# Patient Record
Sex: FEMALE | Race: BLACK OR AFRICAN AMERICAN | NOT HISPANIC OR LATINO | Employment: UNEMPLOYED | URBAN - METROPOLITAN AREA
[De-identification: names, ages, dates, MRNs, and addresses within clinical notes are randomized per-mention and may not be internally consistent; named-entity substitution may affect disease eponyms.]

---

## 2021-09-21 ENCOUNTER — APPOINTMENT (OUTPATIENT)
Dept: CT IMAGING | Facility: HOSPITAL | Age: 31
End: 2021-09-21

## 2021-09-21 ENCOUNTER — HOSPITAL ENCOUNTER (INPATIENT)
Facility: HOSPITAL | Age: 31
LOS: 5 days | Discharge: HOME | End: 2021-09-27
Attending: EMERGENCY MEDICINE | Admitting: INTERNAL MEDICINE

## 2021-09-21 DIAGNOSIS — K85.20 ALCOHOL-INDUCED ACUTE PANCREATITIS WITHOUT INFECTION OR NECROSIS: Primary | ICD-10-CM

## 2021-09-21 DIAGNOSIS — R74.8 ELEVATED LIVER ENZYMES: ICD-10-CM

## 2021-09-21 DIAGNOSIS — R11.2 NON-INTRACTABLE VOMITING WITH NAUSEA, UNSPECIFIED VOMITING TYPE: ICD-10-CM

## 2021-09-21 LAB
ALBUMIN SERPL-MCNC: 4.6 G/DL (ref 3.5–5.2)
ALBUMIN/GLOB SERPL: 1.6 G/DL
ALP SERPL-CCNC: 82 U/L (ref 39–117)
ALT SERPL W P-5'-P-CCNC: 84 U/L (ref 1–33)
ANION GAP SERPL CALCULATED.3IONS-SCNC: 15.2 MMOL/L (ref 5–15)
AST SERPL-CCNC: 217 U/L (ref 1–32)
BACTERIA UR QL AUTO: ABNORMAL /HPF
BASOPHILS # BLD AUTO: 0.05 10*3/MM3 (ref 0–0.2)
BASOPHILS NFR BLD AUTO: 1.2 % (ref 0–1.5)
BILIRUB SERPL-MCNC: 0.4 MG/DL (ref 0–1.2)
BILIRUB UR QL STRIP: NEGATIVE
BUN SERPL-MCNC: 5 MG/DL (ref 6–20)
BUN/CREAT SERPL: 12.2 (ref 7–25)
CALCIUM SPEC-SCNC: 10 MG/DL (ref 8.6–10.5)
CHLORIDE SERPL-SCNC: 98 MMOL/L (ref 98–107)
CLARITY UR: ABNORMAL
CLUE CELLS SPEC QL WET PREP: NORMAL
CO2 SERPL-SCNC: 24.8 MMOL/L (ref 22–29)
COLOR UR: YELLOW
CREAT SERPL-MCNC: 0.41 MG/DL (ref 0.57–1)
DEPRECATED RDW RBC AUTO: 48.3 FL (ref 37–54)
EOSINOPHIL # BLD AUTO: 0.03 10*3/MM3 (ref 0–0.4)
EOSINOPHIL NFR BLD AUTO: 0.7 % (ref 0.3–6.2)
ERYTHROCYTE [DISTWIDTH] IN BLOOD BY AUTOMATED COUNT: 14.5 % (ref 12.3–15.4)
GFR SERPL CREATININE-BSD FRML MDRD: >150 ML/MIN/1.73
GLOBULIN UR ELPH-MCNC: 2.9 GM/DL
GLUCOSE SERPL-MCNC: 83 MG/DL (ref 65–99)
GLUCOSE UR STRIP-MCNC: NEGATIVE MG/DL
HCG SERPL QL: NEGATIVE
HCT VFR BLD AUTO: 35.9 % (ref 34–46.6)
HGB BLD-MCNC: 12.3 G/DL (ref 12–15.9)
HGB UR QL STRIP.AUTO: NEGATIVE
HOLD SPECIMEN: NORMAL
HYALINE CASTS UR QL AUTO: ABNORMAL /LPF
HYDATID CYST SPEC WET PREP: NORMAL
IMM GRANULOCYTES # BLD AUTO: 0.02 10*3/MM3 (ref 0–0.05)
IMM GRANULOCYTES NFR BLD AUTO: 0.5 % (ref 0–0.5)
KETONES UR QL STRIP: ABNORMAL
KOH PREP NAIL: NORMAL
LEUKOCYTE ESTERASE UR QL STRIP.AUTO: NEGATIVE
LIPASE SERPL-CCNC: 834 U/L (ref 13–60)
LYMPHOCYTES # BLD AUTO: 1.03 10*3/MM3 (ref 0.7–3.1)
LYMPHOCYTES NFR BLD AUTO: 23.7 % (ref 19.6–45.3)
MCH RBC QN AUTO: 31.5 PG (ref 26.6–33)
MCHC RBC AUTO-ENTMCNC: 34.3 G/DL (ref 31.5–35.7)
MCV RBC AUTO: 92.1 FL (ref 79–97)
MONOCYTES # BLD AUTO: 0.55 10*3/MM3 (ref 0.1–0.9)
MONOCYTES NFR BLD AUTO: 12.7 % (ref 5–12)
NEUTROPHILS NFR BLD AUTO: 2.66 10*3/MM3 (ref 1.7–7)
NEUTROPHILS NFR BLD AUTO: 61.2 % (ref 42.7–76)
NITRITE UR QL STRIP: NEGATIVE
NRBC BLD AUTO-RTO: 0.5 /100 WBC (ref 0–0.2)
PH UR STRIP.AUTO: 7.5 [PH] (ref 5–8)
PLATELET # BLD AUTO: 259 10*3/MM3 (ref 140–450)
PMV BLD AUTO: 9.5 FL (ref 6–12)
POTASSIUM SERPL-SCNC: 3.9 MMOL/L (ref 3.5–5.2)
PROT SERPL-MCNC: 7.5 G/DL (ref 6–8.5)
PROT UR QL STRIP: ABNORMAL
RBC # BLD AUTO: 3.9 10*6/MM3 (ref 3.77–5.28)
RBC # UR: ABNORMAL /HPF
REF LAB TEST METHOD: ABNORMAL
SODIUM SERPL-SCNC: 138 MMOL/L (ref 136–145)
SP GR UR STRIP: 1.02 (ref 1–1.03)
SQUAMOUS #/AREA URNS HPF: ABNORMAL /HPF
T VAGINALIS SPEC QL WET PREP: NORMAL
UROBILINOGEN UR QL STRIP: ABNORMAL
WBC # BLD AUTO: 4.34 10*3/MM3 (ref 3.4–10.8)
WBC SPEC QL WET PREP: NORMAL
WBC UR QL AUTO: ABNORMAL /HPF
WHOLE BLOOD HOLD SPECIMEN: NORMAL
YEAST GENITAL QL WET PREP: NORMAL

## 2021-09-21 PROCEDURE — 74177 CT ABD & PELVIS W/CONTRAST: CPT

## 2021-09-21 PROCEDURE — 25010000002 DIPHENHYDRAMINE PER 50 MG: Performed by: NURSE PRACTITIONER

## 2021-09-21 PROCEDURE — 25010000002 IOPAMIDOL 61 % SOLUTION: Performed by: EMERGENCY MEDICINE

## 2021-09-21 PROCEDURE — 85025 COMPLETE CBC W/AUTO DIFF WBC: CPT | Performed by: EMERGENCY MEDICINE

## 2021-09-21 PROCEDURE — 87591 N.GONORRHOEAE DNA AMP PROB: CPT | Performed by: NURSE PRACTITIONER

## 2021-09-21 PROCEDURE — 87491 CHLMYD TRACH DNA AMP PROBE: CPT | Performed by: NURSE PRACTITIONER

## 2021-09-21 PROCEDURE — 25010000002 HYDROMORPHONE PER 4 MG: Performed by: NURSE PRACTITIONER

## 2021-09-21 PROCEDURE — 25010000002 HYDROMORPHONE 1 MG/ML SOLUTION: Performed by: NURSE PRACTITIONER

## 2021-09-21 PROCEDURE — G0378 HOSPITAL OBSERVATION PER HR: HCPCS

## 2021-09-21 PROCEDURE — 99285 EMERGENCY DEPT VISIT HI MDM: CPT

## 2021-09-21 PROCEDURE — 83690 ASSAY OF LIPASE: CPT | Performed by: EMERGENCY MEDICINE

## 2021-09-21 PROCEDURE — U0004 COV-19 TEST NON-CDC HGH THRU: HCPCS | Performed by: NURSE PRACTITIONER

## 2021-09-21 PROCEDURE — 80053 COMPREHEN METABOLIC PANEL: CPT | Performed by: EMERGENCY MEDICINE

## 2021-09-21 PROCEDURE — 25010000002 ONDANSETRON PER 1 MG: Performed by: NURSE PRACTITIONER

## 2021-09-21 PROCEDURE — 82077 ASSAY SPEC XCP UR&BREATH IA: CPT | Performed by: NURSE PRACTITIONER

## 2021-09-21 PROCEDURE — 81001 URINALYSIS AUTO W/SCOPE: CPT | Performed by: EMERGENCY MEDICINE

## 2021-09-21 PROCEDURE — 25010000002 DROPERIDOL PER 5 MG: Performed by: NURSE PRACTITIONER

## 2021-09-21 PROCEDURE — 87220 TISSUE EXAM FOR FUNGI: CPT | Performed by: NURSE PRACTITIONER

## 2021-09-21 PROCEDURE — 87210 SMEAR WET MOUNT SALINE/INK: CPT | Performed by: NURSE PRACTITIONER

## 2021-09-21 PROCEDURE — 84703 CHORIONIC GONADOTROPIN ASSAY: CPT | Performed by: EMERGENCY MEDICINE

## 2021-09-21 RX ORDER — DROPERIDOL 2.5 MG/ML
2.5 INJECTION, SOLUTION INTRAMUSCULAR; INTRAVENOUS ONCE
Status: COMPLETED | OUTPATIENT
Start: 2021-09-21 | End: 2021-09-21

## 2021-09-21 RX ORDER — ONDANSETRON 2 MG/ML
4 INJECTION INTRAMUSCULAR; INTRAVENOUS ONCE
Status: COMPLETED | OUTPATIENT
Start: 2021-09-21 | End: 2021-09-21

## 2021-09-21 RX ORDER — SODIUM CHLORIDE 0.9 % (FLUSH) 0.9 %
10 SYRINGE (ML) INJECTION AS NEEDED
Status: DISCONTINUED | OUTPATIENT
Start: 2021-09-21 | End: 2021-09-27 | Stop reason: HOSPADM

## 2021-09-21 RX ORDER — ACETAMINOPHEN 325 MG/1
650 TABLET ORAL EVERY 4 HOURS PRN
Status: DISCONTINUED | OUTPATIENT
Start: 2021-09-21 | End: 2021-09-27 | Stop reason: HOSPADM

## 2021-09-21 RX ORDER — ACETAMINOPHEN 650 MG/1
650 SUPPOSITORY RECTAL EVERY 4 HOURS PRN
Status: DISCONTINUED | OUTPATIENT
Start: 2021-09-21 | End: 2021-09-27 | Stop reason: HOSPADM

## 2021-09-21 RX ORDER — SODIUM CHLORIDE 9 MG/ML
200 INJECTION, SOLUTION INTRAVENOUS CONTINUOUS
Status: DISCONTINUED | OUTPATIENT
Start: 2021-09-21 | End: 2021-09-27

## 2021-09-21 RX ORDER — HYDROMORPHONE HYDROCHLORIDE 1 MG/ML
0.5 INJECTION, SOLUTION INTRAMUSCULAR; INTRAVENOUS; SUBCUTANEOUS ONCE
Status: COMPLETED | OUTPATIENT
Start: 2021-09-21 | End: 2021-09-21

## 2021-09-21 RX ORDER — SODIUM CHLORIDE 0.9 % (FLUSH) 0.9 %
10 SYRINGE (ML) INJECTION EVERY 12 HOURS SCHEDULED
Status: DISCONTINUED | OUTPATIENT
Start: 2021-09-21 | End: 2021-09-27 | Stop reason: HOSPADM

## 2021-09-21 RX ORDER — ONDANSETRON 2 MG/ML
4 INJECTION INTRAMUSCULAR; INTRAVENOUS EVERY 6 HOURS PRN
Status: DISCONTINUED | OUTPATIENT
Start: 2021-09-21 | End: 2021-09-22

## 2021-09-21 RX ORDER — HYDROMORPHONE HYDROCHLORIDE 1 MG/ML
0.5 INJECTION, SOLUTION INTRAMUSCULAR; INTRAVENOUS; SUBCUTANEOUS EVERY 4 HOURS PRN
Status: DISCONTINUED | OUTPATIENT
Start: 2021-09-21 | End: 2021-09-22

## 2021-09-21 RX ORDER — DIPHENHYDRAMINE HYDROCHLORIDE 50 MG/ML
25 INJECTION INTRAMUSCULAR; INTRAVENOUS ONCE
Status: COMPLETED | OUTPATIENT
Start: 2021-09-21 | End: 2021-09-21

## 2021-09-21 RX ORDER — ACETAMINOPHEN 160 MG/5ML
650 SOLUTION ORAL EVERY 4 HOURS PRN
Status: DISCONTINUED | OUTPATIENT
Start: 2021-09-21 | End: 2021-09-27 | Stop reason: HOSPADM

## 2021-09-21 RX ADMIN — DROPERIDOL 2.5 MG: 2.5 INJECTION, SOLUTION INTRAMUSCULAR; INTRAVENOUS at 22:49

## 2021-09-21 RX ADMIN — ONDANSETRON 4 MG: 2 INJECTION INTRAMUSCULAR; INTRAVENOUS at 21:02

## 2021-09-21 RX ADMIN — HYDROMORPHONE HYDROCHLORIDE 1 MG: 1 INJECTION, SOLUTION INTRAMUSCULAR; INTRAVENOUS; SUBCUTANEOUS at 22:50

## 2021-09-21 RX ADMIN — SODIUM CHLORIDE 1000 ML: 9 INJECTION, SOLUTION INTRAVENOUS at 21:02

## 2021-09-21 RX ADMIN — HYDROMORPHONE HYDROCHLORIDE 0.5 MG: 1 INJECTION, SOLUTION INTRAMUSCULAR; INTRAVENOUS; SUBCUTANEOUS at 21:02

## 2021-09-21 RX ADMIN — DIPHENHYDRAMINE HYDROCHLORIDE 25 MG: 50 INJECTION, SOLUTION INTRAMUSCULAR; INTRAVENOUS at 22:49

## 2021-09-21 RX ADMIN — IOPAMIDOL 100 ML: 612 INJECTION, SOLUTION INTRAVENOUS at 21:57

## 2021-09-22 ENCOUNTER — APPOINTMENT (OUTPATIENT)
Dept: ULTRASOUND IMAGING | Facility: HOSPITAL | Age: 31
End: 2021-09-22

## 2021-09-22 PROBLEM — R10.13 EPIGASTRIC PAIN: Status: ACTIVE | Noted: 2021-09-22

## 2021-09-22 PROBLEM — R74.01 TRANSAMINITIS: Status: ACTIVE | Noted: 2021-09-22

## 2021-09-22 LAB
ALBUMIN SERPL-MCNC: 4.1 G/DL (ref 3.5–5.2)
ALBUMIN/GLOB SERPL: 1.7 G/DL
ALP SERPL-CCNC: 75 U/L (ref 39–117)
ALT SERPL W P-5'-P-CCNC: 59 U/L (ref 1–33)
ANION GAP SERPL CALCULATED.3IONS-SCNC: 12.6 MMOL/L (ref 5–15)
AST SERPL-CCNC: 128 U/L (ref 1–32)
BASOPHILS # BLD AUTO: 0.05 10*3/MM3 (ref 0–0.2)
BASOPHILS NFR BLD AUTO: 1.2 % (ref 0–1.5)
BILIRUB SERPL-MCNC: 0.6 MG/DL (ref 0–1.2)
BUN SERPL-MCNC: 4 MG/DL (ref 6–20)
BUN/CREAT SERPL: 8.7 (ref 7–25)
CALCIUM SPEC-SCNC: 9 MG/DL (ref 8.6–10.5)
CHLORIDE SERPL-SCNC: 98 MMOL/L (ref 98–107)
CO2 SERPL-SCNC: 24.4 MMOL/L (ref 22–29)
CREAT SERPL-MCNC: 0.46 MG/DL (ref 0.57–1)
DEPRECATED RDW RBC AUTO: 46.7 FL (ref 37–54)
EOSINOPHIL # BLD AUTO: 0.02 10*3/MM3 (ref 0–0.4)
EOSINOPHIL NFR BLD AUTO: 0.5 % (ref 0.3–6.2)
ERYTHROCYTE [DISTWIDTH] IN BLOOD BY AUTOMATED COUNT: 14 % (ref 12.3–15.4)
ETHANOL BLD-MCNC: 42 MG/DL (ref 0–10)
ETHANOL UR QL: 0.04 %
GFR SERPL CREATININE-BSD FRML MDRD: >150 ML/MIN/1.73
GLOBULIN UR ELPH-MCNC: 2.4 GM/DL
GLUCOSE SERPL-MCNC: 83 MG/DL (ref 65–99)
HAV IGM SERPL QL IA: NORMAL
HBV CORE IGM SERPL QL IA: NORMAL
HBV SURFACE AG SERPL QL IA: NORMAL
HCT VFR BLD AUTO: 33 % (ref 34–46.6)
HCV AB SER DONR QL: NORMAL
HGB BLD-MCNC: 11.4 G/DL (ref 12–15.9)
IMM GRANULOCYTES # BLD AUTO: 0.03 10*3/MM3 (ref 0–0.05)
IMM GRANULOCYTES NFR BLD AUTO: 0.7 % (ref 0–0.5)
INR PPP: 1.14 (ref 0.9–1.1)
LYMPHOCYTES # BLD AUTO: 0.63 10*3/MM3 (ref 0.7–3.1)
LYMPHOCYTES NFR BLD AUTO: 14.5 % (ref 19.6–45.3)
MCH RBC QN AUTO: 31.4 PG (ref 26.6–33)
MCHC RBC AUTO-ENTMCNC: 34.5 G/DL (ref 31.5–35.7)
MCV RBC AUTO: 90.9 FL (ref 79–97)
MONOCYTES # BLD AUTO: 0.64 10*3/MM3 (ref 0.1–0.9)
MONOCYTES NFR BLD AUTO: 14.7 % (ref 5–12)
NEUTROPHILS NFR BLD AUTO: 2.97 10*3/MM3 (ref 1.7–7)
NEUTROPHILS NFR BLD AUTO: 68.4 % (ref 42.7–76)
NRBC BLD AUTO-RTO: 0.2 /100 WBC (ref 0–0.2)
PLATELET # BLD AUTO: 265 10*3/MM3 (ref 140–450)
PMV BLD AUTO: 10 FL (ref 6–12)
POTASSIUM SERPL-SCNC: 3.9 MMOL/L (ref 3.5–5.2)
PROT SERPL-MCNC: 6.5 G/DL (ref 6–8.5)
PROTHROMBIN TIME: 14.4 SECONDS (ref 11.7–14.2)
RBC # BLD AUTO: 3.63 10*6/MM3 (ref 3.77–5.28)
SARS-COV-2 ORF1AB RESP QL NAA+PROBE: NOT DETECTED
SODIUM SERPL-SCNC: 135 MMOL/L (ref 136–145)
WBC # BLD AUTO: 4.34 10*3/MM3 (ref 3.4–10.8)

## 2021-09-22 PROCEDURE — 25010000002 HYDROMORPHONE PER 4 MG: Performed by: NURSE PRACTITIONER

## 2021-09-22 PROCEDURE — 76705 ECHO EXAM OF ABDOMEN: CPT

## 2021-09-22 PROCEDURE — 80074 ACUTE HEPATITIS PANEL: CPT | Performed by: NURSE PRACTITIONER

## 2021-09-22 PROCEDURE — 36415 COLL VENOUS BLD VENIPUNCTURE: CPT | Performed by: NURSE PRACTITIONER

## 2021-09-22 PROCEDURE — 80053 COMPREHEN METABOLIC PANEL: CPT | Performed by: NURSE PRACTITIONER

## 2021-09-22 PROCEDURE — 25010000002 ONDANSETRON PER 1 MG: Performed by: NURSE PRACTITIONER

## 2021-09-22 PROCEDURE — 85025 COMPLETE CBC W/AUTO DIFF WBC: CPT | Performed by: NURSE PRACTITIONER

## 2021-09-22 PROCEDURE — 99222 1ST HOSP IP/OBS MODERATE 55: CPT | Performed by: INTERNAL MEDICINE

## 2021-09-22 PROCEDURE — 85610 PROTHROMBIN TIME: CPT | Performed by: NURSE PRACTITIONER

## 2021-09-22 PROCEDURE — 25010000002 ONDANSETRON PER 1 MG: Performed by: HOSPITALIST

## 2021-09-22 RX ORDER — HYDROMORPHONE HYDROCHLORIDE 1 MG/ML
0.5 INJECTION, SOLUTION INTRAMUSCULAR; INTRAVENOUS; SUBCUTANEOUS
Status: DISCONTINUED | OUTPATIENT
Start: 2021-09-22 | End: 2021-09-27

## 2021-09-22 RX ORDER — ONDANSETRON 2 MG/ML
4 INJECTION INTRAMUSCULAR; INTRAVENOUS EVERY 4 HOURS PRN
Status: DISCONTINUED | OUTPATIENT
Start: 2021-09-22 | End: 2021-09-27 | Stop reason: HOSPADM

## 2021-09-22 RX ORDER — FAMOTIDINE 10 MG/ML
20 INJECTION, SOLUTION INTRAVENOUS EVERY 12 HOURS SCHEDULED
Status: DISCONTINUED | OUTPATIENT
Start: 2021-09-22 | End: 2021-09-27 | Stop reason: HOSPADM

## 2021-09-22 RX ORDER — LORAZEPAM 1 MG/1
1 TABLET ORAL EVERY 6 HOURS PRN
Status: DISCONTINUED | OUTPATIENT
Start: 2021-09-22 | End: 2021-09-27

## 2021-09-22 RX ADMIN — ONDANSETRON 4 MG: 2 INJECTION INTRAMUSCULAR; INTRAVENOUS at 21:26

## 2021-09-22 RX ADMIN — ONDANSETRON 4 MG: 2 INJECTION INTRAMUSCULAR; INTRAVENOUS at 12:32

## 2021-09-22 RX ADMIN — SODIUM CHLORIDE, PRESERVATIVE FREE 10 ML: 5 INJECTION INTRAVENOUS at 20:33

## 2021-09-22 RX ADMIN — LORAZEPAM 1 MG: 1 TABLET ORAL at 09:37

## 2021-09-22 RX ADMIN — SODIUM CHLORIDE, PRESERVATIVE FREE 10 ML: 5 INJECTION INTRAVENOUS at 00:44

## 2021-09-22 RX ADMIN — HYDROMORPHONE HYDROCHLORIDE 0.5 MG: 1 INJECTION, SOLUTION INTRAMUSCULAR; INTRAVENOUS; SUBCUTANEOUS at 19:10

## 2021-09-22 RX ADMIN — FAMOTIDINE 20 MG: 10 INJECTION INTRAVENOUS at 11:04

## 2021-09-22 RX ADMIN — HYDROMORPHONE HYDROCHLORIDE 0.5 MG: 1 INJECTION, SOLUTION INTRAMUSCULAR; INTRAVENOUS; SUBCUTANEOUS at 11:33

## 2021-09-22 RX ADMIN — HYDROMORPHONE HYDROCHLORIDE 0.5 MG: 1 INJECTION, SOLUTION INTRAMUSCULAR; INTRAVENOUS; SUBCUTANEOUS at 16:13

## 2021-09-22 RX ADMIN — SODIUM CHLORIDE 200 ML/HR: 9 INJECTION, SOLUTION INTRAVENOUS at 09:37

## 2021-09-22 RX ADMIN — FAMOTIDINE 20 MG: 10 INJECTION INTRAVENOUS at 20:33

## 2021-09-22 RX ADMIN — HYDROMORPHONE HYDROCHLORIDE 0.5 MG: 1 INJECTION, SOLUTION INTRAMUSCULAR; INTRAVENOUS; SUBCUTANEOUS at 09:37

## 2021-09-22 RX ADMIN — HYDROMORPHONE HYDROCHLORIDE 0.5 MG: 1 INJECTION, SOLUTION INTRAMUSCULAR; INTRAVENOUS; SUBCUTANEOUS at 04:50

## 2021-09-22 RX ADMIN — SODIUM CHLORIDE 200 ML/HR: 9 INJECTION, SOLUTION INTRAVENOUS at 20:29

## 2021-09-22 RX ADMIN — SODIUM CHLORIDE, PRESERVATIVE FREE 10 ML: 5 INJECTION INTRAVENOUS at 11:05

## 2021-09-22 RX ADMIN — ONDANSETRON 4 MG: 2 INJECTION INTRAMUSCULAR; INTRAVENOUS at 08:23

## 2021-09-22 RX ADMIN — LORAZEPAM 1 MG: 1 TABLET ORAL at 15:35

## 2021-09-22 RX ADMIN — ONDANSETRON 4 MG: 2 INJECTION INTRAMUSCULAR; INTRAVENOUS at 02:24

## 2021-09-22 RX ADMIN — HYDROMORPHONE HYDROCHLORIDE 0.5 MG: 1 INJECTION, SOLUTION INTRAMUSCULAR; INTRAVENOUS; SUBCUTANEOUS at 21:26

## 2021-09-22 RX ADMIN — SODIUM CHLORIDE 125 ML/HR: 9 INJECTION, SOLUTION INTRAVENOUS at 00:39

## 2021-09-22 RX ADMIN — SODIUM CHLORIDE 200 ML/HR: 9 INJECTION, SOLUTION INTRAVENOUS at 15:25

## 2021-09-22 RX ADMIN — LORAZEPAM 1 MG: 1 TABLET ORAL at 21:26

## 2021-09-22 RX ADMIN — HYDROMORPHONE HYDROCHLORIDE 0.5 MG: 1 INJECTION, SOLUTION INTRAMUSCULAR; INTRAVENOUS; SUBCUTANEOUS at 06:45

## 2021-09-22 RX ADMIN — HYDROMORPHONE HYDROCHLORIDE 0.5 MG: 1 INJECTION, SOLUTION INTRAMUSCULAR; INTRAVENOUS; SUBCUTANEOUS at 13:52

## 2021-09-22 RX ADMIN — HYDROMORPHONE HYDROCHLORIDE 0.5 MG: 1 INJECTION, SOLUTION INTRAMUSCULAR; INTRAVENOUS; SUBCUTANEOUS at 02:24

## 2021-09-22 RX ADMIN — SODIUM CHLORIDE, PRESERVATIVE FREE 10 ML: 5 INJECTION INTRAVENOUS at 09:38

## 2021-09-23 LAB
ALBUMIN SERPL-MCNC: 3.4 G/DL (ref 3.5–5.2)
ALBUMIN/GLOB SERPL: 1.5 G/DL
ALP SERPL-CCNC: 65 U/L (ref 39–117)
ALT SERPL W P-5'-P-CCNC: 37 U/L (ref 1–33)
ANION GAP SERPL CALCULATED.3IONS-SCNC: 12.1 MMOL/L (ref 5–15)
AST SERPL-CCNC: 62 U/L (ref 1–32)
BASOPHILS # BLD AUTO: 0.03 10*3/MM3 (ref 0–0.2)
BASOPHILS NFR BLD AUTO: 0.6 % (ref 0–1.5)
BILIRUB SERPL-MCNC: 0.6 MG/DL (ref 0–1.2)
BUN SERPL-MCNC: 3 MG/DL (ref 6–20)
BUN/CREAT SERPL: 7.5 (ref 7–25)
C TRACH RRNA SPEC QL NAA+PROBE: NEGATIVE
CALCIUM SPEC-SCNC: 8.1 MG/DL (ref 8.6–10.5)
CHLORIDE SERPL-SCNC: 99 MMOL/L (ref 98–107)
CHOLEST SERPL-MCNC: 156 MG/DL (ref 0–200)
CO2 SERPL-SCNC: 21.9 MMOL/L (ref 22–29)
CREAT SERPL-MCNC: 0.4 MG/DL (ref 0.57–1)
DEPRECATED RDW RBC AUTO: 44.3 FL (ref 37–54)
EOSINOPHIL # BLD AUTO: 0.09 10*3/MM3 (ref 0–0.4)
EOSINOPHIL NFR BLD AUTO: 1.7 % (ref 0.3–6.2)
ERYTHROCYTE [DISTWIDTH] IN BLOOD BY AUTOMATED COUNT: 13.2 % (ref 12.3–15.4)
GFR SERPL CREATININE-BSD FRML MDRD: >150 ML/MIN/1.73
GLOBULIN UR ELPH-MCNC: 2.3 GM/DL
GLUCOSE SERPL-MCNC: 75 MG/DL (ref 65–99)
HCT VFR BLD AUTO: 33 % (ref 34–46.6)
HDLC SERPL-MCNC: 56 MG/DL (ref 40–60)
HGB BLD-MCNC: 11.4 G/DL (ref 12–15.9)
IMM GRANULOCYTES # BLD AUTO: 0.03 10*3/MM3 (ref 0–0.05)
IMM GRANULOCYTES NFR BLD AUTO: 0.6 % (ref 0–0.5)
LDLC SERPL CALC-MCNC: 90 MG/DL (ref 0–100)
LDLC/HDLC SERPL: 1.62 {RATIO}
LIPASE SERPL-CCNC: 889 U/L (ref 13–60)
LYMPHOCYTES # BLD AUTO: 0.66 10*3/MM3 (ref 0.7–3.1)
LYMPHOCYTES NFR BLD AUTO: 12.8 % (ref 19.6–45.3)
MAGNESIUM SERPL-MCNC: 1.5 MG/DL (ref 1.6–2.6)
MCH RBC QN AUTO: 31.5 PG (ref 26.6–33)
MCHC RBC AUTO-ENTMCNC: 34.5 G/DL (ref 31.5–35.7)
MCV RBC AUTO: 91.2 FL (ref 79–97)
MONOCYTES # BLD AUTO: 0.72 10*3/MM3 (ref 0.1–0.9)
MONOCYTES NFR BLD AUTO: 14 % (ref 5–12)
N GONORRHOEA RRNA SPEC QL NAA+PROBE: NEGATIVE
NEUTROPHILS NFR BLD AUTO: 3.63 10*3/MM3 (ref 1.7–7)
NEUTROPHILS NFR BLD AUTO: 70.3 % (ref 42.7–76)
NRBC BLD AUTO-RTO: 0.6 /100 WBC (ref 0–0.2)
PHOSPHATE SERPL-MCNC: 2.6 MG/DL (ref 2.5–4.5)
PLATELET # BLD AUTO: 231 10*3/MM3 (ref 140–450)
PMV BLD AUTO: 10.1 FL (ref 6–12)
POTASSIUM SERPL-SCNC: 3.7 MMOL/L (ref 3.5–5.2)
PROT SERPL-MCNC: 5.7 G/DL (ref 6–8.5)
RBC # BLD AUTO: 3.62 10*6/MM3 (ref 3.77–5.28)
SODIUM SERPL-SCNC: 133 MMOL/L (ref 136–145)
TRIGL SERPL-MCNC: 46 MG/DL (ref 0–150)
VLDLC SERPL-MCNC: 10 MG/DL (ref 5–40)
WBC # BLD AUTO: 5.16 10*3/MM3 (ref 3.4–10.8)

## 2021-09-23 PROCEDURE — 25010000002 ONDANSETRON PER 1 MG: Performed by: HOSPITALIST

## 2021-09-23 PROCEDURE — 25010000002 HYDROMORPHONE PER 4 MG: Performed by: NURSE PRACTITIONER

## 2021-09-23 PROCEDURE — 83690 ASSAY OF LIPASE: CPT | Performed by: HOSPITALIST

## 2021-09-23 PROCEDURE — 80053 COMPREHEN METABOLIC PANEL: CPT | Performed by: HOSPITALIST

## 2021-09-23 PROCEDURE — 90791 PSYCH DIAGNOSTIC EVALUATION: CPT

## 2021-09-23 PROCEDURE — 83735 ASSAY OF MAGNESIUM: CPT | Performed by: STUDENT IN AN ORGANIZED HEALTH CARE EDUCATION/TRAINING PROGRAM

## 2021-09-23 PROCEDURE — 84100 ASSAY OF PHOSPHORUS: CPT | Performed by: STUDENT IN AN ORGANIZED HEALTH CARE EDUCATION/TRAINING PROGRAM

## 2021-09-23 PROCEDURE — 85025 COMPLETE CBC W/AUTO DIFF WBC: CPT | Performed by: STUDENT IN AN ORGANIZED HEALTH CARE EDUCATION/TRAINING PROGRAM

## 2021-09-23 PROCEDURE — 80061 LIPID PANEL: CPT | Performed by: INTERNAL MEDICINE

## 2021-09-23 PROCEDURE — 99232 SBSQ HOSP IP/OBS MODERATE 35: CPT | Performed by: NURSE PRACTITIONER

## 2021-09-23 RX ORDER — CHOLECALCIFEROL (VITAMIN D3) 125 MCG
5 CAPSULE ORAL NIGHTLY PRN
Status: DISCONTINUED | OUTPATIENT
Start: 2021-09-23 | End: 2021-09-27 | Stop reason: HOSPADM

## 2021-09-23 RX ADMIN — SODIUM CHLORIDE, PRESERVATIVE FREE 10 ML: 5 INJECTION INTRAVENOUS at 21:06

## 2021-09-23 RX ADMIN — SODIUM CHLORIDE 200 ML/HR: 9 INJECTION, SOLUTION INTRAVENOUS at 16:49

## 2021-09-23 RX ADMIN — SODIUM CHLORIDE, PRESERVATIVE FREE 10 ML: 5 INJECTION INTRAVENOUS at 09:03

## 2021-09-23 RX ADMIN — ONDANSETRON 4 MG: 2 INJECTION INTRAMUSCULAR; INTRAVENOUS at 21:06

## 2021-09-23 RX ADMIN — ONDANSETRON 4 MG: 2 INJECTION INTRAMUSCULAR; INTRAVENOUS at 02:48

## 2021-09-23 RX ADMIN — Medication 5 MG: at 21:06

## 2021-09-23 RX ADMIN — HYDROMORPHONE HYDROCHLORIDE 0.5 MG: 1 INJECTION, SOLUTION INTRAMUSCULAR; INTRAVENOUS; SUBCUTANEOUS at 02:48

## 2021-09-23 RX ADMIN — ONDANSETRON 4 MG: 2 INJECTION INTRAMUSCULAR; INTRAVENOUS at 13:26

## 2021-09-23 RX ADMIN — HYDROMORPHONE HYDROCHLORIDE 0.5 MG: 1 INJECTION, SOLUTION INTRAMUSCULAR; INTRAVENOUS; SUBCUTANEOUS at 06:28

## 2021-09-23 RX ADMIN — HYDROMORPHONE HYDROCHLORIDE 0.5 MG: 1 INJECTION, SOLUTION INTRAMUSCULAR; INTRAVENOUS; SUBCUTANEOUS at 21:07

## 2021-09-23 RX ADMIN — SODIUM CHLORIDE 200 ML/HR: 9 INJECTION, SOLUTION INTRAVENOUS at 06:28

## 2021-09-23 RX ADMIN — ONDANSETRON 4 MG: 2 INJECTION INTRAMUSCULAR; INTRAVENOUS at 09:21

## 2021-09-23 RX ADMIN — HYDROMORPHONE HYDROCHLORIDE 0.5 MG: 1 INJECTION, SOLUTION INTRAMUSCULAR; INTRAVENOUS; SUBCUTANEOUS at 09:21

## 2021-09-23 RX ADMIN — HYDROMORPHONE HYDROCHLORIDE 0.5 MG: 1 INJECTION, SOLUTION INTRAMUSCULAR; INTRAVENOUS; SUBCUTANEOUS at 13:26

## 2021-09-23 RX ADMIN — HYDROMORPHONE HYDROCHLORIDE 0.5 MG: 1 INJECTION, SOLUTION INTRAMUSCULAR; INTRAVENOUS; SUBCUTANEOUS at 16:17

## 2021-09-23 RX ADMIN — FAMOTIDINE 20 MG: 10 INJECTION INTRAVENOUS at 09:02

## 2021-09-24 PROBLEM — K76.0 FATTY (CHANGE OF) LIVER, NOT ELSEWHERE CLASSIFIED: Chronic | Status: ACTIVE | Noted: 2021-09-24

## 2021-09-24 PROBLEM — K82.8 GALLBLADDER SLUDGE: Chronic | Status: ACTIVE | Noted: 2021-09-24

## 2021-09-24 LAB
ALBUMIN SERPL-MCNC: 3.3 G/DL (ref 3.5–5.2)
ALBUMIN/GLOB SERPL: 1.2 G/DL
ALP SERPL-CCNC: 65 U/L (ref 39–117)
ALT SERPL W P-5'-P-CCNC: 30 U/L (ref 1–33)
ANION GAP SERPL CALCULATED.3IONS-SCNC: 14.9 MMOL/L (ref 5–15)
AST SERPL-CCNC: 40 U/L (ref 1–32)
BASOPHILS # BLD AUTO: 0.02 10*3/MM3 (ref 0–0.2)
BASOPHILS NFR BLD AUTO: 0.3 % (ref 0–1.5)
BILIRUB SERPL-MCNC: 0.8 MG/DL (ref 0–1.2)
BUN SERPL-MCNC: 2 MG/DL (ref 6–20)
BUN/CREAT SERPL: 4.8 (ref 7–25)
CALCIUM SPEC-SCNC: 8.4 MG/DL (ref 8.6–10.5)
CHLORIDE SERPL-SCNC: 99 MMOL/L (ref 98–107)
CO2 SERPL-SCNC: 21.1 MMOL/L (ref 22–29)
CREAT SERPL-MCNC: 0.42 MG/DL (ref 0.57–1)
DEPRECATED RDW RBC AUTO: 49.8 FL (ref 37–54)
EOSINOPHIL # BLD AUTO: 0.16 10*3/MM3 (ref 0–0.4)
EOSINOPHIL NFR BLD AUTO: 2.6 % (ref 0.3–6.2)
ERYTHROCYTE [DISTWIDTH] IN BLOOD BY AUTOMATED COUNT: 14.1 % (ref 12.3–15.4)
GFR SERPL CREATININE-BSD FRML MDRD: >150 ML/MIN/1.73
GLOBULIN UR ELPH-MCNC: 2.7 GM/DL
GLUCOSE SERPL-MCNC: 69 MG/DL (ref 65–99)
HCT VFR BLD AUTO: 36.2 % (ref 34–46.6)
HGB BLD-MCNC: 11.9 G/DL (ref 12–15.9)
IMM GRANULOCYTES # BLD AUTO: 0.05 10*3/MM3 (ref 0–0.05)
IMM GRANULOCYTES NFR BLD AUTO: 0.8 % (ref 0–0.5)
LIPASE SERPL-CCNC: 495 U/L (ref 13–60)
LYMPHOCYTES # BLD AUTO: 0.89 10*3/MM3 (ref 0.7–3.1)
LYMPHOCYTES NFR BLD AUTO: 14.6 % (ref 19.6–45.3)
MAGNESIUM SERPL-MCNC: 1.6 MG/DL (ref 1.6–2.6)
MCH RBC QN AUTO: 32 PG (ref 26.6–33)
MCHC RBC AUTO-ENTMCNC: 32.9 G/DL (ref 31.5–35.7)
MCV RBC AUTO: 97.3 FL (ref 79–97)
MONOCYTES # BLD AUTO: 0.94 10*3/MM3 (ref 0.1–0.9)
MONOCYTES NFR BLD AUTO: 15.5 % (ref 5–12)
NEUTROPHILS NFR BLD AUTO: 4.02 10*3/MM3 (ref 1.7–7)
NEUTROPHILS NFR BLD AUTO: 66.2 % (ref 42.7–76)
NRBC BLD AUTO-RTO: 0.2 /100 WBC (ref 0–0.2)
PHOSPHATE SERPL-MCNC: 2.1 MG/DL (ref 2.5–4.5)
PLATELET # BLD AUTO: 216 10*3/MM3 (ref 140–450)
PMV BLD AUTO: 10.8 FL (ref 6–12)
POTASSIUM SERPL-SCNC: 3.3 MMOL/L (ref 3.5–5.2)
PROT SERPL-MCNC: 6 G/DL (ref 6–8.5)
RBC # BLD AUTO: 3.72 10*6/MM3 (ref 3.77–5.28)
SODIUM SERPL-SCNC: 135 MMOL/L (ref 136–145)
WBC # BLD AUTO: 6.08 10*3/MM3 (ref 3.4–10.8)

## 2021-09-24 PROCEDURE — 25010000002 HYDROMORPHONE PER 4 MG: Performed by: NURSE PRACTITIONER

## 2021-09-24 PROCEDURE — 85025 COMPLETE CBC W/AUTO DIFF WBC: CPT | Performed by: STUDENT IN AN ORGANIZED HEALTH CARE EDUCATION/TRAINING PROGRAM

## 2021-09-24 PROCEDURE — 80053 COMPREHEN METABOLIC PANEL: CPT | Performed by: NURSE PRACTITIONER

## 2021-09-24 PROCEDURE — 99232 SBSQ HOSP IP/OBS MODERATE 35: CPT | Performed by: NURSE PRACTITIONER

## 2021-09-24 PROCEDURE — 83735 ASSAY OF MAGNESIUM: CPT | Performed by: STUDENT IN AN ORGANIZED HEALTH CARE EDUCATION/TRAINING PROGRAM

## 2021-09-24 PROCEDURE — 83690 ASSAY OF LIPASE: CPT | Performed by: NURSE PRACTITIONER

## 2021-09-24 PROCEDURE — 84100 ASSAY OF PHOSPHORUS: CPT | Performed by: STUDENT IN AN ORGANIZED HEALTH CARE EDUCATION/TRAINING PROGRAM

## 2021-09-24 RX ORDER — POTASSIUM CHLORIDE 750 MG/1
20 TABLET, FILM COATED, EXTENDED RELEASE ORAL ONCE
Status: COMPLETED | OUTPATIENT
Start: 2021-09-24 | End: 2021-09-24

## 2021-09-24 RX ADMIN — SODIUM CHLORIDE 200 ML/HR: 9 INJECTION, SOLUTION INTRAVENOUS at 05:57

## 2021-09-24 RX ADMIN — SODIUM CHLORIDE 200 ML/HR: 9 INJECTION, SOLUTION INTRAVENOUS at 18:11

## 2021-09-24 RX ADMIN — HYDROMORPHONE HYDROCHLORIDE 0.5 MG: 1 INJECTION, SOLUTION INTRAMUSCULAR; INTRAVENOUS; SUBCUTANEOUS at 13:16

## 2021-09-24 RX ADMIN — HYDROMORPHONE HYDROCHLORIDE 0.5 MG: 1 INJECTION, SOLUTION INTRAMUSCULAR; INTRAVENOUS; SUBCUTANEOUS at 06:53

## 2021-09-24 RX ADMIN — HYDROMORPHONE HYDROCHLORIDE 0.5 MG: 1 INJECTION, SOLUTION INTRAMUSCULAR; INTRAVENOUS; SUBCUTANEOUS at 00:27

## 2021-09-24 RX ADMIN — SODIUM CHLORIDE 200 ML/HR: 9 INJECTION, SOLUTION INTRAVENOUS at 00:27

## 2021-09-24 RX ADMIN — FAMOTIDINE 20 MG: 10 INJECTION INTRAVENOUS at 23:16

## 2021-09-24 RX ADMIN — SODIUM CHLORIDE 200 ML/HR: 9 INJECTION, SOLUTION INTRAVENOUS at 11:33

## 2021-09-24 RX ADMIN — SODIUM CHLORIDE, PRESERVATIVE FREE 10 ML: 5 INJECTION INTRAVENOUS at 09:45

## 2021-09-24 RX ADMIN — POTASSIUM PHOSPHATE, MONOBASIC AND POTASSIUM PHOSPHATE, DIBASIC 21 MMOL: 224; 236 INJECTION, SOLUTION, CONCENTRATE INTRAVENOUS at 13:08

## 2021-09-24 RX ADMIN — SODIUM CHLORIDE 200 ML/HR: 9 INJECTION, SOLUTION INTRAVENOUS at 23:16

## 2021-09-24 RX ADMIN — HYDROMORPHONE HYDROCHLORIDE 0.5 MG: 1 INJECTION, SOLUTION INTRAMUSCULAR; INTRAVENOUS; SUBCUTANEOUS at 20:02

## 2021-09-24 RX ADMIN — POTASSIUM CHLORIDE 20 MEQ: 750 TABLET, EXTENDED RELEASE ORAL at 11:33

## 2021-09-24 RX ADMIN — FAMOTIDINE 20 MG: 10 INJECTION INTRAVENOUS at 09:44

## 2021-09-25 LAB
ALBUMIN SERPL-MCNC: 3.1 G/DL (ref 3.5–5.2)
ALBUMIN/GLOB SERPL: 1.2 G/DL
ALP SERPL-CCNC: 60 U/L (ref 39–117)
ALT SERPL W P-5'-P-CCNC: 23 U/L (ref 1–33)
ANION GAP SERPL CALCULATED.3IONS-SCNC: 13.3 MMOL/L (ref 5–15)
ANION GAP SERPL CALCULATED.3IONS-SCNC: 14.6 MMOL/L (ref 5–15)
AST SERPL-CCNC: 40 U/L (ref 1–32)
BASOPHILS # BLD AUTO: 0.05 10*3/MM3 (ref 0–0.2)
BASOPHILS NFR BLD AUTO: 0.8 % (ref 0–1.5)
BILIRUB SERPL-MCNC: 0.6 MG/DL (ref 0–1.2)
BUN SERPL-MCNC: <2 MG/DL (ref 6–20)
BUN SERPL-MCNC: <2 MG/DL (ref 6–20)
BUN/CREAT SERPL: ABNORMAL
BUN/CREAT SERPL: ABNORMAL
CALCIUM SPEC-SCNC: 8.2 MG/DL (ref 8.6–10.5)
CALCIUM SPEC-SCNC: 8.4 MG/DL (ref 8.6–10.5)
CHLORIDE SERPL-SCNC: 101 MMOL/L (ref 98–107)
CHLORIDE SERPL-SCNC: 102 MMOL/L (ref 98–107)
CO2 SERPL-SCNC: 22.4 MMOL/L (ref 22–29)
CO2 SERPL-SCNC: 22.7 MMOL/L (ref 22–29)
CREAT SERPL-MCNC: 0.29 MG/DL (ref 0.57–1)
CREAT SERPL-MCNC: 0.32 MG/DL (ref 0.57–1)
DEPRECATED RDW RBC AUTO: 45.5 FL (ref 37–54)
EOSINOPHIL # BLD AUTO: 0.13 10*3/MM3 (ref 0–0.4)
EOSINOPHIL NFR BLD AUTO: 2.1 % (ref 0.3–6.2)
ERYTHROCYTE [DISTWIDTH] IN BLOOD BY AUTOMATED COUNT: 13.5 % (ref 12.3–15.4)
GFR SERPL CREATININE-BSD FRML MDRD: >150 ML/MIN/1.73
GFR SERPL CREATININE-BSD FRML MDRD: >150 ML/MIN/1.73
GLOBULIN UR ELPH-MCNC: 2.5 GM/DL
GLUCOSE SERPL-MCNC: 101 MG/DL (ref 65–99)
GLUCOSE SERPL-MCNC: 93 MG/DL (ref 65–99)
HCT VFR BLD AUTO: 30.5 % (ref 34–46.6)
HGB BLD-MCNC: 10.6 G/DL (ref 12–15.9)
IMM GRANULOCYTES # BLD AUTO: 0.03 10*3/MM3 (ref 0–0.05)
IMM GRANULOCYTES NFR BLD AUTO: 0.5 % (ref 0–0.5)
LIPASE SERPL-CCNC: 231 U/L (ref 13–60)
LYMPHOCYTES # BLD AUTO: 1.03 10*3/MM3 (ref 0.7–3.1)
LYMPHOCYTES NFR BLD AUTO: 16.6 % (ref 19.6–45.3)
MAGNESIUM SERPL-MCNC: 1.6 MG/DL (ref 1.6–2.6)
MCH RBC QN AUTO: 31.9 PG (ref 26.6–33)
MCHC RBC AUTO-ENTMCNC: 34.8 G/DL (ref 31.5–35.7)
MCV RBC AUTO: 91.9 FL (ref 79–97)
MONOCYTES # BLD AUTO: 0.96 10*3/MM3 (ref 0.1–0.9)
MONOCYTES NFR BLD AUTO: 15.4 % (ref 5–12)
NEUTROPHILS NFR BLD AUTO: 4.02 10*3/MM3 (ref 1.7–7)
NEUTROPHILS NFR BLD AUTO: 64.6 % (ref 42.7–76)
NRBC BLD AUTO-RTO: 0.2 /100 WBC (ref 0–0.2)
PHOSPHATE SERPL-MCNC: 2.8 MG/DL (ref 2.5–4.5)
PLATELET # BLD AUTO: 232 10*3/MM3 (ref 140–450)
PMV BLD AUTO: 10.5 FL (ref 6–12)
POTASSIUM SERPL-SCNC: 3.1 MMOL/L (ref 3.5–5.2)
POTASSIUM SERPL-SCNC: 3.1 MMOL/L (ref 3.5–5.2)
PROT SERPL-MCNC: 5.6 G/DL (ref 6–8.5)
RBC # BLD AUTO: 3.32 10*6/MM3 (ref 3.77–5.28)
SODIUM SERPL-SCNC: 138 MMOL/L (ref 136–145)
SODIUM SERPL-SCNC: 138 MMOL/L (ref 136–145)
WBC # BLD AUTO: 6.22 10*3/MM3 (ref 3.4–10.8)

## 2021-09-25 PROCEDURE — 99232 SBSQ HOSP IP/OBS MODERATE 35: CPT | Performed by: INTERNAL MEDICINE

## 2021-09-25 PROCEDURE — 83690 ASSAY OF LIPASE: CPT | Performed by: NURSE PRACTITIONER

## 2021-09-25 PROCEDURE — 83735 ASSAY OF MAGNESIUM: CPT | Performed by: STUDENT IN AN ORGANIZED HEALTH CARE EDUCATION/TRAINING PROGRAM

## 2021-09-25 PROCEDURE — 25010000002 HYDROMORPHONE PER 4 MG: Performed by: NURSE PRACTITIONER

## 2021-09-25 PROCEDURE — 85025 COMPLETE CBC W/AUTO DIFF WBC: CPT | Performed by: STUDENT IN AN ORGANIZED HEALTH CARE EDUCATION/TRAINING PROGRAM

## 2021-09-25 PROCEDURE — 80053 COMPREHEN METABOLIC PANEL: CPT | Performed by: NURSE PRACTITIONER

## 2021-09-25 PROCEDURE — 84100 ASSAY OF PHOSPHORUS: CPT | Performed by: STUDENT IN AN ORGANIZED HEALTH CARE EDUCATION/TRAINING PROGRAM

## 2021-09-25 PROCEDURE — 80048 BASIC METABOLIC PNL TOTAL CA: CPT | Performed by: STUDENT IN AN ORGANIZED HEALTH CARE EDUCATION/TRAINING PROGRAM

## 2021-09-25 RX ORDER — CYCLOBENZAPRINE HCL 10 MG
10 TABLET ORAL ONCE
Status: COMPLETED | OUTPATIENT
Start: 2021-09-25 | End: 2021-09-26

## 2021-09-25 RX ORDER — POTASSIUM CHLORIDE 1.5 G/1.77G
40 POWDER, FOR SOLUTION ORAL ONCE
Status: DISCONTINUED | OUTPATIENT
Start: 2021-09-25 | End: 2021-09-27 | Stop reason: HOSPADM

## 2021-09-25 RX ORDER — POTASSIUM CHLORIDE 750 MG/1
20 TABLET, FILM COATED, EXTENDED RELEASE ORAL ONCE
Status: COMPLETED | OUTPATIENT
Start: 2021-09-25 | End: 2021-09-25

## 2021-09-25 RX ADMIN — Medication 5 MG: at 02:14

## 2021-09-25 RX ADMIN — SODIUM CHLORIDE 200 ML/HR: 9 INJECTION, SOLUTION INTRAVENOUS at 23:54

## 2021-09-25 RX ADMIN — POTASSIUM CHLORIDE 20 MEQ: 750 TABLET, EXTENDED RELEASE ORAL at 13:21

## 2021-09-25 RX ADMIN — SODIUM CHLORIDE 200 ML/HR: 9 INJECTION, SOLUTION INTRAVENOUS at 04:03

## 2021-09-25 RX ADMIN — SODIUM CHLORIDE 200 ML/HR: 9 INJECTION, SOLUTION INTRAVENOUS at 13:57

## 2021-09-25 RX ADMIN — SODIUM CHLORIDE, PRESERVATIVE FREE 10 ML: 5 INJECTION INTRAVENOUS at 08:58

## 2021-09-25 RX ADMIN — MAGNESIUM OXIDE 400 MG (241.3 MG MAGNESIUM) TABLET 800 MG: TABLET at 10:58

## 2021-09-25 RX ADMIN — Medication 2 PACKET: at 10:58

## 2021-09-25 RX ADMIN — ACETAMINOPHEN 650 MG: 325 TABLET, FILM COATED ORAL at 21:22

## 2021-09-25 RX ADMIN — HYDROMORPHONE HYDROCHLORIDE 0.5 MG: 1 INJECTION, SOLUTION INTRAMUSCULAR; INTRAVENOUS; SUBCUTANEOUS at 21:59

## 2021-09-25 RX ADMIN — HYDROMORPHONE HYDROCHLORIDE 0.5 MG: 1 INJECTION, SOLUTION INTRAMUSCULAR; INTRAVENOUS; SUBCUTANEOUS at 04:03

## 2021-09-25 RX ADMIN — FAMOTIDINE 20 MG: 10 INJECTION INTRAVENOUS at 21:21

## 2021-09-25 RX ADMIN — FAMOTIDINE 20 MG: 10 INJECTION INTRAVENOUS at 08:57

## 2021-09-25 RX ADMIN — LORAZEPAM 1 MG: 1 TABLET ORAL at 18:33

## 2021-09-25 RX ADMIN — SODIUM CHLORIDE 200 ML/HR: 9 INJECTION, SOLUTION INTRAVENOUS at 18:58

## 2021-09-25 RX ADMIN — SODIUM CHLORIDE 200 ML/HR: 9 INJECTION, SOLUTION INTRAVENOUS at 08:59

## 2021-09-26 LAB
ALBUMIN SERPL-MCNC: 3.6 G/DL (ref 3.5–5.2)
ALBUMIN/GLOB SERPL: 1.3 G/DL
ALP SERPL-CCNC: 71 U/L (ref 39–117)
ALT SERPL W P-5'-P-CCNC: 25 U/L (ref 1–33)
ANION GAP SERPL CALCULATED.3IONS-SCNC: 14.3 MMOL/L (ref 5–15)
ANION GAP SERPL CALCULATED.3IONS-SCNC: 15.5 MMOL/L (ref 5–15)
AST SERPL-CCNC: 47 U/L (ref 1–32)
BASOPHILS # BLD AUTO: 0.05 10*3/MM3 (ref 0–0.2)
BASOPHILS NFR BLD AUTO: 0.7 % (ref 0–1.5)
BILIRUB SERPL-MCNC: 0.6 MG/DL (ref 0–1.2)
BUN SERPL-MCNC: <2 MG/DL (ref 6–20)
BUN SERPL-MCNC: <2 MG/DL (ref 6–20)
BUN/CREAT SERPL: ABNORMAL
BUN/CREAT SERPL: ABNORMAL
CALCIUM SPEC-SCNC: 8.7 MG/DL (ref 8.6–10.5)
CALCIUM SPEC-SCNC: 8.9 MG/DL (ref 8.6–10.5)
CHLORIDE SERPL-SCNC: 100 MMOL/L (ref 98–107)
CHLORIDE SERPL-SCNC: 98 MMOL/L (ref 98–107)
CO2 SERPL-SCNC: 21.5 MMOL/L (ref 22–29)
CO2 SERPL-SCNC: 24.7 MMOL/L (ref 22–29)
CREAT SERPL-MCNC: 0.3 MG/DL (ref 0.57–1)
CREAT SERPL-MCNC: 0.3 MG/DL (ref 0.57–1)
DEPRECATED RDW RBC AUTO: 46.1 FL (ref 37–54)
EOSINOPHIL # BLD AUTO: 0.21 10*3/MM3 (ref 0–0.4)
EOSINOPHIL NFR BLD AUTO: 2.8 % (ref 0.3–6.2)
ERYTHROCYTE [DISTWIDTH] IN BLOOD BY AUTOMATED COUNT: 13.6 % (ref 12.3–15.4)
GFR SERPL CREATININE-BSD FRML MDRD: >150 ML/MIN/1.73
GFR SERPL CREATININE-BSD FRML MDRD: >150 ML/MIN/1.73
GLOBULIN UR ELPH-MCNC: 2.8 GM/DL
GLUCOSE SERPL-MCNC: 101 MG/DL (ref 65–99)
GLUCOSE SERPL-MCNC: 80 MG/DL (ref 65–99)
HCT VFR BLD AUTO: 35.4 % (ref 34–46.6)
HGB BLD-MCNC: 11.9 G/DL (ref 12–15.9)
IMM GRANULOCYTES # BLD AUTO: 0.07 10*3/MM3 (ref 0–0.05)
IMM GRANULOCYTES NFR BLD AUTO: 0.9 % (ref 0–0.5)
LYMPHOCYTES # BLD AUTO: 1.06 10*3/MM3 (ref 0.7–3.1)
LYMPHOCYTES NFR BLD AUTO: 14.2 % (ref 19.6–45.3)
MAGNESIUM SERPL-MCNC: 1.5 MG/DL (ref 1.6–2.6)
MAGNESIUM SERPL-MCNC: 2 MG/DL (ref 1.6–2.6)
MCH RBC QN AUTO: 31.3 PG (ref 26.6–33)
MCHC RBC AUTO-ENTMCNC: 33.6 G/DL (ref 31.5–35.7)
MCV RBC AUTO: 93.2 FL (ref 79–97)
MONOCYTES # BLD AUTO: 1.04 10*3/MM3 (ref 0.1–0.9)
MONOCYTES NFR BLD AUTO: 14 % (ref 5–12)
NEUTROPHILS NFR BLD AUTO: 5.01 10*3/MM3 (ref 1.7–7)
NEUTROPHILS NFR BLD AUTO: 67.4 % (ref 42.7–76)
NRBC BLD AUTO-RTO: 0 /100 WBC (ref 0–0.2)
PHOSPHATE SERPL-MCNC: 2.7 MG/DL (ref 2.5–4.5)
PLATELET # BLD AUTO: 273 10*3/MM3 (ref 140–450)
PMV BLD AUTO: 10.4 FL (ref 6–12)
POTASSIUM SERPL-SCNC: 2.8 MMOL/L (ref 3.5–5.2)
POTASSIUM SERPL-SCNC: 3.8 MMOL/L (ref 3.5–5.2)
PROT SERPL-MCNC: 6.4 G/DL (ref 6–8.5)
RBC # BLD AUTO: 3.8 10*6/MM3 (ref 3.77–5.28)
SODIUM SERPL-SCNC: 137 MMOL/L (ref 136–145)
SODIUM SERPL-SCNC: 137 MMOL/L (ref 136–145)
WBC # BLD AUTO: 7.44 10*3/MM3 (ref 3.4–10.8)

## 2021-09-26 PROCEDURE — 25010000003 POTASSIUM CHLORIDE 10 MEQ/100ML SOLUTION: Performed by: STUDENT IN AN ORGANIZED HEALTH CARE EDUCATION/TRAINING PROGRAM

## 2021-09-26 PROCEDURE — 80053 COMPREHEN METABOLIC PANEL: CPT | Performed by: STUDENT IN AN ORGANIZED HEALTH CARE EDUCATION/TRAINING PROGRAM

## 2021-09-26 PROCEDURE — 83735 ASSAY OF MAGNESIUM: CPT | Performed by: STUDENT IN AN ORGANIZED HEALTH CARE EDUCATION/TRAINING PROGRAM

## 2021-09-26 PROCEDURE — 80048 BASIC METABOLIC PNL TOTAL CA: CPT | Performed by: STUDENT IN AN ORGANIZED HEALTH CARE EDUCATION/TRAINING PROGRAM

## 2021-09-26 PROCEDURE — 99232 SBSQ HOSP IP/OBS MODERATE 35: CPT | Performed by: INTERNAL MEDICINE

## 2021-09-26 PROCEDURE — 85025 COMPLETE CBC W/AUTO DIFF WBC: CPT | Performed by: STUDENT IN AN ORGANIZED HEALTH CARE EDUCATION/TRAINING PROGRAM

## 2021-09-26 PROCEDURE — 84100 ASSAY OF PHOSPHORUS: CPT | Performed by: STUDENT IN AN ORGANIZED HEALTH CARE EDUCATION/TRAINING PROGRAM

## 2021-09-26 PROCEDURE — 25010000002 HYDROMORPHONE PER 4 MG: Performed by: NURSE PRACTITIONER

## 2021-09-26 PROCEDURE — 25010000002 MAGNESIUM SULFATE 2 GM/50ML SOLUTION: Performed by: STUDENT IN AN ORGANIZED HEALTH CARE EDUCATION/TRAINING PROGRAM

## 2021-09-26 RX ORDER — POTASSIUM CHLORIDE 7.45 MG/ML
10 INJECTION INTRAVENOUS ONCE
Status: DISCONTINUED | OUTPATIENT
Start: 2021-09-26 | End: 2021-09-26

## 2021-09-26 RX ORDER — POTASSIUM CHLORIDE 750 MG/1
20 TABLET, FILM COATED, EXTENDED RELEASE ORAL ONCE
Status: COMPLETED | OUTPATIENT
Start: 2021-09-26 | End: 2021-09-26

## 2021-09-26 RX ORDER — MAGNESIUM SULFATE HEPTAHYDRATE 40 MG/ML
2 INJECTION, SOLUTION INTRAVENOUS ONCE
Status: COMPLETED | OUTPATIENT
Start: 2021-09-26 | End: 2021-09-26

## 2021-09-26 RX ORDER — POTASSIUM CHLORIDE 7.45 MG/ML
10 INJECTION INTRAVENOUS ONCE
Status: COMPLETED | OUTPATIENT
Start: 2021-09-26 | End: 2021-09-26

## 2021-09-26 RX ORDER — AMLODIPINE BESYLATE 5 MG/1
5 TABLET ORAL DAILY
Status: DISCONTINUED | OUTPATIENT
Start: 2021-09-26 | End: 2021-09-27 | Stop reason: HOSPADM

## 2021-09-26 RX ORDER — HYDRALAZINE HYDROCHLORIDE 10 MG/1
10 TABLET, FILM COATED ORAL ONCE
Status: COMPLETED | OUTPATIENT
Start: 2021-09-26 | End: 2021-09-26

## 2021-09-26 RX ORDER — POTASSIUM CHLORIDE 750 MG/1
40 TABLET, FILM COATED, EXTENDED RELEASE ORAL ONCE
Status: COMPLETED | OUTPATIENT
Start: 2021-09-26 | End: 2021-09-26

## 2021-09-26 RX ADMIN — MAGNESIUM SULFATE HEPTAHYDRATE 2 G: 40 INJECTION, SOLUTION INTRAVENOUS at 13:49

## 2021-09-26 RX ADMIN — CYCLOBENZAPRINE 10 MG: 10 TABLET, FILM COATED ORAL at 05:20

## 2021-09-26 RX ADMIN — POTASSIUM CHLORIDE 10 MEQ: 7.46 INJECTION, SOLUTION INTRAVENOUS at 13:49

## 2021-09-26 RX ADMIN — POTASSIUM CHLORIDE 10 MEQ: 7.46 INJECTION, SOLUTION INTRAVENOUS at 15:20

## 2021-09-26 RX ADMIN — POTASSIUM CHLORIDE 40 MEQ: 750 TABLET, EXTENDED RELEASE ORAL at 13:49

## 2021-09-26 RX ADMIN — HYDRALAZINE HYDROCHLORIDE 10 MG: 10 TABLET, FILM COATED ORAL at 18:14

## 2021-09-26 RX ADMIN — FAMOTIDINE 20 MG: 10 INJECTION INTRAVENOUS at 09:05

## 2021-09-26 RX ADMIN — HYDROMORPHONE HYDROCHLORIDE 0.5 MG: 1 INJECTION, SOLUTION INTRAMUSCULAR; INTRAVENOUS; SUBCUTANEOUS at 19:50

## 2021-09-26 RX ADMIN — HYDROMORPHONE HYDROCHLORIDE 0.5 MG: 1 INJECTION, SOLUTION INTRAMUSCULAR; INTRAVENOUS; SUBCUTANEOUS at 21:44

## 2021-09-26 RX ADMIN — LORAZEPAM 1 MG: 1 TABLET ORAL at 20:43

## 2021-09-26 RX ADMIN — SODIUM CHLORIDE 200 ML/HR: 9 INJECTION, SOLUTION INTRAVENOUS at 09:05

## 2021-09-26 RX ADMIN — MAGNESIUM OXIDE 400 MG (241.3 MG MAGNESIUM) TABLET 800 MG: TABLET at 15:00

## 2021-09-26 RX ADMIN — Medication 5 MG: at 21:44

## 2021-09-26 RX ADMIN — ACETAMINOPHEN 650 MG: 325 TABLET, FILM COATED ORAL at 11:55

## 2021-09-26 RX ADMIN — AMLODIPINE BESYLATE 5 MG: 5 TABLET ORAL at 21:44

## 2021-09-26 RX ADMIN — SODIUM CHLORIDE 200 ML/HR: 9 INJECTION, SOLUTION INTRAVENOUS at 04:28

## 2021-09-26 RX ADMIN — POTASSIUM CHLORIDE 20 MEQ: 750 TABLET, EXTENDED RELEASE ORAL at 16:35

## 2021-09-26 RX ADMIN — SODIUM CHLORIDE, PRESERVATIVE FREE 10 ML: 5 INJECTION INTRAVENOUS at 09:05

## 2021-09-27 ENCOUNTER — READMISSION MANAGEMENT (OUTPATIENT)
Dept: CALL CENTER | Facility: HOSPITAL | Age: 31
End: 2021-09-27

## 2021-09-27 VITALS
WEIGHT: 147.1 LBS | DIASTOLIC BLOOD PRESSURE: 87 MMHG | HEART RATE: 74 BPM | RESPIRATION RATE: 16 BRPM | OXYGEN SATURATION: 98 % | BODY MASS INDEX: 21.79 KG/M2 | HEIGHT: 69 IN | SYSTOLIC BLOOD PRESSURE: 129 MMHG | TEMPERATURE: 99 F

## 2021-09-27 LAB
ALBUMIN SERPL-MCNC: 3.7 G/DL (ref 3.5–5.2)
ALBUMIN/GLOB SERPL: 1.2 G/DL
ALP SERPL-CCNC: 73 U/L (ref 39–117)
ALT SERPL W P-5'-P-CCNC: 22 U/L (ref 1–33)
ANION GAP SERPL CALCULATED.3IONS-SCNC: 14.4 MMOL/L (ref 5–15)
AST SERPL-CCNC: 45 U/L (ref 1–32)
BASOPHILS # BLD AUTO: 0.07 10*3/MM3 (ref 0–0.2)
BASOPHILS NFR BLD AUTO: 1.1 % (ref 0–1.5)
BILIRUB SERPL-MCNC: 0.6 MG/DL (ref 0–1.2)
BUN SERPL-MCNC: <2 MG/DL (ref 6–20)
BUN/CREAT SERPL: ABNORMAL
CALCIUM SPEC-SCNC: 8.8 MG/DL (ref 8.6–10.5)
CHLORIDE SERPL-SCNC: 96 MMOL/L (ref 98–107)
CO2 SERPL-SCNC: 22.6 MMOL/L (ref 22–29)
CREAT SERPL-MCNC: 0.34 MG/DL (ref 0.57–1)
DEPRECATED RDW RBC AUTO: 42.4 FL (ref 37–54)
EOSINOPHIL # BLD AUTO: 0.26 10*3/MM3 (ref 0–0.4)
EOSINOPHIL NFR BLD AUTO: 4 % (ref 0.3–6.2)
ERYTHROCYTE [DISTWIDTH] IN BLOOD BY AUTOMATED COUNT: 13 % (ref 12.3–15.4)
GFR SERPL CREATININE-BSD FRML MDRD: >150 ML/MIN/1.73
GLOBULIN UR ELPH-MCNC: 3.1 GM/DL
GLUCOSE SERPL-MCNC: 76 MG/DL (ref 65–99)
HCT VFR BLD AUTO: 32.7 % (ref 34–46.6)
HGB BLD-MCNC: 11.6 G/DL (ref 12–15.9)
IMM GRANULOCYTES # BLD AUTO: 0.04 10*3/MM3 (ref 0–0.05)
IMM GRANULOCYTES NFR BLD AUTO: 0.6 % (ref 0–0.5)
LYMPHOCYTES # BLD AUTO: 1.16 10*3/MM3 (ref 0.7–3.1)
LYMPHOCYTES NFR BLD AUTO: 17.8 % (ref 19.6–45.3)
MAGNESIUM SERPL-MCNC: 1.8 MG/DL (ref 1.6–2.6)
MCH RBC QN AUTO: 31.6 PG (ref 26.6–33)
MCHC RBC AUTO-ENTMCNC: 35.5 G/DL (ref 31.5–35.7)
MCV RBC AUTO: 89.1 FL (ref 79–97)
MONOCYTES # BLD AUTO: 1.08 10*3/MM3 (ref 0.1–0.9)
MONOCYTES NFR BLD AUTO: 16.6 % (ref 5–12)
NEUTROPHILS NFR BLD AUTO: 3.91 10*3/MM3 (ref 1.7–7)
NEUTROPHILS NFR BLD AUTO: 59.9 % (ref 42.7–76)
NRBC BLD AUTO-RTO: 0.2 /100 WBC (ref 0–0.2)
PHOSPHATE SERPL-MCNC: 2.8 MG/DL (ref 2.5–4.5)
PLATELET # BLD AUTO: 284 10*3/MM3 (ref 140–450)
PMV BLD AUTO: 10.6 FL (ref 6–12)
POTASSIUM SERPL-SCNC: 3.1 MMOL/L (ref 3.5–5.2)
PROT SERPL-MCNC: 6.8 G/DL (ref 6–8.5)
RBC # BLD AUTO: 3.67 10*6/MM3 (ref 3.77–5.28)
SODIUM SERPL-SCNC: 133 MMOL/L (ref 136–145)
WBC # BLD AUTO: 6.52 10*3/MM3 (ref 3.4–10.8)

## 2021-09-27 PROCEDURE — 80053 COMPREHEN METABOLIC PANEL: CPT | Performed by: STUDENT IN AN ORGANIZED HEALTH CARE EDUCATION/TRAINING PROGRAM

## 2021-09-27 PROCEDURE — 84100 ASSAY OF PHOSPHORUS: CPT | Performed by: STUDENT IN AN ORGANIZED HEALTH CARE EDUCATION/TRAINING PROGRAM

## 2021-09-27 PROCEDURE — 25010000002 HYDROMORPHONE PER 4 MG: Performed by: NURSE PRACTITIONER

## 2021-09-27 PROCEDURE — 85025 COMPLETE CBC W/AUTO DIFF WBC: CPT | Performed by: STUDENT IN AN ORGANIZED HEALTH CARE EDUCATION/TRAINING PROGRAM

## 2021-09-27 PROCEDURE — 83735 ASSAY OF MAGNESIUM: CPT | Performed by: STUDENT IN AN ORGANIZED HEALTH CARE EDUCATION/TRAINING PROGRAM

## 2021-09-27 RX ORDER — POTASSIUM CHLORIDE 1.5 G/1.77G
20 POWDER, FOR SOLUTION ORAL DAILY
Qty: 5 PACKET | Refills: 0 | Status: SHIPPED | OUTPATIENT
Start: 2021-09-27 | End: 2021-10-02

## 2021-09-27 RX ORDER — AMLODIPINE BESYLATE 5 MG/1
5 TABLET ORAL DAILY
Qty: 30 TABLET | Refills: 0 | Status: SHIPPED | OUTPATIENT
Start: 2021-09-27

## 2021-09-27 RX ADMIN — HYDROMORPHONE HYDROCHLORIDE 0.5 MG: 1 INJECTION, SOLUTION INTRAMUSCULAR; INTRAVENOUS; SUBCUTANEOUS at 01:28

## 2021-09-28 NOTE — OUTREACH NOTE
Prep Survey      Responses   [facility] patient discharged from?  [place]   Is LACE score < 7 ?  No   Emergency Room discharge w/ pulse ox?  No   Eligibility  Readm Mgmt   Discharge diagnosis  Acute pancreatitis, due to alcohol   Does the patient have one of the following disease processes/diagnoses(primary or secondary)?  Other   Does the patient have Home health ordered?  No   Is there a DME ordered?  No   Prep survey completed?  Yes          Cristina Doe RN

## 2021-09-29 ENCOUNTER — READMISSION MANAGEMENT (OUTPATIENT)
Dept: CALL CENTER | Facility: HOSPITAL | Age: 31
End: 2021-09-29

## 2021-09-29 NOTE — OUTREACH NOTE
Medical Week 1 Survey      Responses   [facility], [place], operated by [facility] patient discharged from?  [place]   Does the patient have one of the following disease processes/diagnoses(primary or secondary)?  Other   Week 1 attempt successful?  No   Unsuccessful attempts  Attempt 1          Brad Puga RN

## 2021-09-30 ENCOUNTER — READMISSION MANAGEMENT (OUTPATIENT)
Dept: CALL CENTER | Facility: HOSPITAL | Age: 31
End: 2021-09-30

## 2021-09-30 NOTE — OUTREACH NOTE
Medical Week 1 Survey      Responses   [facility], [place] patient discharged from?  [place]   Does the patient have one of the following disease processes/diagnoses(primary or secondary)?  Other   Week 1 attempt successful?  No   Unsuccessful attempts  Attempt 2          Beth Guajardo RN

## 2021-10-01 ENCOUNTER — READMISSION MANAGEMENT (OUTPATIENT)
Dept: CALL CENTER | Facility: HOSPITAL | Age: 31
End: 2021-10-01

## 2021-10-01 NOTE — OUTREACH NOTE
Medical Week 1 Survey      Responses   [facility], [place], operated by [facility] patient discharged from?  [place]   Does the patient have one of the following disease processes/diagnoses(primary or secondary)?  Other   Week 1 attempt successful?  No   Unsuccessful attempts  Attempt 3          Krupa Roe RN

## 2021-10-11 ENCOUNTER — READMISSION MANAGEMENT (OUTPATIENT)
Dept: CALL CENTER | Facility: HOSPITAL | Age: 31
End: 2021-10-11

## 2021-10-11 NOTE — OUTREACH NOTE
Medical Week 2 Survey      Responses   [facility] patient discharged from? [place]   Does the patient have one of the following disease processes/diagnoses(primary or secondary)? Other   Week 2 attempt successful? No   Unsuccessful attempts Attempt 1          Mayra Ruiz RN

## 2021-10-13 ENCOUNTER — READMISSION MANAGEMENT (OUTPATIENT)
Dept: CALL CENTER | Facility: HOSPITAL | Age: 31
End: 2021-10-13

## 2021-10-13 NOTE — OUTREACH NOTE
Medical Week 2 Survey      Responses   [facility] patient discharged from? [place]   Does the patient have one of the following disease processes/diagnoses(primary or secondary)? Other   Week 2 attempt successful? No   Unsuccessful attempts Attempt 2          Ruma Ace RN

## 2023-04-27 ENCOUNTER — HOSPITAL ENCOUNTER (INPATIENT)
Facility: HOSPITAL | Age: 33
LOS: 5 days | Discharge: HOME | End: 2023-05-02
Attending: EMERGENCY MEDICINE | Admitting: INTERNAL MEDICINE
Payer: COMMERCIAL

## 2023-04-27 ENCOUNTER — APPOINTMENT (OUTPATIENT)
Dept: CT IMAGING | Facility: HOSPITAL | Age: 33
End: 2023-04-27
Payer: COMMERCIAL

## 2023-04-27 ENCOUNTER — APPOINTMENT (OUTPATIENT)
Dept: GENERAL RADIOLOGY | Facility: HOSPITAL | Age: 33
End: 2023-04-27
Payer: COMMERCIAL

## 2023-04-27 DIAGNOSIS — F10.931 ALCOHOL WITHDRAWAL SYNDROME, WITH DELIRIUM: ICD-10-CM

## 2023-04-27 DIAGNOSIS — R65.20 SEVERE SEPSIS: Primary | ICD-10-CM

## 2023-04-27 DIAGNOSIS — A41.9 SEVERE SEPSIS: Primary | ICD-10-CM

## 2023-04-27 LAB
ALBUMIN SERPL-MCNC: 3.8 G/DL (ref 3.5–5.2)
ALBUMIN/GLOB SERPL: 1 G/DL
ALP SERPL-CCNC: 133 U/L (ref 39–117)
ALT SERPL W P-5'-P-CCNC: 41 U/L (ref 1–33)
AMMONIA BLD-SCNC: 27 UMOL/L (ref 11–51)
AMPHET+METHAMPHET UR QL: NEGATIVE
ANION GAP SERPL CALCULATED.3IONS-SCNC: 22.7 MMOL/L (ref 5–15)
ANISOCYTOSIS BLD QL: ABNORMAL
ARTERIAL PATENCY WRIST A: POSITIVE
AST SERPL-CCNC: 298 U/L (ref 1–32)
ATMOSPHERIC PRESS: 743.5 MMHG
B PARAPERT DNA SPEC QL NAA+PROBE: NOT DETECTED
B PERT DNA SPEC QL NAA+PROBE: NOT DETECTED
BACTERIA UR QL AUTO: ABNORMAL /HPF
BARBITURATES UR QL SCN: NEGATIVE
BASE EXCESS BLDA CALC-SCNC: -0.5 MMOL/L (ref 0–2)
BASOPHILS # BLD MANUAL: 0.08 10*3/MM3 (ref 0–0.2)
BASOPHILS NFR BLD MANUAL: 1 % (ref 0–1.5)
BDY SITE: ABNORMAL
BENZODIAZ UR QL SCN: NEGATIVE
BILIRUB SERPL-MCNC: 0.8 MG/DL (ref 0–1.2)
BILIRUB UR QL STRIP: NEGATIVE
BUN SERPL-MCNC: 5 MG/DL (ref 6–20)
BUN/CREAT SERPL: 8.8 (ref 7–25)
C PNEUM DNA NPH QL NAA+NON-PROBE: NOT DETECTED
CALCIUM SPEC-SCNC: 8.9 MG/DL (ref 8.6–10.5)
CANNABINOIDS SERPL QL: POSITIVE
CHLORIDE SERPL-SCNC: 95 MMOL/L (ref 98–107)
CK SERPL-CCNC: 248 U/L (ref 20–180)
CLARITY UR: ABNORMAL
CO2 SERPL-SCNC: 19.3 MMOL/L (ref 22–29)
COCAINE UR QL: NEGATIVE
COLOR UR: ABNORMAL
CREAT SERPL-MCNC: 0.57 MG/DL (ref 0.57–1)
D-LACTATE SERPL-SCNC: 4.3 MMOL/L (ref 0.5–2)
D-LACTATE SERPL-SCNC: 6.7 MMOL/L (ref 0.5–2)
DEPRECATED RDW RBC AUTO: 42.4 FL (ref 37–54)
EGFRCR SERPLBLD CKD-EPI 2021: 124.8 ML/MIN/1.73
ERYTHROCYTE [DISTWIDTH] IN BLOOD BY AUTOMATED COUNT: 13.6 % (ref 12.3–15.4)
ETHANOL BLD-MCNC: 233 MG/DL (ref 0–10)
ETHANOL UR QL: 0.23 %
FENTANYL UR-MCNC: NEGATIVE NG/ML
FLUAV SUBTYP SPEC NAA+PROBE: NOT DETECTED
FLUBV RNA ISLT QL NAA+PROBE: NOT DETECTED
GLOBULIN UR ELPH-MCNC: 3.8 GM/DL
GLUCOSE BLDC GLUCOMTR-MCNC: 100 MG/DL (ref 70–130)
GLUCOSE SERPL-MCNC: 150 MG/DL (ref 65–99)
GLUCOSE UR STRIP-MCNC: NEGATIVE MG/DL
HADV DNA SPEC NAA+PROBE: NOT DETECTED
HAV IGM SERPL QL IA: NORMAL
HBV CORE IGM SERPL QL IA: NORMAL
HBV SURFACE AG SERPL QL IA: NORMAL
HCG SERPL QL: NEGATIVE
HCO3 BLDA-SCNC: 22.6 MMOL/L (ref 22–28)
HCOV 229E RNA SPEC QL NAA+PROBE: NOT DETECTED
HCOV HKU1 RNA SPEC QL NAA+PROBE: NOT DETECTED
HCOV NL63 RNA SPEC QL NAA+PROBE: NOT DETECTED
HCOV OC43 RNA SPEC QL NAA+PROBE: NOT DETECTED
HCT VFR BLD AUTO: 28.6 % (ref 34–46.6)
HCV AB SER DONR QL: NORMAL
HGB BLD-MCNC: 9.8 G/DL (ref 12–15.9)
HGB UR QL STRIP.AUTO: ABNORMAL
HMPV RNA NPH QL NAA+NON-PROBE: NOT DETECTED
HPIV1 RNA ISLT QL NAA+PROBE: NOT DETECTED
HPIV2 RNA SPEC QL NAA+PROBE: NOT DETECTED
HPIV3 RNA NPH QL NAA+PROBE: NOT DETECTED
HPIV4 P GENE NPH QL NAA+PROBE: NOT DETECTED
HYALINE CASTS UR QL AUTO: ABNORMAL /LPF
HYPOCHROMIA BLD QL: ABNORMAL
KETONES UR QL STRIP: ABNORMAL
LEUKOCYTE ESTERASE UR QL STRIP.AUTO: ABNORMAL
LIPASE SERPL-CCNC: 12 U/L (ref 13–60)
LYMPHOCYTES # BLD MANUAL: 1.31 10*3/MM3 (ref 0.7–3.1)
LYMPHOCYTES NFR BLD MANUAL: 1 % (ref 5–12)
M PNEUMO IGG SER IA-ACNC: NOT DETECTED
MAGNESIUM SERPL-MCNC: 1.3 MG/DL (ref 1.6–2.6)
MCH RBC QN AUTO: 30.1 PG (ref 26.6–33)
MCHC RBC AUTO-ENTMCNC: 34.3 G/DL (ref 31.5–35.7)
MCV RBC AUTO: 87.7 FL (ref 79–97)
METHADONE UR QL SCN: NEGATIVE
MODALITY: ABNORMAL
MONOCYTES # BLD: 0.08 10*3/MM3 (ref 0.1–0.9)
NEUTROPHILS # BLD AUTO: 6.54 10*3/MM3 (ref 1.7–7)
NEUTROPHILS NFR BLD MANUAL: 81.6 % (ref 42.7–76)
NITRITE UR QL STRIP: POSITIVE
NRBC SPEC MANUAL: 4.1 /100 WBC (ref 0–0.2)
OPIATES UR QL: NEGATIVE
OXYCODONE UR QL SCN: NEGATIVE
PCO2 BLDA: 30.9 MM HG (ref 35–45)
PH BLDA: 7.47 PH UNITS (ref 7.35–7.45)
PH UR STRIP.AUTO: 6 [PH] (ref 5–8)
PLAT MORPH BLD: NORMAL
PLATELET # BLD AUTO: 171 10*3/MM3 (ref 140–450)
PMV BLD AUTO: 10.1 FL (ref 6–12)
PO2 BLDA: 34 MM HG (ref 80–100)
POLYCHROMASIA BLD QL SMEAR: ABNORMAL
POTASSIUM SERPL-SCNC: 3.7 MMOL/L (ref 3.5–5.2)
PROCALCITONIN SERPL-MCNC: 0.49 NG/ML (ref 0–0.25)
PROT SERPL-MCNC: 7.6 G/DL (ref 6–8.5)
PROT UR QL STRIP: ABNORMAL
RBC # BLD AUTO: 3.26 10*6/MM3 (ref 3.77–5.28)
RBC # UR STRIP: ABNORMAL /HPF
REF LAB TEST METHOD: ABNORMAL
RHINOVIRUS RNA SPEC NAA+PROBE: NOT DETECTED
RSV RNA NPH QL NAA+NON-PROBE: NOT DETECTED
SALICYLATES SERPL-MCNC: 0.4 MG/DL
SAO2 % BLDCOA: 70.7 % (ref 92–99)
SARS-COV-2 RNA NPH QL NAA+NON-PROBE: NOT DETECTED
SCHISTOCYTES BLD QL SMEAR: ABNORMAL
SODIUM SERPL-SCNC: 137 MMOL/L (ref 136–145)
SP GR UR STRIP: 1.02 (ref 1–1.03)
SQUAMOUS #/AREA URNS HPF: ABNORMAL /HPF
TOTAL RATE: 20 BREATHS/MINUTE
UROBILINOGEN UR QL STRIP: ABNORMAL
VARIANT LYMPHS NFR BLD MANUAL: 16.3 % (ref 19.6–45.3)
WBC # UR STRIP: ABNORMAL /HPF
WBC MORPH BLD: NORMAL
WBC NRBC COR # BLD: 8.01 10*3/MM3 (ref 3.4–10.8)

## 2023-04-27 PROCEDURE — 36600 WITHDRAWAL OF ARTERIAL BLOOD: CPT

## 2023-04-27 PROCEDURE — 84145 PROCALCITONIN (PCT): CPT | Performed by: EMERGENCY MEDICINE

## 2023-04-27 PROCEDURE — 80307 DRUG TEST PRSMV CHEM ANLYZR: CPT | Performed by: EMERGENCY MEDICINE

## 2023-04-27 PROCEDURE — 82077 ASSAY SPEC XCP UR&BREATH IA: CPT | Performed by: EMERGENCY MEDICINE

## 2023-04-27 PROCEDURE — 87150 DNA/RNA AMPLIFIED PROBE: CPT | Performed by: EMERGENCY MEDICINE

## 2023-04-27 PROCEDURE — 85007 BL SMEAR W/DIFF WBC COUNT: CPT | Performed by: EMERGENCY MEDICINE

## 2023-04-27 PROCEDURE — 25010000002 CEFTRIAXONE PER 250 MG: Performed by: EMERGENCY MEDICINE

## 2023-04-27 PROCEDURE — 85025 COMPLETE CBC W/AUTO DIFF WBC: CPT | Performed by: EMERGENCY MEDICINE

## 2023-04-27 PROCEDURE — 80179 DRUG ASSAY SALICYLATE: CPT | Performed by: INTERNAL MEDICINE

## 2023-04-27 PROCEDURE — 81001 URINALYSIS AUTO W/SCOPE: CPT | Performed by: EMERGENCY MEDICINE

## 2023-04-27 PROCEDURE — 84703 CHORIONIC GONADOTROPIN ASSAY: CPT | Performed by: EMERGENCY MEDICINE

## 2023-04-27 PROCEDURE — 82948 REAGENT STRIP/BLOOD GLUCOSE: CPT

## 2023-04-27 PROCEDURE — 70450 CT HEAD/BRAIN W/O DYE: CPT

## 2023-04-27 PROCEDURE — 83690 ASSAY OF LIPASE: CPT | Performed by: INTERNAL MEDICINE

## 2023-04-27 PROCEDURE — 71045 X-RAY EXAM CHEST 1 VIEW: CPT

## 2023-04-27 PROCEDURE — 87086 URINE CULTURE/COLONY COUNT: CPT | Performed by: INTERNAL MEDICINE

## 2023-04-27 PROCEDURE — 87040 BLOOD CULTURE FOR BACTERIA: CPT | Performed by: EMERGENCY MEDICINE

## 2023-04-27 PROCEDURE — 51702 INSERT TEMP BLADDER CATH: CPT

## 2023-04-27 PROCEDURE — 25010000002 THIAMINE PER 100 MG: Performed by: EMERGENCY MEDICINE

## 2023-04-27 PROCEDURE — 87077 CULTURE AEROBIC IDENTIFY: CPT | Performed by: INTERNAL MEDICINE

## 2023-04-27 PROCEDURE — 80074 ACUTE HEPATITIS PANEL: CPT | Performed by: INTERNAL MEDICINE

## 2023-04-27 PROCEDURE — 25010000002 LORAZEPAM PER 2 MG: Performed by: EMERGENCY MEDICINE

## 2023-04-27 PROCEDURE — 0202U NFCT DS 22 TRGT SARS-COV-2: CPT | Performed by: INTERNAL MEDICINE

## 2023-04-27 PROCEDURE — 87186 SC STD MICRODIL/AGAR DIL: CPT | Performed by: INTERNAL MEDICINE

## 2023-04-27 PROCEDURE — 83735 ASSAY OF MAGNESIUM: CPT | Performed by: EMERGENCY MEDICINE

## 2023-04-27 PROCEDURE — 82803 BLOOD GASES ANY COMBINATION: CPT

## 2023-04-27 PROCEDURE — 82550 ASSAY OF CK (CPK): CPT | Performed by: EMERGENCY MEDICINE

## 2023-04-27 PROCEDURE — 87641 MR-STAPH DNA AMP PROBE: CPT | Performed by: INTERNAL MEDICINE

## 2023-04-27 PROCEDURE — 87088 URINE BACTERIA CULTURE: CPT | Performed by: EMERGENCY MEDICINE

## 2023-04-27 PROCEDURE — 82140 ASSAY OF AMMONIA: CPT | Performed by: INTERNAL MEDICINE

## 2023-04-27 PROCEDURE — 25010000002 CEFTRIAXONE PER 250 MG: Performed by: INTERNAL MEDICINE

## 2023-04-27 PROCEDURE — 80053 COMPREHEN METABOLIC PANEL: CPT | Performed by: EMERGENCY MEDICINE

## 2023-04-27 PROCEDURE — 83605 ASSAY OF LACTIC ACID: CPT | Performed by: EMERGENCY MEDICINE

## 2023-04-27 PROCEDURE — 25010000002 ENOXAPARIN PER 10 MG: Performed by: INTERNAL MEDICINE

## 2023-04-27 PROCEDURE — 99285 EMERGENCY DEPT VISIT HI MDM: CPT

## 2023-04-27 PROCEDURE — 87186 SC STD MICRODIL/AGAR DIL: CPT | Performed by: EMERGENCY MEDICINE

## 2023-04-27 PROCEDURE — 25010000002 MAGNESIUM SULFATE 2 GM/50ML SOLUTION: Performed by: EMERGENCY MEDICINE

## 2023-04-27 RX ORDER — CALCIUM CARBONATE 200(500)MG
2 TABLET,CHEWABLE ORAL 2 TIMES DAILY PRN
Status: DISCONTINUED | OUTPATIENT
Start: 2023-04-27 | End: 2023-05-02 | Stop reason: HOSPADM

## 2023-04-27 RX ORDER — POTASSIUM CHLORIDE 1.5 G/1.77G
40 POWDER, FOR SOLUTION ORAL AS NEEDED
Status: DISCONTINUED | OUTPATIENT
Start: 2023-04-27 | End: 2023-05-02 | Stop reason: HOSPADM

## 2023-04-27 RX ORDER — ACETAMINOPHEN 650 MG/1
650 SUPPOSITORY RECTAL ONCE
Status: COMPLETED | OUTPATIENT
Start: 2023-04-27 | End: 2023-04-27

## 2023-04-27 RX ORDER — SODIUM CHLORIDE 0.9 % (FLUSH) 0.9 %
10 SYRINGE (ML) INJECTION EVERY 12 HOURS SCHEDULED
Status: DISCONTINUED | OUTPATIENT
Start: 2023-04-27 | End: 2023-05-02 | Stop reason: HOSPADM

## 2023-04-27 RX ORDER — MAGNESIUM SULFATE HEPTAHYDRATE 40 MG/ML
4 INJECTION, SOLUTION INTRAVENOUS AS NEEDED
Status: DISCONTINUED | OUTPATIENT
Start: 2023-04-27 | End: 2023-05-02 | Stop reason: HOSPADM

## 2023-04-27 RX ORDER — CALCIUM GLUCONATE 20 MG/ML
1 INJECTION, SOLUTION INTRAVENOUS AS NEEDED
Status: DISCONTINUED | OUTPATIENT
Start: 2023-04-27 | End: 2023-05-02 | Stop reason: HOSPADM

## 2023-04-27 RX ORDER — POTASSIUM CHLORIDE 750 MG/1
40 TABLET, FILM COATED, EXTENDED RELEASE ORAL AS NEEDED
Status: DISCONTINUED | OUTPATIENT
Start: 2023-04-27 | End: 2023-05-02 | Stop reason: HOSPADM

## 2023-04-27 RX ORDER — ACETAMINOPHEN 650 MG/1
650 SUPPOSITORY RECTAL EVERY 4 HOURS PRN
Status: DISCONTINUED | OUTPATIENT
Start: 2023-04-27 | End: 2023-05-02 | Stop reason: HOSPADM

## 2023-04-27 RX ORDER — MAGNESIUM SULFATE HEPTAHYDRATE 40 MG/ML
2 INJECTION, SOLUTION INTRAVENOUS AS NEEDED
Status: DISCONTINUED | OUTPATIENT
Start: 2023-04-27 | End: 2023-05-02 | Stop reason: HOSPADM

## 2023-04-27 RX ORDER — ACETAMINOPHEN 325 MG/1
650 TABLET ORAL EVERY 4 HOURS PRN
Status: DISCONTINUED | OUTPATIENT
Start: 2023-04-27 | End: 2023-05-02 | Stop reason: HOSPADM

## 2023-04-27 RX ORDER — DIAZEPAM 5 MG/ML
10 INJECTION, SOLUTION INTRAMUSCULAR; INTRAVENOUS ONCE
Status: DISCONTINUED | OUTPATIENT
Start: 2023-04-27 | End: 2023-04-28

## 2023-04-27 RX ORDER — LORAZEPAM 2 MG/ML
1 INJECTION INTRAMUSCULAR ONCE
Status: COMPLETED | OUTPATIENT
Start: 2023-04-27 | End: 2023-04-27

## 2023-04-27 RX ORDER — SODIUM CHLORIDE 0.9 % (FLUSH) 0.9 %
10 SYRINGE (ML) INJECTION AS NEEDED
Status: DISCONTINUED | OUTPATIENT
Start: 2023-04-27 | End: 2023-05-02 | Stop reason: HOSPADM

## 2023-04-27 RX ORDER — LORAZEPAM 1 MG/1
1 TABLET ORAL EVERY 8 HOURS
Status: DISCONTINUED | OUTPATIENT
Start: 2023-04-28 | End: 2023-04-28

## 2023-04-27 RX ORDER — LORAZEPAM 2 MG/ML
1 INJECTION INTRAMUSCULAR ONCE
Status: DISCONTINUED | OUTPATIENT
Start: 2023-04-27 | End: 2023-04-27

## 2023-04-27 RX ORDER — LORAZEPAM 2 MG/ML
2 INJECTION INTRAMUSCULAR EVERY 4 HOURS PRN
Status: DISCONTINUED | OUTPATIENT
Start: 2023-04-27 | End: 2023-05-02 | Stop reason: HOSPADM

## 2023-04-27 RX ORDER — SODIUM CHLORIDE 9 MG/ML
150 INJECTION, SOLUTION INTRAVENOUS CONTINUOUS
Status: DISCONTINUED | OUTPATIENT
Start: 2023-04-27 | End: 2023-05-01

## 2023-04-27 RX ORDER — ONDANSETRON 4 MG/1
4 TABLET, FILM COATED ORAL EVERY 6 HOURS PRN
Status: DISCONTINUED | OUTPATIENT
Start: 2023-04-27 | End: 2023-05-02 | Stop reason: HOSPADM

## 2023-04-27 RX ORDER — ONDANSETRON 2 MG/ML
4 INJECTION INTRAMUSCULAR; INTRAVENOUS EVERY 6 HOURS PRN
Status: DISCONTINUED | OUTPATIENT
Start: 2023-04-27 | End: 2023-05-02 | Stop reason: HOSPADM

## 2023-04-27 RX ORDER — LORAZEPAM 2 MG/ML
1 INJECTION INTRAMUSCULAR
Status: DISCONTINUED | OUTPATIENT
Start: 2023-04-27 | End: 2023-04-28

## 2023-04-27 RX ORDER — SODIUM CHLORIDE 9 MG/ML
40 INJECTION, SOLUTION INTRAVENOUS AS NEEDED
Status: DISCONTINUED | OUTPATIENT
Start: 2023-04-27 | End: 2023-05-02 | Stop reason: HOSPADM

## 2023-04-27 RX ORDER — MAGNESIUM SULFATE HEPTAHYDRATE 40 MG/ML
2 INJECTION, SOLUTION INTRAVENOUS ONCE
Status: COMPLETED | OUTPATIENT
Start: 2023-04-27 | End: 2023-04-27

## 2023-04-27 RX ORDER — POTASSIUM CHLORIDE 7.45 MG/ML
10 INJECTION INTRAVENOUS
Status: DISCONTINUED | OUTPATIENT
Start: 2023-04-27 | End: 2023-05-02 | Stop reason: HOSPADM

## 2023-04-27 RX ORDER — ENOXAPARIN SODIUM 100 MG/ML
40 INJECTION SUBCUTANEOUS NIGHTLY
Status: DISCONTINUED | OUTPATIENT
Start: 2023-04-27 | End: 2023-05-02 | Stop reason: HOSPADM

## 2023-04-27 RX ORDER — LORAZEPAM 1 MG/1
1 TABLET ORAL EVERY 6 HOURS
Status: DISCONTINUED | OUTPATIENT
Start: 2023-04-27 | End: 2023-04-28

## 2023-04-27 RX ORDER — FOLIC ACID 1 MG/1
1 TABLET ORAL DAILY
Status: DISCONTINUED | OUTPATIENT
Start: 2023-04-28 | End: 2023-05-02 | Stop reason: HOSPADM

## 2023-04-27 RX ADMIN — CEFTRIAXONE SODIUM 1 G: 1 INJECTION, POWDER, FOR SOLUTION INTRAMUSCULAR; INTRAVENOUS at 21:32

## 2023-04-27 RX ADMIN — SODIUM CHLORIDE 1000 ML: 9 INJECTION, SOLUTION INTRAVENOUS at 20:04

## 2023-04-27 RX ADMIN — SODIUM CHLORIDE 1701 ML: 9 INJECTION, SOLUTION INTRAVENOUS at 21:19

## 2023-04-27 RX ADMIN — LORAZEPAM 1 MG: 2 INJECTION INTRAMUSCULAR; INTRAVENOUS at 20:07

## 2023-04-27 RX ADMIN — ACETAMINOPHEN 650 MG: 650 SUPPOSITORY RECTAL at 20:19

## 2023-04-27 RX ADMIN — LORAZEPAM 1 MG: 2 INJECTION INTRAMUSCULAR; INTRAVENOUS at 20:42

## 2023-04-27 RX ADMIN — Medication 10 ML: at 23:07

## 2023-04-27 RX ADMIN — ENOXAPARIN SODIUM 40 MG: 100 INJECTION SUBCUTANEOUS at 23:10

## 2023-04-27 RX ADMIN — MAGNESIUM SULFATE HEPTAHYDRATE 2 G: 2 INJECTION, SOLUTION INTRAVENOUS at 20:07

## 2023-04-27 RX ADMIN — FOLIC ACID 1000 ML/HR: 5 INJECTION, SOLUTION INTRAMUSCULAR; INTRAVENOUS; SUBCUTANEOUS at 20:39

## 2023-04-27 RX ADMIN — SODIUM CHLORIDE 125 ML/HR: 9 INJECTION, SOLUTION INTRAVENOUS at 23:07

## 2023-04-27 RX ADMIN — CEFTRIAXONE SODIUM 1 G: 1 INJECTION, POWDER, FOR SOLUTION INTRAMUSCULAR; INTRAVENOUS at 23:12

## 2023-04-27 RX ADMIN — LORAZEPAM 1 MG: 1 TABLET ORAL at 23:08

## 2023-04-27 NOTE — ED NOTES
"Pt arrived via [facility]  From home. EMS was called for seizure. Family states they heard a \"thud\" and found the pt on the floor but did not report seizure activity to EMS. EMS states pt posturing enroute. Pt non verbal at this time.   "

## 2023-04-27 NOTE — ED PROVIDER NOTES
" EMERGENCY DEPARTMENT ENCOUNTER    Room Number:  3002/1  Date seen:  4/27/2023  PCP: Provider, No Known  Historian: EMS  Relevant information provided by sources other than the patient, review of external records, and social determinants of health may be included in the HPI section.      HPI:  Chief Complaint: Altered mental status  Additional historical features obtained directly from: EMS reported that seen some seizure-like activity along with agitation and \"uncontrollable body movements\" nothing else is known about the patient at this time no past medical history is no  Context: Lisa Gibson is a 32 y.o. female who presents to the ED c/o EMS was called to the scene today for the symptoms mentioned above.  No one else here with patient at this time to give any additional information and she was delirious and unable to provide any.  She is agitated and restless used and not having any tonic-clonic movements but movement is consistent with akathisia        Initial impression including social determinants which will impact the assessment very difficult social situation given the events she presents to the present status.  Very aggressive resuscitation, and was also noted that her temperature was 106.5.  Certainly would suggest something like serotonin syndrome or severe DTs along with the possibility of severe sepsis with delirium.  We will try to calm her down and resuscitate her as best we can as we try and get samples for testing          PAST MEDICAL HISTORY  Active Ambulatory Problems     Diagnosis Date Noted   • Alcohol-induced acute pancreatitis without infection or necrosis 09/21/2021   • Transaminitis 09/22/2021   • Epigastric pain 09/22/2021   • Fatty (change of) liver, not elsewhere classified 09/24/2021   • Gallbladder sludge 09/24/2021     Resolved Ambulatory Problems     Diagnosis Date Noted   • No Resolved Ambulatory Problems     No Additional Past Medical History         PAST SURGICAL " HISTORY  No past surgical history on file.      FAMILY HISTORY  No family history on file.      SOCIAL HISTORY  Social History     Socioeconomic History   • Marital status:    Tobacco Use   • Smoking status: Never   • Smokeless tobacco: Never         ALLERGIES  Patient has no known allergies.          PHYSICAL EXAM  ED Triage Vitals   Temp Pulse Resp BP SpO2   -- -- -- -- --      Temp src Heart Rate Source Patient Position BP Location FiO2 (%)   -- -- -- -- --         Physical Exam      GENERAL: Agitated and delirious, thin and emaciated  HENT: nares patent  EYES: no scleral icterus, pupils are dilated  CV: Extremely tachycardic in the 170s and appears to be narrow complex, distal pulses symmetric and palpable  RESPIRATORY: Hyperventilating and tachypneic but breath sounds are clear  ABDOMEN: soft, nondistended nontender with normal bowel sound  MUSCULOSKELETAL: No obvious deformity  NEURO: Agitated and delirious with akathisia body wide.  No seizure activity is seen  SKIN: Very warm        LAB RESULTS  Recent Results (from the past 24 hour(s))   Comprehensive Metabolic Panel    Collection Time: 04/27/23  8:03 PM    Specimen: Blood   Result Value Ref Range    Glucose 150 (H) 65 - 99 mg/dL    BUN 5 (L) 6 - 20 mg/dL    Creatinine 0.57 0.57 - 1.00 mg/dL    Sodium 137 136 - 145 mmol/L    Potassium 3.7 3.5 - 5.2 mmol/L    Chloride 95 (L) 98 - 107 mmol/L    CO2 19.3 (L) 22.0 - 29.0 mmol/L    Calcium 8.9 8.6 - 10.5 mg/dL    Total Protein 7.6 6.0 - 8.5 g/dL    Albumin 3.8 3.5 - 5.2 g/dL    ALT (SGPT) 41 (H) 1 - 33 U/L    AST (SGOT) 298 (H) 1 - 32 U/L    Alkaline Phosphatase 133 (H) 39 - 117 U/L    Total Bilirubin 0.8 0.0 - 1.2 mg/dL    Globulin 3.8 gm/dL    A/G Ratio 1.0 g/dL    BUN/Creatinine Ratio 8.8 7.0 - 25.0    Anion Gap 22.7 (H) 5.0 - 15.0 mmol/L    eGFR 124.8 >60.0 mL/min/1.73   hCG, Serum, Qualitative    Collection Time: 04/27/23  8:03 PM    Specimen: Blood   Result Value Ref Range    HCG Qualitative  Negative Negative   Ethanol    Collection Time: 04/27/23  8:03 PM    Specimen: Blood   Result Value Ref Range    Ethanol 233 (H) 0 - 10 mg/dL    Ethanol % 0.233 %   Magnesium    Collection Time: 04/27/23  8:03 PM    Specimen: Blood   Result Value Ref Range    Magnesium 1.3 (L) 1.6 - 2.6 mg/dL   CBC Auto Differential    Collection Time: 04/27/23  8:03 PM    Specimen: Blood   Result Value Ref Range    WBC 8.01 3.40 - 10.80 10*3/mm3    RBC 3.26 (L) 3.77 - 5.28 10*6/mm3    Hemoglobin 9.8 (L) 12.0 - 15.9 g/dL    Hematocrit 28.6 (L) 34.0 - 46.6 %    MCV 87.7 79.0 - 97.0 fL    MCH 30.1 26.6 - 33.0 pg    MCHC 34.3 31.5 - 35.7 g/dL    RDW 13.6 12.3 - 15.4 %    RDW-SD 42.4 37.0 - 54.0 fl    MPV 10.1 6.0 - 12.0 fL    Platelets 171 140 - 450 10*3/mm3   Procalcitonin    Collection Time: 04/27/23  8:03 PM    Specimen: Blood   Result Value Ref Range    Procalcitonin 0.49 (H) 0.00 - 0.25 ng/mL   CK    Collection Time: 04/27/23  8:03 PM    Specimen: Blood   Result Value Ref Range    Creatine Kinase 248 (H) 20 - 180 U/L   Manual Differential    Collection Time: 04/27/23  8:03 PM    Specimen: Blood   Result Value Ref Range    Neutrophil % 81.6 (H) 42.7 - 76.0 %    Lymphocyte % 16.3 (L) 19.6 - 45.3 %    Monocyte % 1.0 (L) 5.0 - 12.0 %    Basophil % 1.0 0.0 - 1.5 %    Neutrophils Absolute 6.54 1.70 - 7.00 10*3/mm3    Lymphocytes Absolute 1.31 0.70 - 3.10 10*3/mm3    Monocytes Absolute 0.08 (L) 0.10 - 0.90 10*3/mm3    Basophils Absolute 0.08 0.00 - 0.20 10*3/mm3    nRBC 4.1 (H) 0.0 - 0.2 /100 WBC    Anisocytosis Slight/1+ None Seen    Hypochromia Mod/2+ None Seen    Polychromasia Slight/1+ None Seen    Schistocytes Slight/1+ None Seen    WBC Morphology Normal Normal    Platelet Morphology Normal Normal   Lipase    Collection Time: 04/27/23  8:03 PM    Specimen: Blood   Result Value Ref Range    Lipase 12 (L) 13 - 60 U/L   Salicylate Level    Collection Time: 04/27/23  8:03 PM    Specimen: Blood   Result Value Ref Range    Salicylate 0.4  <=30.0 mg/dL   Urine Drug Screen - Indwelling Urethral Catheter    Collection Time: 04/27/23  8:31 PM    Specimen: Indwelling Urethral Catheter; Urine   Result Value Ref Range    Amphet/Methamphet, Screen Negative Negative    Barbiturates Screen, Urine Negative Negative    Benzodiazepine Screen, Urine Negative Negative    Cocaine Screen, Urine Negative Negative    Opiate Screen Negative Negative    THC, Screen, Urine Positive (A) Negative    Methadone Screen, Urine Negative Negative    Oxycodone Screen, Urine Negative Negative    Fentanyl, Urine Negative Negative   Lactic Acid, Plasma    Collection Time: 04/27/23  8:31 PM    Specimen: Blood   Result Value Ref Range    Lactate 6.7 (C) 0.5 - 2.0 mmol/L   Urinalysis With Microscopic If Indicated (No Culture) - Indwelling Urethral Catheter    Collection Time: 04/27/23  8:31 PM    Specimen: Indwelling Urethral Catheter; Urine   Result Value Ref Range    Color, UA Dark Yellow (A) Yellow, Straw    Appearance, UA Turbid (A) Clear    pH, UA 6.0 5.0 - 8.0    Specific Gravity, UA 1.022 1.005 - 1.030    Glucose, UA Negative Negative    Ketones, UA 15 mg/dL (1+) (A) Negative    Bilirubin, UA Negative Negative    Blood, UA Large (3+) (A) Negative    Protein, UA >=300 mg/dL (3+) (A) Negative    Leuk Esterase, UA Moderate (2+) (A) Negative    Nitrite, UA Positive (A) Negative    Urobilinogen, UA 1.0 E.U./dL 0.2 - 1.0 E.U./dL   Urinalysis, Microscopic Only - Indwelling Urethral Catheter    Collection Time: 04/27/23  8:31 PM    Specimen: Indwelling Urethral Catheter; Urine   Result Value Ref Range    RBC, UA Too Numerous to Count (A) None Seen, 0-2 /HPF    WBC, UA Too Numerous to Count (A) None Seen, 0-2 /HPF    Bacteria, UA 3+ (A) None Seen /HPF    Squamous Epithelial Cells, UA 0-2 None Seen, 0-2 /HPF    Hyaline Casts, UA 7-12 None Seen /LPF    Methodology Automated Microscopy    Blood Gas, Arterial -    Collection Time: 04/27/23  8:58 PM    Specimen: Arterial Blood   Result Value  Ref Range    Site Arterial: right radial     [employer] Test Positive     pH, Arterial 7.472 (H) 7.350 - 7.450 pH units    pCO2, Arterial 30.9 (L) 35.0 - 45.0 mm Hg    pO2, Arterial 34.0 (C) 80.0 - 100.0 mm Hg    HCO3, Arterial 22.6 22.0 - 28.0 mmol/L    Base Excess, Arterial -0.5 (L) 0.0 - 2.0 mmol/L    O2 Saturation Calculated 70.7 (L) 92.0 - 99.0 %    Barometric Pressure for Blood Gas 743.5 mmHg    Modality Room Air     Rate 20 Breaths/minute   Respiratory Panel PCR w/COVID-19(SARS-CoV-2) ЕКАТЕРИНА/SHARLENE/LAURA/PAD/COR/MAD/GILMA In-House, NP Swab in [facility], 3-4 HR TAT - Swab, Nasopharynx    Collection Time: 04/27/23  9:25 PM    Specimen: Nasopharynx; Swab   Result Value Ref Range    ADENOVIRUS, PCR Not Detected Not Detected    Coronavirus 229E Not Detected Not Detected    Coronavirus HKU1 Not Detected Not Detected    Coronavirus NL63 Not Detected Not Detected    Coronavirus OC43 Not Detected Not Detected    COVID19 Not Detected Not Detected - Ref. Range    Human Metapneumovirus Not Detected Not Detected    Human Rhinovirus/Enterovirus Not Detected Not Detected    Influenza A PCR Not Detected Not Detected    Influenza B PCR Not Detected Not Detected    Parainfluenza Virus 1 Not Detected Not Detected    Parainfluenza Virus 2 Not Detected Not Detected    Parainfluenza Virus 3 Not Detected Not Detected    Parainfluenza Virus 4 Not Detected Not Detected    RSV, PCR Not Detected Not Detected    Bordetella pertussis pcr Not Detected Not Detected    Bordetella parapertussis PCR Not Detected Not Detected    Chlamydophila pneumoniae PCR Not Detected Not Detected    Mycoplasma pneumo by PCR Not Detected Not Detected   POC Glucose Once    Collection Time: 04/27/23 10:33 PM    Specimen: Blood   Result Value Ref Range    Glucose 100 70 - 130 mg/dL   STAT Lactic Acid, Reflex    Collection Time: 04/27/23 11:04 PM    Specimen: Blood   Result Value Ref Range    Lactate 4.3 (C) 0.5 - 2.0 mmol/L   Ammonia    Collection Time: 04/27/23 11:04 PM     "Specimen: Blood   Result Value Ref Range    Ammonia 27 11 - 51 umol/L   Hepatitis Panel, Acute    Collection Time: 04/27/23 11:04 PM    Specimen: Blood   Result Value Ref Range    Hepatitis B Surface Ag Non-Reactive Non-Reactive    Hep A IgM Non-Reactive Non-Reactive    Hep B C IgM Non-Reactive Non-Reactive    Hepatitis C Ab Non-Reactive Non-Reactive       Ordered the above labs and my independent interpretation of these results are discussed in the section labeled \"ED course\" below        RADIOLOGY  CT Head Without Contrast    Result Date: 4/27/2023  Patient: MYRIAM TALLEY  Time Out: 22:33 Exam(s): CT HEAD Without Contrast EXAM:   CT Head Without Intravenous Contrast CLINICAL HISTORY:    Reason for exam: Encephalopathy (Ped 0-17y). TECHNIQUE:   Axial computed tomography images of the head brain without intravenous contrast.  CTDI is 54.93 mGy and DLP is 949.1 mGy-cm.  This CT exam was performed according to the principle of ALARA (As Low As Reasonably Achievable) by using one or more of the following dose reduction techniques: automated exposure control, adjustment of the mA and or kV according to patient size, and or use of iterative reconstruction technique. COMPARISON:   None. FINDINGS:   Brain:  Unremarkable.  No hemorrhage.  No significant white matter disease.  No edema.   Ventricles:  Unremarkable.  No ventriculomegaly.   Bones joints:  Unremarkable.  No acute fracture.   Soft tissues:  Unremarkable.   Sinuses:  Completely opacified right sphenoid sinus.   Mastoid air cells:  Unremarkable as visualized.  No mastoid effusion. IMPRESSION:     1.  No intracranial hemorrhage or other acute intracranial abnormality. 2.  Completely opacified right sphenoid sinus.  Recommend correlation for symptoms.     Electronically signed by Nakul Ruiz MD on 04-27-23 at 2233    XR Chest 1 View    Result Date: 4/27/2023  XR CHEST 1 VW-  Clinical: Febrile  FINDINGS: The right lung is clear. Heart size within normal " "limits. No mediastinal or hilar abnormality. Vague area of infiltrate demonstrated at the left lung base. No pleural effusion or vascular congestion seen.  CONCLUSION: Vague infiltrate at the left base, otherwise within normal limits.  This report was finalized on 4/27/2023 10:14 PM by Dr. Christian Latif M.D.        Ordered the above noted radiological studies.  Independently interpreted by me in PACS.  My independent interpretation of these results are discussed in the section below labeled \"ED course\"        PROCEDURES  Critical Care  Performed by: Rocky Cline MD  Authorized by: Rocky Cline MD     Critical care provider statement:     Critical care time (minutes):  45    Critical care time was exclusive of:  Separately billable procedures and treating other patients    Critical care was necessary to treat or prevent imminent or life-threatening deterioration of the following conditions: Suspicion for malignant hyperthermia or serotonin syndrome.    Critical care was time spent personally by me on the following activities:  Discussions with consultants, evaluation of patient's response to treatment, examination of patient, ordering and performing treatments and interventions, ordering and review of laboratory studies, ordering and review of radiographic studies, pulse oximetry, re-evaluation of patient's condition and review of old charts    I assumed direction of critical care for this patient from another provider in my specialty: no      Care discussed with: admitting provider                MEDICATIONS GIVEN IN ER  Medications   sodium chloride 0.9 % flush 10 mL (has no administration in time range)   LORazepam (ATIVAN) injection 1 mg (1 mg Intravenous Given 4/27/23 2042)   diazePAM (VALIUM) injection 10 mg (10 mg Intravenous Not Given 4/27/23 2206)   thiamine (B-1) 100 mg in sodium chloride 0.9 % 100 mL IVPB (has no administration in time range)   sodium chloride 0.9 % infusion (125 mL/hr Intravenous " New Bag 4/27/23 2307)   cefTRIAXone (ROCEPHIN) 2 g in sodium chloride 0.9 % 100 mL IVPB-VTB (has no administration in time range)   sodium chloride 0.9 % flush 10 mL (10 mL Intravenous Given 4/27/23 2307)   sodium chloride 0.9 % flush 10 mL (has no administration in time range)   sodium chloride 0.9 % infusion 40 mL (has no administration in time range)   Enoxaparin Sodium (LOVENOX) syringe 40 mg (40 mg Subcutaneous Given 4/27/23 2310)   acetaminophen (TYLENOL) tablet 650 mg (has no administration in time range)     Or   acetaminophen (TYLENOL) suppository 650 mg (has no administration in time range)   potassium chloride (K-DUR,KLOR-CON) ER tablet 40 mEq (has no administration in time range)     Or   potassium chloride (KLOR-CON) packet 40 mEq (has no administration in time range)     Or   potassium chloride 10 mEq in 100 mL IVPB (has no administration in time range)   Magnesium Sulfate - Total Dose 10 grams - Magnesium 1 or Less (has no administration in time range)     Or   Magnesium Sulfate - Total Dose 6 grams - Magnesium 1.1 - 1.5 (has no administration in time range)     Or   Magnesium Sulfate - Total Dose 4 grams - Magnesium 1.6 - 1.9 (has no administration in time range)   potassium & sodium phosphates (PHOS-NAK) 280-160-250 MG packet - for Phosphorus less than 1.25 mg/dL (has no administration in time range)     Or   potassium & sodium phosphates (PHOS-NAK) 280-160-250 MG packet - for Phosphorus 1.25 - 2.5 mg/dL (has no administration in time range)   calcium gluconate 1g/50ml 0.675% NaCl IV SOLN (has no administration in time range)     And   calcium gluconate 2 g in sodium chloride 0.9 % 500 mL IVPB (has no administration in time range)   ondansetron (ZOFRAN) tablet 4 mg (has no administration in time range)     Or   ondansetron (ZOFRAN) injection 4 mg (has no administration in time range)   calcium carbonate (TUMS) chewable tablet 500 mg (200 mg elemental) (has no administration in time range)  "  LORazepam (ATIVAN) tablet 1 mg (1 mg Oral Given 4/27/23 2308)     Followed by   LORazepam (ATIVAN) tablet 1 mg (has no administration in time range)   folic acid (FOLVITE) tablet 1 mg (has no administration in time range)   LORazepam (ATIVAN) injection 2 mg (has no administration in time range)   LORazepam (ATIVAN) injection 1 mg (1 mg Intravenous Given 4/27/23 2007)   thiamine (B-1) 100 mg, folic acid 1 mg in sodium chloride 0.9 % 1,000 mL infusion (1,000 mL/hr Intravenous New Bag 4/27/23 2039)   magnesium sulfate 2g/50 mL (PREMIX) infusion (2 g Intravenous New Bag 4/27/23 2007)   sodium chloride 0.9 % bolus 1,000 mL (0 mL Intravenous Stopped 4/27/23 2107)   acetaminophen (TYLENOL) suppository 650 mg (650 mg Rectal Given 4/27/23 2019)   sodium chloride 0.9 % bolus 1,701 mL (1,701 mL Intravenous New Bag 4/27/23 2119)   cefTRIAXone (ROCEPHIN) 1 g in sodium chloride 0.9 % 100 mL IVPB-VTB (1 g Intravenous New Bag 4/27/23 2132)   cefTRIAXone (ROCEPHIN) 1 g in sodium chloride 0.9 % 100 mL IVPB-VTB (1 g Intravenous New Bag 4/27/23 2312)                   MEDICAL DECISION MAKING and INDEPENDENT INTERPRETATIONS      Everything documented below this statement is the \"ED course\" section which I have referred to above.  Everything discussed in the following section constitutes MY INDEPENDENT INTERPRETATIONS of the lab work, EKGs, and radiology studies, and all of my personal conversations with other providers, and all of my independent decision making regarding this patient are discussed below.      ED Course as of 04/27/23 2351   Thu Apr 27, 2023 2111 The clinical data that I have at this point would point more towards sepsis with UTI as in origin, however it really does not make complete sense based on the way the patient looks.    I also think there is a component of alcohol withdrawal and potentially even coingestion.  I cannot exclude serotonin syndrome or neuroleptic malignant syndrome, but I have no history to go " "off of regarding what she might of taken.    She has been covered for sepsis with antibiotics and fluids.  Her temperature is down to 103 after all the active measures.  She is hemodynamically stable and much more calm at this time.  She still confused and a little restless but no longer having the akathisia and certainly no seizure-like activity. [DP]   2112 I will admit the patient to the ICU for further [DP]   2350 Patient does have a significantly elevated lactate and white blood cell count which could be partially reactive but also could represent sepsis as discussed above. [DP]   2350 Urinalysis does have 3+ bacteria and too numerous to count white blood cells so antibiotics were started [DP]   2350 She was not acidotic on her ABG [DP]   2350 Patient received 2 doses of IV Ativan and her mental status and vital signs did improve quite a bit although she was still confused and somewhat anxious [DP]   2351 Her mother showed up at the bedside and I ask if there were any illicit substances or prescription substance that she knew of that her daughter might of taken and she said no [DP]   2351 Patient was reluctant to give any information [DP]   2351 Appreciate Dr. Cardozo who came down to the ER to evaluate the patient and agrees with course of care to this point.  She will be admitted to the ICU [DP]      ED Course User Index  [DP] Rocky Cline MD         Everything documented above with this statement, in the ED course section constitutes my independent interpretation of lab work EKG and radiology studies along with my personal conversations with other providers and my independent medical decision making.  This statement will not be repeated before each data point, but they are all my independent interpretation needs contained within the \"ED course\" section.             All appropriate hygiene and PPE requirements were satisfied with this patient encounter      FINAL DIAGNOSES  Final diagnoses:   Severe sepsis "   Alcohol withdrawal syndrome, with delirium           DISPOSITION  Admit to ICU          Latest Documented Vital Signs:  As of 23:51 EDT  BP- 121/71 HR- 111 Temp- (!) 100.8 °F (38.2 °C) (Rectal) O2 sat- 100%        --    Please note that portions of this were completed with a voice recognition program.       Note Disclaimer: At [facility], we believe that sharing information builds trust and better relationships. You are receiving this note because you are receiving care at [facility] or recently visited. It is possible you will see health information before a provider has talked with you about it. This kind of information can be easy to misunderstand. To help you fully understand what it      Rocky Cline MD  04/27/23 1536

## 2023-04-28 ENCOUNTER — APPOINTMENT (OUTPATIENT)
Dept: ULTRASOUND IMAGING | Facility: HOSPITAL | Age: 33
End: 2023-04-28
Payer: COMMERCIAL

## 2023-04-28 LAB
ALBUMIN SERPL-MCNC: 3 G/DL (ref 3.5–5.2)
ALBUMIN/GLOB SERPL: 1 G/DL
ALP SERPL-CCNC: 112 U/L (ref 39–117)
ALT SERPL W P-5'-P-CCNC: 49 U/L (ref 1–33)
ANION GAP SERPL CALCULATED.3IONS-SCNC: 19.2 MMOL/L (ref 5–15)
AST SERPL-CCNC: 465 U/L (ref 1–32)
BACTERIA BLD CULT: ABNORMAL
BILIRUB SERPL-MCNC: 0.8 MG/DL (ref 0–1.2)
BOTTLE TYPE: ABNORMAL
BUN SERPL-MCNC: 3 MG/DL (ref 6–20)
BUN/CREAT SERPL: 7.5 (ref 7–25)
CALCIUM SPEC-SCNC: 7.3 MG/DL (ref 8.6–10.5)
CHLORIDE SERPL-SCNC: 104 MMOL/L (ref 98–107)
CO2 SERPL-SCNC: 18.8 MMOL/L (ref 22–29)
CREAT SERPL-MCNC: 0.4 MG/DL (ref 0.57–1)
D-LACTATE SERPL-SCNC: 4 MMOL/L (ref 0.5–2)
D-LACTATE SERPL-SCNC: 4.3 MMOL/L (ref 0.5–2)
D-LACTATE SERPL-SCNC: 4.4 MMOL/L (ref 0.5–2)
D-LACTATE SERPL-SCNC: 5 MMOL/L (ref 0.5–2)
DEPRECATED RDW RBC AUTO: 41.8 FL (ref 37–54)
EGFRCR SERPLBLD CKD-EPI 2021: 135.9 ML/MIN/1.73
ERYTHROCYTE [DISTWIDTH] IN BLOOD BY AUTOMATED COUNT: 13.5 % (ref 12.3–15.4)
GLOBULIN UR ELPH-MCNC: 2.9 GM/DL
GLUCOSE SERPL-MCNC: 85 MG/DL (ref 65–99)
HCT VFR BLD AUTO: 25.8 % (ref 34–46.6)
HGB BLD-MCNC: 8.9 G/DL (ref 12–15.9)
LYMPHOCYTES # BLD MANUAL: 0.73 10*3/MM3 (ref 0.7–3.1)
MCH RBC QN AUTO: 30.3 PG (ref 26.6–33)
MCHC RBC AUTO-ENTMCNC: 34.5 G/DL (ref 31.5–35.7)
MCV RBC AUTO: 87.8 FL (ref 79–97)
MRSA DNA SPEC QL NAA+PROBE: NORMAL
NEUTROPHILS # BLD AUTO: 5.26 10*3/MM3 (ref 1.7–7)
NEUTROPHILS NFR BLD MANUAL: 87.8 % (ref 42.7–76)
PLAT MORPH BLD: NORMAL
PLATELET # BLD AUTO: 143 10*3/MM3 (ref 140–450)
PMV BLD AUTO: 10.3 FL (ref 6–12)
POTASSIUM SERPL-SCNC: 2.1 MMOL/L (ref 3.5–5.2)
POTASSIUM SERPL-SCNC: 2.9 MMOL/L (ref 3.5–5.2)
PROT SERPL-MCNC: 5.9 G/DL (ref 6–8.5)
RBC # BLD AUTO: 2.94 10*6/MM3 (ref 3.77–5.28)
RBC MORPH BLD: NORMAL
SMUDGE CELLS BLD QL SMEAR: ABNORMAL
SODIUM SERPL-SCNC: 142 MMOL/L (ref 136–145)
VARIANT LYMPHS NFR BLD MANUAL: 12.2 % (ref 19.6–45.3)
WBC NRBC COR # BLD: 5.99 10*3/MM3 (ref 3.4–10.8)

## 2023-04-28 PROCEDURE — 76775 US EXAM ABDO BACK WALL LIM: CPT

## 2023-04-28 PROCEDURE — 80053 COMPREHEN METABOLIC PANEL: CPT | Performed by: INTERNAL MEDICINE

## 2023-04-28 PROCEDURE — 85025 COMPLETE CBC W/AUTO DIFF WBC: CPT | Performed by: INTERNAL MEDICINE

## 2023-04-28 PROCEDURE — 36415 COLL VENOUS BLD VENIPUNCTURE: CPT | Performed by: INTERNAL MEDICINE

## 2023-04-28 PROCEDURE — 85007 BL SMEAR W/DIFF WBC COUNT: CPT | Performed by: INTERNAL MEDICINE

## 2023-04-28 PROCEDURE — 84132 ASSAY OF SERUM POTASSIUM: CPT | Performed by: INTERNAL MEDICINE

## 2023-04-28 PROCEDURE — 76705 ECHO EXAM OF ABDOMEN: CPT

## 2023-04-28 PROCEDURE — 25010000002 THIAMINE PER 100 MG: Performed by: INTERNAL MEDICINE

## 2023-04-28 PROCEDURE — 25010000002 ENOXAPARIN PER 10 MG: Performed by: INTERNAL MEDICINE

## 2023-04-28 PROCEDURE — 83605 ASSAY OF LACTIC ACID: CPT | Performed by: EMERGENCY MEDICINE

## 2023-04-28 PROCEDURE — 99222 1ST HOSP IP/OBS MODERATE 55: CPT | Performed by: PSYCHIATRY & NEUROLOGY

## 2023-04-28 PROCEDURE — 25010000002 CEFTRIAXONE PER 250 MG: Performed by: INTERNAL MEDICINE

## 2023-04-28 RX ORDER — LORAZEPAM 2 MG/ML
1 INJECTION INTRAMUSCULAR
Status: DISCONTINUED | OUTPATIENT
Start: 2023-04-28 | End: 2023-05-02 | Stop reason: HOSPADM

## 2023-04-28 RX ORDER — LORAZEPAM 2 MG/ML
2 INJECTION INTRAMUSCULAR
Status: DISCONTINUED | OUTPATIENT
Start: 2023-04-28 | End: 2023-05-02 | Stop reason: HOSPADM

## 2023-04-28 RX ORDER — LORAZEPAM 1 MG/1
4 TABLET ORAL
Status: DISCONTINUED | OUTPATIENT
Start: 2023-04-28 | End: 2023-05-02 | Stop reason: HOSPADM

## 2023-04-28 RX ORDER — LORAZEPAM 1 MG/1
2 TABLET ORAL
Status: DISCONTINUED | OUTPATIENT
Start: 2023-04-28 | End: 2023-05-02 | Stop reason: HOSPADM

## 2023-04-28 RX ORDER — LORAZEPAM 1 MG/1
1 TABLET ORAL
Status: DISCONTINUED | OUTPATIENT
Start: 2023-04-28 | End: 2023-05-02 | Stop reason: HOSPADM

## 2023-04-28 RX ORDER — LORAZEPAM 2 MG/ML
4 INJECTION INTRAMUSCULAR
Status: DISCONTINUED | OUTPATIENT
Start: 2023-04-28 | End: 2023-05-02 | Stop reason: HOSPADM

## 2023-04-28 RX ADMIN — CEFTRIAXONE 2 G: 2 INJECTION, POWDER, FOR SOLUTION INTRAMUSCULAR; INTRAVENOUS at 17:09

## 2023-04-28 RX ADMIN — ACETAMINOPHEN 650 MG: 325 TABLET, FILM COATED ORAL at 07:58

## 2023-04-28 RX ADMIN — SODIUM CHLORIDE 125 ML/HR: 9 INJECTION, SOLUTION INTRAVENOUS at 02:27

## 2023-04-28 RX ADMIN — ACETAMINOPHEN 650 MG: 325 TABLET, FILM COATED ORAL at 20:41

## 2023-04-28 RX ADMIN — LORAZEPAM 1 MG: 1 TABLET ORAL at 10:57

## 2023-04-28 RX ADMIN — POTASSIUM CHLORIDE 40 MEQ: 750 TABLET, EXTENDED RELEASE ORAL at 08:38

## 2023-04-28 RX ADMIN — POTASSIUM CHLORIDE 40 MEQ: 750 TABLET, EXTENDED RELEASE ORAL at 16:43

## 2023-04-28 RX ADMIN — Medication 10 ML: at 20:41

## 2023-04-28 RX ADMIN — ACETAMINOPHEN 650 MG: 325 TABLET, FILM COATED ORAL at 12:09

## 2023-04-28 RX ADMIN — SODIUM CHLORIDE 125 ML/HR: 9 INJECTION, SOLUTION INTRAVENOUS at 20:45

## 2023-04-28 RX ADMIN — ACETAMINOPHEN 650 MG: 325 TABLET, FILM COATED ORAL at 00:02

## 2023-04-28 RX ADMIN — ACETAMINOPHEN 650 MG: 325 TABLET, FILM COATED ORAL at 16:43

## 2023-04-28 RX ADMIN — POTASSIUM CHLORIDE 40 MEQ: 750 TABLET, EXTENDED RELEASE ORAL at 04:30

## 2023-04-28 RX ADMIN — ENOXAPARIN SODIUM 40 MG: 100 INJECTION SUBCUTANEOUS at 20:40

## 2023-04-28 RX ADMIN — Medication 10 ML: at 08:04

## 2023-04-28 RX ADMIN — LORAZEPAM 1 MG: 1 TABLET ORAL at 04:30

## 2023-04-28 RX ADMIN — POTASSIUM CHLORIDE 40 MEQ: 750 TABLET, EXTENDED RELEASE ORAL at 20:41

## 2023-04-28 RX ADMIN — THIAMINE HYDROCHLORIDE 100 MG: 100 INJECTION, SOLUTION INTRAMUSCULAR; INTRAVENOUS at 08:00

## 2023-04-28 RX ADMIN — SODIUM CHLORIDE 125 ML/HR: 9 INJECTION, SOLUTION INTRAVENOUS at 11:01

## 2023-04-28 RX ADMIN — Medication 1 MG: at 08:00

## 2023-04-28 RX ADMIN — POTASSIUM CHLORIDE 40 MEQ: 750 TABLET, EXTENDED RELEASE ORAL at 12:09

## 2023-04-28 NOTE — PLAN OF CARE
Goal Outcome Evaluation:                   Pt remains in CICU on RA. Pt continues to be febrile with a T-max of 104 degrees F. Pt intermittently refuses cooling blanket- educated on importance of decreasing fever. Pt has had good UOP.     Pt and mother updated at bedside by RN.

## 2023-04-28 NOTE — PLAN OF CARE
Problem: Fall Injury Risk  Goal: Absence of Fall and Fall-Related Injury  Outcome: Ongoing, Progressing  Intervention: Identify and Manage Contributors  Recent Flowsheet Documentation  Taken 4/28/2023 0600 by Ashley Mota RN  Medication Review/Management: medications reviewed  Taken 4/28/2023 0500 by Ashley Mota RN  Medication Review/Management: medications reviewed  Taken 4/28/2023 0400 by Ashley Mota RN  Medication Review/Management: medications reviewed  Taken 4/28/2023 0300 by Ashley Mota RN  Medication Review/Management: medications reviewed  Taken 4/28/2023 0200 by Ashley Mota RN  Medication Review/Management: medications reviewed  Taken 4/28/2023 0100 by Ashley Mota RN  Medication Review/Management: medications reviewed  Taken 4/28/2023 0000 by Ashley Mota RN  Medication Review/Management: medications reviewed  Taken 4/27/2023 2316 by Ashley Mota RN  Medication Review/Management: medications reviewed  Taken 4/27/2023 2230 by Ashley Mota RN  Medication Review/Management: medications reviewed  Intervention: Promote Injury-Free Environment  Recent Flowsheet Documentation  Taken 4/28/2023 0600 by Ashley Mota RN  Safety Promotion/Fall Prevention:   assistive device/personal items within reach   clutter free environment maintained   fall prevention program maintained   safety round/check completed  Taken 4/28/2023 0500 by Ashley Mota RN  Safety Promotion/Fall Prevention:   assistive device/personal items within reach   fall prevention program maintained   safety round/check completed  Taken 4/28/2023 0400 by Ashley Mota RN  Safety Promotion/Fall Prevention:   assistive device/personal items within reach   clutter free environment maintained   fall prevention program maintained   safety round/check completed  Taken 4/28/2023 0300 by Ashley Mota RN  Safety Promotion/Fall Prevention:   assistive device/personal items within  reach   clutter free environment maintained   fall prevention program maintained   safety round/check completed  Taken 4/28/2023 0200 by Ashley Mota RN  Safety Promotion/Fall Prevention:   assistive device/personal items within reach   clutter free environment maintained   fall prevention program maintained   safety round/check completed  Taken 4/28/2023 0100 by Ashley Mota RN  Safety Promotion/Fall Prevention:   clutter free environment maintained   fall prevention program maintained   safety round/check completed  Taken 4/28/2023 0000 by Ashley Mota RN  Safety Promotion/Fall Prevention:   clutter free environment maintained   fall prevention program maintained   safety round/check completed  Taken 4/27/2023 2316 by Ashley Mota RN  Safety Promotion/Fall Prevention:   clutter free environment maintained   fall prevention program maintained   safety round/check completed  Taken 4/27/2023 2230 by Ashley Mota RN  Safety Promotion/Fall Prevention:   clutter free environment maintained   fall prevention program maintained   safety round/check completed     Problem: Skin Injury Risk Increased  Goal: Skin Health and Integrity  Outcome: Ongoing, Progressing  Intervention: Optimize Skin Protection  Recent Flowsheet Documentation  Taken 4/28/2023 0600 by Ashley Mota RN  Head of Bed (HOB) Positioning: HOB elevated  Taken 4/28/2023 0400 by Ashley Mota RN  Head of Bed (HOB) Positioning: HOB elevated  Taken 4/28/2023 0200 by Ashley Mota RN  Head of Bed (HOB) Positioning: HOB elevated  Taken 4/28/2023 0000 by Ashley Mota RN  Head of Bed (HOB) Positioning: HOB elevated  Taken 4/27/2023 2230 by sAhley Mota RN  Head of Bed (HOB) Positioning: HOB elevated   Goal Outcome Evaluation:

## 2023-04-28 NOTE — H&P
"[facility]    CC: UTO    HPI:  Mrs. Gibson is a 31yo female with history of alcohol abuse.  She was brought to ER today by EMS.  Her mother is in from out of town visiting.  She says Lisa wasn't complaining of anything the past few days, seemed to be acting normally.  Patient was in the bathroom and mother was in another room, mother her patient hit the ground. EMS was called. Patient was brought to ER altered and having abnormal body movements, febrile. Patient given antibiotics, fluids and benzos in the er and she has improved significantly at this point. She is now awake and able to tell me her name that she is in a hospital and that the year is 2023.  She recognizes her mother.  She doesn't remember feeling  Unwell in the past few days. She denies any prescription or street drugs. States she drinks \"2 shots\" a day.  She denies any pain.  She denies any urinary symptoms. She says her back hurts all over.     PMH:  Alcohol abuse  Acute pancreatitis  Fatty liver  HTN    SH:  +etoh, daily    FH: non contributory  ALL: NKDA  ROS: 12 point negative except as in HPI    Vital Sign Min/Max for last 24 hours  Temp  Min: 103.3 °F (39.6 °C)  Max: 106.5 °F (41.4 °C)   BP  Min: 112/56  Max: 119/84   Pulse  Min: 107  Max: 150   Resp  Min: 16  Max: 28   SpO2  Min: 98 %  Max: 99 %   No data recorded   Weight  Min: 56.7 kg (125 lb)  Max: 56.7 kg (125 lb)     GEN:   appears ill,  A&O x3 fetor hepaticus?  HEENT: PERRL, EOMI, normal sclera, mm dry, no JVD, trachea midline, neck supple  CHEST: CTA bilat, no wheezes, no crackles, no use of accessory muscles  CV: tachy, regular, no m/g/r  ABD: soft, nt, nd +bs, no hepatosplenomegaly  EXT: no c/c/e  SKIN: no rashes, no xanthomas, nl turgor, warm, dry  LYMPH: no palpable cervical or supraclavicular lymphadenopathy  NEURO: CN 2-12 intact and symmetric bilaterally  PSYCH: flat affect, nl orientation, nl judgment, nl mood  MSK: no kyphoscoliosis, 5/5 strength ue and le " bilaterally    Labs: 4/27: reviewed:  7.47/30/34 (vbg)  UDS: +THC  lactate 6.7  U/a: george est 2+; nitrate +; wbc TNC; Rogelio 3+  Glucose 150  Bun 5  Cr 0.57  Na 137  Bicarb 19.3  Alt 41  aSt 298  Alk phos 133  etoh 233  Mg 1.3  Wbc 8  hgb 9.8  plts 171  procal 0.49  Ck 248  CXR: 4/27: nad to my read    A/P:  1. Encephalopathy -- likely multifactorial.  Suspect patient may not be being totally honest with the amount/types of substances consumed -- sz remain in differential dx -- will ask neurology to see in morning. Monitor in ICU. benzos if needed. Check ammonia, ct head  2. Hepatitis - suspect alcoholic hepatitis -- supportive care, monitor. ruq ultrasound  3. Abnormal u/a -- treat with antibiotics for now, but patient denies urinary symptoms -- will check renal ultrasound  4. Lactic acidosis - -- suspect will be much improved here, bicarb should be lower given the etoh, lactic and ketones in urine. Continue iv fluids  5. Alcohol intoxication - thiamine, ciwa  6. hypomagnesium -- replace  7. Anemia  8. Mild rhabdo  9. Fever -- unclear    Discussed with mother at bedside, discussed with Dr. Cline    CC 40 mins excluding any future billable procedures.

## 2023-04-28 NOTE — PROGRESS NOTES
ABG collected on PT was a mixed gas, RT notified Dr Cline of collection and offered to re-stick PT. He stated it was ok, he could use the PH and to just document the gas.

## 2023-04-28 NOTE — ED NOTES
"Nursing report ED to floor  Lisa Gibson  32 y.o.  female    HPI :   Chief Complaint   Patient presents with    Altered Mental Status       Admitting doctor:   Ryne Resendiz MD    Admitting diagnosis:   The primary encounter diagnosis was Severe sepsis. A diagnosis of Alcohol withdrawal syndrome, with delirium was also pertinent to this visit.    Code status:   Current Code Status       Date Active Code Status Order ID Comments User Context       Prior            Allergies:   Patient has no known allergies.    Isolation:   Enhanced Droplet/Contact     Intake and Output  No intake or output data in the 24 hours ending 04/27/23 2132    Weight:       04/27/23 2001   Weight: 56.7 kg (125 lb)       Most recent vitals:   Vitals:    04/27/23 2001 04/27/23 2011 04/27/23 2031 04/27/23 2101   BP: 112/52  112/56    Pulse: (!) 150  (!) 122    Resp: 16   28   Temp:  (!) 106.5 °F (41.4 °C)  (!) 103.3 °F (39.6 °C)   TempSrc:  Rectal  Rectal   SpO2: 99%  98%    Weight: 56.7 kg (125 lb)      Height: 175.3 cm (69\")          Active LDAs/IV Access:   Lines, Drains & Airways       Active LDAs       Name Placement date Placement time Site Days    Peripheral IV 04/27/23 1955 Left Antecubital 04/27/23 1955  Antecubital  less than 1    Peripheral IV 04/27/23 2000 Right Antecubital 04/27/23 2000  Antecubital  less than 1    Peripheral IV 04/27/23 2105 Anterior;Right Forearm 04/27/23 2105  Forearm  less than 1    Urethral Catheter Non-latex 16 Fr. 04/27/23 2031  -- less than 1                    Labs (abnormal labs have a star):   Labs Reviewed   COMPREHENSIVE METABOLIC PANEL - Abnormal; Notable for the following components:       Result Value    Glucose 150 (*)     BUN 5 (*)     Chloride 95 (*)     CO2 19.3 (*)     ALT (SGPT) 41 (*)     AST (SGOT) 298 (*)     Alkaline Phosphatase 133 (*)     Anion Gap 22.7 (*)     All other components within normal limits    Narrative:     GFR Normal >60  Chronic Kidney Disease <60  Kidney " Failure <15     ETHANOL - Abnormal; Notable for the following components:    Ethanol 233 (*)     All other components within normal limits   MAGNESIUM - Abnormal; Notable for the following components:    Magnesium 1.3 (*)     All other components within normal limits   CBC WITH AUTO DIFFERENTIAL - Abnormal; Notable for the following components:    RBC 3.26 (*)     Hemoglobin 9.8 (*)     Hematocrit 28.6 (*)     All other components within normal limits   LACTIC ACID, PLASMA - Abnormal; Notable for the following components:    Lactate 6.7 (*)     All other components within normal limits   CK - Abnormal; Notable for the following components:    Creatine Kinase 248 (*)     All other components within normal limits   URINALYSIS W/ MICROSCOPIC IF INDICATED (NO CULTURE) - Abnormal; Notable for the following components:    Color, UA Dark Yellow (*)     Appearance, UA Turbid (*)     Ketones, UA 15 mg/dL (1+) (*)     Blood, UA Large (3+) (*)     Protein, UA >=300 mg/dL (3+) (*)     Leuk Esterase, UA Moderate (2+) (*)     Nitrite, UA Positive (*)     All other components within normal limits   MANUAL DIFFERENTIAL - Abnormal; Notable for the following components:    Neutrophil % 81.6 (*)     Lymphocyte % 16.3 (*)     Monocyte % 1.0 (*)     Monocytes Absolute 0.08 (*)     nRBC 4.1 (*)     All other components within normal limits   URINALYSIS, MICROSCOPIC ONLY - Abnormal; Notable for the following components:    RBC, UA Too Numerous to Count (*)     WBC, UA Too Numerous to Count (*)     Bacteria, UA 3+ (*)     All other components within normal limits   BLOOD GAS, ARTERIAL - Abnormal; Notable for the following components:    pH, Arterial 7.472 (*)     pCO2, Arterial 30.9 (*)     pO2, Arterial 34.0 (*)     Base Excess, Arterial -0.5 (*)     O2 Saturation Calculated 70.7 (*)     All other components within normal limits   HCG, SERUM, QUALITATIVE - Normal   BLOOD CULTURE   BLOOD CULTURE   RESPIRATORY PANEL PCR W/ COVID-19  (SARS-COV-2) ЕКАТЕРИНА/SHARLENE/LAURA/PAD/COR/MAD/GILMA IN-HOUSE, NP SWAB IN [facility], 3-4 HR TAT   MRSA SCREEN, PCR   BLOOD GAS, ARTERIAL   URINE DRUG SCREEN    Narrative:     The following orders were created for panel order Urine Drug Screen - Indwelling Urethral Catheter.  Procedure                               Abnormality         Status                     ---------                               -----------         ------                     Urine Drug Screen - Indw...[[id number]]                      In process                   Please view results for these tests on the individual orders.   URINE DRUG SCREEN   PROCALCITONIN   LACTIC ACID, REFLEX   LIPASE   AMMONIA   SALICYLATE LEVEL   CBC AND DIFFERENTIAL    Narrative:     The following orders were created for panel order CBC & Differential.  Procedure                               Abnormality         Status                     ---------                               -----------         ------                     CBC Auto Differential[[id number]]        Abnormal            Final result                 Please view results for these tests on the individual orders.       EKG:   No orders to display       Meds given in ED:   Medications   sodium chloride 0.9 % flush 10 mL (has no administration in time range)   LORazepam (ATIVAN) injection 1 mg (1 mg Intravenous Given 4/27/23 2042)   sodium chloride 0.9 % bolus 1,701 mL (1,701 mL Intravenous New Bag 4/27/23 2119)   cefTRIAXone (ROCEPHIN) 1 g in sodium chloride 0.9 % 100 mL IVPB-VTB (has no administration in time range)   diazePAM (VALIUM) injection 10 mg (has no administration in time range)   thiamine (B-1) 100 mg in sodium chloride 0.9 % 100 mL IVPB (has no administration in time range)   cefTRIAXone (ROCEPHIN) 1 g in sodium chloride 0.9 % 100 mL IVPB-VTB (has no administration in time range)   sodium chloride 0.9 % infusion (has no administration in time range)   LORazepam (ATIVAN) injection 1 mg (1 mg Intravenous Given  4/27/23 2007)   thiamine (B-1) 100 mg, folic acid 1 mg in sodium chloride 0.9 % 1,000 mL infusion (1,000 mL/hr Intravenous New Bag 4/27/23 2039)   magnesium sulfate 2g/50 mL (PREMIX) infusion (2 g Intravenous New Bag 4/27/23 2007)   sodium chloride 0.9 % bolus 1,000 mL (0 mL Intravenous Stopped 4/27/23 2107)   acetaminophen (TYLENOL) suppository 650 mg (650 mg Rectal Given 4/27/23 2019)       Imaging results:  No radiology results for the last day    Ambulatory status:   - bedrest    Social issues:   Social History     Socioeconomic History    Marital status:    Tobacco Use    Smoking status: Never    Smokeless tobacco: Never       NIH Stroke Scale:         Ida Lawson RN  04/27/23 21:32 EDT

## 2023-04-28 NOTE — CONSULTS
Reason for Consultation: Question of seizure    Chief complaint altered mental status  Subjective .     History of present illness: History taken from the chart and the patient's mother at bedside.  Patient unable to contribute much to the conversation as she is quite tired.  Mother confirms documentation that she was visiting, patient had been working long day, and is doing a lot more work at home as well, concern for alcohol ingestion versus abuse patient lost consciousness.  She reportedly had altered mental status and abnormal involuntary movements and was febrile.  Evaluation to date demonstrates normal CT head, urinalysis with suggestion of urinary tract infection.  Mother patient states the patient has had UTIs before.     Review of Systems  Pertinent items are noted in HPI    History  History reviewed. No pertinent past medical history., No past surgical history on file., History reviewed. No pertinent family history.,   Social History     Tobacco Use   • Smoking status: Never   • Smokeless tobacco: Never   ,   Medications Prior to Admission   Medication Sig Dispense Refill Last Dose   • amLODIPine (NORVASC) 5 MG tablet Take 1 tablet by mouth Daily. 30 tablet 0    , Scheduled Meds:  cefTRIAXone, 2 g, Intravenous, Q24H  enoxaparin, 40 mg, Subcutaneous, Nightly  folic acid, 1 mg, Oral, Daily  sodium chloride, 10 mL, Intravenous, Q12H  thiamine (VITAMIN B1) IVPB, 100 mg, Intravenous, Daily    , Continuous Infusions:  sodium chloride, 125 mL/hr, Last Rate: 125 mL/hr (04/28/23 1101)    , PRN Meds:  •  acetaminophen **OR** acetaminophen  •  calcium carbonate  •  Calcium Gluconate-NaCl **AND** calcium gluconate IVPB **AND** Calcium  •  LORazepam **OR** LORazepam **OR** LORazepam **OR** LORazepam **OR** LORazepam **OR** LORazepam **OR** LORazepam **OR** LORazepam  •  LORazepam  •  magnesium sulfate **OR** magnesium sulfate **OR** magnesium sulfate  •  ondansetron **OR** ondansetron  •  potassium & sodium  phosphates **OR** potassium & sodium phosphates  •  potassium chloride **OR** potassium chloride **OR** potassium chloride  •  [COMPLETED] Insert Peripheral IV **AND** sodium chloride  •  sodium chloride  •  sodium chloride and Allergies:  Patient has no known allergies.    Objective     Vital Signs   Temp:  [99.7 °F (37.6 °C)-106.5 °F (41.4 °C)] 102 °F (38.9 °C)  Heart Rate:  [107-150] 107  Resp:  [16-28] 25  BP: (107-131)/(52-96) 124/89    Intake & Output (last day)       04/27 0701  04/28 0700 04/28 0701  04/29 0700    P.O. 50     I.V. (mL/kg) 962.5 (16.2)     Total Intake(mL/kg) 1012.5 (17)     Urine (mL/kg/hr) 2400 475 (1.1)    Total Output 2400 475    Net -1387.5 -475                 Physical Exam:     Patient asleep but arousable, follows commands, oriented to place, month, and her mother being present.  Eyes are conjugate, to examine pupillary response due to patient's resistance.  No facial asymmetry.  She has equal full strength distally in all 4 limbs.  Remainder of exam deferred.    Results Review:   I reviewed the patient's new clinical results.    I personally reviewed the CT of the head, and agree with the report, no acute intracranial findings.      Assessment & Plan       Severe sepsis  Possible seizure    32-year-old with transient loss of consciousness, with reported witnessed abnormal movements, in the setting of unknown alcohol use history and suspected urinary tract infection based on UA and fever.  Exam nonfocal, agree with treating acute sepsis, no indication at present for direct treatment/AED of possible symptomatic seizure versus etiology for acute loss of consciousness.    Neurology will sign off, discussed with Dr. Tilley.     I discussed the patients findings and my recommendations with patient, family and primary care team    Time: 30 minutes spent on this encounter to include face-to-face/floor time,  review of records, coordination of care and documentation.    This note contains  portions which were generated via Dragon Software (voice to written text).    Kne Sutton DO  04/28/23  14:00 EDT

## 2023-04-28 NOTE — PROGRESS NOTES
"                                              LOS: 1 day   Patient Care Team:  Provider, No Known as PCP - General    Chief Complaint:  F/up sepsis, alcoholism and critical care management    Subjective   Interval History  I reviewed the admission note, progress notes, PMH, PSH, Family hx, social history, imagings and prior records on this admission, summarized the finding in my note and formulated a transition of care plan.    Fever.  Shivering.  Abdominal discomfort only on palpation of the lower abdomen    REVIEW OF SYSTEMS:   CARDIOVASCULAR: No chest pain, chest pressure or chest discomfort. No palpitations or edema.   RESPIRATORY: Mild cough attributed to allergy.  No shortness of breath.  GASTROINTESTINAL: No anorexia, nausea, vomiting or diarrhea. No abdominal pain.  CONSTITUTIONAL: Fever and chills.  Neuro: Denies headache, neck stiffness.    Ventilator/Non-Invasive Ventilation Settings (From admission, onward)    None                Physical Exam:     Vital Signs  Temp:  [99.7 °F (37.6 °C)-106.5 °F (41.4 °C)] 99.7 °F (37.6 °C)  Heart Rate:  [107-150] 112  Resp:  [16-28] 25  BP: (107-128)/(52-94) 119/80    Intake/Output Summary (Last 24 hours) at 4/28/2023 0736  Last data filed at 4/28/2023 0649  Gross per 24 hour   Intake 1012.5 ml   Output 2400 ml   Net -1387.5 ml     Flowsheet Rows    Flowsheet Row First Filed Value   Admission Height 175.3 cm (69\") Documented at 04/27/2023 2001   Admission Weight 56.7 kg (125 lb) Documented at 04/27/2023 2001          PPE used per hospital policy    General Appearance:   Alert, cooperative, in no acute distress   ENMT:  Mallampati score 2, dry mucous membrane   Eyes:  Pupils equal and reactive to light. EOMI   Neck:    Trachea midline. No thyromegaly.   Lungs:    Clear to auscultation,respirations regular, even and nonlabored    Heart:   Tachycardia but regular rhythm.  No audible murmur   Skin:   No rash or ecchymosis   Abdomen:    Soft.  Mild tenderness on palpation of " the lower abdomen.  No guarding or rebound.   Neuro/psych:  Conscious, alert, oriented x3. Strength 5/5 in upper and lower  ext.  Appropriate mood and affect   Extremities:  No cyanosis, clubbing or edema.  Warm extremities and well-perfused          Results Review:        Results from last 7 days   Lab Units 04/28/23 0225 04/27/23 2003   SODIUM mmol/L 142 137   POTASSIUM mmol/L 2.1* 3.7   CHLORIDE mmol/L 104 95*   CO2 mmol/L 18.8* 19.3*   BUN mg/dL 3* 5*   CREATININE mg/dL 0.40* 0.57   GLUCOSE mg/dL 85 150*   CALCIUM mg/dL 7.3* 8.9     Results from last 7 days   Lab Units 04/27/23 2003   CK TOTAL U/L 248*     Results from last 7 days   Lab Units 04/28/23 0225 04/27/23 2003   WBC 10*3/mm3 5.99 8.01   HEMOGLOBIN g/dL 8.9* 9.8*   HEMATOCRIT % 25.8* 28.6*   PLATELETS 10*3/mm3 143 171                           Results from last 7 days   Lab Units 04/27/23 2058   PH, ARTERIAL pH units 7.472*   PCO2, ARTERIAL mm Hg 30.9*   PO2 ART mm Hg 34.0*   MODALITY  Room Air   O2 SATURATION CALC % 70.7*         I reviewed the patient's new clinical results.        Medication Review:   cefTRIAXone, 2 g, Intravenous, Q24H  diazePAM, 10 mg, Intravenous, Once  enoxaparin, 40 mg, Subcutaneous, Nightly  folic acid, 1 mg, Oral, Daily  LORazepam, 1 mg, Oral, Q6H   Followed by  LORazepam, 1 mg, Oral, Q8H  sodium chloride, 10 mL, Intravenous, Q12H  thiamine (VITAMIN B1) IVPB, 100 mg, Intravenous, Daily        sodium chloride, 125 mL/hr, Last Rate: 125 mL/hr (04/28/23 0227)        Diagnostic imaging:  I personally and independently reviewed the following images:  CXR 4/27/23: Vague left pulmonary infiltrates    Assessment     1. Severe sepsis, POA, 2ary to UTI  2. UTI  3. Metabolic/toxic encephalopathy, resolved  4. Alcohol intoxication  5. Lactic acidosis, secondary to alcoholism and sepsis  6. Transaminitis, likely mild acute alcoholic hepatitis  7. THC use  8. Microscopic hematuria, secondary to UTI VS other  9. Elevated CK,  mild  10. Acute on chronic anemia but no evidence of blood loss.  Hb stable  11. Hepatic steatosis on ultrasound, probably secondary to alcoholism  12. Electrolytes disturbance: Severe hypokalemia      Plan         · Rocephin for UTI.  Add urine culture to the specimen collected on presentation  · IV hydration with normal saline  · Replace potassium  · Thiamine and folate supplement  · Ativan per CIWA protocol  · Pending ultrasound of the kidneys due to hematuria  · Continue trending lactate  · DVT prophylaxis with Lovenox  · Counseled against alcoholism    Continue ICU care for today due to severe sepsis.    González Tilley MD  04/28/23  07:36 EDT      Time: Critical care 35 min      This note was dictated utilizing Dragon dictation

## 2023-04-29 ENCOUNTER — APPOINTMENT (OUTPATIENT)
Dept: CT IMAGING | Facility: HOSPITAL | Age: 33
End: 2023-04-29
Payer: COMMERCIAL

## 2023-04-29 LAB
ALBUMIN SERPL-MCNC: 3.2 G/DL (ref 3.5–5.2)
ALBUMIN/GLOB SERPL: 0.9 G/DL
ALP SERPL-CCNC: 92 U/L (ref 39–117)
ALT SERPL W P-5'-P-CCNC: 33 U/L (ref 1–33)
ANION GAP SERPL CALCULATED.3IONS-SCNC: 15 MMOL/L (ref 5–15)
AST SERPL-CCNC: 128 U/L (ref 1–32)
BASOPHILS # BLD AUTO: 0.06 10*3/MM3 (ref 0–0.2)
BASOPHILS NFR BLD AUTO: 1 % (ref 0–1.5)
BILIRUB SERPL-MCNC: 1.2 MG/DL (ref 0–1.2)
BUN SERPL-MCNC: 4 MG/DL (ref 6–20)
BUN/CREAT SERPL: 8.9 (ref 7–25)
CALCIUM SPEC-SCNC: 8.1 MG/DL (ref 8.6–10.5)
CHLORIDE SERPL-SCNC: 103 MMOL/L (ref 98–107)
CO2 SERPL-SCNC: 19 MMOL/L (ref 22–29)
CREAT SERPL-MCNC: 0.45 MG/DL (ref 0.57–1)
D-LACTATE SERPL-SCNC: 1.5 MMOL/L (ref 0.5–2)
DEPRECATED RDW RBC AUTO: 39.3 FL (ref 37–54)
EGFRCR SERPLBLD CKD-EPI 2021: 132.1 ML/MIN/1.73
EOSINOPHIL # BLD AUTO: 0.02 10*3/MM3 (ref 0–0.4)
EOSINOPHIL NFR BLD AUTO: 0.3 % (ref 0.3–6.2)
ERYTHROCYTE [DISTWIDTH] IN BLOOD BY AUTOMATED COUNT: 12.8 % (ref 12.3–15.4)
GLOBULIN UR ELPH-MCNC: 3.5 GM/DL
GLUCOSE SERPL-MCNC: 94 MG/DL (ref 65–99)
HCT VFR BLD AUTO: 28.7 % (ref 34–46.6)
HGB BLD-MCNC: 10 G/DL (ref 12–15.9)
LYMPHOCYTES # BLD AUTO: 1.1 10*3/MM3 (ref 0.7–3.1)
LYMPHOCYTES NFR BLD AUTO: 18.6 % (ref 19.6–45.3)
MCH RBC QN AUTO: 29.9 PG (ref 26.6–33)
MCHC RBC AUTO-ENTMCNC: 34.8 G/DL (ref 31.5–35.7)
MCV RBC AUTO: 85.7 FL (ref 79–97)
MONOCYTES # BLD AUTO: 0.51 10*3/MM3 (ref 0.1–0.9)
MONOCYTES NFR BLD AUTO: 8.6 % (ref 5–12)
NEUTROPHILS NFR BLD AUTO: 4.17 10*3/MM3 (ref 1.7–7)
NEUTROPHILS NFR BLD AUTO: 70.5 % (ref 42.7–76)
PLATELET # BLD AUTO: 143 10*3/MM3 (ref 140–450)
PMV BLD AUTO: 11.4 FL (ref 6–12)
POTASSIUM SERPL-SCNC: 3.3 MMOL/L (ref 3.5–5.2)
POTASSIUM SERPL-SCNC: 3.5 MMOL/L (ref 3.5–5.2)
PROT SERPL-MCNC: 6.7 G/DL (ref 6–8.5)
RBC # BLD AUTO: 3.35 10*6/MM3 (ref 3.77–5.28)
SODIUM SERPL-SCNC: 137 MMOL/L (ref 136–145)
WBC NRBC COR # BLD: 5.92 10*3/MM3 (ref 3.4–10.8)

## 2023-04-29 PROCEDURE — 83605 ASSAY OF LACTIC ACID: CPT | Performed by: INTERNAL MEDICINE

## 2023-04-29 PROCEDURE — 85025 COMPLETE CBC W/AUTO DIFF WBC: CPT | Performed by: INTERNAL MEDICINE

## 2023-04-29 PROCEDURE — 25010000002 THIAMINE PER 100 MG: Performed by: INTERNAL MEDICINE

## 2023-04-29 PROCEDURE — 25010000002 PIPERACILLIN SOD-TAZOBACTAM PER 1 G: Performed by: INTERNAL MEDICINE

## 2023-04-29 PROCEDURE — 84132 ASSAY OF SERUM POTASSIUM: CPT | Performed by: INTERNAL MEDICINE

## 2023-04-29 PROCEDURE — 99223 1ST HOSP IP/OBS HIGH 75: CPT | Performed by: STUDENT IN AN ORGANIZED HEALTH CARE EDUCATION/TRAINING PROGRAM

## 2023-04-29 PROCEDURE — 80053 COMPREHEN METABOLIC PANEL: CPT | Performed by: INTERNAL MEDICINE

## 2023-04-29 PROCEDURE — 25010000002 ENOXAPARIN PER 10 MG: Performed by: INTERNAL MEDICINE

## 2023-04-29 PROCEDURE — 87040 BLOOD CULTURE FOR BACTERIA: CPT | Performed by: STUDENT IN AN ORGANIZED HEALTH CARE EDUCATION/TRAINING PROGRAM

## 2023-04-29 PROCEDURE — 74176 CT ABD & PELVIS W/O CONTRAST: CPT

## 2023-04-29 RX ADMIN — POTASSIUM CHLORIDE 40 MEQ: 750 TABLET, EXTENDED RELEASE ORAL at 16:52

## 2023-04-29 RX ADMIN — Medication 1 MG: at 08:32

## 2023-04-29 RX ADMIN — ACETAMINOPHEN 650 MG: 325 TABLET, FILM COATED ORAL at 08:32

## 2023-04-29 RX ADMIN — ACETAMINOPHEN 650 MG: 325 TABLET, FILM COATED ORAL at 21:35

## 2023-04-29 RX ADMIN — TAZOBACTAM SODIUM AND PIPERACILLIN SODIUM 4.5 G: 500; 4 INJECTION, SOLUTION INTRAVENOUS at 11:17

## 2023-04-29 RX ADMIN — ENOXAPARIN SODIUM 40 MG: 100 INJECTION SUBCUTANEOUS at 21:35

## 2023-04-29 RX ADMIN — THIAMINE HYDROCHLORIDE 100 MG: 100 INJECTION, SOLUTION INTRAMUSCULAR; INTRAVENOUS at 08:33

## 2023-04-29 RX ADMIN — POTASSIUM CHLORIDE 40 MEQ: 750 TABLET, EXTENDED RELEASE ORAL at 23:26

## 2023-04-29 RX ADMIN — SODIUM CHLORIDE 125 ML/HR: 9 INJECTION, SOLUTION INTRAVENOUS at 05:19

## 2023-04-29 RX ADMIN — TAZOBACTAM SODIUM AND PIPERACILLIN SODIUM 4.5 G: 500; 4 INJECTION, SOLUTION INTRAVENOUS at 16:52

## 2023-04-29 RX ADMIN — ACETAMINOPHEN 650 MG: 325 TABLET, FILM COATED ORAL at 16:52

## 2023-04-29 RX ADMIN — Medication 10 ML: at 23:27

## 2023-04-29 RX ADMIN — Medication 10 ML: at 08:35

## 2023-04-29 RX ADMIN — POTASSIUM CHLORIDE 40 MEQ: 750 TABLET, EXTENDED RELEASE ORAL at 12:20

## 2023-04-29 RX ADMIN — SODIUM CHLORIDE 150 ML/HR: 9 INJECTION, SOLUTION INTRAVENOUS at 23:27

## 2023-04-29 RX ADMIN — SODIUM CHLORIDE 150 ML/HR: 9 INJECTION, SOLUTION INTRAVENOUS at 13:30

## 2023-04-29 NOTE — PLAN OF CARE
Problem: Adult Inpatient Plan of Care  Goal: Plan of Care Review  Outcome: Ongoing, Progressing  Flowsheets (Taken 4/29/2023 5342)  Progress: no change  Plan of Care Reviewed With: patient  Outcome Evaluation: In CICU. On room air. CT abd/pelv done. Potassium replaced. PRN tylenol given for fever, cooling blanket maintained. BM x2. Family updated at bedside.

## 2023-04-29 NOTE — CONSULTS
Referring Provider: Ryne Resendiz MD  Reason for Consultation: E. coli bacteremia  Chief Complaint   Patient presents with   • Altered Mental Status         Subjective   History of present illness: Patient is a 32-year-old female with past medical history of alcohol abuse who presents in the setting of syncopal episode and sepsis.  ID consulted for E. coli bacteremia.    Patient reports she was at home in the bathroom when she was noted by family members to have fallen and reportedly was unconscious for a small amount of time.  She reports it was somewhere between 30 seconds to 2 minutes.  Reportedly there were abnormal body movements and she was brought to the ER where she was found to have fever of 106.5 with normal WBC count.  She was tachycardic and received benzodiazepine and was started on antibiotics with ceftriaxone.  Blood cultures are positive for E. coli and urinalysis was abnormal consistent with possible infection.    Patient reports she was feeling poorly for multiple days prior to her presentation but mostly her symptoms are located in her abdomen.  She states in her very lower abdomen she had pain and cramping.  States she was not having any dysuria but noted change in color and smell of her urine.  States she has had urinary tract infections before.  States she has had some loose bowel movements since being here in the hospital.  Denies any recent sick contacts.  Denies any drug use.  States she drinks 2-3 shots on the weekends.    History reviewed. No pertinent past medical history.     reports that she has never smoked. She has never used smokeless tobacco.     No Known Allergies    Medication:  Antibiotics:  Anti-Infectives (From admission, onward)    Ordered     Dose/Rate Route Frequency Start Stop    04/29/23 1058  piperacillin-tazobactam (ZOSYN) 4.5 g in iso-osmotic dextrose 100 mL IVPB (premix)        Ordering Provider: Markie Resendiz MD    4.5 g  over 4 Hours Intravenous Every 8  Hours 04/29/23 1745 05/06/23 1744    04/29/23 1058  piperacillin-tazobactam (ZOSYN) 4.5 g in iso-osmotic dextrose 100 mL IVPB (premix)        Ordering Provider: Markie Resendiz MD    4.5 g  over 30 Minutes Intravenous Once 04/29/23 1145 04/29/23 1147    04/27/23 2211  cefTRIAXone (ROCEPHIN) 1 g in sodium chloride 0.9 % 100 mL IVPB-VTB        Ordering Provider: Ryne Resendiz MD    1 g  200 mL/hr over 30 Minutes Intravenous Once 04/27/23 2213 04/27/23 2342    04/27/23 2020  cefTRIAXone (ROCEPHIN) 1 g in sodium chloride 0.9 % 100 mL IVPB-VTB        Ordering Provider: Rocky Cline MD    1 g  200 mL/hr over 30 Minutes Intravenous Once 04/27/23 2022 04/27/23 2202            Objective     Physical Exam:   Vital Signs   Temp:  [101.2 °F (38.4 °C)-104 °F (40 °C)] 101.2 °F (38.4 °C)  Heart Rate:  [] 87  BP: (108-159)/() 119/97    GENERAL: Awake and alert, in no acute distress.   HEENT: Oropharynx is clear. Hearing is grossly normal.   EYES: PERRL. No conjunctival injection. No lid lag.   HEART: Regular rate and regular rhythm. No peripheral edema.   LUNGS: Normal work of breathing  GI: Abdomen is soft and tender diffusely in the lower quadrants.  SKIN: Warm and dry without cutaneous eruptions   PSYCHIATRIC: Appropriate mood, affect, insight, and judgment.     Results Review:   I reviewed the patient's new clinical results.  I reviewed the patient's new imaging results and agree with the interpretation.  I reviewed the patient's other test results and agree with the interpretation    Lab Results   Component Value Date    WBC 5.99 04/28/2023    HGB 8.9 (L) 04/28/2023    HCT 25.8 (L) 04/28/2023    MCV 87.8 04/28/2023     04/28/2023       No results found for: LIN ANGEL VANCORANDOM    Lab Results   Component Value Date    GLUCOSE 94 04/29/2023    BUN 4 (L) 04/29/2023    CREATININE 0.45 (L) 04/29/2023    EGFRIFAFRI >150 09/27/2021    BCR 8.9 04/29/2023    CO2 19.0 (L) 04/29/2023     CALCIUM 8.1 (L) 04/29/2023    ALBUMIN 3.2 (L) 04/29/2023     (H) 04/29/2023    ALT 33 04/29/2023         Estimated Creatinine Clearance: 169.9 mL/min (A) (by C-G formula based on SCr of 0.45 mg/dL (L)).      Microbiology:  4/27 blood cultures 2 out of 2 E. coli  4/27 urine culture in process  4/27 respiratory panel negative  4/27 MRSA nares negative    Radiology:  4/27 chest x-ray reviewed by me with no acute infiltrates.    4/27 CT of the head report reviewed with no acute intracranial pathology identified.    4/28 ultrasound the gallbladder report reviewed with stable evidence of diffuse hepatic steatosis.    4/28 ultrasound bilateral kidneys report reviewed with simple appearing right renal cyst but otherwise normal.    4/29 CT of the abdomen and pelvis with right perinephric stranding and diffuse bladder wall thickening.  Consistent with right-sided pyelonephritis.  Diffuse fatty infiltration of the liver.    Assessment   #E. coli septicemia  #Acute right pyelonephritis  #Alcohol abuse  #Lactic acidosis  #Transaminitis  #Hepatic steatosis    Urinary source seems most likely given CT findings.  CT scan of the abdomen and pelvis with right enlarged kidney and perinephric stranding consistent with pyelonephritis and diffuse bladder wall thickening.  Continue IV Zosyn 4.5 g every 8 hours for now while following up susceptibilities on blood culture.  Repeat blood cultures today to document sterility.      Thank you for this consult.  We will continue to follow along and tailor antibiotics as the patient's clinical course evolves.

## 2023-04-29 NOTE — PROGRESS NOTES
"  Daily Progress Note.   [facility] CARDIAC INTENSIVE CARE  4/29/2023    Patient:  Name:  Lisa Gibson  MRN:  [id number]  1990  32 y.o.  female         Chief Complaint:  F/up sepsis, alcoholism and critical care management          Interval History:  Afebrile over night remains on room air blood pressure stable.    On cooling blanket temp 101.4  She is awake gives appropriate history  abd pain  Diarrhea overnight 5 x water she says        Physical Exam:  /94   Pulse 104   Temp (!) 102.7 °F (39.3 °C) (Rectal)   Resp 25   Ht 175.3 cm (69\")   Wt 59.4 kg (130 lb 15.3 oz)   LMP  (LMP Unknown)   SpO2 98%   BMI 19.34 kg/m²   Body mass index is 19.34 kg/m².    Intake/Output Summary (Last 24 hours) at 4/29/2023 1038  Last data filed at 4/29/2023 0554  Gross per 24 hour   Intake 5092.5 ml   Output 2450 ml   Net 2642.5 ml     General appearance: ill, conversant   Eyes: anicteric sclerae, moist conjunctivae; no lidlag;    HENT: Atraumatic; oropharynx clear with moist mucous membranes    Neck: Trachea midline;  supple   Lungs: CTA, with normal respiratory effort and no intercostal retractions  CV: RRR, no rub   Abdomen: non rigid + ttp BS+  Extremities: No peripheral edema or ext lad  Skin: WWP no diffuse visible rash  Psych: Appropriate affect, alert   Neuro: Moves all ext. CN II-XII. Speech intact.    Data Review:  Notable Labs:  Results from last 7 days   Lab Units 04/28/23 0225 04/27/23 2003   WBC 10*3/mm3 5.99 8.01   HEMOGLOBIN g/dL 8.9* 9.8*   PLATELETS 10*3/mm3 143 171     Results from last 7 days   Lab Units 04/28/23  1107 04/28/23 0225 04/27/23 2003   SODIUM mmol/L  --  142 137   POTASSIUM mmol/L 2.9* 2.1* 3.7   CHLORIDE mmol/L  --  104 95*   CO2 mmol/L  --  18.8* 19.3*   BUN mg/dL  --  3* 5*   CREATININE mg/dL  --  0.40* 0.57   GLUCOSE mg/dL  --  85 150*   CALCIUM mg/dL  --  7.3* 8.9   MAGNESIUM mg/dL  --   --  1.3*   Estimated Creatinine Clearance: 191.1 mL/min (A) (by C-G " formula based on SCr of 0.4 mg/dL (L)).    Results from last 7 days   Lab Units 04/28/23  1725 04/28/23  1107 04/28/23  0525 04/28/23  0225 04/27/23 2031 04/27/23 2003   AST (SGOT) U/L  --   --   --  465*  --  298*   ALT (SGPT) U/L  --   --   --  49*  --  41*   PROCALCITONIN ng/mL  --   --   --   --   --  0.49*   LACTATE mmol/L 5.0* 4.4* 4.0* 4.3*   < >  --    PLATELETS 10*3/mm3  --   --   --  143  --  171    < > = values in this interval not displayed.       Results from last 7 days   Lab Units 04/27/23 2058   PH, ARTERIAL pH units 7.472*   PCO2, ARTERIAL mm Hg 30.9*   PO2 ART mm Hg 34.0*   HCO3 ART mmol/L 22.6       Imaging:  Reviewed chest images personally from past 3 days    ASSESSMENT  /  PLAN:  1. Severe sepsis, POA, 2ary to UTI  2. UTI  3. Metabolic/toxic encephalopathy, resolved  4. Alcohol intoxication  5. Lactic acidosis, secondary to alcoholism and sepsis  6. Transaminitis, likely mild acute alcoholic hepatitis  7. THC use  8. Microscopic hematuria, secondary to UTI VS other  9. Elevated CK, mild  10. Acute on chronic anemia but no evidence of blood loss.  Hb stable  11. Hepatic steatosis on ultrasound, probably secondary to alcoholism  12. Electrolytes disturbance: Severe hypokalemia  13. ecoli bacteremia         Persistent fevers - scan ct abd pelvis stat stop ceftriaxone, start zosyn for anaerobic coverage  Follow micro  Consult to infectious disease for ecoli bactermia  Thiamine  Await repeat CMP  Trend lactic acid  Increase IVFS  Trend lfts  Trend lactic acid    Total critical care time was 33 minutes, excluding any separately billable procedure time.  Time did not overlap with any other provider.        Electronically signed by Markie Resendiz MD, 04/29/23, 10:38 AM EDT.  [facility]

## 2023-04-29 NOTE — PLAN OF CARE
Problem: Fall Injury Risk  Goal: Absence of Fall and Fall-Related Injury  Intervention: Identify and Manage Contributors  Recent Flowsheet Documentation  Taken 4/29/2023 0600 by Ashley Mota RN  Medication Review/Management: medications reviewed  Taken 4/29/2023 0500 by Ashley Mota RN  Medication Review/Management: medications reviewed  Taken 4/29/2023 0400 by Ashley Mota RN  Medication Review/Management: medications reviewed  Taken 4/29/2023 0300 by Ashley Mota RN  Medication Review/Management: medications reviewed  Taken 4/29/2023 0200 by Ashley Mota RN  Medication Review/Management: medications reviewed  Taken 4/29/2023 0100 by Ashley Mota RN  Medication Review/Management: medications reviewed  Taken 4/29/2023 0000 by Ashley Mota RN  Medication Review/Management: medications reviewed  Taken 4/28/2023 2300 by Ashley Mota RN  Medication Review/Management: medications reviewed  Taken 4/28/2023 2200 by Ashley Mota RN  Medication Review/Management: medications reviewed  Taken 4/28/2023 2100 by Ashley Mota RN  Medication Review/Management: medications reviewed  Taken 4/28/2023 2000 by Ashley Mota RN  Medication Review/Management: medications reviewed  Intervention: Promote Injury-Free Environment  Recent Flowsheet Documentation  Taken 4/29/2023 0600 by Ashley Mota RN  Safety Promotion/Fall Prevention:   assistive device/personal items within reach   clutter free environment maintained   fall prevention program maintained   safety round/check completed  Taken 4/29/2023 0500 by Ashley Mota RN  Safety Promotion/Fall Prevention:   assistive device/personal items within reach   clutter free environment maintained   fall prevention program maintained   safety round/check completed  Taken 4/29/2023 0400 by Ashley Mota RN  Safety Promotion/Fall Prevention:   assistive device/personal items within reach   clutter free environment  maintained   safety round/check completed  Taken 4/29/2023 0300 by Ashley Mota RN  Safety Promotion/Fall Prevention:   assistive device/personal items within reach   clutter free environment maintained   fall prevention program maintained   safety round/check completed  Taken 4/29/2023 0200 by Ashley Mota RN  Safety Promotion/Fall Prevention:   assistive device/personal items within reach   clutter free environment maintained   fall prevention program maintained   safety round/check completed  Taken 4/29/2023 0100 by Ashley Mota RN  Safety Promotion/Fall Prevention:   assistive device/personal items within reach   clutter free environment maintained   fall prevention program maintained   safety round/check completed  Taken 4/29/2023 0000 by Ashley Mota RN  Safety Promotion/Fall Prevention:   assistive device/personal items within reach   clutter free environment maintained   fall prevention program maintained   safety round/check completed  Taken 4/28/2023 2300 by Ashley Mota RN  Safety Promotion/Fall Prevention:   assistive device/personal items within reach   clutter free environment maintained   fall prevention program maintained   safety round/check completed  Taken 4/28/2023 2200 by Ashley Mota RN  Safety Promotion/Fall Prevention:   assistive device/personal items within reach   clutter free environment maintained   fall prevention program maintained   safety round/check completed  Taken 4/28/2023 2100 by Ashley Mota RN  Safety Promotion/Fall Prevention:   assistive device/personal items within reach   clutter free environment maintained   fall prevention program maintained   safety round/check completed  Taken 4/28/2023 2000 by Aslhey Mota RN  Safety Promotion/Fall Prevention: safety round/check completed     Problem: Skin Injury Risk Increased  Goal: Skin Health and Integrity  Intervention: Optimize Skin Protection  Recent Flowsheet Documentation  Taken  4/29/2023 0600 by Ashley Mota RN  Head of Bed (HOB) Positioning: HOB elevated  Taken 4/29/2023 0400 by Ashley Mota RN  Head of Bed (HOB) Positioning: HOB elevated  Taken 4/29/2023 0200 by Ashley Mota RN  Head of Bed (HOB) Positioning: HOB elevated  Taken 4/29/2023 0000 by Ashley Mota RN  Head of Bed (HOB) Positioning: HOB elevated  Taken 4/28/2023 2200 by Ashley Mota RN  Head of Bed ([facility]) Positioning: HOB elevated  Taken 4/28/2023 2000 by Ashley Mota RN  Pressure Reduction Techniques: frequent weight shift encouraged  Head of Bed (HOB) Positioning: HOB elevated  Pressure Reduction Devices: specialty bed utilized  Skin Protection:   incontinence pads utilized   transparent dressing maintained   Goal Outcome Evaluation:

## 2023-04-30 PROBLEM — F19.10 POLYSUBSTANCE ABUSE: Status: ACTIVE | Noted: 2023-04-30

## 2023-04-30 PROBLEM — N10 ACUTE PYELONEPHRITIS: Status: ACTIVE | Noted: 2023-04-30

## 2023-04-30 PROBLEM — R78.81 E COLI BACTEREMIA: Status: ACTIVE | Noted: 2023-04-30

## 2023-04-30 PROBLEM — B96.20 E COLI BACTEREMIA: Status: ACTIVE | Noted: 2023-04-30

## 2023-04-30 PROBLEM — F10.939 ALCOHOL WITHDRAWAL: Status: ACTIVE | Noted: 2023-04-30

## 2023-04-30 PROBLEM — K76.0 HEPATIC STEATOSIS: Status: ACTIVE | Noted: 2021-09-24

## 2023-04-30 PROBLEM — G93.41 METABOLIC ENCEPHALOPATHY: Status: ACTIVE | Noted: 2023-04-30

## 2023-04-30 PROBLEM — E87.6 HYPOKALEMIA: Status: ACTIVE | Noted: 2023-04-30

## 2023-04-30 PROBLEM — F10.10 ALCOHOL ABUSE: Status: ACTIVE | Noted: 2023-04-30

## 2023-04-30 PROBLEM — I10 ESSENTIAL HYPERTENSION: Status: ACTIVE | Noted: 2023-04-30

## 2023-04-30 LAB
ALBUMIN SERPL-MCNC: 2.9 G/DL (ref 3.5–5.2)
ALBUMIN/GLOB SERPL: 0.8 G/DL
ALP SERPL-CCNC: 95 U/L (ref 39–117)
ALT SERPL W P-5'-P-CCNC: 25 U/L (ref 1–33)
ANION GAP SERPL CALCULATED.3IONS-SCNC: 10 MMOL/L (ref 5–15)
AST SERPL-CCNC: 70 U/L (ref 1–32)
BACTERIA SPEC AEROBE CULT: ABNORMAL
BASOPHILS # BLD AUTO: 0.05 10*3/MM3 (ref 0–0.2)
BASOPHILS NFR BLD AUTO: 0.8 % (ref 0–1.5)
BILIRUB SERPL-MCNC: 1.2 MG/DL (ref 0–1.2)
BUN SERPL-MCNC: 4 MG/DL (ref 6–20)
BUN/CREAT SERPL: 10.5 (ref 7–25)
CALCIUM SPEC-SCNC: 8.2 MG/DL (ref 8.6–10.5)
CHLORIDE SERPL-SCNC: 105 MMOL/L (ref 98–107)
CO2 SERPL-SCNC: 20 MMOL/L (ref 22–29)
CREAT SERPL-MCNC: 0.38 MG/DL (ref 0.57–1)
DEPRECATED RDW RBC AUTO: 41.9 FL (ref 37–54)
EGFRCR SERPLBLD CKD-EPI 2021: 137.6 ML/MIN/1.73
EOSINOPHIL # BLD AUTO: 0.06 10*3/MM3 (ref 0–0.4)
EOSINOPHIL NFR BLD AUTO: 0.9 % (ref 0.3–6.2)
ERYTHROCYTE [DISTWIDTH] IN BLOOD BY AUTOMATED COUNT: 13.5 % (ref 12.3–15.4)
GLOBULIN UR ELPH-MCNC: 3.5 GM/DL
GLUCOSE BLDC GLUCOMTR-MCNC: 104 MG/DL (ref 70–130)
GLUCOSE SERPL-MCNC: 97 MG/DL (ref 65–99)
GRAM STN SPEC: ABNORMAL
HCT VFR BLD AUTO: 28 % (ref 34–46.6)
HGB BLD-MCNC: 9.6 G/DL (ref 12–15.9)
ISOLATED FROM: ABNORMAL
ISOLATED FROM: ABNORMAL
LYMPHOCYTES # BLD AUTO: 1.37 10*3/MM3 (ref 0.7–3.1)
LYMPHOCYTES NFR BLD AUTO: 21.3 % (ref 19.6–45.3)
MCH RBC QN AUTO: 29.9 PG (ref 26.6–33)
MCHC RBC AUTO-ENTMCNC: 34.3 G/DL (ref 31.5–35.7)
MCV RBC AUTO: 87.2 FL (ref 79–97)
MONOCYTES # BLD AUTO: 0.96 10*3/MM3 (ref 0.1–0.9)
MONOCYTES NFR BLD AUTO: 15 % (ref 5–12)
NEUTROPHILS NFR BLD AUTO: 3.91 10*3/MM3 (ref 1.7–7)
NEUTROPHILS NFR BLD AUTO: 60.9 % (ref 42.7–76)
PLATELET # BLD AUTO: 148 10*3/MM3 (ref 140–450)
PMV BLD AUTO: 11.9 FL (ref 6–12)
POTASSIUM SERPL-SCNC: 3.7 MMOL/L (ref 3.5–5.2)
PROT SERPL-MCNC: 6.4 G/DL (ref 6–8.5)
RBC # BLD AUTO: 3.21 10*6/MM3 (ref 3.77–5.28)
SODIUM SERPL-SCNC: 135 MMOL/L (ref 136–145)
WBC NRBC COR # BLD: 6.42 10*3/MM3 (ref 3.4–10.8)

## 2023-04-30 PROCEDURE — 63710000001 DIPHENHYDRAMINE PER 50 MG

## 2023-04-30 PROCEDURE — 85025 COMPLETE CBC W/AUTO DIFF WBC: CPT | Performed by: INTERNAL MEDICINE

## 2023-04-30 PROCEDURE — 25010000002 PIPERACILLIN SOD-TAZOBACTAM PER 1 G: Performed by: INTERNAL MEDICINE

## 2023-04-30 PROCEDURE — 25010000002 ENOXAPARIN PER 10 MG: Performed by: INTERNAL MEDICINE

## 2023-04-30 PROCEDURE — 99232 SBSQ HOSP IP/OBS MODERATE 35: CPT | Performed by: STUDENT IN AN ORGANIZED HEALTH CARE EDUCATION/TRAINING PROGRAM

## 2023-04-30 PROCEDURE — 80053 COMPREHEN METABOLIC PANEL: CPT | Performed by: INTERNAL MEDICINE

## 2023-04-30 PROCEDURE — 25010000002 THIAMINE PER 100 MG: Performed by: INTERNAL MEDICINE

## 2023-04-30 PROCEDURE — 25010000002 CEFTRIAXONE PER 250 MG: Performed by: STUDENT IN AN ORGANIZED HEALTH CARE EDUCATION/TRAINING PROGRAM

## 2023-04-30 PROCEDURE — 82948 REAGENT STRIP/BLOOD GLUCOSE: CPT

## 2023-04-30 RX ORDER — DIPHENHYDRAMINE HCL 25 MG
25 CAPSULE ORAL EVERY 6 HOURS PRN
Status: DISCONTINUED | OUTPATIENT
Start: 2023-04-30 | End: 2023-05-02 | Stop reason: HOSPADM

## 2023-04-30 RX ADMIN — SODIUM CHLORIDE 150 ML/HR: 9 INJECTION, SOLUTION INTRAVENOUS at 15:48

## 2023-04-30 RX ADMIN — THIAMINE HYDROCHLORIDE 100 MG: 100 INJECTION, SOLUTION INTRAMUSCULAR; INTRAVENOUS at 08:33

## 2023-04-30 RX ADMIN — CEFTRIAXONE 2 G: 2 INJECTION, POWDER, FOR SOLUTION INTRAMUSCULAR; INTRAVENOUS at 09:10

## 2023-04-30 RX ADMIN — ENOXAPARIN SODIUM 40 MG: 100 INJECTION SUBCUTANEOUS at 20:25

## 2023-04-30 RX ADMIN — ACETAMINOPHEN 650 MG: 325 TABLET, FILM COATED ORAL at 21:49

## 2023-04-30 RX ADMIN — Medication 10 ML: at 20:25

## 2023-04-30 RX ADMIN — TAZOBACTAM SODIUM AND PIPERACILLIN SODIUM 4.5 G: 500; 4 INJECTION, SOLUTION INTRAVENOUS at 00:51

## 2023-04-30 RX ADMIN — ACETAMINOPHEN 650 MG: 325 TABLET, FILM COATED ORAL at 05:20

## 2023-04-30 RX ADMIN — POTASSIUM CHLORIDE 40 MEQ: 750 TABLET, EXTENDED RELEASE ORAL at 05:20

## 2023-04-30 RX ADMIN — DIPHENHYDRAMINE HYDROCHLORIDE 25 MG: 25 CAPSULE ORAL at 00:51

## 2023-04-30 RX ADMIN — SODIUM CHLORIDE 150 ML/HR: 9 INJECTION, SOLUTION INTRAVENOUS at 23:04

## 2023-04-30 RX ADMIN — SODIUM CHLORIDE 150 ML/HR: 9 INJECTION, SOLUTION INTRAVENOUS at 09:02

## 2023-04-30 RX ADMIN — Medication 10 ML: at 08:35

## 2023-04-30 RX ADMIN — ACETAMINOPHEN 650 MG: 325 TABLET, FILM COATED ORAL at 16:59

## 2023-04-30 RX ADMIN — Medication 1 MG: at 08:33

## 2023-04-30 NOTE — CONSULTS
Patient Name:  Lisa Gibson  YOB: 1990  MRN:  [id number]  Date of Admission:  4/27/2023  Date of Consult:  4/30/2023  Patient Care Team:  Provider, No Known as PCP - General    Inpatient Internal Medicine Consult  Consult performed by: Cara Kramer APRN  Consult ordered by: Markie Resendiz MD        Evaluate status and make recommendations regarding treatment for medical management out of critical care unit.     Subjective   History of Present Illness  Ms. Gibson is a 32 y.o. female with history of alcohol abuse, acute pancreatitis, fatty liver, hypertension that has been admitted to [facility] due to syncopal episode and seizure-like activity reported by family.  On arrival to the ED she was agitated restless and delirious unable to provide much information.  Noted to have akathisia. Temp was 106.5 on arrival. Alcohol withdrawal versus febrile symptomatic seizure was suspected.She was treated with Ativan for her agitation.  She was found to have elevated lactate and white count along with abnormal urinalysis. She was admitted to the critical care unit and given IV antibiotics and fluids as well as vitamin supplementation. Drug screen  positive for THC. Found to have ecoli bacteremia. She has been monitored for withdrawals via CIWA scale and treated when appropriate. Electrolytes replaced.   She has been admitted by the intensivist  service.  Infectious disease also following the patient. Neurology evaluated and has signed off.  She has remained with temp throughout the stay, trending down.  Continues with rectal temp monitor.  We were asked to see and assist with her medical problems, specifically relating to medical management out of the critical care unit.    Today she is awake alert oriented and appropriate.  She reports she was not taking very good care of herself and had been working very hard, not drinking enough fluid.  She thinks she passed out and  fell several times.  She suspected she may have had a UTI due to foul-smelling urine and urgency.  No burning with urination, no nausea vomiting or flank pain.  She has UTIs a couple times a year.  She was rushing trying to clean her house for her child's birthday when her mother came over and apparently found her passed out again.  She reports drinking occasionally on the weekends and sometimes binge drinking.  She denies substance abuse.  She does not want help with her drinking.        History reviewed. No pertinent past medical history.  No past surgical history on file.  History reviewed. No pertinent family history.  Social History     Tobacco Use   • Smoking status: Never   • Smokeless tobacco: Never     Medications Prior to Admission   Medication Sig Dispense Refill Last Dose   • amLODIPine (NORVASC) 5 MG tablet Take 1 tablet by mouth Daily. 30 tablet 0      Allergies:  Patient has no known allergies.    Review of Systems   Respiratory: Negative for cough and shortness of breath.    Gastrointestinal: Negative for nausea and vomiting.   Musculoskeletal: Negative for arthralgias and myalgias.       Objective      Vital Signs  Temp:  [100.4 °F (38 °C)-102.4 °F (39.1 °C)] 100.4 °F (38 °C)  Heart Rate:  [] 93  Resp:  [18-19] 19  BP: (112-135)/(84-99) 122/92  Body mass index is 19.34 kg/m².    Physical Exam  Constitutional:       General: She is not in acute distress.     Appearance: Normal appearance. She is not ill-appearing.   HENT:      Head: Normocephalic and atraumatic.      Mouth/Throat:      Mouth: Mucous membranes are moist.   Eyes:      Extraocular Movements: Extraocular movements intact.      Pupils: Pupils are equal, round, and reactive to light.   Cardiovascular:      Rate and Rhythm: Normal rate and regular rhythm.      Pulses: Normal pulses.      Heart sounds: Normal heart sounds.   Pulmonary:      Effort: Pulmonary effort is normal. No respiratory distress.      Breath sounds: Normal breath  sounds.   Abdominal:      General: Bowel sounds are normal. There is no distension.      Palpations: Abdomen is soft.      Tenderness: There is no abdominal tenderness.   Genitourinary:     Comments: Bah catheter in place draining clear sidney urine.  Cooling blanket with rectal temp monitor  Musculoskeletal:         General: No swelling or tenderness. Normal range of motion.      Cervical back: Normal range of motion and neck supple.   Skin:     General: Skin is warm and dry.   Neurological:      General: No focal deficit present.      Mental Status: She is alert and oriented to person, place, and time.         Results Review:   I reviewed the patient's new clinical results.           Assessment/Plan     Active Hospital Problems    Diagnosis  POA   • **Severe sepsis [A41.9, R65.20]  Yes   • E coli bacteremia [R78.81, B96.20]  Unknown   • Alcohol withdrawal [F10.939]  Unknown   • Alcohol abuse [F10.10]  Unknown   • Acute pyelonephritis [N10]  Unknown   • Essential hypertension [I10]  Unknown   • Metabolic encephalopathy [G93.41]  Unknown   • Polysubstance abuse [F19.10]  Unknown   • Hypokalemia [E87.6]  Unknown   • Hepatic steatosis [K76.0]  Unknown       ASSESSMENT  /  PLAN:  1. Severe sepsis, E. coli bacteremia, switched from Zosyn to Rocephin per ID today.  Repeat blood cultures per ID.  2. UTI with pyelonephritis.  Can probably DC Bah catheter in the morning.Still on cooling blanket and rectal temp monitor which we can DC if she stays less than 100.5 throughout night.   3. Metabolic/toxic encephalopathy, resolved  4. Alcohol intoxication?,  resolved  5. Lactic acidosis, secondary to alcoholism and sepsis  6. Transaminitis, likely mild acute alcoholic hepatitis  7. THC use  8. Microscopic hematuria, secondary to UTI VS other  9. Rhabdomylsis, mild improved, check CK in a.m.  Continue IV fluids.  10. Acute on chronic anemia but no evidence of blood loss.  Hb stable  11. Hepatic steatosis on ultrasound, probably  secondary to alcoholism  12. Electrolytes disturbance: Severe hypokalemia now resolved.  Continue potassium replacement protocol.  13. Alcohol abuse, CIWA scale and treatment as appropriate.  Denies wanting help.  Continue vitamin supplementation.  14.  Pancreatic head and second portion the   duodenum there is a 10 mm hypodensity which is indeterminant.- repeat imaging as outpatient defer to CBC, consider repeat inpatient if fevers dont improve           Thank you very much for asking [facility] to be involved in this patient's care. We will follow along with you.      ROD Rich  [facility]  04/30/23  12:53 EDT

## 2023-04-30 NOTE — PROGRESS NOTES
"  Daily Progress Note.   [facility] CARDIAC INTENSIVE CARE  4/30/2023    Patient:  Name:  Lisa Gibson  MRN:  [id number]  1990  32 y.o.  female         Chief Complaint:  F/up sepsis, alcoholism and critical care management     Interval History:  On cooling blanket  Tylenol prn  Persistent fever  On room air  Feels hungry awake and alert.  Tolerating her diet at present time without any increased abdominal pain nausea or vomiting.    Physical Exam:  /88   Pulse 95   Temp (!) 101 °F (38.3 °C) (Rectal) Comment (Src): probe  Resp 19   Ht 175.3 cm (69\")   Wt 59.4 kg (130 lb 15.3 oz)   LMP  (LMP Unknown)   SpO2 99%   BMI 19.34 kg/m²   Body mass index is 19.34 kg/m².    Intake/Output Summary (Last 24 hours) at 4/30/2023 0838  Last data filed at 4/30/2023 0500  Gross per 24 hour   Intake 3812.42 ml   Output 2600 ml   Net 1212.42 ml     General appearance: ill, conversant   Eyes: anicteric sclerae, moist conjunctivae; no lidlag;    HENT: Atraumatic; oropharynx clear with moist mucous membranes    Neck: Trachea midline;  supple   Lungs: CTA, with normal respiratory effort and no intercostal retractions  CV: RRR, no rub   Abdomen: non rigid mild + ttp BS+  Extremities: No peripheral edema  Skin: WWP no diffuse visible rash  Psych: Appropriate affect, alert   Neuro: Moves all ext. CN II-XII. Speech intact.    Data Review:  Notable Labs:  Results from last 7 days   Lab Units 04/30/23  0548 04/29/23  1248 04/28/23  0225 04/27/23 2003   WBC 10*3/mm3 6.42 5.92 5.99 8.01   HEMOGLOBIN g/dL 9.6* 10.0* 8.9* 9.8*   PLATELETS 10*3/mm3 148 143 143 171     Results from last 7 days   Lab Units 04/30/23  0548 04/29/23  2243 04/29/23  0813 04/28/23  1107 04/28/23  0225 04/27/23 2003   SODIUM mmol/L 135*  --  137  --  142 137   POTASSIUM mmol/L 3.7 3.3* 3.5 2.9* 2.1* 3.7   CHLORIDE mmol/L 105  --  103  --  104 95*   CO2 mmol/L 20.0*  --  19.0*  --  18.8* 19.3*   BUN mg/dL 4*  --  4*  --  3* 5* "   CREATININE mg/dL 0.38*  --  0.45*  --  0.40* 0.57   GLUCOSE mg/dL 97  --  94  --  85 150*   CALCIUM mg/dL 8.2*  --  8.1*  --  7.3* 8.9   MAGNESIUM mg/dL  --   --   --   --   --  1.3*   Estimated Creatinine Clearance: 201.1 mL/min (A) (by C-G formula based on SCr of 0.38 mg/dL (L)).    Results from last 7 days   Lab Units 04/30/23  0548 04/29/23  1248 04/29/23  0813 04/28/23  1725 04/28/23  1107 04/28/23  0525 04/28/23  0225 04/27/23 2031 04/27/23 2003   AST (SGOT) U/L 70*  --  128*  --   --   --  465*  --  298*   ALT (SGPT) U/L 25  --  33  --   --   --  49*  --  41*   PROCALCITONIN ng/mL  --   --   --   --   --   --   --   --  0.49*   LACTATE mmol/L  --  1.5  --  5.0* 4.4*   < > 4.3*   < >  --    PLATELETS 10*3/mm3 148 143  --   --   --   --  143  --  171    < > = values in this interval not displayed.       Results from last 7 days   Lab Units 04/27/23 2058   PH, ARTERIAL pH units 7.472*   PCO2, ARTERIAL mm Hg 30.9*   PO2 ART mm Hg 34.0*   HCO3 ART mmol/L 22.6       Imaging:  Reviewed chest images personally from past 3 days    ASSESSMENT  /  PLAN:  1. Severe sepsis, POA, 2ary to UTI  2. UTI with pyelonephritis  3. Metabolic/toxic encephalopathy, resolved  4. Alcohol intoxication resolved  5. Lactic acidosis, secondary to alcoholism and sepsis  6. Transaminitis, likely mild acute alcoholic hepatitis  7. THC use  8. Microscopic hematuria, secondary to UTI VS other  9. Rhabdomylsis, mild improved  10. Acute on chronic anemia but no evidence of blood loss.  Hb stable  11. Hepatic steatosis on ultrasound, probably secondary to alcoholism  12. Electrolytes disturbance: Severe hypokalemia  13. ecoli bacteremia  14. Alcohol abuse  15.  Pancreatic head and second portion the   duodenum there is a 10 mm hypodensity which is indeterminant.- repeat imaging as outpatient or if fevers dont improve     Id following, on zosyn-adjustments pending micro, ct with concern for pyelonephritis     Thiamine  Cont IVFS  Advance  diet    ciwa protocol -calm awake alert, no tremors    Out of icu  Will consult internal medicine fpr mgmt of uti, anemia, hypokalemia and etoh withdrawal out of the icu     Transfer out of icu    D/w aleah Resendiz MD  [facility]  04/30/23  08:49 EDT

## 2023-04-30 NOTE — PLAN OF CARE
Problem: Adult Inpatient Plan of Care  Goal: Plan of Care Review  4/30/2023 1718 by Lana Alexandra  Outcome: Ongoing, Progressing  Flowsheets (Taken 4/30/2023 1718)  Progress: no change  Plan of Care Reviewed With: patient  Outcome Evaluation: In CICU. On room air. PRN tylenol given for fever, cooling blanket maintained. +BM. Transfer orders placed. Family updated at bedside.

## 2023-04-30 NOTE — PROGRESS NOTES
LOS: 3 days     Chief Complaint: Pyelonephritis and bacteremia    Interval History: Patient reports she is feeling slightly better.  States she continues to have fever.  Overall fever curve downtrending.  Less tachycardic.  No leukocytosis.  Tolerating antibiotics.    Vital Signs  Temp:  [101 °F (38.3 °C)-102.4 °F (39.1 °C)] 101 °F (38.3 °C)  Heart Rate:  [] 95  Resp:  [18-19] 19  BP: (112-135)/(84-99) 127/88    Physical Exam:  General: In no acute distress  HEENT: Oropharynx clear, moist mucous membranes  Cardiovascular: RRR  Respiratory: Normal work of breathing  Skin: No rashes or lesions  Extremities: No edema, cyanosis  Access: Peripheral IV.    Antibiotics:  Anti-Infectives (From admission, onward)    Ordered     Dose/Rate Route Frequency Start Stop    04/30/23 0834  cefTRIAXone (ROCEPHIN) 2 g in sodium chloride 0.9 % 100 mL IVPB-VTB        Ordering Provider: Joseph Praaksh DO    2 g  200 mL/hr over 30 Minutes Intravenous Every 24 Hours 04/30/23 0930 05/12/23 0929    04/29/23 1058  piperacillin-tazobactam (ZOSYN) 4.5 g in iso-osmotic dextrose 100 mL IVPB (premix)        Ordering Provider: Markie Resendiz MD    4.5 g  over 30 Minutes Intravenous Once 04/29/23 1145 04/29/23 1147    04/27/23 2211  cefTRIAXone (ROCEPHIN) 1 g in sodium chloride 0.9 % 100 mL IVPB-VTB        Ordering Provider: Ryne Resendiz MD    1 g  200 mL/hr over 30 Minutes Intravenous Once 04/27/23 2213 04/27/23 2342    04/27/23 2020  cefTRIAXone (ROCEPHIN) 1 g in sodium chloride 0.9 % 100 mL IVPB-VTB        Ordering Provider: Rocky Cline MD    1 g  200 mL/hr over 30 Minutes Intravenous Once 04/27/23 2022 04/27/23 2202           Results Review:     I reviewed the patient's new clinical results.    Lab Results   Component Value Date    WBC 6.42 04/30/2023    HGB 9.6 (L) 04/30/2023    HCT 28.0 (L) 04/30/2023    MCV 87.2 04/30/2023     04/30/2023     Lab Results   Component Value Date    GLUCOSE 97 04/30/2023     BUN 4 (L) 04/30/2023    CREATININE 0.38 (L) 04/30/2023    EGFRIFAFRI >150 09/27/2021    BCR 10.5 04/30/2023    CO2 20.0 (L) 04/30/2023    CALCIUM 8.2 (L) 04/30/2023    ALBUMIN 2.9 (L) 04/30/2023    AST 70 (H) 04/30/2023    ALT 25 04/30/2023       Microbiology:  4/27 blood cultures 2 out of 2 E. coli  4/27 urine culture E. coli  4/27 respiratory panel negative  4/27 MRSA nares negative    Assessment    #E. coli septicemia  #Acute right pyelonephritis  #Alcohol abuse  #Lactic acidosis  #Transaminitis  #Hepatic steatosis    Urine culture correlating with blood culture results and with imaging showing pyelonephritis this seems to be the most likely source.  Stop Zosyn today and start ceftriaxone 2 g daily.  Will likely need a 14-day course for pyelonephritis.  Fever curve slowly improving which is not to be unexpected with pyelonephritis.    ID will follow.

## 2023-05-01 LAB
ALBUMIN SERPL-MCNC: 2.4 G/DL (ref 3.5–5.2)
ALBUMIN/GLOB SERPL: 0.6 G/DL
ALP SERPL-CCNC: 103 U/L (ref 39–117)
ALT SERPL W P-5'-P-CCNC: 21 U/L (ref 1–33)
ANION GAP SERPL CALCULATED.3IONS-SCNC: 10.5 MMOL/L (ref 5–15)
AST SERPL-CCNC: 55 U/L (ref 1–32)
BASOPHILS # BLD MANUAL: 0.06 10*3/MM3 (ref 0–0.2)
BASOPHILS NFR BLD MANUAL: 1 % (ref 0–1.5)
BILIRUB SERPL-MCNC: 0.9 MG/DL (ref 0–1.2)
BUN SERPL-MCNC: 3 MG/DL (ref 6–20)
BUN/CREAT SERPL: 9.7 (ref 7–25)
BURR CELLS BLD QL SMEAR: ABNORMAL
CALCIUM SPEC-SCNC: 8.1 MG/DL (ref 8.6–10.5)
CHLORIDE SERPL-SCNC: 106 MMOL/L (ref 98–107)
CO2 SERPL-SCNC: 19.5 MMOL/L (ref 22–29)
CREAT SERPL-MCNC: 0.31 MG/DL (ref 0.57–1)
DACRYOCYTES BLD QL SMEAR: ABNORMAL
DEPRECATED RDW RBC AUTO: 41.2 FL (ref 37–54)
EGFRCR SERPLBLD CKD-EPI 2021: 144.5 ML/MIN/1.73
EOSINOPHIL # BLD MANUAL: 0.06 10*3/MM3 (ref 0–0.4)
EOSINOPHIL NFR BLD MANUAL: 1 % (ref 0.3–6.2)
ERYTHROCYTE [DISTWIDTH] IN BLOOD BY AUTOMATED COUNT: 13.2 % (ref 12.3–15.4)
GLOBULIN UR ELPH-MCNC: 3.8 GM/DL
GLUCOSE SERPL-MCNC: 95 MG/DL (ref 65–99)
HCT VFR BLD AUTO: 27.3 % (ref 34–46.6)
HGB BLD-MCNC: 9.6 G/DL (ref 12–15.9)
LYMPHOCYTES # BLD MANUAL: 1.32 10*3/MM3 (ref 0.7–3.1)
LYMPHOCYTES NFR BLD MANUAL: 18.2 % (ref 5–12)
MAGNESIUM SERPL-MCNC: 1.6 MG/DL (ref 1.6–2.6)
MCH RBC QN AUTO: 30.6 PG (ref 26.6–33)
MCHC RBC AUTO-ENTMCNC: 35.2 G/DL (ref 31.5–35.7)
MCV RBC AUTO: 86.9 FL (ref 79–97)
MONOCYTES # BLD: 1.13 10*3/MM3 (ref 0.1–0.9)
NEUTROPHILS # BLD AUTO: 3.64 10*3/MM3 (ref 1.7–7)
NEUTROPHILS NFR BLD MANUAL: 58.6 % (ref 42.7–76)
PLAT MORPH BLD: NORMAL
PLATELET # BLD AUTO: 155 10*3/MM3 (ref 140–450)
PMV BLD AUTO: 11.4 FL (ref 6–12)
POIKILOCYTOSIS BLD QL SMEAR: ABNORMAL
POTASSIUM SERPL-SCNC: 3.3 MMOL/L (ref 3.5–5.2)
POTASSIUM SERPL-SCNC: 3.9 MMOL/L (ref 3.5–5.2)
PROT SERPL-MCNC: 6.2 G/DL (ref 6–8.5)
RBC # BLD AUTO: 3.14 10*6/MM3 (ref 3.77–5.28)
SODIUM SERPL-SCNC: 136 MMOL/L (ref 136–145)
VARIANT LYMPHS NFR BLD MANUAL: 21.2 % (ref 19.6–45.3)
WBC MORPH BLD: NORMAL
WBC NRBC COR # BLD: 6.22 10*3/MM3 (ref 3.4–10.8)

## 2023-05-01 PROCEDURE — 25010000002 ENOXAPARIN PER 10 MG: Performed by: INTERNAL MEDICINE

## 2023-05-01 PROCEDURE — 85025 COMPLETE CBC W/AUTO DIFF WBC: CPT | Performed by: INTERNAL MEDICINE

## 2023-05-01 PROCEDURE — 0 MAGNESIUM SULFATE 4 GM/100ML SOLUTION: Performed by: INTERNAL MEDICINE

## 2023-05-01 PROCEDURE — 25010000002 CEFTRIAXONE PER 250 MG: Performed by: INTERNAL MEDICINE

## 2023-05-01 PROCEDURE — 80053 COMPREHEN METABOLIC PANEL: CPT | Performed by: INTERNAL MEDICINE

## 2023-05-01 PROCEDURE — 85007 BL SMEAR W/DIFF WBC COUNT: CPT | Performed by: INTERNAL MEDICINE

## 2023-05-01 PROCEDURE — 84132 ASSAY OF SERUM POTASSIUM: CPT | Performed by: HOSPITALIST

## 2023-05-01 PROCEDURE — 25010000002 THIAMINE PER 100 MG: Performed by: INTERNAL MEDICINE

## 2023-05-01 PROCEDURE — 99232 SBSQ HOSP IP/OBS MODERATE 35: CPT | Performed by: STUDENT IN AN ORGANIZED HEALTH CARE EDUCATION/TRAINING PROGRAM

## 2023-05-01 PROCEDURE — 83735 ASSAY OF MAGNESIUM: CPT | Performed by: HOSPITALIST

## 2023-05-01 RX ADMIN — SODIUM CHLORIDE 150 ML/HR: 9 INJECTION, SOLUTION INTRAVENOUS at 05:39

## 2023-05-01 RX ADMIN — Medication 1 MG: at 11:26

## 2023-05-01 RX ADMIN — Medication 10 ML: at 20:27

## 2023-05-01 RX ADMIN — THIAMINE HYDROCHLORIDE 100 MG: 100 INJECTION, SOLUTION INTRAMUSCULAR; INTRAVENOUS at 11:26

## 2023-05-01 RX ADMIN — Medication 10 ML: at 08:54

## 2023-05-01 RX ADMIN — POTASSIUM CHLORIDE 40 MEQ: 750 TABLET, EXTENDED RELEASE ORAL at 17:50

## 2023-05-01 RX ADMIN — CEFTRIAXONE 2 G: 2 INJECTION, POWDER, FOR SOLUTION INTRAMUSCULAR; INTRAVENOUS at 08:54

## 2023-05-01 RX ADMIN — ACETAMINOPHEN 650 MG: 325 TABLET, FILM COATED ORAL at 05:33

## 2023-05-01 RX ADMIN — ACETAMINOPHEN 650 MG: 325 TABLET, FILM COATED ORAL at 14:29

## 2023-05-01 RX ADMIN — ENOXAPARIN SODIUM 40 MG: 100 INJECTION SUBCUTANEOUS at 20:27

## 2023-05-01 RX ADMIN — MAGNESIUM SULFATE HEPTAHYDRATE 4 G: 40 INJECTION, SOLUTION INTRAVENOUS at 14:18

## 2023-05-01 RX ADMIN — POTASSIUM CHLORIDE 40 MEQ: 750 TABLET, EXTENDED RELEASE ORAL at 14:18

## 2023-05-01 NOTE — PROGRESS NOTES
LOS: 4 days     Chief Complaint: Pyelonephritis and bacteremia    Interval History: Patient reports she is feeling better this morning.  Fever curve overall improving and actually afebrile this morning.  Denies any difficulty tolerating antibiotics.  Eating some breakfast this morning.  No leukocytosis.    Vital Signs  Temp:  [98.7 °F (37.1 °C)-100.5 °F (38.1 °C)] 99.5 °F (37.5 °C)  Heart Rate:  [85-99] 93  Resp:  [16-20] 16  BP: (103-139)/() 123/90    Physical Exam:  General: In no acute distress  HEENT: Oropharynx clear, moist mucous membranes  Cardiovascular: RRR  Respiratory: Normal work of breathing  Skin: No rashes or lesions  Extremities: No edema, cyanosis  Access: Peripheral IV.    Antibiotics:  Anti-Infectives (From admission, onward)    Ordered     Dose/Rate Route Frequency Start Stop    04/30/23 0834  cefTRIAXone (ROCEPHIN) 2 g in sodium chloride 0.9 % 100 mL IVPB-VTB        Ordering Provider: Markie Resendiz MD    2 g  200 mL/hr over 30 Minutes Intravenous Every 24 Hours 04/30/23 0930 05/12/23 0929    04/29/23 1058  piperacillin-tazobactam (ZOSYN) 4.5 g in iso-osmotic dextrose 100 mL IVPB (premix)        Ordering Provider: Markie Resendiz MD    4.5 g  over 30 Minutes Intravenous Once 04/29/23 1145 04/29/23 1147    04/27/23 2211  cefTRIAXone (ROCEPHIN) 1 g in sodium chloride 0.9 % 100 mL IVPB-VTB        Ordering Provider: Ryne Resendiz MD    1 g  200 mL/hr over 30 Minutes Intravenous Once 04/27/23 2213 04/27/23 2342    04/27/23 2020  cefTRIAXone (ROCEPHIN) 1 g in sodium chloride 0.9 % 100 mL IVPB-VTB        Ordering Provider: Rocky Cline MD    1 g  200 mL/hr over 30 Minutes Intravenous Once 04/27/23 2022 04/27/23 2202           Results Review:     I reviewed the patient's new clinical results.    Lab Results   Component Value Date    WBC 6.22 05/01/2023    HGB 9.6 (L) 05/01/2023    HCT 27.3 (L) 05/01/2023    MCV 86.9 05/01/2023     05/01/2023     Lab Results   Component  Value Date    GLUCOSE 95 05/01/2023    BUN 3 (L) 05/01/2023    CREATININE 0.31 (L) 05/01/2023    EGFRIFAFRI >150 09/27/2021    BCR 9.7 05/01/2023    CO2 19.5 (L) 05/01/2023    CALCIUM 8.1 (L) 05/01/2023    ALBUMIN 2.4 (L) 05/01/2023    AST 55 (H) 05/01/2023    ALT 21 05/01/2023       Microbiology:  4/27 blood cultures 2 out of 2 E. coli  4/27 urine culture E. coli  4/27 respiratory panel negative  4/27 MRSA nares negative  4/29 blood cultures no growth to date    Assessment    #E. coli septicemia  #Acute right pyelonephritis  #Alcohol abuse  #Lactic acidosis  #Transaminitis  #Hepatic steatosis    Repeat blood cultures are negative to date.  Continue ceftriaxone 2 g daily for pyelonephritis.  Will need a 14-day course for pyelonephritis with plan to transition to oral levofloxacin 750 mg daily on discharge with an end date of 5/13.     ID will follow.
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
## 2023-05-01 NOTE — PROGRESS NOTES
Patient out of the ICU without any pulmonary critical care needs under general medicine service.  Appreciate their care for this patient.  [facility] will now sign off however any new pulmonary critical care issues arise please feel free to give us a a call.  Markie Resendiz MD  [facility]  05/01/23  10:07 EDT

## 2023-05-01 NOTE — PLAN OF CARE
Goal Outcome Evaluation:    Patient alert and oriented, VSS, on room air. Patient voiding via wong. Had a BM this evening. Educated on catheter care. Patient CIWA 0. Denies pain. Elevated temp. Given tylenol.

## 2023-05-02 ENCOUNTER — READMISSION MANAGEMENT (OUTPATIENT)
Dept: CALL CENTER | Facility: HOSPITAL | Age: 33
End: 2023-05-02
Payer: COMMERCIAL

## 2023-05-02 VITALS
BODY MASS INDEX: 17.18 KG/M2 | RESPIRATION RATE: 18 BRPM | HEIGHT: 69 IN | DIASTOLIC BLOOD PRESSURE: 87 MMHG | HEART RATE: 90 BPM | TEMPERATURE: 98.5 F | OXYGEN SATURATION: 100 % | WEIGHT: 115.96 LBS | SYSTOLIC BLOOD PRESSURE: 126 MMHG

## 2023-05-02 LAB
ALBUMIN SERPL-MCNC: 3.1 G/DL (ref 3.5–5.2)
ALBUMIN/GLOB SERPL: 0.8 G/DL
ALP SERPL-CCNC: 130 U/L (ref 39–117)
ALT SERPL W P-5'-P-CCNC: 19 U/L (ref 1–33)
ANION GAP SERPL CALCULATED.3IONS-SCNC: 8 MMOL/L (ref 5–15)
AST SERPL-CCNC: 73 U/L (ref 1–32)
BILIRUB SERPL-MCNC: 0.7 MG/DL (ref 0–1.2)
BUN SERPL-MCNC: <2 MG/DL (ref 6–20)
BUN/CREAT SERPL: ABNORMAL
CALCIUM SPEC-SCNC: 8 MG/DL (ref 8.6–10.5)
CHLORIDE SERPL-SCNC: 108 MMOL/L (ref 98–107)
CK SERPL-CCNC: 235 U/L (ref 20–180)
CO2 SERPL-SCNC: 22 MMOL/L (ref 22–29)
CREAT SERPL-MCNC: 0.31 MG/DL (ref 0.57–1)
DEPRECATED RDW RBC AUTO: 41.7 FL (ref 37–54)
EGFRCR SERPLBLD CKD-EPI 2021: 144.5 ML/MIN/1.73
ERYTHROCYTE [DISTWIDTH] IN BLOOD BY AUTOMATED COUNT: 13.5 % (ref 12.3–15.4)
GIANT PLATELETS: ABNORMAL
GLOBULIN UR ELPH-MCNC: 3.7 GM/DL
GLUCOSE SERPL-MCNC: 114 MG/DL (ref 65–99)
HCT VFR BLD AUTO: 28.4 % (ref 34–46.6)
HGB BLD-MCNC: 9.7 G/DL (ref 12–15.9)
LYMPHOCYTES # BLD MANUAL: 1.81 10*3/MM3 (ref 0.7–3.1)
LYMPHOCYTES NFR BLD MANUAL: 16.3 % (ref 5–12)
MAGNESIUM SERPL-MCNC: 2.2 MG/DL (ref 1.6–2.6)
MCH RBC QN AUTO: 29.9 PG (ref 26.6–33)
MCHC RBC AUTO-ENTMCNC: 34.2 G/DL (ref 31.5–35.7)
MCV RBC AUTO: 87.7 FL (ref 79–97)
MONOCYTES # BLD: 0.96 10*3/MM3 (ref 0.1–0.9)
NEUTROPHILS # BLD AUTO: 3.13 10*3/MM3 (ref 1.7–7)
NEUTROPHILS NFR BLD MANUAL: 53.1 % (ref 42.7–76)
PLATELET # BLD AUTO: 206 10*3/MM3 (ref 140–450)
PMV BLD AUTO: 11 FL (ref 6–12)
POTASSIUM SERPL-SCNC: 3.6 MMOL/L (ref 3.5–5.2)
PROT SERPL-MCNC: 6.8 G/DL (ref 6–8.5)
RBC # BLD AUTO: 3.24 10*6/MM3 (ref 3.77–5.28)
RBC MORPH BLD: NORMAL
SODIUM SERPL-SCNC: 138 MMOL/L (ref 136–145)
VARIANT LYMPHS NFR BLD MANUAL: 30.6 % (ref 19.6–45.3)
WBC MORPH BLD: NORMAL
WBC NRBC COR # BLD: 5.9 10*3/MM3 (ref 3.4–10.8)

## 2023-05-02 PROCEDURE — 80053 COMPREHEN METABOLIC PANEL: CPT | Performed by: INTERNAL MEDICINE

## 2023-05-02 PROCEDURE — 85025 COMPLETE CBC W/AUTO DIFF WBC: CPT | Performed by: INTERNAL MEDICINE

## 2023-05-02 PROCEDURE — 99232 SBSQ HOSP IP/OBS MODERATE 35: CPT | Performed by: STUDENT IN AN ORGANIZED HEALTH CARE EDUCATION/TRAINING PROGRAM

## 2023-05-02 PROCEDURE — 85007 BL SMEAR W/DIFF WBC COUNT: CPT | Performed by: INTERNAL MEDICINE

## 2023-05-02 PROCEDURE — 82550 ASSAY OF CK (CPK): CPT | Performed by: HOSPITALIST

## 2023-05-02 PROCEDURE — 97110 THERAPEUTIC EXERCISES: CPT

## 2023-05-02 PROCEDURE — 25010000002 CEFTRIAXONE PER 250 MG: Performed by: INTERNAL MEDICINE

## 2023-05-02 PROCEDURE — 83735 ASSAY OF MAGNESIUM: CPT | Performed by: HOSPITALIST

## 2023-05-02 PROCEDURE — 97162 PT EVAL MOD COMPLEX 30 MIN: CPT

## 2023-05-02 RX ORDER — LEVOFLOXACIN 750 MG/1
750 TABLET ORAL DAILY
Qty: 11 TABLET | Refills: 0 | Status: SHIPPED | OUTPATIENT
Start: 2023-05-02 | End: 2023-05-13

## 2023-05-02 RX ADMIN — CEFTRIAXONE 2 G: 2 INJECTION, POWDER, FOR SOLUTION INTRAMUSCULAR; INTRAVENOUS at 09:40

## 2023-05-02 RX ADMIN — Medication 10 ML: at 09:44

## 2023-05-02 RX ADMIN — Medication 100 MG: at 09:39

## 2023-05-02 RX ADMIN — Medication 1 MG: at 09:40

## 2023-05-02 NOTE — PAYOR COMM NOTE
"Lisa Talley (32 y.o. Female)     PLEASE SEE ATTACHED FOR CONTINUED INPT STAY DAYS    REF #   [id number]    PLEASE CALL COOPER LAL RN/ DEPT @ [phone]   OR [phone]    THANK YOU  COOPER LAL RN  [facility]       Date of Birth   1990    Social Security Number       Address   [address]  [place] [address]    Home Phone   [phone]    MRN   [id number]       Episcopalian   Unknown    Marital Status                               Admission Date   4/27/23    Admission Type   Emergency    Admitting Provider   Ryne Resendiz MD    Attending Provider       Department, Room/Bed   [facility], S501/1       Discharge Date   5/2/2023    Discharge Disposition   Home or Self Care    Discharge Destination                               Attending Provider: (none)   Allergies: No Known Allergies    Isolation: None   Infection: None   Code Status: CPR    Ht: 175.3 cm (69\")   Wt: 52.6 kg (115 lb 15.4 oz)    Admission Cmt: None   Principal Problem: Severe sepsis [A41.9,R65.20]                 Active Insurance as of 4/27/2023     Primary Coverage     Payor Plan Insurance Group Employer/Plan Group    CIG[employer] CIGNA [id number]     Payor Plan Address Payor Plan Phone Number Payor Plan Fax Number Effective Dates    [id number] [phone]  1/1/2021 - None Entered    [facility] [id number]       Subscriber Name Subscriber Birth Date Member ID       LISA TALLEY 1990 [id number]                 Emergency Contacts      (Rel.) Home Phone Work Phone Mobile Phone    Diana Shelton (Mother) -- -- [phone]            [place]: [facility] [id number]  Tax ID [id number]       Physician Progress Notes (last 48 hours)      Joseph Prakash DO at 05/02/23 0901           LOS: 5 days     Chief Complaint: Pyelonephritis and bacteremia    Interval History: Patient states she continues to feel better.  Looking forward to going home. "  Denies any fevers or chills.  States she is having a better appetite.  Tolerating antibiotics.    Vital Signs  Temp:  [98.5 °F (36.9 °C)-99.9 °F (37.7 °C)] 98.5 °F (36.9 °C)  Heart Rate:  [84-94] 90  Resp:  [16-18] 18  BP: (121-129)/(86-94) 126/87    Physical Exam:  General: In no acute distress  HEENT: Oropharynx clear, moist mucous membranes  Cardiovascular: RRR  Respiratory: Normal work of breathing  Skin: No rashes or lesions  Extremities: No edema, cyanosis  Access: Peripheral IV.    Antibiotics:  Anti-Infectives (From admission, onward)    Ordered     Dose/Rate Route Frequency Start Stop    04/30/23 0834  cefTRIAXone (ROCEPHIN) 2 g in sodium chloride 0.9 % 100 mL IVPB-VTB        Ordering Provider: Markie Resendiz MD    2 g  200 mL/hr over 30 Minutes Intravenous Every 24 Hours 04/30/23 0930 05/12/23 0929    04/29/23 1058  piperacillin-tazobactam (ZOSYN) 4.5 g in iso-osmotic dextrose 100 mL IVPB (premix)        Ordering Provider: Markie Resendiz MD    4.5 g  over 30 Minutes Intravenous Once 04/29/23 1145 04/29/23 1147    04/27/23 2211  cefTRIAXone (ROCEPHIN) 1 g in sodium chloride 0.9 % 100 mL IVPB-VTB        Ordering Provider: Ryne Resendiz MD    1 g  200 mL/hr over 30 Minutes Intravenous Once 04/27/23 2213 04/27/23 2342    04/27/23 2020  cefTRIAXone (ROCEPHIN) 1 g in sodium chloride 0.9 % 100 mL IVPB-VTB        Ordering Provider: Rocky Cline MD    1 g  200 mL/hr over 30 Minutes Intravenous Once 04/27/23 2022 04/27/23 2202           Results Review:     I reviewed the patient's new clinical results.    Lab Results   Component Value Date    WBC 5.90 05/02/2023    HGB 9.7 (L) 05/02/2023    HCT 28.4 (L) 05/02/2023    MCV 87.7 05/02/2023     05/02/2023     Lab Results   Component Value Date    GLUCOSE 114 (H) 05/02/2023    BUN <2 (L) 05/02/2023    CREATININE 0.31 (L) 05/02/2023    EGFRIFAFRI >150 09/27/2021    BCR  05/02/2023      Comment:      Unable to calculate Bun/Crea Ratio.    CO2  22.0 2023    CALCIUM 8.0 (L) 2023    ALBUMIN 3.1 (L) 2023    AST 73 (H) 2023    ALT 19 2023       Microbiology:   blood cultures 2 out of 2 E. coli   urine culture E. coli   respiratory panel negative   MRSA nares negative   blood cultures no growth to date    Assessment    #E. coli septicemia  #Acute right pyelonephritis  #Alcohol abuse  #Lactic acidosis  #Transaminitis  #Hepatic steatosis    Repeat blood cultures have remained negative.  Continue ceftriaxone 2 g daily for pyelonephritis.  Will need a 14-day course for pyelonephritis with plan to transition to oral levofloxacin 750 mg daily on discharge with an end date of .  Okay for discharge on the above plan from an ID perspective.    Thank you for allowing me to be involved in the care of this patient. Infectious diseases will sign off at this time with antibiotics plan in place, but please call me at [phone] if any further ID questions or new ID concerns.        Electronically signed by Joseph Prakash DO at 23 [phone]     Jamarcus Mendez MD at 23 1300              Name: Lisa Gibson ADMIT: 2023   : 1990  PCP: Provider, No Known    MRN: [id number] LOS: 4 days   AGE/SEX: 32 y.o. female  ROOM: [facility]     Subjective   Subjective   Did not want to have catheter removed she feels she is too unsteady to get up to use the bathroom.    Objective   Objective   Vital Signs  Temp:  [98.7 °F (37.1 °C)-100.5 °F (38.1 °C)] 99.9 °F (37.7 °C)  Heart Rate:  [84-98] 84  Resp:  [16-20] 16  BP: (103-139)/() 121/94  SpO2:  [98 %-100 %] 100 %  on   ;   Device (Oxygen Therapy): room air  Body mass index is 17.55 kg/m².    Physical Exam  Constitutional:       General: She is not in acute distress.     Appearance: Normal appearance. She is not toxic-appearing.   HENT:      Head: Normocephalic and atraumatic.   Eyes:      Extraocular Movements: Extraocular movements intact.    Cardiovascular:      Rate and Rhythm: Normal rate and regular rhythm.   Pulmonary:      Effort: Pulmonary effort is normal. No respiratory distress.      Breath sounds: Normal breath sounds. No wheezing, rhonchi or rales.   Abdominal:      General: Bowel sounds are normal.      Palpations: Abdomen is soft.      Tenderness: There is no abdominal tenderness. There is no guarding or rebound.   Musculoskeletal:         General: No swelling.      Cervical back: Normal range of motion.      Right lower leg: No edema.      Left lower leg: No edema.   Skin:     General: Skin is warm and dry.   Neurological:      General: No focal deficit present.      Mental Status: She is alert and oriented to person, place, and time.   Psychiatric:         Mood and Affect: Mood normal.         Behavior: Behavior normal.         Thought Content: Thought content normal.     Results Review  I reviewed the patient's new clinical results.  Results from last 7 days   Lab Units 05/01/23 0436 04/30/23  0548 04/29/23  1248 04/28/23  0225   WBC 10*3/mm3 6.22 6.42 5.92 5.99   HEMOGLOBIN g/dL 9.6* 9.6* 10.0* 8.9*   PLATELETS 10*3/mm3 155 148 143 143     Results from last 7 days   Lab Units 05/01/23  0436 04/30/23  0548 04/29/23  2243 04/29/23  0813 04/28/23  1107 04/28/23  0225   SODIUM mmol/L 136 135*  --  137  --  142   POTASSIUM mmol/L 3.3* 3.7 3.3* 3.5   < > 2.1*   CHLORIDE mmol/L 106 105  --  103  --  104   CO2 mmol/L 19.5* 20.0*  --  19.0*  --  18.8*   BUN mg/dL 3* 4*  --  4*  --  3*   CREATININE mg/dL 0.31* 0.38*  --  0.45*  --  0.40*   GLUCOSE mg/dL 95 97  --  94  --  85    < > = values in this interval not displayed.     Lab Results   Component Value Date    ANIONGAP 10.5 05/01/2023     Estimated Creatinine Clearance: 223.7 mL/min (A) (by C-G formula based on SCr of 0.31 mg/dL (L)).    Results from last 7 days   Lab Units 05/01/23 0436 04/30/23  0548 04/29/23  0813 04/28/23  0225   ALBUMIN g/dL 2.4* 2.9* 3.2* 3.0*   BILIRUBIN mg/dL 0.9  1.2 1.2 0.8   ALK PHOS U/L 103 95 92 112   AST (SGOT) U/L 55* 70* 128* 465*   ALT (SGPT) U/L 21 25 33 49*     Results from last 7 days   Lab Units 05/01/23  0436 04/30/23  0548 04/29/23  0813 04/28/23  0225 04/27/23 2003   CALCIUM mg/dL 8.1* 8.2* 8.1* 7.3* 8.9   ALBUMIN g/dL 2.4* 2.9* 3.2* 3.0* 3.8   MAGNESIUM mg/dL 1.6  --   --   --  1.3*     Results from last 7 days   Lab Units 04/29/23  1248 04/28/23  1725 04/28/23  1107 04/28/23  0525 04/27/23 2031 04/27/23 2003   PROCALCITONIN ng/mL  --   --   --   --   --  0.49*   LACTATE mmol/L 1.5 5.0* 4.4* 4.0*   < >  --     < > = values in this interval not displayed.     Glucose   Date/Time Value Ref Range Status   04/30/2023 1101 104 70 - 130 mg/dL Final     Comment:     Meter: [id number] : [id number] Sinan STRATTON       No radiology results for the last day    Scheduled Meds  cefTRIAXone, 2 g, Intravenous, Q24H  enoxaparin, 40 mg, Subcutaneous, Nightly  folic acid, 1 mg, Oral, Daily  sodium chloride, 10 mL, Intravenous, Q12H  thiamine (VITAMIN B1) IVPB, 100 mg, Intravenous, Daily    Continuous Infusions  sodium chloride, 150 mL/hr, Last Rate: 150 mL/hr (05/01/23 0539)    PRN Meds  •  acetaminophen **OR** acetaminophen  •  calcium carbonate  •  Calcium Gluconate-NaCl **AND** calcium gluconate IVPB **AND** Calcium  •  diphenhydrAMINE  •  LORazepam **OR** LORazepam **OR** LORazepam **OR** LORazepam **OR** LORazepam **OR** LORazepam **OR** LORazepam **OR** LORazepam  •  LORazepam  •  magnesium sulfate **OR** magnesium sulfate **OR** magnesium sulfate  •  ondansetron **OR** ondansetron  •  potassium & sodium phosphates **OR** potassium & sodium phosphates  •  potassium chloride **OR** potassium chloride **OR** potassium chloride  •  [COMPLETED] Insert Peripheral IV **AND** sodium chloride  •  sodium chloride  •  sodium chloride    sodium chloride, 150 mL/hr, Last Rate: 150 mL/hr (05/01/23 0539)    Diet  Diet: Regular/House Diet, Vegetarian; Vegan (No animal  products); Texture: Regular Texture (IDDSI 7); Fluid Consistency: Thin (IDDSI 0)    I have personally reviewed:  [x]  Medications  [x]  Laboratory   [x]  Microbiology   [x]  Radiology   []  EKG/Telemetry   []  Cardiology/Vascular   []  Pathology   []  Records    CIWA (last 2 days)     Date/Time CIWA-Ar Score    05/01/23 0800 2    04/30/23 2234 0    04/30/23 2000 0    04/30/23 1400 0    04/30/23 0800 0    04/30/23 0400 0    04/30/23 0000 0    04/29/23 2000 0    04/29/23 1615 0           Assessment/Plan     Active Hospital Problems    Diagnosis  POA   • **Severe sepsis [A41.9, R65.20]  Yes   • E coli bacteremia [R78.81, B96.20]  Unknown   • Alcohol withdrawal [F10.939]  Unknown   • Alcohol abuse [F10.10]  Unknown   • Acute pyelonephritis [N10]  Unknown   • Essential hypertension [I10]  Unknown   • Metabolic encephalopathy [G93.41]  Unknown   • Polysubstance abuse [F19.10]  Unknown   • Hypokalemia [E87.6]  Unknown   • Hepatic steatosis [K76.0]  Unknown      Resolved Hospital Problems   No resolved problems to display.     32 y.o. female with a history of alcohol abuse mother found patient on the ground (hit the ground)    Sepsis  E. coli bacteremia  UTI pyelonephritis  -Ceftriaxone transition to levofloxacin on discharge (14-day course end date 5/13/2023) per ID  -Lactic acidosis resolved    Metabolic encephalopathy  -Resolved    Alcohol use  Possible alcohol intoxication  Transaminitis, hepatic steatosis  -CIWA scores 0 to 2  -As needed Ativan (has not required any)    Abnormal CT scan  -Nodularity left lower lobe and indeterminate pancreatic head duodenum area  -Probably outpatient CT scans    Hypokalemia  -Replace per protocol  -magnesium wnl    Rhabdomyolysis  -CPK recheck  -LFTs improving    Acute on chronic anemia  -Monitoring    SCDs for DVT prophylaxis    Discussed with patient and nursing staff and Dr. Markie Resendiz    Discharge: TBD. PT consulted    Jamarcus Mendez MD  [facility]  05/01/23      Electronically signed by Jamarcus Mendez MD at 05/01/23 1340     Markie Resendiz MD at 05/01/23 1006        Patient out of the ICU without any pulmonary critical care needs under general medicine service.  Appreciate their care for this patient.  [place] [facility] will now sign off however any new pulmonary critical care issues arise please feel free to give us a a call.  Markie Resendiz MD  [facility]  05/01/23  10:07 EDT      Electronically signed by Markie Resendiz MD at 05/01/23 1007     Joseph Prakash DO at 05/01/23 0955           LOS: 4 days     Chief Complaint: Pyelonephritis and bacteremia    Interval History: Patient reports she is feeling better this morning.  Fever curve overall improving and actually afebrile this morning.  Denies any difficulty tolerating antibiotics.  Eating some breakfast this morning.  No leukocytosis.    Vital Signs  Temp:  [98.7 °F (37.1 °C)-100.5 °F (38.1 °C)] 99.5 °F (37.5 °C)  Heart Rate:  [85-99] 93  Resp:  [16-20] 16  BP: (103-139)/() 123/90    Physical Exam:  General: In no acute distress  HEENT: Oropharynx clear, moist mucous membranes  Cardiovascular: RRR  Respiratory: Normal work of breathing  Skin: No rashes or lesions  Extremities: No edema, cyanosis  Access: Peripheral IV.    Antibiotics:  Anti-Infectives (From admission, onward)    Ordered     Dose/Rate Route Frequency Start Stop    04/30/23 0834  cefTRIAXone (ROCEPHIN) 2 g in sodium chloride 0.9 % 100 mL IVPB-VTB        Ordering Provider: Markie Resendiz MD    2 g  200 mL/hr over 30 Minutes Intravenous Every 24 Hours 04/30/23 0930 05/12/23 0929    04/29/23 1058  piperacillin-tazobactam (ZOSYN) 4.5 g in iso-osmotic dextrose 100 mL IVPB (premix)        Ordering Provider: Markie Resendiz MD    4.5 g  over 30 Minutes Intravenous Once 04/29/23 1145 04/29/23 1147    04/27/23 2211  cefTRIAXone (ROCEPHIN) 1 g in sodium chloride 0.9 % 100  mL IVPB-VTB        Ordering Provider: Ryne Resendiz MD    1 g  200 mL/hr over 30 Minutes Intravenous Once 04/27/23 2213 04/27/23 2342 04/27/23 2020  cefTRIAXone (ROCEPHIN) 1 g in sodium chloride 0.9 % 100 mL IVPB-VTB        Ordering Provider: Rocky Cline MD    1 g  200 mL/hr over 30 Minutes Intravenous Once 04/27/23 2022 04/27/23 2202           Results Review:     I reviewed the patient's new clinical results.    Lab Results   Component Value Date    WBC 6.22 05/01/2023    HGB 9.6 (L) 05/01/2023    HCT 27.3 (L) 05/01/2023    MCV 86.9 05/01/2023     05/01/2023     Lab Results   Component Value Date    GLUCOSE 95 05/01/2023    BUN 3 (L) 05/01/2023    CREATININE 0.31 (L) 05/01/2023    EGFRIFAFRI >150 09/27/2021    BCR 9.7 05/01/2023    CO2 19.5 (L) 05/01/2023    CALCIUM 8.1 (L) 05/01/2023    ALBUMIN 2.4 (L) 05/01/2023    AST 55 (H) 05/01/2023    ALT 21 05/01/2023       Microbiology:  4/27 blood cultures 2 out of 2 E. coli  4/27 urine culture E. coli  4/27 respiratory panel negative  4/27 MRSA nares negative  4/29 blood cultures no growth to date    Assessment    #E. coli septicemia  #Acute right pyelonephritis  #Alcohol abuse  #Lactic acidosis  #Transaminitis  #Hepatic steatosis    Repeat blood cultures are negative to date.  Continue ceftriaxone 2 g daily for pyelonephritis.  Will need a 14-day course for pyelonephritis with plan to transition to oral levofloxacin 750 mg daily on discharge with an end date of 5/13.     ID will follow.       Electronically signed by Joseph Prakash DO at 05/01/23 0906

## 2023-05-02 NOTE — DISCHARGE SUMMARY
[facility]    Date of Discharge:  5/2/2023    PCP: Provider, No Known    Discharge Diagnosis:   Active Hospital Problems    Diagnosis  POA   • **Severe sepsis [A41.9, R65.20]  Yes   • E coli bacteremia [R78.81, B96.20]  Unknown   • Alcohol withdrawal [F10.939]  Unknown   • Alcohol abuse [F10.10]  Unknown   • Acute pyelonephritis [N10]  Unknown   • Essential hypertension [I10]  Unknown   • Metabolic encephalopathy [G93.41]  Unknown   • Polysubstance abuse [F19.10]  Unknown   • Hypokalemia [E87.6]  Unknown   • Hepatic steatosis [K76.0]  Unknown      Resolved Hospital Problems   No resolved problems to display.          Consults     Date and Time Order Name Status Description    4/30/2023  8:51 AM Inpatient Internal Medicine Consult Completed     4/29/2023 11:06 AM Inpatient Infectious Diseases Consult Completed     4/28/2023 12:34 AM Inpatient Neurology Consult General Completed     4/27/2023  9:11 PM Pulmonology (on-call MD unless specified)          Hospital Course  32 y.o. female found down by mother (patient hit the ground). She has a history of alcohol abuse. She was admitted to the ICU with sepsis, E. coli bacteremia, and UTI pyelonephritis. She was started on ceftriaxone and sepsis resolved. Lactic acidosis resolved. Repeat blood cultures have remained negative. ID recommends levofloxacin 750 mg daily with an end date of 5/13/2023. She also had an abnormal CT scan. There was noted nodularity in the left lower lobe, fatty liver, and indeterminate lesions in the pancreatic head and second portion of the duodenum. There was noted perinephric fat stranding on the right kidney suggesting pyelonephritis. Recommend follow-up pancreatic protocol CT scan as an outpatient. I discussed with the patient today. She was already aware of the lesions and wants to obtain CT scans as an outpatient. She is ready to go home today. She does not have a PCP and is agreeable to  follow-up with [facility].    She was placed on CIWA protocol. Scores were 0-4, most recent 2. She had as needed Ativan but did not require any.    I discussed the patient's findings and my recommendations with patient and nursing staff. Patient feels improved today and would very much like to go home. Her mother is going to stay with her.    Temp:  [98.5 °F (36.9 °C)-99.9 °F (37.7 °C)] 98.5 °F (36.9 °C)  Heart Rate:  [84-94] 90  Resp:  [16-18] 18  BP: (121-129)/(86-94) 126/87  Body mass index is 17.12 kg/m².    Physical Exam  Constitutional:       General: She is not in acute distress.     Appearance: Normal appearance. She is not toxic-appearing.   HENT:      Head: Normocephalic and atraumatic.   Cardiovascular:      Rate and Rhythm: Normal rate and regular rhythm.   Pulmonary:      Effort: Pulmonary effort is normal. No respiratory distress.      Breath sounds: Normal breath sounds. No wheezing or rhonchi.   Abdominal:      General: Bowel sounds are normal.      Palpations: Abdomen is soft.      Tenderness: There is no abdominal tenderness. There is no guarding or rebound.   Musculoskeletal:         General: No swelling.      Cervical back: Normal range of motion.      Right lower leg: No edema.      Left lower leg: No edema.   Skin:     General: Skin is warm and dry.   Neurological:      General: No focal deficit present.      Mental Status: She is alert and oriented to person, place, and time.   Psychiatric:         Mood and Affect: Mood normal.         Behavior: Behavior normal.         Thought Content: Thought content normal.       Disposition: Home or Self Care       Discharge Medications      New Medications      Instructions Start Date   levoFLOXacin 750 MG tablet  Commonly known as: Levaquin   750 mg, Oral, Daily            Diet Instructions     Diet: Regular/House Diet      Discharge Diet: Regular/House Diet    Texture: Regular Texture (IDDSI 7)    Fluid Consistency: Thin (IDDSI 0)         Activity Instructions      Activity as Tolerated      Work Restrictions      Type of Restriction: Work    May Return to Work: Specific Date    Return To Work Date: 5/8/2023    With / Without Restrictions: Without Restrictions         Additional Instructions for the Follow-ups that You Need to Schedule     Call MD for problems / concerns.   As directed         Follow-up Information     [facility]. Schedule an appointment as soon as possible for a visit in 2 week(s).    Why: new PCP. Needs follow-up CT pancreatic protocol to follow-up indeterminate lesion  Contact information:  [place] [id number] [phone]                    No future appointments.  Pending Labs     Order Current Status    Blood Culture - Blood, Arm, Left Preliminary result    Blood Culture - Blood, Hand, Left Preliminary result         Jamarcus Mendez MD  [facility]  05/02/23    Discharge time spent greater than 30 minutes.

## 2023-05-02 NOTE — PLAN OF CARE
Goal Outcome Evaluation:  Plan of Care Reviewed With: patient           Outcome Evaluation: Pt admitted with seizure, +sepsis, h/o etoh use.  Pt reports normally working, lives w/ 5 yo dtr.  Today pt demonstrates generalized weakness, limitd activity tolerance but able to ambulate 175' w/ contact assist. slow guarded gait with some unsteadiness but no overt LOB/safety concerns.  Pt had been in bed since admission; recommend using bathroom or even commode instead of purewick, getting to chair and ambulating in room/silva w/ staff.  Recommend DC to home with mother assisting; discussed with  pt, RN and MD.

## 2023-05-02 NOTE — PLAN OF CARE
Goal Outcome Evaluation:           Progress: improving Patient is being discharged to home with mom to transport and assist. A&Ox4, vital signs stable on room air, patient ambulated with PT x2 around nurses station. No c/o pain at this time, patient verbalizes understanding of discharge no questions at this time.

## 2023-05-02 NOTE — CASE MANAGEMENT/SOCIAL WORK
Continued Stay Note  [facility]     Patient Name: Lisa Gibson  MRN: [id number]  Today's Date: 5/2/2023    Admit Date: 4/27/2023    Plan: Home   Discharge Plan     Row Name 05/02/23 9907       Plan    Plan Home    Plan Comments S/w pt and her mother Diana at bedside.  Pt lives in an apartment with her dependent dtr, is [place]ADLs and can drive.  She does not use any home DME and no hx of HH or SNF.  Pt does not have a PCP - [facility] provider list and appt liaison contact # given.    Final Note DC home. ........Beth TAMAYO/ VALERIA               Discharge Codes    No documentation.               Expected Discharge Date and Time     Expected Discharge Date Expected Discharge Time    May 2, 2023             Beth Bernal RN

## 2023-05-02 NOTE — PROGRESS NOTES
LOS: 5 days     Chief Complaint: Pyelonephritis and bacteremia    Interval History: Patient states she continues to feel better.  Looking forward to going home.  Denies any fevers or chills.  States she is having a better appetite.  Tolerating antibiotics.    Vital Signs  Temp:  [98.5 °F (36.9 °C)-99.9 °F (37.7 °C)] 98.5 °F (36.9 °C)  Heart Rate:  [84-94] 90  Resp:  [16-18] 18  BP: (121-129)/(86-94) 126/87    Physical Exam:  General: In no acute distress  HEENT: Oropharynx clear, moist mucous membranes  Cardiovascular: RRR  Respiratory: Normal work of breathing  Skin: No rashes or lesions  Extremities: No edema, cyanosis  Access: Peripheral IV.    Antibiotics:  Anti-Infectives (From admission, onward)    Ordered     Dose/Rate Route Frequency Start Stop    04/30/23 0834  cefTRIAXone (ROCEPHIN) 2 g in sodium chloride 0.9 % 100 mL IVPB-VTB        Ordering Provider: Markie Resendiz MD    2 g  200 mL/hr over 30 Minutes Intravenous Every 24 Hours 04/30/23 0930 05/12/23 0929    04/29/23 1058  piperacillin-tazobactam (ZOSYN) 4.5 g in iso-osmotic dextrose 100 mL IVPB (premix)        Ordering Provider: Markie Resendiz MD    4.5 g  over 30 Minutes Intravenous Once 04/29/23 1145 04/29/23 1147    04/27/23 2211  cefTRIAXone (ROCEPHIN) 1 g in sodium chloride 0.9 % 100 mL IVPB-VTB        Ordering Provider: Ryne Resendiz MD    1 g  200 mL/hr over 30 Minutes Intravenous Once 04/27/23 2213 04/27/23 2342    04/27/23 2020  cefTRIAXone (ROCEPHIN) 1 g in sodium chloride 0.9 % 100 mL IVPB-VTB        Ordering Provider: Rocky Cline MD    1 g  200 mL/hr over 30 Minutes Intravenous Once 04/27/23 2022 04/27/23 2202           Results Review:     I reviewed the patient's new clinical results.    Lab Results   Component Value Date    WBC 5.90 05/02/2023    HGB 9.7 (L) 05/02/2023    HCT 28.4 (L) 05/02/2023    MCV 87.7 05/02/2023     05/02/2023     Lab Results   Component Value Date    GLUCOSE 114 (H) 05/02/2023    BUN <2  (L) 05/02/2023    CREATININE 0.31 (L) 05/02/2023    EGFRIFAFRI >150 09/27/2021    BCR  05/02/2023      Comment:      Unable to calculate Bun/Crea Ratio.    CO2 22.0 05/02/2023    CALCIUM 8.0 (L) 05/02/2023    ALBUMIN 3.1 (L) 05/02/2023    AST 73 (H) 05/02/2023    ALT 19 05/02/2023       Microbiology:  4/27 blood cultures 2 out of 2 E. coli  4/27 urine culture E. coli  4/27 respiratory panel negative  4/27 MRSA nares negative  4/29 blood cultures no growth to date    Assessment    #E. coli septicemia  #Acute right pyelonephritis  #Alcohol abuse  #Lactic acidosis  #Transaminitis  #Hepatic steatosis    Repeat blood cultures have remained negative.  Continue ceftriaxone 2 g daily for pyelonephritis.  Will need a 14-day course for pyelonephritis with plan to transition to oral levofloxacin 750 mg daily on discharge with an end date of 5/13.  Okay for discharge on the above plan from an ID perspective.    Thank you for allowing me to be involved in the care of this patient. Infectious diseases will sign off at this time with antibiotics plan in place, but please call me at [phone] if any further ID questions or new ID concerns.

## 2023-05-02 NOTE — PLAN OF CARE
Goal Outcome Evaluation:    Problem: Adult Inpatient Plan of Care  Goal: Absence of Hospital-Acquired Illness or Injury  Intervention: Identify and Manage Fall Risk  Recent Flowsheet Documentation  Taken 5/2/2023 0400 by Rob Newberry RN  Safety Promotion/Fall Prevention:   toileting scheduled   safety round/check completed   nonskid shoes/slippers when out of bed   lighting adjusted   fall prevention program maintained   clutter free environment maintained   activity supervised  Taken 5/2/2023 0200 by Rob Newberry RN  Safety Promotion/Fall Prevention:   toileting scheduled   safety round/check completed   nonskid shoes/slippers when out of bed   lighting adjusted   fall prevention program maintained   clutter free environment maintained   activity supervised  Taken 5/2/2023 0004 by Rob Newberry RN  Safety Promotion/Fall Prevention:   toileting scheduled   safety round/check completed   nonskid shoes/slippers when out of bed   lighting adjusted   fall prevention program maintained   clutter free environment maintained   activity supervised  Taken 5/1/2023 2200 by Rob Newberry RN  Safety Promotion/Fall Prevention:   toileting scheduled   safety round/check completed   nonskid shoes/slippers when out of bed   lighting adjusted   fall prevention program maintained   clutter free environment maintained   activity supervised  Taken 5/1/2023 2010 by Rob Newberry RN  Safety Promotion/Fall Prevention:   toileting scheduled   safety round/check completed   nonskid shoes/slippers when out of bed   lighting adjusted   fall prevention program maintained   clutter free environment maintained   activity supervised   Plan of Care Reviewed With: patient AOX4, room air, denies pain, no seizure activities noted on this shift will continue to monitor

## 2023-05-02 NOTE — THERAPY EVALUATION
Patient Name: Lisa Gibson  : 1990    MRN: [id number]                              Today's Date: 2023       Admit Date: 2023    Visit Dx:     ICD-10-CM ICD-9-CM   1. Severe sepsis  A41.9 038.9    R65.20 995.92   2. Alcohol withdrawal syndrome, with delirium  F10.931 291.0     Patient Active Problem List   Diagnosis   • Alcohol-induced acute pancreatitis without infection or necrosis   • Transaminitis   • Epigastric pain   • Hepatic steatosis   • Gallbladder sludge   • Severe sepsis   • E coli bacteremia   • Alcohol withdrawal   • Alcohol abuse   • Acute pyelonephritis   • Essential hypertension   • Metabolic encephalopathy   • Polysubstance abuse   • Hypokalemia     History reviewed. No pertinent past medical history.  History reviewed. No pertinent surgical history.   General Information     Row Name 23 1142          Physical Therapy Time and Intention    Document Type evaluation  -AR     Mode of Treatment physical therapy  -AR     Row Name 23 1142          General Information    Patient Profile Reviewed yes  -AR     Prior Level of Function independent:;work  -AR     Existing Precautions/Restrictions fall  -AR     Barriers to Rehab none identified  -AR     Row Name 23 1142          Living Environment    People in Home child(mushtaq), dependent  mother able to assist  -AR     Row Name 23 1142          Home Main Entrance    Number of Stairs, Main Entrance none  -AR     Row Name 23 1142          Cognition    Orientation Status (Cognition) oriented x 3  -AR     Row Name 23 1142          Safety Issues, Functional Mobility    Impairments Affecting Function (Mobility) balance;endurance/activity tolerance;strength  -AR           User Key  (r) = Recorded By, (t) = Taken By, (c) = Cosigned By    Initials Name Provider Type    AR Kala Burns PT Physical Therapist               Mobility     Row Name 23 1143          Bed Mobility    Bed Mobility  supine-sit;sit-supine  -AR     Supine-Sit [place] (Bed Mobility) standby assist  -AR     Sit-Supine [place] (Bed Mobility) not tested  -AR     Row Name 05/02/23 1143          Sit-Stand Transfer    Sit-Stand [place] (Transfers) contact guard  -AR     Row Name 05/02/23 1143          Gait/Stairs (Locomotion)    [place] Level (Gait) contact guard  -AR     Distance in Feet (Gait) 175' slow guarded gait  -AR     Deviations/Abnormal Patterns (Gait) gait speed decreased;festinating/shuffling  -AR     Bilateral Gait Deviations heel strike decreased  -AR           User Key  (r) = Recorded By, (t) = Taken By, (c) = Cosigned By    Initials Name Provider Type    AR Kala Burns, THOMAS Physical Therapist               Obj/Interventions     Row Name 05/02/23 1144          Range of Motion Comprehensive    Comment, General Range of Motion B LE WFL  -AR     [facility] Name 05/02/23 1144          Strength Comprehensive (MMT)    Comment, General Manual Muscle Testing (MMT) Assessment B LE 4/5  -AR     [facility] Name 05/02/23 1144          Motor Skills    Therapeutic Exercise --  B Ap, LAQ 10x  -AR     Row Name 05/02/23 1144          Balance    Balance Assessment standing dynamic balance  -AR     Dynamic Standing Balance contact guard  -AR           User Key  (r) = Recorded By, (t) = Taken By, (c) = Cosigned By    Initials Name Provider Type    AR Kala Burns, PT Physical Therapist               Goals/Plan    No documentation.                Clinical Impression     [facility] Name 05/02/23 1147          Pain    Pretreatment Pain Rating 0/10 - no pain  -AR     Posttreatment Pain Rating 0/10 - no pain  -AR     Pain Intervention(s) Repositioned  -AR     [facility] Name 05/02/23 1147          Plan of Care Review    Plan of Care Reviewed With patient  -AR     Outcome Evaluation Pt admitted with seizure, +sepsis, h/o etoh use.  Pt reports normally working, lives w/ 5 yo dtr.  Today pt demonstrates generalized weakness, limitd activity  tolerance but able to ambulate 175' w/ contact assist. slow guarded gait with some unsteadiness but no overt LOB/safety concerns.  Pt had been in bed since admission; recommend using bathroom or even commode instead of purewick, getting to chair and ambulating in room/silva w/ staff.  Recommend DC to home with mother assisting; discussed with  pt, RN and MD.  -AR     Row Name 05/02/23 1147          Therapy Assessment/Plan (PT)    Criteria for Skilled Interventions Met (PT) no;no problems identified which require skilled intervention;does not meet criteria for skilled intervention  -AR     Therapy Frequency (PT) evaluation only  -AR     Row Name 05/02/23 1147          Vital Signs    O2 Delivery Pre Treatment room air  -AR     Row Name 05/02/23 1147          Positioning and Restraints    Pre-Treatment Position in bed  -AR     Post Treatment Position chair  -AR     In Chair notified nsg;sitting;call light within reach;encouraged to call for assist;exit alarm on  -AR           User Key  (r) = Recorded By, (t) = Taken By, (c) = Cosigned By    Initials Name Provider Type    Kala Peguero, PT Physical Therapist               Outcome Measures     Row Name 05/02/23 1151 05/02/23 0726       How much help from another person do you currently need...    Turning from your back to your side while in flat bed without using bedrails? 4  -AR 4  -JJ    Moving from lying on back to sitting on the side of a flat bed without bedrails? 3  -AR 3  -JJ    Moving to and from a bed to a chair (including a wheelchair)? 3  -AR 2  -JJ    Standing up from a chair using your arms (e.g., wheelchair, bedside chair)? 3  -AR 2  -JJ    Climbing 3-5 steps with a railing? 3  -AR 2  -JJ    To walk in hospital room? 3  -AR 2  -JJ    AM-PAC 6 Clicks Score (PT) 19  -AR 15  -JJ    Highest level of mobility 6 --> Walked 10 steps or more  -AR 4 --> Transferred to chair/commode  -JJ    Row Name 05/02/23 1151          Functional Assessment    Outcome Measure  Options AM-PAC 6 Clicks Basic Mobility (PT)  -AR           User Key  (r) = Recorded By, (t) = Taken By, (c) = Cosigned By    Initials Name Provider Type    AR Kala Burns PT Physical Therapist    Kendra Francis, RN Registered Nurse                             Physical Therapy Education     Title: PT OT SLP Therapies (Done)     Topic: Physical Therapy (Done)     Point: Mobility training (Done)     Learning Progress Summary           Patient Acceptance, E, VU by AR at 5/2/2023 1152                   Point: Home exercise program (Done)     Learning Progress Summary           Patient Acceptance, E, VU by AR at 5/2/2023 1152                   Point: Body mechanics (Done)     Learning Progress Summary           Patient Acceptance, E, VU by AR at 5/2/2023 1152                   Point: Precautions (Done)     Learning Progress Summary           Patient Acceptance, E, VU by AR at 5/2/2023 1152                               User Key     Initials Effective Dates Name Provider Type Discipline    AR 06/16/21 -  Kala Burns PT Physical Therapist PT              PT Recommendation and Plan     Plan of Care Reviewed With: patient  Outcome Evaluation: Pt admitted with seizure, +sepsis, h/o etoh use.  Pt reports normally working, lives w/ 5 yo dtr.  Today pt demonstrates generalized weakness, limitd activity tolerance but able to ambulate 175' w/ contact assist. slow guarded gait with some unsteadiness but no overt LOB/safety concerns.  Pt had been in bed since admission; recommend using bathroom or even commode instead of purewick, getting to chair and ambulating in room/silva w/ staff.  Recommend DC to home with mother assisting; discussed with  pt, RN and MD.     Time Calculation:    PT Charges     Row Name 05/02/23 1141             Time Calculation    Start Time 0959  -AR      Stop Time 1024  -AR      Time Calculation (min) 25 min  -AR      PT Received On 05/02/23  -AR            User Key  (r) = Recorded By, (t) =  Taken By, (c) = Cosigned By    Initials Name Provider Type    AR Kala Burns, PT Physical Therapist              Therapy Charges for Today     Code Description Service Date Service Provider Modifiers Qty    24479762655 HC PT EVAL MOD COMPLEXITY 4 5/2/2023 Kala Burns, PT GP 1    94462671300 HC PT THER PROC EA 15 MIN 5/2/2023 Kala Burns, PT GP 1          PT G-Codes  Outcome Measure Options: AM-PAC 6 Clicks Basic Mobility (PT)  AM-PAC 6 Clicks Score (PT): 19       Kala Burns PT  5/2/2023

## 2023-05-03 NOTE — OUTREACH NOTE
Prep Survey    Flowsheet Row Responses   List of hospitals in [place] patient discharged from? [place] [facility]   Is LACE score < 7 ? No   Eligibility Not Eligible   What are the reasons patient is not eligible? Other   Does the patient have one of the following disease processes/diagnoses(primary or secondary)? Other   Prep survey completed? Yes          Carolynn HOFF - Registered Nurse

## 2023-05-04 LAB
BACTERIA SPEC AEROBE CULT: NORMAL
BACTERIA SPEC AEROBE CULT: NORMAL

## 2023-05-05 NOTE — PAYOR COMM NOTE
"Lisa Talley (32 y.o. Female)   PLEASE SEE ATTACHED FOR DC NOTICE    REF #   [id number]    THANK YOU  COOPER LAL RN/ DEPT  [facility]   [phone]  [phone]      Date of Birth   1990    Social Security Number       Address   [address] [place] [address]  [place] [address]    Home Phone   [phone]    MRN   [id number]       Anglican   Unknown    Marital Status                               Admission Date   4/27/23    Admission Type   Emergency    Admitting Provider   Ryne Resendiz MD    Attending Provider       Department, Room/Bed   [facility], S501/1       Discharge Date   5/2/2023    Discharge Disposition   Home or Self Care    Discharge Destination                               Attending Provider: (none)   Allergies: No Known Allergies    Isolation: None   Infection: None   Code Status: Prior    Ht: 175.3 cm (69\")   Wt: 52.6 kg (115 lb 15.4 oz)    Admission Cmt: None   Principal Problem: Severe sepsis [A41.9,R65.20]                 Active Insurance as of 4/27/2023     Primary Coverage     Payor Plan Insurance Group Employer/Plan Group    CIG[employer] CIGNA [id number]     Payor Plan Address Payor Plan Phone Number Payor Plan Fax Number Effective Dates    [address] [phone]  1/1/2021 - None Entered    [facility] [id number]       Subscriber Name Subscriber Birth Date Member ID       LISA TALLEY 1990 [id number]                 Emergency Contacts      (Rel.) Home Phone Work Phone Mobile Phone    Diana Shelton (Mother) -- -- [phone]            [place]: [facility] [id number]  Tax ID [id number]     Discharge Summary      Jamarcus Mendez MD at 05/02/23 1029                              [facility]    Date of Discharge:  5/2/2023    PCP: Provider, No Known    Discharge Diagnosis:   Active Hospital Problems    Diagnosis  POA   • **Severe sepsis [A41.9, R65.20]  Yes   • E " coli bacteremia [R78.81, B96.20]  Unknown   • Alcohol withdrawal [F10.939]  Unknown   • Alcohol abuse [F10.10]  Unknown   • Acute pyelonephritis [N10]  Unknown   • Essential hypertension [I10]  Unknown   • Metabolic encephalopathy [G93.41]  Unknown   • Polysubstance abuse [F19.10]  Unknown   • Hypokalemia [E87.6]  Unknown   • Hepatic steatosis [K76.0]  Unknown      Resolved Hospital Problems   No resolved problems to display.          Consults     Date and Time Order Name Status Description    4/30/2023  8:51 AM Inpatient Internal Medicine Consult Completed     4/29/2023 11:06 AM Inpatient Infectious Diseases Consult Completed     4/28/2023 12:34 AM Inpatient Neurology Consult General Completed     4/27/2023  9:11 PM Pulmonology (on-call MD unless specified)          Hospital Course  32 y.o. female found down by mother (patient hit the ground). She has a history of alcohol abuse. She was admitted to the ICU with sepsis, E. coli bacteremia, and UTI pyelonephritis. She was started on ceftriaxone and sepsis resolved. Lactic acidosis resolved. Repeat blood cultures have remained negative. ID recommends levofloxacin 750 mg daily with an end date of 5/13/2023. She also had an abnormal CT scan. There was noted nodularity in the left lower lobe, fatty liver, and indeterminate lesions in the pancreatic head and second portion of the duodenum. There was noted perinephric fat stranding on the right kidney suggesting pyelonephritis. Recommend follow-up pancreatic protocol CT scan as an outpatient. I discussed with the patient today. She was already aware of the lesions and wants to obtain CT scans as an outpatient. She is ready to go home today. She does not have a PCP and is agreeable to follow-up with [facility].    She was placed on CIWA protocol. Scores were 0-4, most recent 2. She had as needed Ativan but did not require any.    I discussed the patient's findings and my recommendations with patient and nursing staff. Patient  feels improved today and would very much like to go home. Her mother is going to stay with her.    Temp:  [98.5 °F (36.9 °C)-99.9 °F (37.7 °C)] 98.5 °F (36.9 °C)  Heart Rate:  [84-94] 90  Resp:  [16-18] 18  BP: (121-129)/(86-94) 126/87  Body mass index is 17.12 kg/m².    Physical Exam  Constitutional:       General: She is not in acute distress.     Appearance: Normal appearance. She is not toxic-appearing.   HENT:      Head: Normocephalic and atraumatic.   Cardiovascular:      Rate and Rhythm: Normal rate and regular rhythm.   Pulmonary:      Effort: Pulmonary effort is normal. No respiratory distress.      Breath sounds: Normal breath sounds. No wheezing or rhonchi.   Abdominal:      General: Bowel sounds are normal.      Palpations: Abdomen is soft.      Tenderness: There is no abdominal tenderness. There is no guarding or rebound.   Musculoskeletal:         General: No swelling.      Cervical back: Normal range of motion.      Right lower leg: No edema.      Left lower leg: No edema.   Skin:     General: Skin is warm and dry.   Neurological:      General: No focal deficit present.      Mental Status: She is alert and oriented to person, place, and time.   Psychiatric:         Mood and Affect: Mood normal.         Behavior: Behavior normal.         Thought Content: Thought content normal.       Disposition: Home or Self Care       Discharge Medications      New Medications      Instructions Start Date   levoFLOXacin 750 MG tablet  Commonly known as: Levaquin   750 mg, Oral, Daily            Diet Instructions     Diet: Regular/House Diet      Discharge Diet: Regular/House Diet    Texture: Regular Texture (IDDSI 7)    Fluid Consistency: Thin (IDDSI 0)         Activity Instructions     Activity as Tolerated      Work Restrictions      Type of Restriction: Work    May Return to Work: Specific Date    Return To Work Date: 5/8/2023    With / Without Restrictions: Without Restrictions         Additional Instructions for  the Follow-ups that You Need to Schedule     Call MD for problems / concerns.   As directed         Follow-up Information     [facility]YSICAN REFERRAL SERVICE. Schedule an appointment as soon as possible for a visit in 2 week(s).    Why: new PCP. Needs follow-up CT pancreatic protocol to follow-up indeterminate lesion  Contact information:  [facility] [address]  [phone]                    No future appointments.  Pending Labs     Order Current Status    Blood Culture - Blood, Arm, Left Preliminary result    Blood Culture - Blood, Hand, Left Preliminary result         Jamarcus Mendez MD  [facility]  05/02/23    Discharge time spent greater than 30 minutes.      Electronically signed by Jamarcus Mendez MD at 05/02/23 3171

## 2023-06-01 ENCOUNTER — HOSPITAL ENCOUNTER (EMERGENCY)
Facility: HOSPITAL | Age: 33
Discharge: HOME | End: 2023-06-01
Attending: EMERGENCY MEDICINE
Payer: COMMERCIAL

## 2023-06-01 VITALS
HEART RATE: 78 BPM | TEMPERATURE: 97.2 F | BODY MASS INDEX: 17.03 KG/M2 | HEIGHT: 69 IN | SYSTOLIC BLOOD PRESSURE: 131 MMHG | RESPIRATION RATE: 16 BRPM | DIASTOLIC BLOOD PRESSURE: 90 MMHG | WEIGHT: 115 LBS | OXYGEN SATURATION: 99 %

## 2023-06-01 DIAGNOSIS — R10.30 LOWER ABDOMINAL PAIN: Primary | ICD-10-CM

## 2023-06-01 DIAGNOSIS — Z78.9 ALCOHOL USE: ICD-10-CM

## 2023-06-01 LAB
ALBUMIN SERPL-MCNC: 4.5 G/DL (ref 3.5–5.2)
ALBUMIN/GLOB SERPL: 1.3 G/DL
ALP SERPL-CCNC: 122 U/L (ref 39–117)
ALT SERPL W P-5'-P-CCNC: 37 U/L (ref 1–33)
AMPHET+METHAMPHET UR QL: NEGATIVE
ANION GAP SERPL CALCULATED.3IONS-SCNC: 15 MMOL/L (ref 5–15)
AST SERPL-CCNC: 136 U/L (ref 1–32)
B-HCG UR QL: NEGATIVE
BACTERIA UR QL AUTO: ABNORMAL /HPF
BARBITURATES UR QL SCN: NEGATIVE
BENZODIAZ UR QL SCN: NEGATIVE
BILIRUB SERPL-MCNC: 0.6 MG/DL (ref 0–1.2)
BILIRUB UR QL STRIP: NEGATIVE
BUN SERPL-MCNC: 3 MG/DL (ref 6–20)
BUN/CREAT SERPL: 7.1 (ref 7–25)
CALCIUM SPEC-SCNC: 9.4 MG/DL (ref 8.6–10.5)
CANNABINOIDS SERPL QL: POSITIVE
CHLORIDE SERPL-SCNC: 96 MMOL/L (ref 98–107)
CLARITY UR: CLEAR
CO2 SERPL-SCNC: 28 MMOL/L (ref 22–29)
COCAINE UR QL: NEGATIVE
COLOR UR: YELLOW
CREAT SERPL-MCNC: 0.42 MG/DL (ref 0.57–1)
D-LACTATE SERPL-SCNC: 1.7 MMOL/L (ref 0.5–2)
DEPRECATED RDW RBC AUTO: 42.2 FL (ref 37–54)
EGFRCR SERPLBLD CKD-EPI 2021: 134.3 ML/MIN/1.73
EOSINOPHIL # BLD MANUAL: 0.04 10*3/MM3 (ref 0–0.4)
EOSINOPHIL NFR BLD MANUAL: 1 % (ref 0.3–6.2)
ERYTHROCYTE [DISTWIDTH] IN BLOOD BY AUTOMATED COUNT: 13.6 % (ref 12.3–15.4)
ETHANOL BLD-MCNC: 209 MG/DL (ref 0–10)
ETHANOL UR QL: 0.21 %
FENTANYL UR-MCNC: NEGATIVE NG/ML
GLOBULIN UR ELPH-MCNC: 3.5 GM/DL
GLUCOSE SERPL-MCNC: 123 MG/DL (ref 65–99)
GLUCOSE UR STRIP-MCNC: NEGATIVE MG/DL
HCT VFR BLD AUTO: 32.7 % (ref 34–46.6)
HGB BLD-MCNC: 10.8 G/DL (ref 12–15.9)
HGB UR QL STRIP.AUTO: ABNORMAL
HYALINE CASTS UR QL AUTO: ABNORMAL /LPF
KETONES UR QL STRIP: ABNORMAL
LEUKOCYTE ESTERASE UR QL STRIP.AUTO: ABNORMAL
LIPASE SERPL-CCNC: 10 U/L (ref 13–60)
LYMPHOCYTES # BLD MANUAL: 2.83 10*3/MM3 (ref 0.7–3.1)
LYMPHOCYTES NFR BLD MANUAL: 7.1 % (ref 5–12)
MCH RBC QN AUTO: 28.3 PG (ref 26.6–33)
MCHC RBC AUTO-ENTMCNC: 33 G/DL (ref 31.5–35.7)
MCV RBC AUTO: 85.8 FL (ref 79–97)
METHADONE UR QL SCN: NEGATIVE
MONOCYTES # BLD: 0.3 10*3/MM3 (ref 0.1–0.9)
NEUTROPHILS # BLD AUTO: 1.03 10*3/MM3 (ref 1.7–7)
NEUTROPHILS NFR BLD MANUAL: 24.5 % (ref 42.7–76)
NITRITE UR QL STRIP: NEGATIVE
NRBC BLD AUTO-RTO: 0 /100 WBC (ref 0–0.2)
OPIATES UR QL: NEGATIVE
OXYCODONE UR QL SCN: NEGATIVE
PH UR STRIP.AUTO: 6.5 [PH] (ref 5–8)
PLAT MORPH BLD: NORMAL
PLATELET # BLD AUTO: 199 10*3/MM3 (ref 140–450)
PMV BLD AUTO: 10.3 FL (ref 6–12)
POTASSIUM SERPL-SCNC: 3.2 MMOL/L (ref 3.5–5.2)
PROCALCITONIN SERPL-MCNC: 0.08 NG/ML (ref 0–0.25)
PROT SERPL-MCNC: 8 G/DL (ref 6–8.5)
PROT UR QL STRIP: ABNORMAL
RBC # BLD AUTO: 3.81 10*6/MM3 (ref 3.77–5.28)
RBC # UR STRIP: ABNORMAL /HPF
RBC MORPH BLD: NORMAL
REF LAB TEST METHOD: ABNORMAL
SODIUM SERPL-SCNC: 139 MMOL/L (ref 136–145)
SP GR UR STRIP: 1.01 (ref 1–1.03)
SQUAMOUS #/AREA URNS HPF: ABNORMAL /HPF
UROBILINOGEN UR QL STRIP: ABNORMAL
VARIANT LYMPHS NFR BLD MANUAL: 67.3 % (ref 19.6–45.3)
WBC # UR STRIP: ABNORMAL /HPF
WBC MORPH BLD: NORMAL
WBC NRBC COR # BLD: 4.2 10*3/MM3 (ref 3.4–10.8)

## 2023-06-01 PROCEDURE — 80307 DRUG TEST PRSMV CHEM ANLYZR: CPT | Performed by: EMERGENCY MEDICINE

## 2023-06-01 PROCEDURE — 84145 PROCALCITONIN (PCT): CPT | Performed by: EMERGENCY MEDICINE

## 2023-06-01 PROCEDURE — 99283 EMERGENCY DEPT VISIT LOW MDM: CPT

## 2023-06-01 PROCEDURE — 83690 ASSAY OF LIPASE: CPT | Performed by: EMERGENCY MEDICINE

## 2023-06-01 PROCEDURE — 82077 ASSAY SPEC XCP UR&BREATH IA: CPT | Performed by: EMERGENCY MEDICINE

## 2023-06-01 PROCEDURE — 80053 COMPREHEN METABOLIC PANEL: CPT | Performed by: EMERGENCY MEDICINE

## 2023-06-01 PROCEDURE — 36415 COLL VENOUS BLD VENIPUNCTURE: CPT

## 2023-06-01 PROCEDURE — 81001 URINALYSIS AUTO W/SCOPE: CPT | Performed by: EMERGENCY MEDICINE

## 2023-06-01 PROCEDURE — 83605 ASSAY OF LACTIC ACID: CPT | Performed by: EMERGENCY MEDICINE

## 2023-06-01 PROCEDURE — 81025 URINE PREGNANCY TEST: CPT | Performed by: EMERGENCY MEDICINE

## 2023-06-01 PROCEDURE — 85025 COMPLETE CBC W/AUTO DIFF WBC: CPT | Performed by: EMERGENCY MEDICINE

## 2023-06-01 PROCEDURE — 85007 BL SMEAR W/DIFF WBC COUNT: CPT | Performed by: EMERGENCY MEDICINE

## 2023-06-01 RX ORDER — DICYCLOMINE HYDROCHLORIDE 10 MG/1
20 CAPSULE ORAL ONCE
Status: COMPLETED | OUTPATIENT
Start: 2023-06-01 | End: 2023-06-01

## 2023-06-01 RX ORDER — DICYCLOMINE HCL 20 MG
20 TABLET ORAL EVERY 8 HOURS PRN
Qty: 20 TABLET | Refills: 0 | Status: SHIPPED | OUTPATIENT
Start: 2023-06-01

## 2023-06-01 RX ORDER — SODIUM CHLORIDE 0.9 % (FLUSH) 0.9 %
10 SYRINGE (ML) INJECTION AS NEEDED
Status: DISCONTINUED | OUTPATIENT
Start: 2023-06-01 | End: 2023-06-01 | Stop reason: HOSPADM

## 2023-06-01 RX ADMIN — DICYCLOMINE HYDROCHLORIDE 20 MG: 10 CAPSULE ORAL at 09:49

## 2023-06-01 RX ADMIN — SODIUM CHLORIDE, POTASSIUM CHLORIDE, SODIUM LACTATE AND CALCIUM CHLORIDE 1000 ML: 600; 310; 30; 20 INJECTION, SOLUTION INTRAVENOUS at 08:09

## 2023-06-01 NOTE — ED PROVIDER NOTES
" EMERGENCY DEPARTMENT ENCOUNTER    Room Number:  13/13  Date seen:  6/1/2023  PCP: Provider, No Known  Historian(s): Patient      HPI:  Chief Complaint: Abdominal pain, back pain  A complete HPI/ROS/PMH/PSH/SH/FH are unobtainable / limited due to: None  Context: Lisa Gibson is a 32 y.o. female who presents to the ED c/o pain in the lower abdomen that radiates around to the back bilaterally.  Patient has not had much appetite lately.  She is currently on her menstrual cycle.  She has not seen any blood in her stools.  She denies fevers.  She was recently admitted here few weeks ago for pyelonephritis sepsis as well as alcohol abuse and alcohol withdrawal concerns.  She was discharged home on oral antibiotics which she says she completed.  She felt well for couple weeks but now over the past few days has been feeling uncomfortable in the lower abdomen again.  She supposes that she \"may have just been doing too much recently\" because she has been very busy at home and indicates that her daughter recently graduated from .        PAST MEDICAL HISTORY  Active Ambulatory Problems     Diagnosis Date Noted   • Alcohol-induced acute pancreatitis without infection or necrosis 09/21/2021   • Transaminitis 09/22/2021   • Epigastric pain 09/22/2021   • Hepatic steatosis 09/24/2021   • Gallbladder sludge 09/24/2021   • Severe sepsis 04/27/2023   • E coli bacteremia 04/30/2023   • Alcohol withdrawal 04/30/2023   • Alcohol abuse 04/30/2023   • Acute pyelonephritis 04/30/2023   • Essential hypertension 04/30/2023   • Metabolic encephalopathy 04/30/2023   • Polysubstance abuse 04/30/2023   • Hypokalemia 04/30/2023     Resolved Ambulatory Problems     Diagnosis Date Noted   • No Resolved Ambulatory Problems     No Additional Past Medical History         PAST SURGICAL HISTORY  History reviewed. No pertinent surgical history.      FAMILY HISTORY  History reviewed. No pertinent family history.      SOCIAL HISTORY  Social " History     Socioeconomic History   • Marital status:    Tobacco Use   • Smoking status: Never   • Smokeless tobacco: Never   Vaping Use   • Vaping Use: Never used   Substance and Sexual Activity   • Drug use: Never         ALLERGIES  Patient has no known allergies.        REVIEW OF SYSTEMS  Review of Systems   Constitutional: Positive for activity change and appetite change. Negative for fever.   HENT: Negative.    Eyes: Negative for pain and visual disturbance.   Respiratory: Negative for cough and shortness of breath.    Cardiovascular: Negative for chest pain.   Gastrointestinal: Positive for abdominal pain. Negative for blood in stool and diarrhea.   Genitourinary: Positive for flank pain and urgency. Negative for dysuria.   Musculoskeletal: Positive for back pain.   Skin: Negative for color change.   Neurological: Negative for syncope and headaches.   All other systems reviewed and are negative.           PHYSICAL EXAM  ED Triage Vitals [06/01/23 0746]   Temp Heart Rate Resp BP SpO2   97.2 °F (36.2 °C) 105 16 127/91 98 %      Temp src Heart Rate Source Patient Position BP Location FiO2 (%)   -- -- -- -- --       Physical Exam      GENERAL: Pleasant lady, no diaphoresis, calm, no acute distress  HENT: nares patent, normocephalic and atraumatic  EYES: no scleral icterus, EOMI, normal conjunctiva  CV: regular rhythm, normal rate, normal distal pulses  RESPIRATORY: normal effort, no stridor  ABDOMEN: soft, mild to moderate tenderness diffusely across the lower abdomen but no peritoneal features elicited.,  Mild bilateral CVA tenderness also noted.  MUSCULOSKELETAL: no deformity, no asymmetry  NEURO: alert, moves all extremities, follows commands  PSYCH:  calm, cooperative  SKIN: warm, dry    Vital signs and nursing notes reviewed.        LAB RESULTS  Recent Results (from the past 24 hour(s))   Comprehensive Metabolic Panel    Collection Time: 06/01/23  8:08 AM    Specimen: Blood   Result Value Ref Range     Glucose 123 (H) 65 - 99 mg/dL    BUN 3 (L) 6 - 20 mg/dL    Creatinine 0.42 (L) 0.57 - 1.00 mg/dL    Sodium 139 136 - 145 mmol/L    Potassium 3.2 (L) 3.5 - 5.2 mmol/L    Chloride 96 (L) 98 - 107 mmol/L    CO2 28.0 22.0 - 29.0 mmol/L    Calcium 9.4 8.6 - 10.5 mg/dL    Total Protein 8.0 6.0 - 8.5 g/dL    Albumin 4.5 3.5 - 5.2 g/dL    ALT (SGPT) 37 (H) 1 - 33 U/L    AST (SGOT) 136 (H) 1 - 32 U/L    Alkaline Phosphatase 122 (H) 39 - 117 U/L    Total Bilirubin 0.6 0.0 - 1.2 mg/dL    Globulin 3.5 gm/dL    A/G Ratio 1.3 g/dL    BUN/Creatinine Ratio 7.1 7.0 - 25.0    Anion Gap 15.0 5.0 - 15.0 mmol/L    eGFR 134.3 >60.0 mL/min/1.73   Lipase    Collection Time: 06/01/23  8:08 AM    Specimen: Blood   Result Value Ref Range    Lipase 10 (L) 13 - 60 U/L   Pregnancy, Urine - Urine, Clean Catch    Collection Time: 06/01/23  8:08 AM    Specimen: Urine, Clean Catch   Result Value Ref Range    HCG, Urine QL Negative Negative   Urinalysis With Culture If Indicated - Urine, Clean Catch    Collection Time: 06/01/23  8:08 AM    Specimen: Urine, Clean Catch   Result Value Ref Range    Color, UA Yellow Yellow, Straw    Appearance, UA Clear Clear    pH, UA 6.5 5.0 - 8.0    Specific Gravity, UA 1.015 1.005 - 1.030    Glucose, UA Negative Negative    Ketones, UA Trace (A) Negative    Bilirubin, UA Negative Negative    Blood, UA Moderate (2+) (A) Negative    Protein,  mg/dL (2+) (A) Negative    Leuk Esterase, UA Small (1+) (A) Negative    Nitrite, UA Negative Negative    Urobilinogen, UA 0.2 E.U./dL 0.2 - 1.0 E.U./dL   Ethanol    Collection Time: 06/01/23  8:08 AM    Specimen: Blood   Result Value Ref Range    Ethanol 209 (H) 0 - 10 mg/dL    Ethanol % 0.209 %   Urine Drug Screen - Urine, Clean Catch    Collection Time: 06/01/23  8:08 AM    Specimen: Urine, Clean Catch   Result Value Ref Range    Amphet/Methamphet, Screen Negative Negative    Barbiturates Screen, Urine Negative Negative    Benzodiazepine Screen, Urine Negative Negative     Cocaine Screen, Urine Negative Negative    Opiate Screen Negative Negative    THC, Screen, Urine Positive (A) Negative    Methadone Screen, Urine Negative Negative    Oxycodone Screen, Urine Negative Negative    Fentanyl, Urine Negative Negative   Lactic Acid, Plasma    Collection Time: 06/01/23  8:08 AM    Specimen: Blood   Result Value Ref Range    Lactate 1.7 0.5 - 2.0 mmol/L   Procalcitonin    Collection Time: 06/01/23  8:08 AM    Specimen: Blood   Result Value Ref Range    Procalcitonin 0.08 0.00 - 0.25 ng/mL   CBC Auto Differential    Collection Time: 06/01/23  8:08 AM    Specimen: Blood   Result Value Ref Range    WBC 4.20 3.40 - 10.80 10*3/mm3    RBC 3.81 3.77 - 5.28 10*6/mm3    Hemoglobin 10.8 (L) 12.0 - 15.9 g/dL    Hematocrit 32.7 (L) 34.0 - 46.6 %    MCV 85.8 79.0 - 97.0 fL    MCH 28.3 26.6 - 33.0 pg    MCHC 33.0 31.5 - 35.7 g/dL    RDW 13.6 12.3 - 15.4 %    RDW-SD 42.2 37.0 - 54.0 fl    MPV 10.3 6.0 - 12.0 fL    Platelets 199 140 - 450 10*3/mm3    nRBC 0.0 0.0 - 0.2 /100 WBC   Urinalysis, Microscopic Only - Urine, Clean Catch    Collection Time: 06/01/23  8:08 AM    Specimen: Urine, Clean Catch   Result Value Ref Range    RBC, UA 31-50 (A) None Seen, 0-2 /HPF    WBC, UA 3-5 (A) None Seen, 0-2 /HPF    Bacteria, UA None Seen None Seen /HPF    Squamous Epithelial Cells, UA 3-6 (A) None Seen, 0-2 /HPF    Hyaline Casts, UA 7-12 None Seen /LPF    Methodology Automated Microscopy    Manual Differential    Collection Time: 06/01/23  8:08 AM    Specimen: Blood   Result Value Ref Range    Neutrophil % 24.5 (L) 42.7 - 76.0 %    Lymphocyte % 67.3 (H) 19.6 - 45.3 %    Monocyte % 7.1 5.0 - 12.0 %    Eosinophil % 1.0 0.3 - 6.2 %    Neutrophils Absolute 1.03 (L) 1.70 - 7.00 10*3/mm3    Lymphocytes Absolute 2.83 0.70 - 3.10 10*3/mm3    Monocytes Absolute 0.30 0.10 - 0.90 10*3/mm3    Eosinophils Absolute 0.04 0.00 - 0.40 10*3/mm3    RBC Morphology Normal Normal    WBC Morphology Normal Normal    Platelet Morphology  Normal Normal       Ordered the above labs and reviewed the results.        RADIOLOGY  No Radiology Exams Resulted Within Past 24 Hours    Ordered the above noted radiological studies. Reviewed by me in PACS.          PROCEDURES  Procedures        MEDICATIONS GIVEN IN ER  Medications   sodium chloride 0.9 % flush 10 mL (has no administration in time range)   lactated ringers bolus 1,000 mL (0 mL Intravenous Stopped 6/1/23 8282)   dicyclomine (BENTYL) capsule 20 mg (20 mg Oral Given 6/1/23 4816)       MEDICAL DECISION MAKING, PROGRESS, and CONSULTS    All labs have been independently reviewed by me.  All radiology studies have been reviewed by me and I have also reviewed the radiology report.   EKG's independently viewed and interpreted by me.  Discussion below represents my analysis of pertinent findings related to patient's condition, differential diagnosis, treatment plan and final disposition.      Additional sources:  - Discussed/ obtained information from independent historians: None    - External (non-ED) record review: I reviewed the most recent hospital discharge summary from May 2, 2023 when patient was admitted here for severe sepsis with bacteremia and alcohol withdrawal and alcohol abuse as well as pyelonephritis diagnosis.  She was discharged home to continue a course of oral levofloxacin      Additional orders considered but not ordered:  I considered ordering a CT scan of the abdomen and pelvis but patient just had a recent CT scan last month and I did not want to repeat ionizing radiation exposure study on her at this time since she is younger demographic and my pretest probability for finding any notable/emergent findings is on the low side    Orders placed during this visit:  Orders Placed This Encounter   Procedures   • Comprehensive Metabolic Panel   • Lipase   • Pregnancy, Urine - Urine, Clean Catch   • Urinalysis With Culture If Indicated - Urine, Clean Catch   • Ethanol   • Urine Drug Screen -  "Urine, Clean Catch   • Lactic Acid, Plasma   • Procalcitonin   • CBC Auto Differential   • Urinalysis, Microscopic Only - Urine, Clean Catch   • Manual Differential   • Monitor Blood Pressure   • Insert Peripheral IV   • CBC & Differential           Differential diagnosis includes but is not limited to:    UTI, appendicitis, PID, kidney stone, colitis, interstitial cystitis    Independent interpretation of labs, radiology studies, and discussions with consultants:  ED Course as of 06/01/23 1008   Thu Jun 01, 2023   0824 Patient is afebrile and nontoxic-appearing.  We will start some IV fluids for now.  Checking multiple labs, especially urinalysis to look for recurrence of UTI/pyelonephritis concerns. [DOUGLAS]   0903 Hemoglobin(!): 10.8 [DOUGLAS]   0904 Hematocrit(!): 32.7 [DOUGLAS]   0904 AST (SGOT)(!): 136 [DOUGLAS]   0904 ALT (SGPT)(!): 37 [DOUGLAS]   0904 Ethanol(!): 209 [DOUGLAS]   0904 THC Screen, Urine(!): Positive [DOUGLAS]   0904 I reviewed the urinalysis which shows moderate amount of RBCs but patient is currently on her menstrual cycle.  Nitrites are negative.  There is no convincing sign of bacteria and there is minimal WBCs so I do not consider this analysis consistent with UTI or suggestive of pyelonephritis. [DOUGLAS]   0904 I just reexamined the patient in the room.  She is resting comfortably.  She still has some mild discomfort in the lower abdomen.  I discussed with her about the alcohol level of 209 on her testing today.  She tells me that she could not sleep well last night so she had \"2 shots\".  I explained that her alcohol use may be contributing to some of her abdominal discomfort issues and I reviewed with her the previous findings of liver abnormalities and pancreatic abnormalities on her previous CT imaging and strongly urged her to stop drinking because of the risks associated with alcohol use to these particular organs.  At this time, I do not think we need to repeat a CT scan right now.  I really do not think she has acute " "appendicitis or any other acute surgical emergency problem.  I will give her 1 Bentyl tablet and continue with IV fluids for now.  Will reassess in a little while. [DOUGLAS]   0906 I just reassessed the patient once more.  She continues to rest comfortably in bed.  She is drinking water without any difficulties.  Repeat abdominal exam still shows no peritoneal features.  I am convinced there is no need for imaging right now.  I did advise her to follow-up promptly with her PCP for careful review of all of her test performed over the past month and the need for further outpatient studies such as CT work-up for pancreatic abnormality and also further follow-up regarding liver studies.  I explained that if her symptoms continue then she may need outpatient GI consultation as well but this can be coordinated through her PCP.  I discussed with her the usual \"return to ER\" instructions prior to discharge as well. [DOUGLAS]      ED Course User Index  [DOUGLAS] Peter Aparicio MD         DIAGNOSIS  Final diagnoses:   Lower abdominal pain   Alcohol use         DISPOSITION  Discharge home        Latest Documented Vital Signs:  As of 10:08 EDT  BP- 131/90 HR- 78 Temp- 97.2 °F (36.2 °C) O2 sat- 99%              --    Please note that portions of this were completed with a voice recognition program.       Note Disclaimer: At [facility], we believe that sharing information builds trust and better relationships. You are receiving this note because you are receiving care at [facility] or recently visited. It is possible you will see health information before a provider has talked with you about it. This kind of information can be easy to misunderstand. To help you fully understand what it means for your health, we urge you to discuss this note with your provider.           Peter Aparicio MD  06/01/23 1008    "

## 2023-06-01 NOTE — Clinical Note
[facility] EMERGENCY DEPARTMENT  [address] [facility] [address]  Phone: [phone]    Lisa Gibson was seen and treated in our emergency department on 6/1/2023.  She may return to work on 06/01/2023.         Thank you for choosing [facility].    Sylvia Rivera RN

## 2023-06-01 NOTE — DISCHARGE INSTRUCTIONS
Rest as needed.  Recommend gentle diet and plenty of fluids as tolerated.  Please return to the emergency room for any worsening pain, fevers, nausea, vomiting, abnormal vaginal bleeding or any other concerns.

## 2023-08-07 ENCOUNTER — HOSPITAL ENCOUNTER (INPATIENT)
Facility: HOSPITAL | Age: 33
LOS: 3 days | Discharge: HOME | DRG: 439 | End: 2023-08-12
Attending: EMERGENCY MEDICINE | Admitting: INTERNAL MEDICINE
Payer: COMMERCIAL

## 2023-08-07 ENCOUNTER — APPOINTMENT (OUTPATIENT)
Dept: CT IMAGING | Facility: HOSPITAL | Age: 33
DRG: 439 | End: 2023-08-07
Payer: COMMERCIAL

## 2023-08-07 DIAGNOSIS — F10.11 HISTORY OF ALCOHOL ABUSE: ICD-10-CM

## 2023-08-07 DIAGNOSIS — K85.90 ACUTE PANCREATITIS, UNSPECIFIED COMPLICATION STATUS, UNSPECIFIED PANCREATITIS TYPE: Primary | ICD-10-CM

## 2023-08-07 PROBLEM — K86.3 PANCREATIC PSEUDOCYST: Status: ACTIVE | Noted: 2023-08-07

## 2023-08-07 PROBLEM — R91.1 PULMONARY NODULE: Status: ACTIVE | Noted: 2023-08-07

## 2023-08-07 PROBLEM — F10.20 ALCOHOL DEPENDENCE: Status: ACTIVE | Noted: 2023-08-07

## 2023-08-07 PROBLEM — K70.10 ALCOHOLIC HEPATITIS: Status: ACTIVE | Noted: 2023-08-07

## 2023-08-07 LAB
ALBUMIN SERPL-MCNC: 5.2 G/DL (ref 3.5–5.2)
ALBUMIN/GLOB SERPL: 1.4 G/DL
ALP SERPL-CCNC: 112 U/L (ref 39–117)
ALT SERPL W P-5'-P-CCNC: 73 U/L (ref 1–33)
AMPHET+METHAMPHET UR QL: NEGATIVE
ANION GAP SERPL CALCULATED.3IONS-SCNC: 23 MMOL/L (ref 5–15)
ANION GAP SERPL CALCULATED.3IONS-SCNC: 23.3 MMOL/L (ref 5–15)
AST SERPL-CCNC: 151 U/L (ref 1–32)
BACTERIA UR QL AUTO: NORMAL /HPF
BARBITURATES UR QL SCN: NEGATIVE
BASOPHILS # BLD AUTO: 0.07 10*3/MM3 (ref 0–0.2)
BASOPHILS # BLD AUTO: 0.07 10*3/MM3 (ref 0–0.2)
BASOPHILS NFR BLD AUTO: 1.2 % (ref 0–1.5)
BASOPHILS NFR BLD AUTO: 1.4 % (ref 0–1.5)
BENZODIAZ UR QL SCN: NEGATIVE
BILIRUB SERPL-MCNC: 0.7 MG/DL (ref 0–1.2)
BILIRUB UR QL STRIP: NEGATIVE
BUN SERPL-MCNC: 10 MG/DL (ref 6–20)
BUN SERPL-MCNC: 8 MG/DL (ref 6–20)
BUN/CREAT SERPL: 15.4 (ref 7–25)
BUN/CREAT SERPL: 19 (ref 7–25)
CALCIUM SPEC-SCNC: 9.1 MG/DL (ref 8.6–10.5)
CALCIUM SPEC-SCNC: 9.4 MG/DL (ref 8.6–10.5)
CANNABINOIDS SERPL QL: NEGATIVE
CHLORIDE SERPL-SCNC: 94 MMOL/L (ref 98–107)
CHLORIDE SERPL-SCNC: 97 MMOL/L (ref 98–107)
CLARITY UR: CLEAR
CO2 SERPL-SCNC: 17 MMOL/L (ref 22–29)
CO2 SERPL-SCNC: 18.7 MMOL/L (ref 22–29)
COCAINE UR QL: NEGATIVE
COLOR UR: YELLOW
CREAT SERPL-MCNC: 0.42 MG/DL (ref 0.57–1)
CREAT SERPL-MCNC: 0.65 MG/DL (ref 0.57–1)
D-LACTATE SERPL-SCNC: 1.3 MMOL/L (ref 0.5–2)
D-LACTATE SERPL-SCNC: 2.4 MMOL/L (ref 0.5–2)
D-LACTATE SERPL-SCNC: 3.3 MMOL/L (ref 0.5–2)
DEPRECATED RDW RBC AUTO: 47.7 FL (ref 37–54)
DEPRECATED RDW RBC AUTO: 48.8 FL (ref 37–54)
EGFRCR SERPLBLD CKD-EPI 2021: 120.1 ML/MIN/1.73
EGFRCR SERPLBLD CKD-EPI 2021: 133.5 ML/MIN/1.73
EOSINOPHIL # BLD AUTO: 0 10*3/MM3 (ref 0–0.4)
EOSINOPHIL # BLD AUTO: 0.01 10*3/MM3 (ref 0–0.4)
EOSINOPHIL NFR BLD AUTO: 0 % (ref 0.3–6.2)
EOSINOPHIL NFR BLD AUTO: 0.2 % (ref 0.3–6.2)
ERYTHROCYTE [DISTWIDTH] IN BLOOD BY AUTOMATED COUNT: 15.9 % (ref 12.3–15.4)
ERYTHROCYTE [DISTWIDTH] IN BLOOD BY AUTOMATED COUNT: 16.2 % (ref 12.3–15.4)
ETHANOL BLD-MCNC: 80 MG/DL (ref 0–10)
ETHANOL UR QL: 0.08 %
FENTANYL UR-MCNC: NEGATIVE NG/ML
GLOBULIN UR ELPH-MCNC: 3.8 GM/DL
GLUCOSE SERPL-MCNC: 107 MG/DL (ref 65–99)
GLUCOSE SERPL-MCNC: 94 MG/DL (ref 65–99)
GLUCOSE UR STRIP-MCNC: NEGATIVE MG/DL
HCG SERPL QL: NEGATIVE
HCT VFR BLD AUTO: 33.7 % (ref 34–46.6)
HCT VFR BLD AUTO: 38.2 % (ref 34–46.6)
HGB BLD-MCNC: 10.6 G/DL (ref 12–15.9)
HGB BLD-MCNC: 12.3 G/DL (ref 12–15.9)
HGB UR QL STRIP.AUTO: ABNORMAL
HOLD SPECIMEN: NORMAL
HYALINE CASTS UR QL AUTO: NORMAL /LPF
IMM GRANULOCYTES # BLD AUTO: 0.02 10*3/MM3 (ref 0–0.05)
IMM GRANULOCYTES # BLD AUTO: 0.03 10*3/MM3 (ref 0–0.05)
IMM GRANULOCYTES NFR BLD AUTO: 0.4 % (ref 0–0.5)
IMM GRANULOCYTES NFR BLD AUTO: 0.5 % (ref 0–0.5)
INR PPP: 1.13 (ref 0.9–1.1)
KETONES UR QL STRIP: ABNORMAL
LEUKOCYTE ESTERASE UR QL STRIP.AUTO: NEGATIVE
LIPASE SERPL-CCNC: 167 U/L (ref 13–60)
LYMPHOCYTES # BLD AUTO: 2.3 10*3/MM3 (ref 0.7–3.1)
LYMPHOCYTES # BLD AUTO: 2.43 10*3/MM3 (ref 0.7–3.1)
LYMPHOCYTES NFR BLD AUTO: 41.4 % (ref 19.6–45.3)
LYMPHOCYTES NFR BLD AUTO: 46.7 % (ref 19.6–45.3)
MCH RBC QN AUTO: 26.6 PG (ref 26.6–33)
MCH RBC QN AUTO: 26.7 PG (ref 26.6–33)
MCHC RBC AUTO-ENTMCNC: 31.5 G/DL (ref 31.5–35.7)
MCHC RBC AUTO-ENTMCNC: 32.2 G/DL (ref 31.5–35.7)
MCV RBC AUTO: 83 FL (ref 79–97)
MCV RBC AUTO: 84.7 FL (ref 79–97)
METHADONE UR QL SCN: NEGATIVE
MONOCYTES # BLD AUTO: 0.35 10*3/MM3 (ref 0.1–0.9)
MONOCYTES # BLD AUTO: 0.65 10*3/MM3 (ref 0.1–0.9)
MONOCYTES NFR BLD AUTO: 11.1 % (ref 5–12)
MONOCYTES NFR BLD AUTO: 7.1 % (ref 5–12)
NEUTROPHILS NFR BLD AUTO: 2.19 10*3/MM3 (ref 1.7–7)
NEUTROPHILS NFR BLD AUTO: 2.68 10*3/MM3 (ref 1.7–7)
NEUTROPHILS NFR BLD AUTO: 44.4 % (ref 42.7–76)
NEUTROPHILS NFR BLD AUTO: 45.6 % (ref 42.7–76)
NITRITE UR QL STRIP: NEGATIVE
NRBC BLD AUTO-RTO: 0 /100 WBC (ref 0–0.2)
NRBC BLD AUTO-RTO: 0 /100 WBC (ref 0–0.2)
OPIATES UR QL: NEGATIVE
OXYCODONE UR QL SCN: NEGATIVE
PH UR STRIP.AUTO: 6 [PH] (ref 5–8)
PLATELET # BLD AUTO: 234 10*3/MM3 (ref 140–450)
PLATELET # BLD AUTO: 270 10*3/MM3 (ref 140–450)
PMV BLD AUTO: 10.3 FL (ref 6–12)
PMV BLD AUTO: 9.7 FL (ref 6–12)
POTASSIUM SERPL-SCNC: 3.9 MMOL/L (ref 3.5–5.2)
POTASSIUM SERPL-SCNC: 4 MMOL/L (ref 3.5–5.2)
PROT SERPL-MCNC: 9 G/DL (ref 6–8.5)
PROT UR QL STRIP: ABNORMAL
PROTHROMBIN TIME: 14.7 SECONDS (ref 11.7–14.2)
RBC # BLD AUTO: 3.98 10*6/MM3 (ref 3.77–5.28)
RBC # BLD AUTO: 4.6 10*6/MM3 (ref 3.77–5.28)
RBC # UR STRIP: NORMAL /HPF
REF LAB TEST METHOD: NORMAL
SODIUM SERPL-SCNC: 136 MMOL/L (ref 136–145)
SODIUM SERPL-SCNC: 137 MMOL/L (ref 136–145)
SP GR UR STRIP: >=1.03 (ref 1–1.03)
SQUAMOUS #/AREA URNS HPF: NORMAL /HPF
UROBILINOGEN UR QL STRIP: ABNORMAL
WBC # UR STRIP: NORMAL /HPF
WBC NRBC COR # BLD: 4.93 10*3/MM3 (ref 3.4–10.8)
WBC NRBC COR # BLD: 5.87 10*3/MM3 (ref 3.4–10.8)
WHOLE BLOOD HOLD COAG: NORMAL
WHOLE BLOOD HOLD SPECIMEN: NORMAL

## 2023-08-07 PROCEDURE — 25010000002 LORAZEPAM PER 2 MG: Performed by: NURSE PRACTITIONER

## 2023-08-07 PROCEDURE — 80307 DRUG TEST PRSMV CHEM ANLYZR: CPT | Performed by: PHYSICIAN ASSISTANT

## 2023-08-07 PROCEDURE — 84703 CHORIONIC GONADOTROPIN ASSAY: CPT

## 2023-08-07 PROCEDURE — G0378 HOSPITAL OBSERVATION PER HR: HCPCS

## 2023-08-07 PROCEDURE — 99285 EMERGENCY DEPT VISIT HI MDM: CPT

## 2023-08-07 PROCEDURE — 85025 COMPLETE CBC W/AUTO DIFF WBC: CPT | Performed by: NURSE PRACTITIONER

## 2023-08-07 PROCEDURE — 85610 PROTHROMBIN TIME: CPT | Performed by: NURSE PRACTITIONER

## 2023-08-07 PROCEDURE — 81001 URINALYSIS AUTO W/SCOPE: CPT

## 2023-08-07 PROCEDURE — 83690 ASSAY OF LIPASE: CPT

## 2023-08-07 PROCEDURE — 74177 CT ABD & PELVIS W/CONTRAST: CPT

## 2023-08-07 PROCEDURE — 25010000002 ONDANSETRON PER 1 MG: Performed by: EMERGENCY MEDICINE

## 2023-08-07 PROCEDURE — 36415 COLL VENOUS BLD VENIPUNCTURE: CPT | Performed by: NURSE PRACTITIONER

## 2023-08-07 PROCEDURE — 80053 COMPREHEN METABOLIC PANEL: CPT

## 2023-08-07 PROCEDURE — 25010000002 ENOXAPARIN PER 10 MG: Performed by: STUDENT IN AN ORGANIZED HEALTH CARE EDUCATION/TRAINING PROGRAM

## 2023-08-07 PROCEDURE — 83605 ASSAY OF LACTIC ACID: CPT | Performed by: NURSE PRACTITIONER

## 2023-08-07 PROCEDURE — 25010000002 ONDANSETRON PER 1 MG: Performed by: STUDENT IN AN ORGANIZED HEALTH CARE EDUCATION/TRAINING PROGRAM

## 2023-08-07 PROCEDURE — 25010000002 ONDANSETRON PER 1 MG: Performed by: PHYSICIAN ASSISTANT

## 2023-08-07 PROCEDURE — 82077 ASSAY SPEC XCP UR&BREATH IA: CPT | Performed by: PHYSICIAN ASSISTANT

## 2023-08-07 PROCEDURE — 25010000002 HYDROMORPHONE PER 4 MG: Performed by: EMERGENCY MEDICINE

## 2023-08-07 PROCEDURE — 25510000001 IOPAMIDOL 61 % SOLUTION: Performed by: EMERGENCY MEDICINE

## 2023-08-07 PROCEDURE — 85025 COMPLETE CBC W/AUTO DIFF WBC: CPT

## 2023-08-07 PROCEDURE — 25010000002 THIAMINE HCL 200 MG/2ML SOLUTION: Performed by: NURSE PRACTITIONER

## 2023-08-07 PROCEDURE — 25010000002 HYDROMORPHONE PER 4 MG: Performed by: STUDENT IN AN ORGANIZED HEALTH CARE EDUCATION/TRAINING PROGRAM

## 2023-08-07 RX ORDER — LORAZEPAM 2 MG/ML
2 INJECTION INTRAMUSCULAR
Status: DISCONTINUED | OUTPATIENT
Start: 2023-08-07 | End: 2023-08-12 | Stop reason: HOSPADM

## 2023-08-07 RX ORDER — OXYCODONE HYDROCHLORIDE AND ACETAMINOPHEN 5; 325 MG/1; MG/1
1 TABLET ORAL EVERY 6 HOURS PRN
Status: DISCONTINUED | OUTPATIENT
Start: 2023-08-07 | End: 2023-08-12 | Stop reason: HOSPADM

## 2023-08-07 RX ORDER — SODIUM CHLORIDE 0.9 % (FLUSH) 0.9 %
10 SYRINGE (ML) INJECTION AS NEEDED
Status: DISCONTINUED | OUTPATIENT
Start: 2023-08-07 | End: 2023-08-12 | Stop reason: HOSPADM

## 2023-08-07 RX ORDER — AMOXICILLIN 250 MG
2 CAPSULE ORAL 2 TIMES DAILY
Status: DISCONTINUED | OUTPATIENT
Start: 2023-08-07 | End: 2023-08-12 | Stop reason: HOSPADM

## 2023-08-07 RX ORDER — SODIUM CHLORIDE 0.9 % (FLUSH) 0.9 %
10 SYRINGE (ML) INJECTION EVERY 12 HOURS SCHEDULED
Status: DISCONTINUED | OUTPATIENT
Start: 2023-08-07 | End: 2023-08-12 | Stop reason: HOSPADM

## 2023-08-07 RX ORDER — ONDANSETRON 2 MG/ML
4 INJECTION INTRAMUSCULAR; INTRAVENOUS ONCE
Status: COMPLETED | OUTPATIENT
Start: 2023-08-07 | End: 2023-08-07

## 2023-08-07 RX ORDER — HYDROXYZINE PAMOATE 25 MG/1
25 CAPSULE ORAL EVERY 8 HOURS PRN
Status: DISCONTINUED | OUTPATIENT
Start: 2023-08-07 | End: 2023-08-12 | Stop reason: HOSPADM

## 2023-08-07 RX ORDER — NITROGLYCERIN 0.4 MG/1
0.4 TABLET SUBLINGUAL
Status: DISCONTINUED | OUTPATIENT
Start: 2023-08-07 | End: 2023-08-12 | Stop reason: HOSPADM

## 2023-08-07 RX ORDER — DIPHENOXYLATE HYDROCHLORIDE AND ATROPINE SULFATE 2.5; .025 MG/1; MG/1
1 TABLET ORAL DAILY
Status: DISCONTINUED | OUTPATIENT
Start: 2023-08-07 | End: 2023-08-12 | Stop reason: HOSPADM

## 2023-08-07 RX ORDER — POLYETHYLENE GLYCOL 3350 17 G/17G
17 POWDER, FOR SOLUTION ORAL DAILY PRN
Status: DISCONTINUED | OUTPATIENT
Start: 2023-08-07 | End: 2023-08-12 | Stop reason: HOSPADM

## 2023-08-07 RX ORDER — ONDANSETRON 2 MG/ML
4 INJECTION INTRAMUSCULAR; INTRAVENOUS EVERY 4 HOURS PRN
Status: DISCONTINUED | OUTPATIENT
Start: 2023-08-07 | End: 2023-08-09

## 2023-08-07 RX ORDER — HYDROMORPHONE HYDROCHLORIDE 1 MG/ML
0.5 INJECTION, SOLUTION INTRAMUSCULAR; INTRAVENOUS; SUBCUTANEOUS ONCE
Status: COMPLETED | OUTPATIENT
Start: 2023-08-07 | End: 2023-08-07

## 2023-08-07 RX ORDER — FOLIC ACID 1 MG/1
1 TABLET ORAL DAILY
Status: DISCONTINUED | OUTPATIENT
Start: 2023-08-07 | End: 2023-08-12 | Stop reason: HOSPADM

## 2023-08-07 RX ORDER — HYDROMORPHONE HYDROCHLORIDE 1 MG/ML
0.5 INJECTION, SOLUTION INTRAMUSCULAR; INTRAVENOUS; SUBCUTANEOUS
Status: DISCONTINUED | OUTPATIENT
Start: 2023-08-07 | End: 2023-08-08

## 2023-08-07 RX ORDER — SODIUM CHLORIDE 9 MG/ML
40 INJECTION, SOLUTION INTRAVENOUS AS NEEDED
Status: DISCONTINUED | OUTPATIENT
Start: 2023-08-07 | End: 2023-08-12 | Stop reason: HOSPADM

## 2023-08-07 RX ORDER — ONDANSETRON 2 MG/ML
4 INJECTION INTRAMUSCULAR; INTRAVENOUS EVERY 6 HOURS PRN
Status: DISCONTINUED | OUTPATIENT
Start: 2023-08-07 | End: 2023-08-07

## 2023-08-07 RX ORDER — ACETAMINOPHEN 325 MG/1
650 TABLET ORAL EVERY 4 HOURS PRN
Status: DISCONTINUED | OUTPATIENT
Start: 2023-08-07 | End: 2023-08-12 | Stop reason: HOSPADM

## 2023-08-07 RX ORDER — LORAZEPAM 1 MG/1
1 TABLET ORAL
Status: DISCONTINUED | OUTPATIENT
Start: 2023-08-07 | End: 2023-08-12 | Stop reason: HOSPADM

## 2023-08-07 RX ORDER — SODIUM CHLORIDE 9 MG/ML
125 INJECTION, SOLUTION INTRAVENOUS CONTINUOUS
Status: DISCONTINUED | OUTPATIENT
Start: 2023-08-07 | End: 2023-08-12 | Stop reason: HOSPADM

## 2023-08-07 RX ORDER — LORAZEPAM 1 MG/1
2 TABLET ORAL
Status: DISCONTINUED | OUTPATIENT
Start: 2023-08-07 | End: 2023-08-12 | Stop reason: HOSPADM

## 2023-08-07 RX ORDER — BISACODYL 10 MG
10 SUPPOSITORY, RECTAL RECTAL DAILY PRN
Status: DISCONTINUED | OUTPATIENT
Start: 2023-08-07 | End: 2023-08-12 | Stop reason: HOSPADM

## 2023-08-07 RX ORDER — ACETAMINOPHEN 650 MG/1
650 SUPPOSITORY RECTAL EVERY 4 HOURS PRN
Status: DISCONTINUED | OUTPATIENT
Start: 2023-08-07 | End: 2023-08-12 | Stop reason: HOSPADM

## 2023-08-07 RX ORDER — ACETAMINOPHEN 160 MG/5ML
650 SOLUTION ORAL EVERY 4 HOURS PRN
Status: DISCONTINUED | OUTPATIENT
Start: 2023-08-07 | End: 2023-08-12 | Stop reason: HOSPADM

## 2023-08-07 RX ORDER — ENOXAPARIN SODIUM 100 MG/ML
40 INJECTION SUBCUTANEOUS EVERY 24 HOURS
Status: DISCONTINUED | OUTPATIENT
Start: 2023-08-07 | End: 2023-08-12 | Stop reason: HOSPADM

## 2023-08-07 RX ORDER — LORAZEPAM 2 MG/ML
1 INJECTION INTRAMUSCULAR
Status: DISCONTINUED | OUTPATIENT
Start: 2023-08-07 | End: 2023-08-12 | Stop reason: HOSPADM

## 2023-08-07 RX ORDER — THIAMINE HYDROCHLORIDE 100 MG/ML
200 INJECTION, SOLUTION INTRAMUSCULAR; INTRAVENOUS EVERY 8 HOURS SCHEDULED
Status: COMPLETED | OUTPATIENT
Start: 2023-08-07 | End: 2023-08-12

## 2023-08-07 RX ORDER — BISACODYL 5 MG/1
5 TABLET, DELAYED RELEASE ORAL DAILY PRN
Status: DISCONTINUED | OUTPATIENT
Start: 2023-08-07 | End: 2023-08-12 | Stop reason: HOSPADM

## 2023-08-07 RX ADMIN — OXYCODONE HYDROCHLORIDE AND ACETAMINOPHEN 1 TABLET: 5; 325 TABLET ORAL at 22:20

## 2023-08-07 RX ADMIN — HYDROMORPHONE HYDROCHLORIDE 0.5 MG: 1 INJECTION, SOLUTION INTRAMUSCULAR; INTRAVENOUS; SUBCUTANEOUS at 12:26

## 2023-08-07 RX ADMIN — THIAMINE HYDROCHLORIDE 200 MG: 100 INJECTION, SOLUTION INTRAMUSCULAR; INTRAVENOUS at 10:46

## 2023-08-07 RX ADMIN — SODIUM CHLORIDE 125 ML/HR: 9 INJECTION, SOLUTION INTRAVENOUS at 22:20

## 2023-08-07 RX ADMIN — LORAZEPAM 2 MG: 2 INJECTION INTRAMUSCULAR; INTRAVENOUS at 17:01

## 2023-08-07 RX ADMIN — HYDROMORPHONE HYDROCHLORIDE 0.5 MG: 1 INJECTION, SOLUTION INTRAMUSCULAR; INTRAVENOUS; SUBCUTANEOUS at 16:51

## 2023-08-07 RX ADMIN — ONDANSETRON 4 MG: 2 INJECTION INTRAMUSCULAR; INTRAVENOUS at 15:09

## 2023-08-07 RX ADMIN — ONDANSETRON 4 MG: 2 INJECTION INTRAMUSCULAR; INTRAVENOUS at 09:24

## 2023-08-07 RX ADMIN — ONDANSETRON 4 MG: 2 INJECTION INTRAMUSCULAR; INTRAVENOUS at 04:54

## 2023-08-07 RX ADMIN — HYDROMORPHONE HYDROCHLORIDE 0.5 MG: 1 INJECTION, SOLUTION INTRAMUSCULAR; INTRAVENOUS; SUBCUTANEOUS at 07:20

## 2023-08-07 RX ADMIN — SODIUM CHLORIDE 1000 ML: 9 INJECTION, SOLUTION INTRAVENOUS at 03:21

## 2023-08-07 RX ADMIN — SODIUM CHLORIDE 125 ML/HR: 9 INJECTION, SOLUTION INTRAVENOUS at 07:00

## 2023-08-07 RX ADMIN — HYDROMORPHONE HYDROCHLORIDE 0.5 MG: 1 INJECTION, SOLUTION INTRAMUSCULAR; INTRAVENOUS; SUBCUTANEOUS at 14:31

## 2023-08-07 RX ADMIN — HYDROMORPHONE HYDROCHLORIDE 0.5 MG: 1 INJECTION, SOLUTION INTRAMUSCULAR; INTRAVENOUS; SUBCUTANEOUS at 03:21

## 2023-08-07 RX ADMIN — THIAMINE HYDROCHLORIDE 200 MG: 100 INJECTION, SOLUTION INTRAMUSCULAR; INTRAVENOUS at 18:43

## 2023-08-07 RX ADMIN — FOLIC ACID 1 MG: 1 TABLET ORAL at 10:45

## 2023-08-07 RX ADMIN — HYDROMORPHONE HYDROCHLORIDE 0.5 MG: 1 INJECTION, SOLUTION INTRAMUSCULAR; INTRAVENOUS; SUBCUTANEOUS at 21:16

## 2023-08-07 RX ADMIN — HYDROMORPHONE HYDROCHLORIDE 0.5 MG: 1 INJECTION, SOLUTION INTRAMUSCULAR; INTRAVENOUS; SUBCUTANEOUS at 09:24

## 2023-08-07 RX ADMIN — IOPAMIDOL 85 ML: 612 INJECTION, SOLUTION INTRAVENOUS at 03:45

## 2023-08-07 RX ADMIN — SODIUM CHLORIDE 125 ML/HR: 9 INJECTION, SOLUTION INTRAVENOUS at 15:10

## 2023-08-07 RX ADMIN — ENOXAPARIN SODIUM 40 MG: 100 INJECTION SUBCUTANEOUS at 15:05

## 2023-08-07 RX ADMIN — ONDANSETRON 4 MG: 2 INJECTION INTRAMUSCULAR; INTRAVENOUS at 21:16

## 2023-08-07 RX ADMIN — ONDANSETRON 4 MG: 2 INJECTION INTRAMUSCULAR; INTRAVENOUS at 03:21

## 2023-08-07 RX ADMIN — HYDROXYZINE PAMOATE 25 MG: 25 CAPSULE ORAL at 10:45

## 2023-08-07 NOTE — PROGRESS NOTES
"[facility] Clinical Pharmacy Services: Enoxaparin Consult    Lisa Gibson has a pharmacy consult to dose prophylactic enoxaparin per Dr. Salinas's request.     Indication: VTE Prophylaxis  Home Anticoagulation: none     Relevant clinical data and objective history reviewed:  33 y.o. female 175.3 cm (69\") 57.6 kg (126 lb 15.8 oz)   Body mass index is 18.75 kg/mý.   Results from last 7 days   Lab Units 08/07/23  1218   PLATELETS 10*3/mm3 234     Estimated Creatinine Clearance: 174.9 mL/min (A) (by C-G formula based on SCr of 0.42 mg/dL (L)).    Assessment/Plan    Will start patient on 40mg subcutaneous every 24 hours, adjusted for renal function. Consult order will be discontinued but pharmacy will continue to follow.     Dina Booth, PharmD  Clinical Pharmacist    "

## 2023-08-07 NOTE — PLAN OF CARE
Goal Outcome Evaluation:  Plan of Care Reviewed With: patient        Progress: no change  Outcome Evaluation: Pt admitted through the ED w/ abdominal pain, nausea, and elevated lipase, A/O, on room air, IVF infusing, clear liquid diet, dilaudid given for pain, seizure precautions

## 2023-08-07 NOTE — PLAN OF CARE
"Goal Outcome Evaluation:  Plan of Care Reviewed With: patient        Progress: no change  Outcome Evaluation: IV dilaudid x2 for abd pain of 8.  zofran x1 for nausea.  hydroxyzine x1 for c/o \"anxiety\".   pt taking sips of clears PO.  had emesis after meal.  may advance diet as tolerated but pt does not desire at this time. last BM 08/06 per pt report.  soft spoken, calm, coopertative.  CIWA score 7 and 5 this shift.  upper bed rails padded for sz precautions.  labs not drawn this am.  lab called at 1000 and 1150 to have drawn.  lactate level 3.3.  ethanol level 80. Dr Salinas notified and orders for telemetry monitoring received.         "

## 2023-08-07 NOTE — ED PROVIDER NOTES
EMERGENCY DEPARTMENT ENCOUNTER    Room Number:  04/04  Date of encounter:  8/7/2023  PCP: Provider, No Known  Patient Care Team:  Provider, No Known as PCP - General   Independent Historians: Patient    HPI:  Chief Complaint: Abdominal pain  A complete HPI/ROS/PMH/PSH/SH/FH are unobtainable due to: N/A    Chronic or social conditions impacting patient care (social determinants of health): None    Context: Lisa Gibson is a 33 y.o. female with past medical history of alcohol induced pancreatitis who arrives to the ED with complaint of abdominal pain.  Patient states that her symptoms started on Friday morning, has been coming and going but getting progressively worse.  Is been associate with nausea, vomiting, diarrhea, chills and sweats.  Denies any measured fever, no constipation, has had some burning and dark color to her urine but denies any blood.  Patient states her symptoms feel similar to previous episodes of pancreatitis.  Patient was also concerned about sepsis due to recent admission for that.    Review of prior external notes (non-ED): Last hospital admission on 5/2/2023 for severe sepsis related to E. coli bacteremia and pyelonephritis, alcohol abuse and withdrawal, polysubstance abuse and metabolic encephalopathy.    Review of prior external test results outside of this encounter: Most recent imaging of the abdomen from 4/29/2023 that showed a 10 mm hypodensity in the setting of the pancreatic head that was indeterminant.    PAST MEDICAL HISTORY  Active Ambulatory Problems     Diagnosis Date Noted    Alcohol-induced acute pancreatitis without infection or necrosis 09/21/2021    Transaminitis 09/22/2021    Epigastric pain 09/22/2021    Hepatic steatosis 09/24/2021    Gallbladder sludge 09/24/2021    Severe sepsis 04/27/2023    E coli bacteremia 04/30/2023    Alcohol withdrawal 04/30/2023    Alcohol abuse 04/30/2023    Acute pyelonephritis 04/30/2023    Essential hypertension 04/30/2023    Metabolic  encephalopathy 04/30/2023    Polysubstance abuse 04/30/2023    Hypokalemia 04/30/2023     Resolved Ambulatory Problems     Diagnosis Date Noted    No Resolved Ambulatory Problems     No Additional Past Medical History       The patient has a COVID HM Topic on their chart, and they are fully vaccinated.    PAST SURGICAL HISTORY  No past surgical history on file.      FAMILY HISTORY  No family history on file.      SOCIAL HISTORY  Social History     Socioeconomic History    Marital status:    Tobacco Use    Smoking status: Never    Smokeless tobacco: Never   Vaping Use    Vaping Use: Never used   Substance and Sexual Activity    Drug use: Never         ALLERGIES  Patient has no known allergies.        REVIEW OF SYSTEMS  Review of Systems     All systems reviewed and negative except for those discussed in HPI.       PHYSICAL EXAM    I have reviewed the triage vital signs and nursing notes.    ED Triage Vitals [08/07/23 0121]   Temp Heart Rate Resp BP SpO2   98.2 øF (36.8 øC) 115 16 126/85 98 %      Temp src Heart Rate Source Patient Position BP Location FiO2 (%)   Tympanic -- -- -- --       Physical Exam    GENERAL: alert and oriented x4, ill-appearing and mildly distressed  HENT: normocephalic, atraumatic, dry mucous membranes  EYES: no scleral icterus, PERRL, EOMI  CV: regular rhythm, tachycardic  RESPIRATORY: normal effort, CTAB  ABDOMEN: diffusely tender with guarding, no rebound, normal bowel sounds  MUSCULOSKELETAL: no deformity  NEURO: alert, moves all extremities, no focal neuro deficits, follows commands  SKIN: warm, dry, no rash   Psych: Appropriate mood and affect      Nursing notes and vital signs reviewed      LAB RESULTS  Recent Results (from the past 24 hour(s))   Comprehensive Metabolic Panel    Collection Time: 08/07/23  1:29 AM    Specimen: Arm, Left; Blood   Result Value Ref Range    Glucose 107 (H) 65 - 99 mg/dL    BUN 10 6 - 20 mg/dL    Creatinine 0.65 0.57 - 1.00 mg/dL    Sodium 136 136 -  145 mmol/L    Potassium 3.9 3.5 - 5.2 mmol/L    Chloride 94 (L) 98 - 107 mmol/L    CO2 18.7 (L) 22.0 - 29.0 mmol/L    Calcium 9.4 8.6 - 10.5 mg/dL    Total Protein 9.0 (H) 6.0 - 8.5 g/dL    Albumin 5.2 3.5 - 5.2 g/dL    ALT (SGPT) 73 (H) 1 - 33 U/L    AST (SGOT) 151 (H) 1 - 32 U/L    Alkaline Phosphatase 112 39 - 117 U/L    Total Bilirubin 0.7 0.0 - 1.2 mg/dL    Globulin 3.8 gm/dL    A/G Ratio 1.4 g/dL    BUN/Creatinine Ratio 15.4 7.0 - 25.0    Anion Gap 23.3 (H) 5.0 - 15.0 mmol/L    eGFR 120.1 >60.0 mL/min/1.73   Lipase    Collection Time: 08/07/23  1:29 AM    Specimen: Arm, Left; Blood   Result Value Ref Range    Lipase 167 (H) 13 - 60 U/L   hCG, Serum, Qualitative    Collection Time: 08/07/23  1:29 AM    Specimen: Arm, Left; Blood   Result Value Ref Range    HCG Qualitative Negative Negative   Green Top (Gel)    Collection Time: 08/07/23  1:29 AM   Result Value Ref Range    Extra Tube Hold for add-ons.    Lavender Top    Collection Time: 08/07/23  1:29 AM   Result Value Ref Range    Extra Tube hold for add-on    Light Blue Top    Collection Time: 08/07/23  1:29 AM   Result Value Ref Range    Extra Tube Hold for add-ons.    CBC Auto Differential    Collection Time: 08/07/23  1:29 AM    Specimen: Arm, Left; Blood   Result Value Ref Range    WBC 4.93 3.40 - 10.80 10*3/mm3    RBC 4.60 3.77 - 5.28 10*6/mm3    Hemoglobin 12.3 12.0 - 15.9 g/dL    Hematocrit 38.2 34.0 - 46.6 %    MCV 83.0 79.0 - 97.0 fL    MCH 26.7 26.6 - 33.0 pg    MCHC 32.2 31.5 - 35.7 g/dL    RDW 16.2 (H) 12.3 - 15.4 %    RDW-SD 47.7 37.0 - 54.0 fl    MPV 9.7 6.0 - 12.0 fL    Platelets 270 140 - 450 10*3/mm3    Neutrophil % 44.4 42.7 - 76.0 %    Lymphocyte % 46.7 (H) 19.6 - 45.3 %    Monocyte % 7.1 5.0 - 12.0 %    Eosinophil % 0.0 (L) 0.3 - 6.2 %    Basophil % 1.4 0.0 - 1.5 %    Immature Grans % 0.4 0.0 - 0.5 %    Neutrophils, Absolute 2.19 1.70 - 7.00 10*3/mm3    Lymphocytes, Absolute 2.30 0.70 - 3.10 10*3/mm3    Monocytes, Absolute 0.35 0.10 - 0.90  10*3/mm3    Eosinophils, Absolute 0.00 0.00 - 0.40 10*3/mm3    Basophils, Absolute 0.07 0.00 - 0.20 10*3/mm3    Immature Grans, Absolute 0.02 0.00 - 0.05 10*3/mm3    nRBC 0.0 0.0 - 0.2 /100 WBC   Urinalysis With Microscopic If Indicated (No Culture) - Urine, Clean Catch    Collection Time: 08/07/23  4:39 AM    Specimen: Urine, Clean Catch   Result Value Ref Range    Color, UA Yellow Yellow, Straw    Appearance, UA Clear Clear    pH, UA 6.0 5.0 - 8.0    Specific Gravity, UA >=1.030 1.005 - 1.030    Glucose, UA Negative Negative    Ketones, UA 80 mg/dL (3+) (A) Negative    Bilirubin, UA Negative Negative    Blood, UA Small (1+) (A) Negative    Protein, UA >=300 mg/dL (3+) (A) Negative    Leuk Esterase, UA Negative Negative    Nitrite, UA Negative Negative    Urobilinogen, UA 1.0 E.U./dL 0.2 - 1.0 E.U./dL   Urine Drug Screen - Urine, Clean Catch    Collection Time: 08/07/23  4:39 AM    Specimen: Urine, Clean Catch   Result Value Ref Range    Amphet/Methamphet, Screen Negative Negative    Barbiturates Screen, Urine Negative Negative    Benzodiazepine Screen, Urine Negative Negative    Cocaine Screen, Urine Negative Negative    Opiate Screen Negative Negative    THC, Screen, Urine Negative Negative    Methadone Screen, Urine Negative Negative    Oxycodone Screen, Urine Negative Negative    Fentanyl, Urine Negative Negative       Ordered the above labs and independently reviewed and interpreted the results by me.        RADIOLOGY  CT Abdomen Pelvis With Contrast    Result Date: 8/7/2023  CT OF THE ABDOMEN AND PELVIS WITH CONTRAST  HISTORY: Abdominal pain. HISTORY of pancreatitis.  COMPARISON: 04/29/2023  TECHNIQUE: Axial CT imaging was obtained through the abdomen and pelvis. IV contrast was administered.  FINDINGS: Area of nodularity is again noted within the left lower lobe. It measures approximately 1.3 cm, previously 1.7 cm. Additional subpleural nodule has resolved. Marked diffuse hepatic steatosis is again noted.  The stomach, adrenal glands, spleen, and gallbladder appear unremarkable. The patient is noted to have inflammatory stranding surrounding the pancreas, as well some pancreatic edema, suggesting pancreatitis. Low-attenuation lesion is again noted within the neck of the pancreas. This measures up to 1.2 cm. This is slightly larger than on prior exam, when it measured 1 cm. No peripancreatic collections are seen. Main portal vein and splenic vein are patent. There is no evidence of gastric outlet obstruction. There is no biliary dilatation. The liver is enlarged, measuring up to 18.6 cm in craniocaudal dimensions. The kidneys enhance symmetrically. There is no hydronephrosis. There is a simple appearing right renal cyst. No additional follow-up is necessary. No distal ureteral or bladder stones are seen. Uterus appears normal. There is no bowel obstruction. Appendix is normal. No acute osseous abnormalities are seen.       1. The patient has peripancreatic stranding and edema, concerning for acute pancreatitis. Patient is again noted to have a low-attenuation lesion within the neck of the pancreas. This appears slightly larger than on prior exam. It is favored to represent a small pseudocyst. 2. Area of nodularity within the left lower lobe has decreased in size, while another has resolved. This would suggest a benign etiology.  Radiation dose reduction techniques were utilized, including automated exposure control and exposure modulation based on body size.  This report was finalized on 8/7/2023 4:31 AM by Dr. Diamond Goodman M.D.       I ordered the above noted radiological studies.  These were independently interpreted and reviewed by me.  See dictation for official radiology interpretation.      PROCEDURES    Procedures      MEDICATIONS GIVEN IN ER    Medications   sodium chloride 0.9 % flush 10 mL (has no administration in time range)   sodium chloride 0.9 % flush 10 mL (has no administration in time range)    sodium chloride 0.9 % flush 10 mL (has no administration in time range)   sodium chloride 0.9 % infusion 40 mL (has no administration in time range)   sennosides-docusate (PERICOLACE) 8.6-50 MG per tablet 2 tablet (has no administration in time range)     And   polyethylene glycol (MIRALAX) packet 17 g (has no administration in time range)     And   bisacodyl (DULCOLAX) EC tablet 5 mg (has no administration in time range)     And   bisacodyl (DULCOLAX) suppository 10 mg (has no administration in time range)   Magnesium Standard Dose Replacement - Follow Nurse / BPA Driven Protocol (has no administration in time range)   sodium chloride 0.9 % infusion (has no administration in time range)   acetaminophen (TYLENOL) tablet 650 mg (has no administration in time range)     Or   acetaminophen (TYLENOL) 160 MG/5ML solution 650 mg (has no administration in time range)     Or   acetaminophen (TYLENOL) suppository 650 mg (has no administration in time range)   thiamine (B-1) injection 200 mg (has no administration in time range)     Followed by   thiamine (VITAMIN B-1) tablet 100 mg (has no administration in time range)   folic acid (FOLVITE) tablet 1 mg (has no administration in time range)   LORazepam (ATIVAN) tablet 1 mg (has no administration in time range)     Or   LORazepam (ATIVAN) injection 1 mg (has no administration in time range)     Or   LORazepam (ATIVAN) tablet 2 mg (has no administration in time range)     Or   LORazepam (ATIVAN) injection 2 mg (has no administration in time range)     Or   LORazepam (ATIVAN) injection 2 mg (has no administration in time range)     Or   LORazepam (ATIVAN) injection 2 mg (has no administration in time range)   sodium chloride 0.9 % bolus 1,000 mL (0 mL Intravenous Stopped 8/7/23 6905)   ondansetron (ZOFRAN) injection 4 mg (4 mg Intravenous Given 8/7/23 0321)   HYDROmorphone (DILAUDID) injection 0.5 mg (0.5 mg Intravenous Given 8/7/23 0321)   iopamidol (ISOVUE-300) 61 %  injection 100 mL (85 mL Intravenous Given by Other 8/7/23 3244)   ondansetron (ZOFRAN) injection 4 mg (4 mg Intravenous Given 8/7/23 1940)         PROGRESS, DATA ANALYSIS, CONSULTS, AND MEDICAL DECISION MAKING    All labs have been independently reviewed by me.  All radiology studies have been reviewed by me and discussed with radiologist dictating the report.   EKG's independently viewed and interpreted by me.  Discussion below represents my analysis of pertinent findings related to patient's condition, differential diagnosis, treatment plan and final disposition.    DDx:  Includes, but is not limited to pancreatitis, gastritis, esophagitis, cholelithiasis, cholecystitis, ileus, gastroenteritis, bowel obstruction, pyelonephritis, UTI, kidney stone    After my initial assessment I am concerned about acute pancreatitis and patient may need hospitalization due to pain control.    ED Course as of 08/07/23 0522   Mon Aug 07, 2023   0313 HCG Qualitative: Negative [[place]]   0321 WBC: 4.93 [[place]]   0321 Hemoglobin: 12.3 [[place]]   0321 Hematocrit: 38.2 [[place]]   0321 Platelets: 270 [[place]]   0321 Glucose(!): 107 [[place]]   0321 BUN: 10 [[place]]   0321 Creatinine: 0.65 [[place]]   0321 Sodium: 136 [[place]]   0321 Potassium: 3.9 [[place]]   0321 Chloride(!): 94 [[place]]   0321 CO2(!): 18.7 [[place]]   0321 ALT (SGPT)(!): 73 [[place]]   0321 AST (SGOT)(!): 151 [[place]]   0321 Lipase(!): 167 [[place]]   0351 CT images independently viewed by me, my interpretation is stranding seen around the pancreas and a 10 mm hypodensity [[place]]   0447 Patient rechecked, pain improved, discussed results of CT scan showing acute appendicitis and recommendation for admission.  Patient expresses understanding and agrees with plan. [[place]]   0503 Patient history, ER presentation and evaluation discussed with ROD Ochoa, will place in observation to a [facility] bed per Dr. Bustillo. [[place]]   0522 Ketones, UA(!): 80 mg/dL (3+) [[place]]      ED Course User Index  [[place]] Alexander Curry PA       MDM: Patient's exam is  consistent with acute appendicitis and patient will be admitted for IV fluids, antiemetics, and pain control.    PPE:  The patient wore a mask and I wore a mask and all appropriate PPE throughout the entire patient encounter.      AS OF 05:22 EDT VITALS:    BP - 140/88  HR - 91  TEMP - 98.2 øF (36.8 øC) (Tympanic)  O2 SATS - 95%      DIAGNOSIS  Final diagnoses:   Acute pancreatitis, unspecified complication status, unspecified pancreatitis type   History of alcohol abuse         DISPOSITION  ADMISSION    Discussed treatment plan and reason for admission with pt/family and admitting physician.  Pt/family voiced understanding of the plan for admission for further testing/treatment as needed.       Note Disclaimer: At [facility], we believe that sharing information builds trust and better relationships. You are receiving this note because you recently visited [facility]. It is possible you will see health information before a provider has talked with you about it. This kind of information can be easy to misunderstand. To help you fully understand what it means for your health, we urge you to discuss this note with your provider.       Alexander Curry PA  08/07/23 0508       Alexander Curry PA  08/07/23 0083

## 2023-08-07 NOTE — ED PROVIDER NOTES
MD ATTESTATION NOTE    The ALL and I have discussed this patient's history, physical exam, and treatment plan.  I have reviewed the documentation and personally had a face to face interaction with the patient. I affirm the documentation and agree with the treatment and plan.  The attached note describes my personal findings.      I provided a substantive portion of the care of the patient.  I personally performed the physical exam in its entirety, and below are my findings.       Brief HPI: This patient is a 33-year-old female with a previous history of acute pancreatitis presenting to the emergency room today with generalized abdominal discomfort that has been present for the past several days.  She does report some associated nausea/vomiting/diarrhea but denies fever/chills, back pain, chest pain, or shortness of breath.      PHYSICAL EXAM  ED Triage Vitals [08/07/23 0121]   Temp Heart Rate Resp BP SpO2   98.2 øF (36.8 øC) 115 16 126/85 98 %      Temp src Heart Rate Source Patient Position BP Location FiO2 (%)   Tympanic -- -- -- --         GENERAL: In mild distress due to discomfort, nontoxic in appearance  HENT: nares patent  EYES: no scleral icterus  CV: regular rhythm, normal rate, no M/R/G  RESPIRATORY: normal effort, lungs clear bilaterally  ABDOMEN: soft, mild generalized tenderness, no rebound or guarding  MUSCULOSKELETAL: no deformity, no edema  NEURO: alert, moves all extremities, follows commands  PSYCH:  calm, cooperative  SKIN: warm, dry    Vital signs and nursing notes reviewed.        Plan: We will treat the patient's discomfort and nausea as we obtain labs, urinalysis, as well as a CT scan of the abdomen and pelvis in the ED today.  We will monitor and reassess following.       Prince Moseley MD  08/07/23 0552

## 2023-08-07 NOTE — ED NOTES
"Nursing report ED to floor  Lisa Gibson  33 y.o.  female    HPI :   Chief Complaint   Patient presents with    Abdominal Pain       Admitting doctor:   Mandeep Bustillo MD    Admitting diagnosis:   The primary encounter diagnosis was Acute pancreatitis, unspecified complication status, unspecified pancreatitis type. A diagnosis of History of alcohol abuse was also pertinent to this visit.    Code status:   Current Code Status       Date Active Code Status Order ID Comments User Context       8/7/2023 0506 CPR (Attempt to Resuscitate) [id number]  Dina Blackburn, APRN ED        Question Answer    Code Status (Patient has no pulse and is not breathing) CPR (Attempt to Resuscitate)    Medical Interventions (Patient has pulse or is breathing) Full Support    Release to patient Routine Release                    Allergies:   Patient has no known allergies.    Isolation:   No active isolations    Intake and Output  No intake or output data in the 24 hours ending 08/07/23 0519    Weight:       08/07/23  0121   Weight: 54.4 kg (120 lb)       Most recent vitals:   Vitals:    08/07/23 0121 08/07/23 0439   BP: 126/85 140/88   Pulse: 115 91   Resp: 16    Temp: 98.2 øF (36.8 øC)    TempSrc: Tympanic    SpO2: 98% 95%   Weight: 54.4 kg (120 lb)    Height: 175.3 cm (69\")        Active LDAs/IV Access:   Lines, Drains & Airways       Active LDAs       Name Placement date Placement time Site Days    Peripheral IV 08/07/23 0310 Right Antecubital 08/07/23 0310  Antecubital  less than 1                    Labs (abnormal labs have a star):   Labs Reviewed   COMPREHENSIVE METABOLIC PANEL - Abnormal; Notable for the following components:       Result Value    Glucose 107 (*)     Chloride 94 (*)     CO2 18.7 (*)     Total Protein 9.0 (*)     ALT (SGPT) 73 (*)     AST (SGOT) 151 (*)     Anion Gap 23.3 (*)     All other components within normal limits    Narrative:     GFR Normal >60  Chronic Kidney Disease <60  Kidney Failure " <15     LIPASE - Abnormal; Notable for the following components:    Lipase 167 (*)     All other components within normal limits   URINALYSIS W/ MICROSCOPIC IF INDICATED (NO CULTURE) - Abnormal; Notable for the following components:    Ketones, UA 80 mg/dL (3+) (*)     Blood, UA Small (1+) (*)     Protein, UA >=300 mg/dL (3+) (*)     All other components within normal limits   CBC WITH AUTO DIFFERENTIAL - Abnormal; Notable for the following components:    RDW 16.2 (*)     Lymphocyte % 46.7 (*)     Eosinophil % 0.0 (*)     All other components within normal limits   HCG, SERUM, QUALITATIVE - Normal   URINE DRUG SCREEN - Normal    Narrative:     Negative Thresholds Per Drugs Screened:    Amphetamines                 500 ng/ml  Barbiturates                 200 ng/ml  Benzodiazepines              100 ng/ml  Cocaine                      300 ng/ml  Methadone                    300 ng/ml  Opiates                      300 ng/ml  Oxycodone                    100 ng/ml  THC                           50 ng/ml  Fentanyl                       5 ng/ml      The Normal Value for all drugs tested is negative. This report includes final unconfirmed screening results to be used for medical treatment purposes only. Unconfirmed results must not be used for non-medical purposes such as employment or legal testing. Clinical consideration should be applied to any drug of abuse test, particularly when unconfirmed results are used.           RAINBOW DRAW    Narrative:     The following orders were created for panel order [facility] Draw.  Procedure                               Abnormality         Status                     ---------                               -----------         ------                     Green Top (Gel)[[id number]]                                  Final result               Lavender Top[[id number]]                                     Final result               Gold Top - [facility][[id number]]                                                               Light Blue Top[[id number]]                                   Final result                 Please view results for these tests on the individual orders.   ETHANOL   URINALYSIS, MICROSCOPIC ONLY   BASIC METABOLIC PANEL   CBC WITH AUTO DIFFERENTIAL   LACTIC ACID, PLASMA   PROTIME-INR   CBC AND DIFFERENTIAL    Narrative:     The following orders were created for panel order CBC & Differential.  Procedure                               Abnormality         Status                     ---------                               -----------         ------                     CBC Auto Differential[[id number]]        Abnormal            Final result                 Please view results for these tests on the individual orders.   GREEN TOP   LAVENDER TOP   LIGHT BLUE TOP       EKG:   No orders to display       Meds given in ED:   Medications   sodium chloride 0.9 % flush 10 mL (has no administration in time range)   sodium chloride 0.9 % flush 10 mL (has no administration in time range)   sodium chloride 0.9 % flush 10 mL (has no administration in time range)   sodium chloride 0.9 % infusion 40 mL (has no administration in time range)   sennosides-docusate (PERICOLACE) 8.6-50 MG per tablet 2 tablet (has no administration in time range)     And   polyethylene glycol (MIRALAX) packet 17 g (has no administration in time range)     And   bisacodyl (DULCOLAX) EC tablet 5 mg (has no administration in time range)     And   bisacodyl (DULCOLAX) suppository 10 mg (has no administration in time range)   Magnesium Standard Dose Replacement - Follow Nurse / BPA Driven Protocol (has no administration in time range)   sodium chloride 0.9 % infusion (has no administration in time range)   acetaminophen (TYLENOL) tablet 650 mg (has no administration in time range)     Or   acetaminophen (TYLENOL) 160 MG/5ML solution 650 mg (has no administration in time range)     Or   acetaminophen (TYLENOL) suppository 650 mg (has no  administration in time range)   thiamine (B-1) injection 200 mg (has no administration in time range)     Followed by   thiamine (VITAMIN B-1) tablet 100 mg (has no administration in time range)   folic acid (FOLVITE) tablet 1 mg (has no administration in time range)   LORazepam (ATIVAN) tablet 1 mg (has no administration in time range)     Or   LORazepam (ATIVAN) injection 1 mg (has no administration in time range)     Or   LORazepam (ATIVAN) tablet 2 mg (has no administration in time range)     Or   LORazepam (ATIVAN) injection 2 mg (has no administration in time range)     Or   LORazepam (ATIVAN) injection 2 mg (has no administration in time range)     Or   LORazepam (ATIVAN) injection 2 mg (has no administration in time range)   sodium chloride 0.9 % bolus 1,000 mL (0 mL Intravenous Stopped 8/7/23 0455)   ondansetron (ZOFRAN) injection 4 mg (4 mg Intravenous Given 8/7/23 0321)   HYDROmorphone (DILAUDID) injection 0.5 mg (0.5 mg Intravenous Given 8/7/23 0321)   iopamidol (ISOVUE-300) 61 % injection 100 mL (85 mL Intravenous Given by Other 8/7/23 0345)   ondansetron (ZOFRAN) injection 4 mg (4 mg Intravenous Given 8/7/23 0454)       Imaging results:  CT Abdomen Pelvis With Contrast    Result Date: 8/7/2023   1. The patient has peripancreatic stranding and edema, concerning for acute pancreatitis. Patient is again noted to have a low-attenuation lesion within the neck of the pancreas. This appears slightly larger than on prior exam. It is favored to represent a small pseudocyst. 2. Area of nodularity within the left lower lobe has decreased in size, while another has resolved. This would suggest a benign etiology.  Radiation dose reduction techniques were utilized, including automated exposure control and exposure modulation based on body size.  This report was finalized on 8/7/2023 4:31 AM by Dr. Diamond Goodman M.D.       Ambulatory status:   - up /c standby assist    Social issues:   Social History      Socioeconomic History    Marital status:    Tobacco Use    Smoking status: Never    Smokeless tobacco: Never   Vaping Use    Vaping Use: Never used   Substance and Sexual Activity    Drug use: Never       NIH Stroke Scale:       Boris Paul RN  08/07/23 05:19 EDT

## 2023-08-07 NOTE — PLAN OF CARE
Goal Outcome Evaluation:   VSS on room air-2L with HR becoming more elevated; PRN meds given for nausea, pain, and anxiety; CIWA protocol in place; pt on IVFs; appetite poor; pt has emesis at times.

## 2023-08-07 NOTE — H&P
Patient Name:  Lisa Gibson  YOB: 1990  MRN:  [id number]  Admit Date:  8/7/2023  Patient Care Team:  Provider, No Known as PCP - General      Subjective   History Present Illness     Chief Complaint   Patient presents with    Abdominal Pain       Ms. Gibson is a 33 y.o. woman with alcohol dependence and alcoholic pancreatitis in the past who presents with progressively worsening abdominal pain with associated nausea, vomiting, and diarrhea for the past several days.  She tells me that she only drinks 1-2 glasses of wine most days. Her ETOH just came back this afternoon still elevated despite not having had anything to drink for several hours prior to presentation.    History of Present Illness  Review of Systems   Constitutional:  Negative for chills, fatigue and fever.   HENT:  Negative for postnasal drip and rhinorrhea.    Eyes: Negative.    Respiratory:  Negative for cough and shortness of breath.    Cardiovascular:  Negative for chest pain and palpitations.   Gastrointestinal:  Positive for abdominal pain, diarrhea, nausea and vomiting. Negative for constipation.   Endocrine: Negative.    Genitourinary:  Negative for dysuria, flank pain, frequency and urgency.   Musculoskeletal:  Negative for arthralgias and myalgias.   Skin:  Negative for rash and wound.   Allergic/Immunologic: Negative.    Neurological:  Positive for tremors. Negative for light-headedness and numbness.   Hematological: Negative.    Psychiatric/Behavioral:  Negative for confusion. The patient is nervous/anxious.       Personal History     History reviewed. No pertinent past medical history.  History reviewed. No pertinent surgical history.  History reviewed. No pertinent family history.  Social History     Tobacco Use    Smoking status: Never    Smokeless tobacco: Never   Vaping Use    Vaping Use: Never used   Substance Use Topics    Drug use: Never     No current facility-administered medications on file prior to  encounter.     Current Outpatient Medications on File Prior to Encounter   Medication Sig Dispense Refill    dicyclomine (BENTYL) 20 MG tablet Take 1 tablet by mouth Every 8 (Eight) Hours As Needed (abdominal pain). 20 tablet 0     No Known Allergies    Objective    Objective     Vital Signs  Temp:  [97.7 øF (36.5 øC)-98.2 øF (36.8 øC)] 97.7 øF (36.5 øC)  Heart Rate:  [] 87  Resp:  [16] 16  BP: (126-140)/(85-91) 137/91  SpO2:  [95 %-98 %] 97 %  on   ;   Device (Oxygen Therapy): room air  Body mass index is 18.75 kg/mý.    Physical Exam  Vitals and nursing note reviewed.   Constitutional:       General: She is not in acute distress.     Appearance: She is normal weight. She is not toxic-appearing.   HENT:      Head: Normocephalic and atraumatic.      Mouth/Throat:      Mouth: Mucous membranes are moist.      Pharynx: Oropharynx is clear. No oropharyngeal exudate.   Eyes:      Extraocular Movements: Extraocular movements intact.      Conjunctiva/sclera: Conjunctivae normal.      Pupils: Pupils are equal, round, and reactive to light.   Cardiovascular:      Rate and Rhythm: Normal rate and regular rhythm.      Heart sounds: Normal heart sounds.   Pulmonary:      Effort: Pulmonary effort is normal. No respiratory distress.      Breath sounds: Normal breath sounds. No wheezing, rhonchi or rales.   Abdominal:      General: Bowel sounds are normal. There is no distension.      Palpations: Abdomen is soft.      Tenderness: There is abdominal tenderness. There is no guarding or rebound.   Musculoskeletal:         General: No tenderness.      Cervical back: Normal range of motion and neck supple.      Right lower leg: No edema.      Left lower leg: No edema.   Skin:     General: Skin is warm and dry.      Findings: No erythema or rash.   Neurological:      General: No focal deficit present.      Mental Status: She is alert and oriented to person, place, and time.   Psychiatric:         Mood and Affect: Mood is anxious.  Affect is flat.         Behavior: Behavior normal.       Results Review:  I reviewed the patient's new clinical results.  I reviewed the patient's new imaging results and agree with the interpretation.  I reviewed the patient's other test results and agree with the interpretation  I personally viewed and interpreted the patient's EKG/Telemetry data  Discussed with ED provider.    Lab Results (last 24 hours)       Procedure Component Value Units Date/Time    CBC & Differential [[id number]]  (Abnormal) Collected: 08/07/23 0129    Specimen: Blood from Arm, Left Updated: 08/07/23 0139    Narrative:      The following orders were created for panel order CBC & Differential.  Procedure                               Abnormality         Status                     ---------                               -----------         ------                     CBC Auto Differential[[id number]]        Abnormal            Final result                 Please view results for these tests on the individual orders.    Comprehensive Metabolic Panel [[id number]]  (Abnormal) Collected: 08/07/23 0129    Specimen: Blood from Arm, Left Updated: 08/07/23 0157     Glucose 107 mg/dL      BUN 10 mg/dL      Creatinine 0.65 mg/dL      Sodium 136 mmol/L      Potassium 3.9 mmol/L      Chloride 94 mmol/L      CO2 18.7 mmol/L      Calcium 9.4 mg/dL      Total Protein 9.0 g/dL      Albumin 5.2 g/dL      ALT (SGPT) 73 U/L      AST (SGOT) 151 U/L      Alkaline Phosphatase 112 U/L      Total Bilirubin 0.7 mg/dL      Globulin 3.8 gm/dL      A/G Ratio 1.4 g/dL      BUN/Creatinine Ratio 15.4     Anion Gap 23.3 mmol/L      eGFR 120.1 mL/min/1.73     Narrative:      GFR Normal >60  Chronic Kidney Disease <60  Kidney Failure <15      Lipase [[id number]]  (Abnormal) Collected: 08/07/23 0129    Specimen: Blood from Arm, Left Updated: 08/07/23 0157     Lipase 167 U/L     hCG, Serum, Qualitative [[id number]]  (Normal) Collected: 08/07/23 0129    Specimen: Blood from Arm, Left  Updated: 08/07/23 0219     HCG Qualitative Negative    CBC Auto Differential [[id number]]  (Abnormal) Collected: 08/07/23 0129    Specimen: Blood from Arm, Left Updated: 08/07/23 0139     WBC 4.93 10*3/mm3      RBC 4.60 10*6/mm3      Hemoglobin 12.3 g/dL      Hematocrit 38.2 %      MCV 83.0 fL      MCH 26.7 pg      MCHC 32.2 g/dL      RDW 16.2 %      RDW-SD 47.7 fl      MPV 9.7 fL      Platelets 270 10*3/mm3      Neutrophil % 44.4 %      Lymphocyte % 46.7 %      Monocyte % 7.1 %      Eosinophil % 0.0 %      Basophil % 1.4 %      Immature Grans % 0.4 %      Neutrophils, Absolute 2.19 10*3/mm3      Lymphocytes, Absolute 2.30 10*3/mm3      Monocytes, Absolute 0.35 10*3/mm3      Eosinophils, Absolute 0.00 10*3/mm3      Basophils, Absolute 0.07 10*3/mm3      Immature Grans, Absolute 0.02 10*3/mm3      nRBC 0.0 /100 WBC     Urinalysis With Microscopic If Indicated (No Culture) - Urine, Clean Catch [[id number]]  (Abnormal) Collected: 08/07/23 0439    Specimen: Urine, Clean Catch Updated: 08/07/23 0513     Color, UA Yellow     Appearance, UA Clear     pH, UA 6.0     Specific Gravity, UA >=1.030     Glucose, UA Negative     Ketones, UA 80 mg/dL (3+)     Bilirubin, UA Negative     Blood, UA Small (1+)     Protein, UA >=300 mg/dL (3+)     Leuk Esterase, UA Negative     Nitrite, UA Negative     Urobilinogen, UA 1.0 E.U./dL    Urine Drug Screen - Urine, Clean Catch [[id number]]  (Normal) Collected: 08/07/23 0439    Specimen: Urine, Clean Catch Updated: 08/07/23 0514     Amphet/Methamphet, Screen Negative     Barbiturates Screen, Urine Negative     Benzodiazepine Screen, Urine Negative     Cocaine Screen, Urine Negative     Opiate Screen Negative     THC, Screen, Urine Negative     Methadone Screen, Urine Negative     Oxycodone Screen, Urine Negative     Fentanyl, Urine Negative    Narrative:      Negative Thresholds Per Drugs Screened:    Amphetamines                 500 ng/ml  Barbiturates                 200  ng/ml  Benzodiazepines              100 ng/ml  Cocaine                      300 ng/ml  Methadone                    300 ng/ml  Opiates                      300 ng/ml  Oxycodone                    100 ng/ml  THC                           50 ng/ml  Fentanyl                       5 ng/ml      The Normal Value for all drugs tested is negative. This report includes final unconfirmed screening results to be used for medical treatment purposes only. Unconfirmed results must not be used for non-medical purposes such as employment or legal testing. Clinical consideration should be applied to any drug of abuse test, particularly when unconfirmed results are used.            Urinalysis, Microscopic Only - Urine, Clean Catch [[id number]] Collected: 08/07/23 0439    Specimen: Urine, Clean Catch Updated: 08/07/23 0538     RBC, UA 0-2 /HPF      WBC, UA None Seen /HPF      Bacteria, UA None Seen /HPF      Squamous Epithelial Cells, UA 0-2 /HPF      Hyaline Casts, UA 0-2 /LPF      Methodology Manual Light Microscopy            Imaging Results (Last 24 Hours)       Procedure Component Value Units Date/Time    CT Abdomen Pelvis With Contrast [[id number]] Collected: 08/07/23 0424     Updated: 08/07/23 0435    Narrative:      CT OF THE ABDOMEN AND PELVIS WITH CONTRAST     HISTORY: Abdominal pain. HISTORY of pancreatitis.     COMPARISON: 04/29/2023     TECHNIQUE: Axial CT imaging was obtained through the abdomen and pelvis.  IV contrast was administered.     FINDINGS:  Area of nodularity is again noted within the left lower lobe. It  measures approximately 1.3 cm, previously 1.7 cm. Additional subpleural  nodule has resolved. Marked diffuse hepatic steatosis is again noted.  The stomach, adrenal glands, spleen, and gallbladder appear  unremarkable. The patient is noted to have inflammatory stranding  surrounding the pancreas, as well some pancreatic edema, suggesting  pancreatitis. Low-attenuation lesion is again noted within the neck  of  the pancreas. This measures up to 1.2 cm. This is slightly larger than  on prior exam, when it measured 1 cm. No peripancreatic collections are  seen. Main portal vein and splenic vein are patent. There is no evidence  of gastric outlet obstruction. There is no biliary dilatation. The liver  is enlarged, measuring up to 18.6 cm in craniocaudal dimensions. The  kidneys enhance symmetrically. There is no hydronephrosis. There is a  simple appearing right renal cyst. No additional follow-up is necessary.  No distal ureteral or bladder stones are seen. Uterus appears normal.  There is no bowel obstruction. Appendix is normal. No acute osseous  abnormalities are seen.       Impression:         1. The patient has peripancreatic stranding and edema, concerning for  acute pancreatitis. Patient is again noted to have a low-attenuation  lesion within the neck of the pancreas. This appears slightly larger  than on prior exam. It is favored to represent a small pseudocyst.  2. Area of nodularity within the left lower lobe has decreased in size,  while another has resolved. This would suggest a benign etiology.     Radiation dose reduction techniques were utilized, including automated  exposure control and exposure modulation based on body size.     This report was finalized on 8/7/2023 4:31 AM by Dr. Diamond Goodman M.D.                   No orders to display        Assessment/Plan     Active Hospital Problems    Diagnosis  POA    **Acute pancreatitis [K85.90]  Yes    Alcohol dependence [F10.20]  Yes    Pulmonary nodule [R91.1]  Yes    Pancreatic pseudocyst [K86.3]  Yes    Alcoholic hepatitis [K70.10]  Yes      Resolved Hospital Problems   No resolved problems to display.       Ms. Gibson is a 33 y.o. woman with alcohol dependence and alcoholic pancreatitis in the past who presents with alcoholic pancreatitis, mild alcoholic hepatitis, intoxication, alcoholic ketoacidosis    Acute pancreatitis-IVF, pain control,  bowel regimen  Alcohol dependence with concern for withdrawal-ciwa, mvi, folate, thiamine, access consult  Mild alcoholic hepatitis-check INR to calculate discriminate function, but with normal T bili, I suspect her DR will be low   Alcoholic ketoacidosis-IVF  Pulmonary nodule-LLL pulm nodule has decreased in size suggesting benign etiology   Pseudocyst seen on CT-this will require repeat imaging as an outpatient in 3-6 months  I discussed the patient's findings and my recommendations with patient and nursing staff.    VTE Prophylaxis - Pharmacy to dose Lovenox.  Code Status - Full code.       Vikram Salinas MD  [facility]  08/07/23  08:30 EDT

## 2023-08-08 LAB
ALBUMIN SERPL-MCNC: 4.1 G/DL (ref 3.5–5.2)
ALBUMIN/GLOB SERPL: 1.4 G/DL
ALP SERPL-CCNC: 78 U/L (ref 39–117)
ALT SERPL W P-5'-P-CCNC: 50 U/L (ref 1–33)
ANION GAP SERPL CALCULATED.3IONS-SCNC: 15.6 MMOL/L (ref 5–15)
AST SERPL-CCNC: 95 U/L (ref 1–32)
BILIRUB SERPL-MCNC: 0.9 MG/DL (ref 0–1.2)
BUN SERPL-MCNC: 4 MG/DL (ref 6–20)
BUN/CREAT SERPL: 6.6 (ref 7–25)
CALCIUM SPEC-SCNC: 9.3 MG/DL (ref 8.6–10.5)
CHLORIDE SERPL-SCNC: 96 MMOL/L (ref 98–107)
CO2 SERPL-SCNC: 23.4 MMOL/L (ref 22–29)
CREAT SERPL-MCNC: 0.61 MG/DL (ref 0.57–1)
DEPRECATED RDW RBC AUTO: 45.6 FL (ref 37–54)
EGFRCR SERPLBLD CKD-EPI 2021: 122 ML/MIN/1.73
ERYTHROCYTE [DISTWIDTH] IN BLOOD BY AUTOMATED COUNT: 15.4 % (ref 12.3–15.4)
GLOBULIN UR ELPH-MCNC: 3 GM/DL
GLUCOSE SERPL-MCNC: 93 MG/DL (ref 65–99)
HCT VFR BLD AUTO: 28.7 % (ref 34–46.6)
HGB BLD-MCNC: 9.3 G/DL (ref 12–15.9)
MAGNESIUM SERPL-MCNC: 2 MG/DL (ref 1.6–2.6)
MCH RBC QN AUTO: 26.6 PG (ref 26.6–33)
MCHC RBC AUTO-ENTMCNC: 32.4 G/DL (ref 31.5–35.7)
MCV RBC AUTO: 82.2 FL (ref 79–97)
PHOSPHATE SERPL-MCNC: 1.1 MG/DL (ref 2.5–4.5)
PLATELET # BLD AUTO: 167 10*3/MM3 (ref 140–450)
PMV BLD AUTO: 9.9 FL (ref 6–12)
POTASSIUM SERPL-SCNC: 3.3 MMOL/L (ref 3.5–5.2)
PROT SERPL-MCNC: 7.1 G/DL (ref 6–8.5)
RBC # BLD AUTO: 3.49 10*6/MM3 (ref 3.77–5.28)
SODIUM SERPL-SCNC: 135 MMOL/L (ref 136–145)
WBC NRBC COR # BLD: 4.15 10*3/MM3 (ref 3.4–10.8)

## 2023-08-08 PROCEDURE — 0 POTASSIUM CHLORIDE 10 MEQ/100ML SOLUTION: Performed by: STUDENT IN AN ORGANIZED HEALTH CARE EDUCATION/TRAINING PROGRAM

## 2023-08-08 PROCEDURE — 84100 ASSAY OF PHOSPHORUS: CPT | Performed by: STUDENT IN AN ORGANIZED HEALTH CARE EDUCATION/TRAINING PROGRAM

## 2023-08-08 PROCEDURE — 25010000002 LORAZEPAM PER 2 MG: Performed by: NURSE PRACTITIONER

## 2023-08-08 PROCEDURE — 25010000002 ONDANSETRON PER 1 MG: Performed by: STUDENT IN AN ORGANIZED HEALTH CARE EDUCATION/TRAINING PROGRAM

## 2023-08-08 PROCEDURE — 83735 ASSAY OF MAGNESIUM: CPT | Performed by: STUDENT IN AN ORGANIZED HEALTH CARE EDUCATION/TRAINING PROGRAM

## 2023-08-08 PROCEDURE — 25010000002 THIAMINE HCL 200 MG/2ML SOLUTION: Performed by: NURSE PRACTITIONER

## 2023-08-08 PROCEDURE — 25010000002 ENOXAPARIN PER 10 MG: Performed by: STUDENT IN AN ORGANIZED HEALTH CARE EDUCATION/TRAINING PROGRAM

## 2023-08-08 PROCEDURE — 25010000002 PROCHLORPERAZINE 10 MG/2ML SOLUTION: Performed by: STUDENT IN AN ORGANIZED HEALTH CARE EDUCATION/TRAINING PROGRAM

## 2023-08-08 PROCEDURE — 80053 COMPREHEN METABOLIC PANEL: CPT | Performed by: STUDENT IN AN ORGANIZED HEALTH CARE EDUCATION/TRAINING PROGRAM

## 2023-08-08 PROCEDURE — 85027 COMPLETE CBC AUTOMATED: CPT | Performed by: STUDENT IN AN ORGANIZED HEALTH CARE EDUCATION/TRAINING PROGRAM

## 2023-08-08 PROCEDURE — 25010000002 HYDROMORPHONE PER 4 MG: Performed by: STUDENT IN AN ORGANIZED HEALTH CARE EDUCATION/TRAINING PROGRAM

## 2023-08-08 PROCEDURE — G0378 HOSPITAL OBSERVATION PER HR: HCPCS

## 2023-08-08 RX ORDER — PROCHLORPERAZINE EDISYLATE 5 MG/ML
5 INJECTION INTRAMUSCULAR; INTRAVENOUS EVERY 6 HOURS PRN
Status: DISCONTINUED | OUTPATIENT
Start: 2023-08-08 | End: 2023-08-12 | Stop reason: HOSPADM

## 2023-08-08 RX ORDER — SODIUM PHOSPHATE IN 0.9 % NACL 15MMOL/100
15 PLASTIC BAG, INJECTION (ML) INTRAVENOUS
Status: COMPLETED | OUTPATIENT
Start: 2023-08-08 | End: 2023-08-08

## 2023-08-08 RX ORDER — POTASSIUM CHLORIDE 7.45 MG/ML
10 INJECTION INTRAVENOUS
Status: COMPLETED | OUTPATIENT
Start: 2023-08-08 | End: 2023-08-08

## 2023-08-08 RX ORDER — POTASSIUM CHLORIDE 750 MG/1
40 TABLET, FILM COATED, EXTENDED RELEASE ORAL EVERY 4 HOURS
Status: DISPENSED | OUTPATIENT
Start: 2023-08-08 | End: 2023-08-08

## 2023-08-08 RX ORDER — HYDROMORPHONE HYDROCHLORIDE 1 MG/ML
0.5 INJECTION, SOLUTION INTRAMUSCULAR; INTRAVENOUS; SUBCUTANEOUS EVERY 4 HOURS PRN
Status: DISCONTINUED | OUTPATIENT
Start: 2023-08-08 | End: 2023-08-12 | Stop reason: HOSPADM

## 2023-08-08 RX ADMIN — SODIUM PHOSPHATE, MONOBASIC, MONOHYDRATE AND SODIUM PHOSPHATE, DIBASIC, ANHYDROUS 15 MMOL: 276; 142 INJECTION, SOLUTION INTRAVENOUS at 17:38

## 2023-08-08 RX ADMIN — ONDANSETRON 4 MG: 2 INJECTION INTRAMUSCULAR; INTRAVENOUS at 04:37

## 2023-08-08 RX ADMIN — POTASSIUM CHLORIDE 10 MEQ: 7.46 INJECTION, SOLUTION INTRAVENOUS at 16:35

## 2023-08-08 RX ADMIN — THIAMINE HYDROCHLORIDE 200 MG: 100 INJECTION, SOLUTION INTRAMUSCULAR; INTRAVENOUS at 03:12

## 2023-08-08 RX ADMIN — Medication 10 ML: at 23:26

## 2023-08-08 RX ADMIN — HYDROMORPHONE HYDROCHLORIDE 0.5 MG: 1 INJECTION, SOLUTION INTRAMUSCULAR; INTRAVENOUS; SUBCUTANEOUS at 00:18

## 2023-08-08 RX ADMIN — OXYCODONE HYDROCHLORIDE AND ACETAMINOPHEN 1 TABLET: 5; 325 TABLET ORAL at 07:44

## 2023-08-08 RX ADMIN — POTASSIUM CHLORIDE 10 MEQ: 7.46 INJECTION, SOLUTION INTRAVENOUS at 15:37

## 2023-08-08 RX ADMIN — POTASSIUM CHLORIDE 10 MEQ: 7.46 INJECTION, SOLUTION INTRAVENOUS at 18:47

## 2023-08-08 RX ADMIN — HYDROMORPHONE HYDROCHLORIDE 0.5 MG: 1 INJECTION, SOLUTION INTRAMUSCULAR; INTRAVENOUS; SUBCUTANEOUS at 04:37

## 2023-08-08 RX ADMIN — POTASSIUM CHLORIDE 40 MEQ: 750 TABLET, EXTENDED RELEASE ORAL at 10:22

## 2023-08-08 RX ADMIN — HYDROXYZINE PAMOATE 25 MG: 25 CAPSULE ORAL at 04:37

## 2023-08-08 RX ADMIN — Medication 10 ML: at 09:23

## 2023-08-08 RX ADMIN — HYDROMORPHONE HYDROCHLORIDE 0.5 MG: 1 INJECTION, SOLUTION INTRAMUSCULAR; INTRAVENOUS; SUBCUTANEOUS at 21:52

## 2023-08-08 RX ADMIN — HYDROMORPHONE HYDROCHLORIDE 0.5 MG: 1 INJECTION, SOLUTION INTRAMUSCULAR; INTRAVENOUS; SUBCUTANEOUS at 17:41

## 2023-08-08 RX ADMIN — SODIUM CHLORIDE 125 ML/HR: 9 INJECTION, SOLUTION INTRAVENOUS at 05:34

## 2023-08-08 RX ADMIN — PROCHLORPERAZINE EDISYLATE 5 MG: 5 INJECTION INTRAMUSCULAR; INTRAVENOUS at 21:52

## 2023-08-08 RX ADMIN — OXYCODONE HYDROCHLORIDE AND ACETAMINOPHEN 1 TABLET: 5; 325 TABLET ORAL at 13:36

## 2023-08-08 RX ADMIN — FOLIC ACID 1 MG: 1 TABLET ORAL at 09:23

## 2023-08-08 RX ADMIN — SODIUM CHLORIDE 125 ML/HR: 9 INJECTION, SOLUTION INTRAVENOUS at 13:18

## 2023-08-08 RX ADMIN — ENOXAPARIN SODIUM 40 MG: 100 INJECTION SUBCUTANEOUS at 14:34

## 2023-08-08 RX ADMIN — SODIUM PHOSPHATE, MONOBASIC, MONOHYDRATE AND SODIUM PHOSPHATE, DIBASIC, ANHYDROUS 15 MMOL: 276; 142 INJECTION, SOLUTION INTRAVENOUS at 11:34

## 2023-08-08 RX ADMIN — SODIUM CHLORIDE 125 ML/HR: 9 INJECTION, SOLUTION INTRAVENOUS at 20:44

## 2023-08-08 RX ADMIN — THIAMINE HYDROCHLORIDE 200 MG: 100 INJECTION, SOLUTION INTRAMUSCULAR; INTRAVENOUS at 10:23

## 2023-08-08 RX ADMIN — LORAZEPAM 2 MG: 2 INJECTION INTRAMUSCULAR; INTRAVENOUS at 00:25

## 2023-08-08 RX ADMIN — ONDANSETRON 4 MG: 2 INJECTION INTRAMUSCULAR; INTRAVENOUS at 17:41

## 2023-08-08 RX ADMIN — POTASSIUM CHLORIDE 10 MEQ: 7.46 INJECTION, SOLUTION INTRAVENOUS at 17:38

## 2023-08-08 RX ADMIN — Medication 1 TABLET: at 09:23

## 2023-08-08 RX ADMIN — PROCHLORPERAZINE EDISYLATE 5 MG: 5 INJECTION INTRAMUSCULAR; INTRAVENOUS at 15:11

## 2023-08-08 RX ADMIN — ONDANSETRON 4 MG: 2 INJECTION INTRAMUSCULAR; INTRAVENOUS at 21:31

## 2023-08-08 RX ADMIN — THIAMINE HYDROCHLORIDE 200 MG: 100 INJECTION, SOLUTION INTRAMUSCULAR; INTRAVENOUS at 18:54

## 2023-08-08 RX ADMIN — ONDANSETRON 4 MG: 2 INJECTION INTRAMUSCULAR; INTRAVENOUS at 13:36

## 2023-08-08 RX ADMIN — SODIUM PHOSPHATE, MONOBASIC, MONOHYDRATE AND SODIUM PHOSPHATE, DIBASIC, ANHYDROUS 15 MMOL: 276; 142 INJECTION, SOLUTION INTRAVENOUS at 14:35

## 2023-08-08 NOTE — PROGRESS NOTES
"Nutrition Services    Patient Name:  Lisa Gibson  YOB: 1990  MRN: [id number]  Admit Date:  8/7/2023    Assessment Date:  08/08/23    Comment: Nutrition assessment for BMI <19  34 y/o female admitted d/t acute pancreatitis, EtOH dependence with intoxication and concern for withdrawal. Pt was experiencing N/v/D for the past few days. Reports she is feeling better today. Currently on CLD, MD to advance to low fat diet today. Pt unsure of any recent weight loss, reports her appetite at home varies. Weight stable since April per weight index. Denied adding Boost at this time. Encouraged PO intake as tolerated, RD will continue to follow.       CLINICAL NUTRITION ASSESSMENT      Reason for Assessment BMI     Diagnosis/Problem   Acute pancreatitis   Medical/Surgical History EtOH dependence, alcoholic pancreatitis    History reviewed. No pertinent surgical history.     Encounter Information        Nutrition History:     Food Preferences:    Supplements:    Factors Affecting Intake: nausea, vomiting     Anthropometrics        Current Height  Current Weight  BMI kg/m2 Height: 175.3 cm (69\")  Weight: 57.6 kg (126 lb 15.8 oz) (08/07/23 0615)  Body mass index is 18.75 kg/mý.   Adjusted BMI (if applicable)    BMI Category Normal/Healthy (18.4 - 24.9)       Admission Weight 126#       Ideal Body Weight (IBW) 145#   Adjusted IBW (if applicable)        Usual Body Weight (UBW) unsure   Weight Change/Trend Stable       Weight History Wt Readings from Last 30 Encounters:   08/07/23 0615 57.6 kg (126 lb 15.8 oz)   08/07/23 0121 54.4 kg (120 lb)   06/01/23 0746 52.2 kg (115 lb)   05/02/23 0500 52.6 kg (115 lb 15.4 oz)   05/01/23 0530 53.9 kg (118 lb 13.3 oz)   04/30/23 0404 59.4 kg (130 lb 15.3 oz)   04/27/23 2244 59.4 kg (130 lb 15.3 oz)   04/27/23 2001 56.7 kg (125 lb)   09/22/21 0035 66.7 kg (147 lb 1.6 oz)   09/21/21 2047 68 kg (150 lb)   07/26/21 1747 72.6 kg (160 lb)           --  Tests/Procedures        " Tests/Procedures CT scan     Labs       Pertinent Labs    Results from last 7 days   Lab Units 08/08/23  0615 08/07/23  1218 08/07/23  0129   SODIUM mmol/L 135* 137 136   POTASSIUM mmol/L 3.3* 4.0 3.9   CHLORIDE mmol/L 96* 97* 94*   CO2 mmol/L 23.4 17.0* 18.7*   BUN mg/dL 4* 8 10   CREATININE mg/dL 0.61 0.42* 0.65   CALCIUM mg/dL 9.3 9.1 9.4   BILIRUBIN mg/dL 0.9  --  0.7   ALK PHOS U/L 78  --  112   ALT (SGPT) U/L 50*  --  73*   AST (SGOT) U/L 95*  --  151*   GLUCOSE mg/dL 93 94 107*     Results from last 7 days   Lab Units 08/08/23  0615   MAGNESIUM mg/dL 2.0   PHOSPHORUS mg/dL 1.1*   HEMOGLOBIN g/dL 9.3*   HEMATOCRIT % 28.7*   WBC 10*3/mm3 4.15   ALBUMIN g/dL 4.1     Results from last 7 days   Lab Units 08/08/23  0615 08/07/23  1218 08/07/23  0129   INR   --  1.13*  --    PLATELETS 10*3/mm3 167 234 270     COVID19   Date Value Ref Range Status   04/27/2023 Not Detected Not Detected - Ref. Range Final     No results found for: HGBA1C       Medications           Scheduled Medications enoxaparin, 40 mg, Subcutaneous, Q24H  folic acid, 1 mg, Oral, Daily  multivitamin, 1 tablet, Oral, Daily  potassium chloride, 40 mEq, Oral, Q4H  senna-docusate sodium, 2 tablet, Oral, BID  sodium chloride, 10 mL, Intravenous, Q12H  sodium phosphate, 15 mmol, Intravenous, Q3H  thiamine (B-1) IV, 200 mg, Intravenous, Q8H   Followed by  [START ON 8/12/2023] thiamine, 100 mg, Oral, Daily       Infusions sodium chloride, 125 mL/hr, Last Rate: 125 mL/hr (08/08/23 1318)       PRN Medications   acetaminophen **OR** acetaminophen **OR** acetaminophen    senna-docusate sodium **AND** polyethylene glycol **AND** bisacodyl **AND** bisacodyl    HYDROmorphone    hydrOXYzine pamoate    LORazepam **OR** LORazepam **OR** LORazepam **OR** LORazepam **OR** LORazepam **OR** LORazepam    Magnesium Standard Dose Replacement - Follow Nurse / BPA Driven Protocol    nitroglycerin    ondansetron    oxyCODONE-acetaminophen    sodium chloride    sodium  chloride    sodium chloride     Physical Findings          Physical Appearance alert, flat affect, oriented, room air   Oral/Mouth Cavity WNL   Edema  no edema   Gastrointestinal last bowel movement: 8/6   Skin  skin intact   Tubes/Drains/Lines none   NFPE No clinical signs of muscle wasting or fat loss   --  Current Nutrition Orders & Evaluation of Intake       Oral Nutrition     Food Allergies NKFA   Current PO Diet Diet: Regular/House Diet, Gastrointestinal Diets, Vegetarian; Vegan (No animal products); Fat-Restricted; Texture: Regular Texture (IDDSI 7); Fluid Consistency: Thin (IDDSI 0)   Supplement n/a   PO Evaluation     % PO Intake 480ml    # of Days Evaluated 1   --  PES STATEMENT / NUTRITION DIAGNOSIS      Nutrition Dx Problem  Problem: Altered GI Function  Etiology: Medical Diagnosis  pancreatitis  Signs/Symptoms: Report/Observation    Comment:    --  NUTRITION INTERVENTION / PLAN OF CARE      Intervention Goal(s) Reduce/improve symptoms, Disease management/therapy, Establish PO intake, Advance diet, and No significant weight loss         RD Intervention/Action Supplement offered/refused, Encourage intake, and Follow Tx Progress         Prescription/Orders:       PO Diet       Supplements       Snacks       Enteral Nutrition       Parenteral Nutrition    New Prescription Ordered? No changes at this time   --      Monitor/Evaluation Per protocol   Discharge Plan/Needs Pending clinical course   Education Will instruct as appropriate   --    RD to follow per protocol.      Electronically signed by:  Merissa Rider RD  08/08/23 13:40 EDT

## 2023-08-08 NOTE — PLAN OF CARE
Problem: Adult Inpatient Plan of Care  Goal: Plan of Care Review  Outcome: Ongoing, Progressing  Flowsheets (Taken 8/8/2023 0421)  Progress: no change  Plan of Care Reviewed With: patient  Goal: Patient-Specific Goal (Individualized)  Outcome: Ongoing, Progressing  Goal: Absence of Hospital-Acquired Illness or Injury  Outcome: Ongoing, Progressing  Intervention: Identify and Manage Fall Risk  Recent Flowsheet Documentation  Taken 8/8/2023 0228 by Danya Pulido RN  Safety Promotion/Fall Prevention: safety round/check completed  Taken 8/8/2023 0004 by Danya Pulido RN  Safety Promotion/Fall Prevention:   safety round/check completed   room organization consistent   nonskid shoes/slippers when out of bed   lighting adjusted   fall prevention program maintained   clutter free environment maintained   assistive device/personal items within reach   activity supervised  Taken 8/7/2023 2220 by Danya Pulido RN  Safety Promotion/Fall Prevention: safety round/check completed  Taken 8/7/2023 2111 by Danya Pulido RN  Safety Promotion/Fall Prevention:   safety round/check completed   room organization consistent   nonskid shoes/slippers when out of bed   lighting adjusted   fall prevention program maintained   clutter free environment maintained   assistive device/personal items within reach   activity supervised  Taken 8/7/2023 2000 by Danya Pulido RN  Safety Promotion/Fall Prevention: safety round/check completed  Intervention: Prevent Skin Injury  Recent Flowsheet Documentation  Taken 8/8/2023 0228 by Danya Pulido RN  Body Position: position changed independently  Taken 8/8/2023 0004 by Danya Pulido RN  Body Position: position changed independently  Taken 8/7/2023 2220 by Danya Pulido RN  Body Position: position changed independently  Taken 8/7/2023 2111 by Danya Pulido RN  Body Position: position changed independently  Skin Protection: tubing/devices free from skin  contact  Taken 8/7/2023 2000 by Danya Pulido RN  Body Position: position changed independently  Intervention: Prevent and Manage VTE (Venous Thromboembolism) Risk  Recent Flowsheet Documentation  Taken 8/8/2023 0228 by Danya Pulido RN  Activity Management: activity encouraged  Taken 8/8/2023 0004 by Danya Pulido RN  Activity Management: activity encouraged  VTE Prevention/Management: (see MAR) other (see comments)  Taken 8/7/2023 2220 by Danya Pulido RN  Activity Management: activity encouraged  Taken 8/7/2023 2111 by Danya Pulido RN  Activity Management: activity encouraged  VTE Prevention/Management: (see MAR) other (see comments)  Taken 8/7/2023 2000 by Danya Pulido RN  Activity Management: activity encouraged  Intervention: Prevent Infection  Recent Flowsheet Documentation  Taken 8/8/2023 0228 by Danya Pulido RN  Infection Prevention:   single patient room provided   rest/sleep promoted   hand hygiene promoted  Taken 8/8/2023 0004 by Danya Pulido RN  Infection Prevention:   single patient room provided   rest/sleep promoted   hand hygiene promoted  Taken 8/7/2023 2220 by Danya Pulido RN  Infection Prevention:   single patient room provided   rest/sleep promoted   hand hygiene promoted  Taken 8/7/2023 2111 by Danya Pulido RN  Infection Prevention:   single patient room provided   rest/sleep promoted   hand hygiene promoted  Taken 8/7/2023 2000 by Danya Pulido RN  Infection Prevention:   single patient room provided   rest/sleep promoted   hand hygiene promoted  Goal: Optimal Comfort and Wellbeing  Outcome: Ongoing, Progressing  Intervention: Monitor Pain and Promote Comfort  Recent Flowsheet Documentation  Taken 8/8/2023 0228 by Danya Pulido RN  Pain Management Interventions:   care clustered   pillow support provided   quiet environment facilitated  Taken 8/8/2023 0018 by Danya Pulido RN  Pain Management Interventions: see MAR  Taken  8/8/2023 0004 by Danya Pulido RN  Pain Management Interventions:   care clustered   pillow support provided   quiet environment facilitated  Taken 8/7/2023 2220 by Danya Pulido RN  Pain Management Interventions: see MAR  Taken 8/7/2023 2111 by Danya Pulido RN  Pain Management Interventions: see MAR  Taken 8/7/2023 2000 by Danya Pulido RN  Pain Management Interventions:   care clustered   pillow support provided   quiet environment facilitated  Intervention: Provide Person-Centered Care  Recent Flowsheet Documentation  Taken 8/8/2023 0004 by Danya Pulido RN  Trust Relationship/Rapport:   care explained   choices provided   questions encouraged   thoughts/feelings acknowledged  Taken 8/7/2023 2111 by Danya Pulido RN  Trust Relationship/Rapport:   care explained   choices provided   questions encouraged   thoughts/feelings acknowledged  Goal: Readiness for Transition of Care  Outcome: Ongoing, Progressing     Problem: Nausea and Vomiting  Goal: Fluid and Electrolyte Balance  Outcome: Ongoing, Progressing  Intervention: Monitor and Manage Hypovolemia  Recent Flowsheet Documentation  Taken 8/7/2023 2111 by Danya Pulido RN  Fluid/Electrolyte Management: fluids provided  Intervention: Prevent and Manage Nausea and Vomiting  Recent Flowsheet Documentation  Taken 8/8/2023 0004 by Danya Pulido RN  Nausea/Vomiting Interventions: see MAR  Environmental Support: calm environment promoted  Taken 8/7/2023 2111 by Danya Pulido RN  Nausea/Vomiting Interventions: see MAR  Environmental Support: calm environment promoted     Problem: Pain Acute  Goal: Acceptable Pain Control and Functional Ability  Outcome: Ongoing, Progressing  Intervention: Prevent or Manage Pain  Recent Flowsheet Documentation  Taken 8/8/2023 0228 by Danya Pulido RN  Medication Review/Management:   medications reviewed   high-risk medications identified  Taken 8/8/2023 0004 by Danya Pulido  RN  Sensory Stimulation Regulation:   auditory stimulation minimized   care clustered   lighting decreased   quiet environment promoted   tactile stimulation minimized   visual stimulation minimized  Sleep/Rest Enhancement:   awakenings minimized   consistent schedule promoted   family presence promoted   noise level reduced   regular sleep/rest pattern promoted   relaxation techniques promoted   room darkened  Medication Review/Management:   medications reviewed   high-risk medications identified  Taken 8/7/2023 2220 by Danya Pulido RN  Medication Review/Management:   medications reviewed   high-risk medications identified  Taken 8/7/2023 2111 by Danya Pulido RN  Sensory Stimulation Regulation:   auditory stimulation minimized   care clustered   lighting decreased   quiet environment promoted   tactile stimulation minimized   visual stimulation minimized  Sleep/Rest Enhancement:   awakenings minimized   consistent schedule promoted   family presence promoted   noise level reduced   regular sleep/rest pattern promoted   relaxation techniques promoted   room darkened  Medication Review/Management:   medications reviewed   high-risk medications identified  Taken 8/7/2023 2000 by Danya Pulido RN  Medication Review/Management:   medications reviewed   high-risk medications identified  Intervention: Develop Pain Management Plan  Recent Flowsheet Documentation  Taken 8/8/2023 0228 by Danya Pulido RN  Pain Management Interventions:   care clustered   pillow support provided   quiet environment facilitated  Taken 8/8/2023 0018 by Danya Pulido RN  Pain Management Interventions: see MAR  Taken 8/8/2023 0004 by Danya Pulido RN  Pain Management Interventions:   care clustered   pillow support provided   quiet environment facilitated  Taken 8/7/2023 2220 by Danya Pulido RN  Pain Management Interventions: see MAR  Taken 8/7/2023 2111 by Danya Pulido RN  Pain Management  Interventions: see MAR  Taken 8/7/2023 2000 by Danya Pulido, RN  Pain Management Interventions:   care clustered   pillow support provided   quiet environment facilitated  Intervention: Optimize Psychosocial Wellbeing  Recent Flowsheet Documentation  Taken 8/8/2023 0004 by Danya Pulido, RN  Supportive Measures:   active listening utilized   self-care encouraged   verbalization of feelings encouraged  Taken 8/7/2023 2111 by Danya Pulido, RN  Supportive Measures:   active listening utilized   self-care encouraged   verbalization of feelings encouraged  Diversional Activities:   smartphone   television   Goal Outcome Evaluation:  Plan of Care Reviewed With: patient        Progress: no change

## 2023-08-08 NOTE — PROGRESS NOTES
Name: Lisa Gibson ADMIT: 2023   : 1990  PCP: Provider, No Known    MRN: [id number] LOS: 0 days   AGE/SEX: 33 y.o. female  ROOM: [facility]     Subjective   Subjective     No events overnight. She reports that her abdominal pain is improving. K and phos are both low, phos very low. She is not currently scoring high enough on ciwa to require ativan       Objective   Objective   Vital Signs  Temp:  [97.7 øF (36.5 øC)-98.6 øF (37 øC)] 97.7 øF (36.5 øC)  Heart Rate:  [69-85] 84  Resp:  [16-18] 16  BP: (131-157)/() 157/104  SpO2:  [95 %-99 %] 99 %  on  Flow (L/min):  [2] 2;   Device (Oxygen Therapy): room air  Body mass index is 18.75 kg/mý.  Physical Exam  Constitutional:       General: She is not in acute distress.     Appearance: She is not toxic-appearing.   Cardiovascular:      Rate and Rhythm: Normal rate and regular rhythm.      Heart sounds: Normal heart sounds.   Pulmonary:      Effort: Pulmonary effort is normal.      Breath sounds: Normal breath sounds.   Abdominal:      General: Bowel sounds are normal. There is no distension.      Palpations: Abdomen is soft.      Tenderness: There is no abdominal tenderness. There is no guarding or rebound.   Musculoskeletal:         General: No tenderness.      Right lower leg: No edema.      Left lower leg: No edema.   Neurological:      Mental Status: She is alert.   Psychiatric:         Mood and Affect: Mood normal.         Behavior: Behavior normal.       Results Review     I reviewed the patient's new clinical results.  Results from last 7 days   Lab Units 23  0615 23  1218 23  0129   WBC 10*3/mm3 4.15 5.87 4.93   HEMOGLOBIN g/dL 9.3* 10.6* 12.3   PLATELETS 10*3/mm3 167 234 270     Results from last 7 days   Lab Units 23  0615 23  1218 23  0129   SODIUM mmol/L 135* 137 136   POTASSIUM mmol/L 3.3* 4.0 3.9   CHLORIDE mmol/L 96* 97* 94*   CO2 mmol/L 23.4 17.0* 18.7*   BUN mg/dL 4* 8 10   CREATININE mg/dL 0.61  0.42* 0.65   GLUCOSE mg/dL 93 94 107*   Estimated Creatinine Clearance: 120.4 mL/min (by C-G formula based on SCr of 0.61 mg/dL).  Results from last 7 days   Lab Units 08/08/23  0615 08/07/23  0129   ALBUMIN g/dL 4.1 5.2   BILIRUBIN mg/dL 0.9 0.7   ALK PHOS U/L 78 112   AST (SGOT) U/L 95* 151*   ALT (SGPT) U/L 50* 73*     Results from last 7 days   Lab Units 08/08/23  0615 08/07/23  1218 08/07/23  0129   CALCIUM mg/dL 9.3 9.1 9.4   ALBUMIN g/dL 4.1  --  5.2   MAGNESIUM mg/dL 2.0  --   --    PHOSPHORUS mg/dL 1.1*  --   --      Results from last 7 days   Lab Units 08/07/23  1919 08/07/23  1521 08/07/23  1218   LACTATE mmol/L 1.3 2.4* 3.3*     COVID19   Date Value Ref Range Status   04/27/2023 Not Detected Not Detected - Ref. Range Final   09/21/2021 Not Detected Not Detected - Ref. Range Final     No results found for: HGBA1C, POCGLU    CT Abdomen Pelvis With Contrast  Narrative: CT OF THE ABDOMEN AND PELVIS WITH CONTRAST     HISTORY: Abdominal pain. HISTORY of pancreatitis.     COMPARISON: 04/29/2023     TECHNIQUE: Axial CT imaging was obtained through the abdomen and pelvis.  IV contrast was administered.     FINDINGS:  Area of nodularity is again noted within the left lower lobe. It  measures approximately 1.3 cm, previously 1.7 cm. Additional subpleural  nodule has resolved. Marked diffuse hepatic steatosis is again noted.  The stomach, adrenal glands, spleen, and gallbladder appear  unremarkable. The patient is noted to have inflammatory stranding  surrounding the pancreas, as well some pancreatic edema, suggesting  pancreatitis. Low-attenuation lesion is again noted within the neck of  the pancreas. This measures up to 1.2 cm. This is slightly larger than  on prior exam, when it measured 1 cm. No peripancreatic collections are  seen. Main portal vein and splenic vein are patent. There is no evidence  of gastric outlet obstruction. There is no biliary dilatation. The liver  is enlarged, measuring up to 18.6 cm in  craniocaudal dimensions. The  kidneys enhance symmetrically. There is no hydronephrosis. There is a  simple appearing right renal cyst. No additional follow-up is necessary.  No distal ureteral or bladder stones are seen. Uterus appears normal.  There is no bowel obstruction. Appendix is normal. No acute osseous  abnormalities are seen.     Impression:    1. The patient has peripancreatic stranding and edema, concerning for  acute pancreatitis. Patient is again noted to have a low-attenuation  lesion within the neck of the pancreas. This appears slightly larger  than on prior exam. It is favored to represent a small pseudocyst.  2. Area of nodularity within the left lower lobe has decreased in size,  while another has resolved. This would suggest a benign etiology.     Radiation dose reduction techniques were utilized, including automated  exposure control and exposure modulation based on body size.     This report was finalized on 8/7/2023 4:31 AM by Dr. Diamond Goodman M.D.       Scheduled Medications  enoxaparin, 40 mg, Subcutaneous, Q24H  folic acid, 1 mg, Oral, Daily  multivitamin, 1 tablet, Oral, Daily  potassium chloride, 40 mEq, Oral, Q4H  senna-docusate sodium, 2 tablet, Oral, BID  sodium chloride, 10 mL, Intravenous, Q12H  sodium phosphate, 15 mmol, Intravenous, Q3H  thiamine (B-1) IV, 200 mg, Intravenous, Q8H   Followed by  [START ON 8/12/2023] thiamine, 100 mg, Oral, Daily    Infusions  sodium chloride, 125 mL/hr, Last Rate: 125 mL/hr (08/08/23 0534)    Diet  Diet: Liquid Diets, Vegetarian; Vegan (No animal products); Clear Liquid; Texture: Regular Texture (IDDSI 7); Fluid Consistency: Thin (IDDSI 0)       Assessment/Plan     Active Hospital Problems    Diagnosis  POA    **Acute pancreatitis [K85.90]  Yes    Alcohol dependence [F10.20]  Yes    Pulmonary nodule [R91.1]  Yes    Pancreatic pseudocyst [K86.3]  Yes    Alcoholic hepatitis [K70.10]  Yes      Resolved Hospital Problems   No resolved problems  to display.       33 y.o. female admitted with Acute pancreatitis.    Acute pancreatitis-pain improving. Advance diet to regular, low fat diet.  Alcohol dependence with intoxication and concern for withdrawal-continue ciwa, folate, mvi, thiamine.  Access consult  Mild alcoholic hepatitis-low DF. No indication for steroids. LFTs improving  Severe hypophosphatemia-replace with iv sodium phos  Hypokalemia-replace orally  LLL pulmonary nodule-this has decreased in size since most recent imaging suggesting a benign etiology  Pseudocyst seen on CT-this will require repeat imaging as an outpatient in 3-6 months  Lovenox 40 mg SC daily for DVT prophylaxis.  Full code.  Discussed with patient and nursing staff.  Anticipate discharge home  1-2 days if not requiring ativan and electrolytes are improved      Vikram Salinas MD  [facility]  08/08/23  11:08 EDT    I wore protective equipment throughout this patient encounter including a face mask, gloves and protective eyewear.  Hand hygiene was performed before donning protective equipment and after removal when leaving the room.

## 2023-08-09 PROBLEM — E83.39 HYPOPHOSPHATEMIA: Status: ACTIVE | Noted: 2023-08-09

## 2023-08-09 PROBLEM — R11.2 NAUSEA WITH VOMITING: Status: ACTIVE | Noted: 2023-08-09

## 2023-08-09 LAB
ALBUMIN SERPL-MCNC: 3.9 G/DL (ref 3.5–5.2)
ALBUMIN/GLOB SERPL: 1.3 G/DL
ALP SERPL-CCNC: 77 U/L (ref 39–117)
ALT SERPL W P-5'-P-CCNC: 53 U/L (ref 1–33)
ANION GAP SERPL CALCULATED.3IONS-SCNC: 17.7 MMOL/L (ref 5–15)
AST SERPL-CCNC: 146 U/L (ref 1–32)
BILIRUB SERPL-MCNC: 0.7 MG/DL (ref 0–1.2)
BUN SERPL-MCNC: <2 MG/DL (ref 6–20)
BUN/CREAT SERPL: ABNORMAL
CALCIUM SPEC-SCNC: 9.4 MG/DL (ref 8.6–10.5)
CHLORIDE SERPL-SCNC: 96 MMOL/L (ref 98–107)
CHOLEST SERPL-MCNC: 205 MG/DL (ref 0–200)
CO2 SERPL-SCNC: 22.3 MMOL/L (ref 22–29)
CREAT SERPL-MCNC: 0.39 MG/DL (ref 0.57–1)
DEPRECATED RDW RBC AUTO: 45.8 FL (ref 37–54)
EGFRCR SERPLBLD CKD-EPI 2021: 135.9 ML/MIN/1.73
ERYTHROCYTE [DISTWIDTH] IN BLOOD BY AUTOMATED COUNT: 15.6 % (ref 12.3–15.4)
GLOBULIN UR ELPH-MCNC: 2.9 GM/DL
GLUCOSE SERPL-MCNC: 102 MG/DL (ref 65–99)
HCT VFR BLD AUTO: 32.4 % (ref 34–46.6)
HDLC SERPL-MCNC: 59 MG/DL (ref 40–60)
HGB BLD-MCNC: 10.7 G/DL (ref 12–15.9)
LDLC SERPL CALC-MCNC: 135 MG/DL (ref 0–100)
LDLC/HDLC SERPL: 2.27 {RATIO}
MAGNESIUM SERPL-MCNC: 1.6 MG/DL (ref 1.6–2.6)
MCH RBC QN AUTO: 27.1 PG (ref 26.6–33)
MCHC RBC AUTO-ENTMCNC: 33 G/DL (ref 31.5–35.7)
MCV RBC AUTO: 82 FL (ref 79–97)
PHOSPHATE SERPL-MCNC: 2.6 MG/DL (ref 2.5–4.5)
PLATELET # BLD AUTO: 180 10*3/MM3 (ref 140–450)
PMV BLD AUTO: 10.7 FL (ref 6–12)
POTASSIUM SERPL-SCNC: 3.2 MMOL/L (ref 3.5–5.2)
POTASSIUM SERPL-SCNC: 3.4 MMOL/L (ref 3.5–5.2)
PROT SERPL-MCNC: 6.8 G/DL (ref 6–8.5)
QT INTERVAL: 392 MS
RBC # BLD AUTO: 3.95 10*6/MM3 (ref 3.77–5.28)
SODIUM SERPL-SCNC: 136 MMOL/L (ref 136–145)
TRIGL SERPL-MCNC: 60 MG/DL (ref 0–150)
VLDLC SERPL-MCNC: 11 MG/DL (ref 5–40)
WBC NRBC COR # BLD: 4.68 10*3/MM3 (ref 3.4–10.8)

## 2023-08-09 PROCEDURE — 25010000002 ONDANSETRON PER 1 MG: Performed by: STUDENT IN AN ORGANIZED HEALTH CARE EDUCATION/TRAINING PROGRAM

## 2023-08-09 PROCEDURE — 93005 ELECTROCARDIOGRAM TRACING: CPT | Performed by: STUDENT IN AN ORGANIZED HEALTH CARE EDUCATION/TRAINING PROGRAM

## 2023-08-09 PROCEDURE — 25010000002 THIAMINE PER 100 MG: Performed by: NURSE PRACTITIONER

## 2023-08-09 PROCEDURE — 84132 ASSAY OF SERUM POTASSIUM: CPT | Performed by: STUDENT IN AN ORGANIZED HEALTH CARE EDUCATION/TRAINING PROGRAM

## 2023-08-09 PROCEDURE — 0 POTASSIUM CHLORIDE 10 MEQ/100ML SOLUTION: Performed by: STUDENT IN AN ORGANIZED HEALTH CARE EDUCATION/TRAINING PROGRAM

## 2023-08-09 PROCEDURE — 80061 LIPID PANEL: CPT | Performed by: STUDENT IN AN ORGANIZED HEALTH CARE EDUCATION/TRAINING PROGRAM

## 2023-08-09 PROCEDURE — 25010000002 PROCHLORPERAZINE 10 MG/2ML SOLUTION: Performed by: STUDENT IN AN ORGANIZED HEALTH CARE EDUCATION/TRAINING PROGRAM

## 2023-08-09 PROCEDURE — 25010000002 HYDROMORPHONE 1 MG/ML SOLUTION: Performed by: STUDENT IN AN ORGANIZED HEALTH CARE EDUCATION/TRAINING PROGRAM

## 2023-08-09 PROCEDURE — 25010000002 ENOXAPARIN PER 10 MG: Performed by: STUDENT IN AN ORGANIZED HEALTH CARE EDUCATION/TRAINING PROGRAM

## 2023-08-09 PROCEDURE — 80053 COMPREHEN METABOLIC PANEL: CPT | Performed by: STUDENT IN AN ORGANIZED HEALTH CARE EDUCATION/TRAINING PROGRAM

## 2023-08-09 PROCEDURE — 83735 ASSAY OF MAGNESIUM: CPT | Performed by: STUDENT IN AN ORGANIZED HEALTH CARE EDUCATION/TRAINING PROGRAM

## 2023-08-09 PROCEDURE — 93010 ELECTROCARDIOGRAM REPORT: CPT | Performed by: INTERNAL MEDICINE

## 2023-08-09 PROCEDURE — 84100 ASSAY OF PHOSPHORUS: CPT | Performed by: STUDENT IN AN ORGANIZED HEALTH CARE EDUCATION/TRAINING PROGRAM

## 2023-08-09 PROCEDURE — 85027 COMPLETE CBC AUTOMATED: CPT | Performed by: STUDENT IN AN ORGANIZED HEALTH CARE EDUCATION/TRAINING PROGRAM

## 2023-08-09 PROCEDURE — 25010000002 THIAMINE HCL 200 MG/2ML SOLUTION: Performed by: NURSE PRACTITIONER

## 2023-08-09 PROCEDURE — 25010000002 HYDROMORPHONE PER 4 MG: Performed by: STUDENT IN AN ORGANIZED HEALTH CARE EDUCATION/TRAINING PROGRAM

## 2023-08-09 RX ORDER — POTASSIUM CHLORIDE 7.45 MG/ML
10 INJECTION INTRAVENOUS
Status: COMPLETED | OUTPATIENT
Start: 2023-08-09 | End: 2023-08-09

## 2023-08-09 RX ORDER — ONDANSETRON 2 MG/ML
6 INJECTION INTRAMUSCULAR; INTRAVENOUS EVERY 4 HOURS PRN
Status: DISCONTINUED | OUTPATIENT
Start: 2023-08-09 | End: 2023-08-12 | Stop reason: HOSPADM

## 2023-08-09 RX ADMIN — THIAMINE HYDROCHLORIDE 200 MG: 100 INJECTION, SOLUTION INTRAMUSCULAR; INTRAVENOUS at 18:13

## 2023-08-09 RX ADMIN — POTASSIUM CHLORIDE 10 MEQ: 7.46 INJECTION, SOLUTION INTRAVENOUS at 16:47

## 2023-08-09 RX ADMIN — ONDANSETRON 4 MG: 2 INJECTION INTRAMUSCULAR; INTRAVENOUS at 06:30

## 2023-08-09 RX ADMIN — HYDROMORPHONE HYDROCHLORIDE 0.5 MG: 1 INJECTION, SOLUTION INTRAMUSCULAR; INTRAVENOUS; SUBCUTANEOUS at 10:22

## 2023-08-09 RX ADMIN — ENOXAPARIN SODIUM 40 MG: 100 INJECTION SUBCUTANEOUS at 14:23

## 2023-08-09 RX ADMIN — THIAMINE HYDROCHLORIDE 200 MG: 100 INJECTION, SOLUTION INTRAMUSCULAR; INTRAVENOUS at 02:41

## 2023-08-09 RX ADMIN — SODIUM CHLORIDE 125 ML/HR: 9 INJECTION, SOLUTION INTRAVENOUS at 12:30

## 2023-08-09 RX ADMIN — HYDROMORPHONE HYDROCHLORIDE 0.5 MG: 1 INJECTION, SOLUTION INTRAMUSCULAR; INTRAVENOUS; SUBCUTANEOUS at 22:15

## 2023-08-09 RX ADMIN — HYDROMORPHONE HYDROCHLORIDE 0.5 MG: 1 INJECTION, SOLUTION INTRAMUSCULAR; INTRAVENOUS; SUBCUTANEOUS at 02:41

## 2023-08-09 RX ADMIN — HYDROMORPHONE HYDROCHLORIDE 0.5 MG: 1 INJECTION, SOLUTION INTRAMUSCULAR; INTRAVENOUS; SUBCUTANEOUS at 18:13

## 2023-08-09 RX ADMIN — POTASSIUM CHLORIDE 10 MEQ: 7.46 INJECTION, SOLUTION INTRAVENOUS at 12:30

## 2023-08-09 RX ADMIN — Medication 10 ML: at 21:08

## 2023-08-09 RX ADMIN — Medication 10 ML: at 08:55

## 2023-08-09 RX ADMIN — HYDROMORPHONE HYDROCHLORIDE 0.5 MG: 1 INJECTION, SOLUTION INTRAMUSCULAR; INTRAVENOUS; SUBCUTANEOUS at 06:39

## 2023-08-09 RX ADMIN — THIAMINE HYDROCHLORIDE 200 MG: 100 INJECTION, SOLUTION INTRAMUSCULAR; INTRAVENOUS at 09:00

## 2023-08-09 RX ADMIN — SODIUM CHLORIDE 125 ML/HR: 9 INJECTION, SOLUTION INTRAVENOUS at 04:02

## 2023-08-09 RX ADMIN — ONDANSETRON 4 MG: 2 INJECTION INTRAMUSCULAR; INTRAVENOUS at 10:22

## 2023-08-09 RX ADMIN — Medication 10 ML: at 04:08

## 2023-08-09 RX ADMIN — POTASSIUM CHLORIDE 10 MEQ: 7.46 INJECTION, SOLUTION INTRAVENOUS at 15:47

## 2023-08-09 RX ADMIN — ONDANSETRON 6 MG: 2 INJECTION INTRAMUSCULAR; INTRAVENOUS at 21:07

## 2023-08-09 RX ADMIN — HYDROMORPHONE HYDROCHLORIDE 0.5 MG: 1 INJECTION, SOLUTION INTRAMUSCULAR; INTRAVENOUS; SUBCUTANEOUS at 14:26

## 2023-08-09 RX ADMIN — PROCHLORPERAZINE EDISYLATE 5 MG: 5 INJECTION INTRAMUSCULAR; INTRAVENOUS at 12:09

## 2023-08-09 RX ADMIN — PROCHLORPERAZINE EDISYLATE 5 MG: 5 INJECTION INTRAMUSCULAR; INTRAVENOUS at 18:12

## 2023-08-09 RX ADMIN — POTASSIUM CHLORIDE 10 MEQ: 7.46 INJECTION, SOLUTION INTRAVENOUS at 14:26

## 2023-08-09 RX ADMIN — PROCHLORPERAZINE EDISYLATE 5 MG: 5 INJECTION INTRAMUSCULAR; INTRAVENOUS at 04:07

## 2023-08-09 RX ADMIN — ONDANSETRON 4 MG: 2 INJECTION INTRAMUSCULAR; INTRAVENOUS at 02:41

## 2023-08-09 RX ADMIN — HYDROXYZINE PAMOATE 25 MG: 25 CAPSULE ORAL at 16:52

## 2023-08-09 RX ADMIN — ONDANSETRON 6 MG: 2 INJECTION INTRAMUSCULAR; INTRAVENOUS at 14:26

## 2023-08-09 NOTE — PLAN OF CARE
Goal Outcome Evaluation:  Plan of Care Reviewed With: patient         VSS with a few bouts of tachycardia when dry heaving. Nauseated a few times, prn zofran and compazine given as ordered.  Communicate needs wells.

## 2023-08-09 NOTE — PROGRESS NOTES
Name: Lisa Gibson ADMIT: 2023   : 1990  PCP: Provider, No Known    MRN: [id number] LOS: 0 days   AGE/SEX: 33 y.o. female  ROOM: [facility]     Subjective   Subjective   Patient seen and examined this morning.  Hospital day 2.  At time of my examination, patient is awake, alert, resting in bed.  She continues to endorse feelings of ongoing nausea, discussed with nursing, she did have episodes of emesis overnight, nonbloody.  Also, continues to endorse ongoing abdominal pain, improved with pain medication, relatively stable from prior.       Objective   Objective   Vital Signs  Temp:  [98.4 øF (36.9 øC)-99.3 øF (37.4 øC)] 98.4 øF (36.9 øC)  Heart Rate:  [81-88] 81  Resp:  [16] 16  BP: (144-167)/() 167/95  SpO2:  [96 %-100 %] 100 %  on   ;   Device (Oxygen Therapy): room air  Body mass index is 18.75 kg/mý.  Physical Exam  Vitals and nursing note reviewed.   Constitutional:       Appearance: She is ill-appearing.      Comments: Appears uncomfortable secondary to nausea and abdominal pain   Eyes:      General: No scleral icterus.  Cardiovascular:      Rate and Rhythm: Normal rate and regular rhythm.      Pulses: Normal pulses.      Heart sounds: Normal heart sounds.   Pulmonary:      Effort: Pulmonary effort is normal. No respiratory distress.      Breath sounds: Normal breath sounds.   Abdominal:      General: Bowel sounds are normal. There is no distension.      Palpations: Abdomen is soft.      Tenderness: There is abdominal tenderness. There is no guarding or rebound.   Skin:     General: Skin is warm and dry.   Neurological:      Mental Status: She is alert.     Results Review     I reviewed the patient's new clinical results.  Results from last 7 days   Lab Units 23  0615 23  1218 23  0129   WBC 10*3/mm3 4.15 5.87 4.93   HEMOGLOBIN g/dL 9.3* 10.6* 12.3   PLATELETS 10*3/mm3 167 234 270     Results from last 7 days   Lab Units 23  0733 23  0615 23  1218  08/07/23  0129   SODIUM mmol/L 136 135* 137 136   POTASSIUM mmol/L 3.2* 3.3* 4.0 3.9   CHLORIDE mmol/L 96* 96* 97* 94*   CO2 mmol/L 22.3 23.4 17.0* 18.7*   BUN mg/dL <2* 4* 8 10   CREATININE mg/dL 0.39* 0.61 0.42* 0.65   GLUCOSE mg/dL 102* 93 94 107*   EGFR mL/min/1.73 135.9 122.0 133.5 120.1     Results from last 7 days   Lab Units 08/09/23  0733 08/08/23  0615 08/07/23  0129   ALBUMIN g/dL 3.9 4.1 5.2   BILIRUBIN mg/dL 0.7 0.9 0.7   ALK PHOS U/L 77 78 112   AST (SGOT) U/L 146* 95* 151*   ALT (SGPT) U/L 53* 50* 73*     Results from last 7 days   Lab Units 08/09/23  0733 08/08/23  0615 08/07/23  1218 08/07/23  0129   CALCIUM mg/dL 9.4 9.3 9.1 9.4   ALBUMIN g/dL 3.9 4.1  --  5.2   MAGNESIUM mg/dL 1.6 2.0  --   --    PHOSPHORUS mg/dL 2.6 1.1*  --   --      Results from last 7 days   Lab Units 08/07/23  1919 08/07/23  1521 08/07/23  1218   LACTATE mmol/L 1.3 2.4* 3.3*     No results found for: HGBA1C, POCGLU    No radiology results for the last day    I have personally reviewed all medications:  Scheduled Medications  enoxaparin, 40 mg, Subcutaneous, Q24H  folic acid, 1 mg, Oral, Daily  multivitamin, 1 tablet, Oral, Daily  senna-docusate sodium, 2 tablet, Oral, BID  sodium chloride, 10 mL, Intravenous, Q12H  thiamine (B-1) IV, 200 mg, Intravenous, Q8H   Followed by  [START ON 8/12/2023] thiamine, 100 mg, Oral, Daily    Infusions  sodium chloride, 125 mL/hr, Last Rate: 125 mL/hr (08/09/23 0930)    Diet  Diet: Gastrointestinal Diets, Vegetarian, Liquid Diets; Pescatarian (Allows fish, dairy, eggs); Full Liquid; Fat-Restricted; Texture: Regular Texture (IDDSI 7); Fluid Consistency: Thin (IDDSI 0)    I have personally reviewed:  [x]  Laboratory   [x]  Microbiology   [x]  Radiology   [x]  EKG/Telemetry  [x]  Cardiology/Vascular   []  Pathology    []  Records       Assessment/Plan     Active Hospital Problems    Diagnosis  POA    **Acute pancreatitis [K85.90]  Yes    Hypophosphatemia [E83.39]  Yes    Nausea with vomiting  [R11.2]  Yes    Alcohol dependence [F10.20]  Yes    Pulmonary nodule [R91.1]  Yes    Pancreatic pseudocyst [K86.3]  Yes    Alcoholic hepatitis [K70.10]  Yes    Alcohol withdrawal [F10.939]  Yes    Hypokalemia [E87.6]  Yes    Epigastric pain [R10.13]  Yes      Resolved Hospital Problems   No resolved problems to display.       33 y.o. female admitted with Acute pancreatitis.    Acute pancreatitis  Pancreatic pseudocyst  Epigastric/generalized abdominal pain  Nausea with vomiting  - Patient with complaints of worsening abdominal pain, nausea and vomiting on arrival. Imaging obtained and showed peripancreatic stranding and edema, consistent with acute pancreatitis, along with again noting area of low-attenuation lesion within the neck of the pancreas, felt to be slightly larger than on prior exam, favored to represent a small pseudocyst. Lipase elevated at 167.  - Patient currently receiving IVF, PRN medications for symptom control. However, she continues to have nausea and vomiting, worse after diet was advanced to regular/low fat/GI/vegetarian on 08/08.  - Change diet back to full liquids, given her intolerance to regular diet at present. Increase dosage of Zofran.  - Continue IVF, PRN medications for symptom control, will monitor symptoms closely, advance diet as tolerated. Check EKG to assess QT interval.  - Repeat Lipase in AM. If no improvement and/or worsens, consider GI consultation for further evaluation.  - Will need repeat CT within 3-6 months for evaluation of likely pseudocyst.    Alcohol dependence with intoxication on admission, now with concern for withdrawal  - Patient endorses drinking 1-2 glasses of wine on most days, on arrival per H&P.  - ETOH level on admission: 86.  - At present, she endorses slight restlessness, anxiety, nausea with vomiting. CIWA scores reviewed, values low, does not appear to have required Ativan over past 24 hours.  - [facility] consulted, note reviewed, they are going to  "attempt to see again tomorrow (08/10). Will follow their plans/recommendations. Greatly appreciate their help.  - CIWA protocol, MV, thiamine, folic acid  - Telemetry, pulse oximetry, seizure precautions, aspiration precautions.    Transaminitis  Alcoholic hepatitis  - LFT remain elevated on most recent labs, hepatocellular predominant, secondary to alcohol. Values slightly worse on most recent labs when compared to prior.  - Order repeat CMP in AM for reassessment.  Consider further evaluation if indicated, based upon results of future testing.    Hypokalemia  - K low on most recent labs; Will replete via electrolyte protocol. Follow up repeat labs to guide ongoing management decisions. Replete PRN per protocol. Continue to monitor.    Anemia  - Hemoglobin low on most recent labs, slightly worse from prior, no evidence of overt blood loss, etiology likely dilutional in the setting of IVF.  No indications for acute intervention at this time.    - Order repeat CBC in AM for reassessment. Continue to monitor, transfuse for hemoglobin <7.    Pulmonary nodule  - CT obtained following arrival showing \"Area of nodularity within the left lower lobe has decreased in size, while another has resolved. This would suggest a benign etiology. \"  - Will need outpatient follow up with PCP for reassessment.    Hypophosphatemia, resolved  - Previously low, treated with replacement per protocol, improved on repeat testing.     All workup, problems and plans new to me today. First day taking care of patient.    Lovenox 40 mg SC daily for DVT prophylaxis.  Full code.  Discussed with patient and nursing staff.  Anticipate discharge home  TBD, once symptoms improved, patient tolerating regular diet and not requiring ativan .      Silvio Chaidez DO  [facility]  08/09/23  10:18 EDT      "

## 2023-08-09 NOTE — PROGRESS NOTES
[facility] spoke w/ RN who reports pt continues to not be appropriate for consult at this time, discussed would follow-up later in shift to see if any changes have occurred and pt more appropriate; otherwise will attempt again tomorrow.

## 2023-08-09 NOTE — PLAN OF CARE
Goal Outcome Evaluation:  Plan of Care Reviewed With: patient        Progress: no change  Outcome Evaluation: Patient A & O. Makes needs known. PRN Dilaudid, Compazine, and Zofran given today. Resting in between care. Potassium replaced per protocol. Family at bedside. No s/s of distress noted.

## 2023-08-09 NOTE — PAYOR COMM NOTE
"Lisa Talley (33 y.o. Female)        PLEASE SEE ATTACHED FOR INPT AUTH AND DAYS APPROVED.     REF#[id number]    PLEASE CALL   OR  [phone]    THANK YOU    AZAM ALLISON LPN CCP       Date of Birth   1990    Social Security Number       Address   [address] [place] [address]   [place] [address]    Home Phone   [phone]    MRN   [id number]       Faith   None    Marital Status                               Admission Date   8/7/23    Admission Type   Emergency    Admitting Provider   Mandeep Bustillo MD    Attending Provider   Silvio Chaidez DO    Department, Room/Bed   [address], [address]/1       Discharge Date       Discharge Disposition       Discharge Destination                                 Attending Provider: Silvio Chaidez DO    Allergies: No Known Allergies    Isolation: None   Infection: None   Code Status: CPR    Ht: 175.3 cm (69\")   Wt: 57.6 kg (126 lb 15.8 oz)    Admission Cmt: None   Principal Problem: Acute pancreatitis [K85.90]                   Active Insurance as of 8/7/2023       Primary Coverage       Payor Plan Insurance Group Employer/Plan Group    CIG[employer] CIGNA [id number]       Payor Plan Address Payor Plan Phone Number Payor Plan Fax Number Effective Dates    [address] [phone]  1/1/2021 - None Entered    [facility] [id number]         Subscriber Name Subscriber Birth Date Member ID       LISA TALLEY 1990 [id number]                     Emergency Contacts        (Rel.) Home Phone Work Phone Mobile Phone    Diana Shelton (Mother) -- -- [phone]              Tram: [facility] [id number]  Tax ID [id number]     History & Physical        Vikram Salinas MD at 08/07/23 0830              Patient Name:  Lisa Talley  YOB: 1990  MRN:  [id number]  Admit Date:  8/7/2023  Patient Care Team:  Provider, No Known as PCP - General      Subjective   History Present Illness "     Chief Complaint   Patient presents with    Abdominal Pain       Ms. Gibson is a 33 y.o. woman with alcohol dependence and alcoholic pancreatitis in the past who presents with progressively worsening abdominal pain with associated nausea, vomiting, and diarrhea for the past several days.  She tells me that she only drinks 1-2 glasses of wine most days. Her ETOH just came back this afternoon still elevated despite not having had anything to drink for several hours prior to presentation.    History of Present Illness  Review of Systems   Constitutional:  Negative for chills, fatigue and fever.   HENT:  Negative for postnasal drip and rhinorrhea.    Eyes: Negative.    Respiratory:  Negative for cough and shortness of breath.    Cardiovascular:  Negative for chest pain and palpitations.   Gastrointestinal:  Positive for abdominal pain, diarrhea, nausea and vomiting. Negative for constipation.   Endocrine: Negative.    Genitourinary:  Negative for dysuria, flank pain, frequency and urgency.   Musculoskeletal:  Negative for arthralgias and myalgias.   Skin:  Negative for rash and wound.   Allergic/Immunologic: Negative.    Neurological:  Positive for tremors. Negative for light-headedness and numbness.   Hematological: Negative.    Psychiatric/Behavioral:  Negative for confusion. The patient is nervous/anxious.       Personal History     History reviewed. No pertinent past medical history.  History reviewed. No pertinent surgical history.  History reviewed. No pertinent family history.  Social History     Tobacco Use    Smoking status: Never    Smokeless tobacco: Never   Vaping Use    Vaping Use: Never used   Substance Use Topics    Drug use: Never     No current facility-administered medications on file prior to encounter.     Current Outpatient Medications on File Prior to Encounter   Medication Sig Dispense Refill    dicyclomine (BENTYL) 20 MG tablet Take 1 tablet by mouth Every 8 (Eight) Hours As Needed  (abdominal pain). 20 tablet 0     No Known Allergies    Objective    Objective     Vital Signs  Temp:  [97.7 øF (36.5 øC)-98.2 øF (36.8 øC)] 97.7 øF (36.5 øC)  Heart Rate:  [] 87  Resp:  [16] 16  BP: (126-140)/(85-91) 137/91  SpO2:  [95 %-98 %] 97 %  on   ;   Device (Oxygen Therapy): room air  Body mass index is 18.75 kg/mý.    Physical Exam  Vitals and nursing note reviewed.   Constitutional:       General: She is not in acute distress.     Appearance: She is normal weight. She is not toxic-appearing.   HENT:      Head: Normocephalic and atraumatic.      Mouth/Throat:      Mouth: Mucous membranes are moist.      Pharynx: Oropharynx is clear. No oropharyngeal exudate.   Eyes:      Extraocular Movements: Extraocular movements intact.      Conjunctiva/sclera: Conjunctivae normal.      Pupils: Pupils are equal, round, and reactive to light.   Cardiovascular:      Rate and Rhythm: Normal rate and regular rhythm.      Heart sounds: Normal heart sounds.   Pulmonary:      Effort: Pulmonary effort is normal. No respiratory distress.      Breath sounds: Normal breath sounds. No wheezing, rhonchi or rales.   Abdominal:      General: Bowel sounds are normal. There is no distension.      Palpations: Abdomen is soft.      Tenderness: There is abdominal tenderness. There is no guarding or rebound.   Musculoskeletal:         General: No tenderness.      Cervical back: Normal range of motion and neck supple.      Right lower leg: No edema.      Left lower leg: No edema.   Skin:     General: Skin is warm and dry.      Findings: No erythema or rash.   Neurological:      General: No focal deficit present.      Mental Status: She is alert and oriented to person, place, and time.   Psychiatric:         Mood and Affect: Mood is anxious. Affect is flat.         Behavior: Behavior normal.       Results Review:  I reviewed the patient's new clinical results.  I reviewed the patient's new imaging results and agree with the  interpretation.  I reviewed the patient's other test results and agree with the interpretation  I personally viewed and interpreted the patient's EKG/Telemetry data  Discussed with ED provider.    Lab Results (last 24 hours)       Procedure Component Value Units Date/Time    CBC & Differential [[id number]]  (Abnormal) Collected: 08/07/23 0129    Specimen: Blood from Arm, Left Updated: 08/07/23 0139    Narrative:      The following orders were created for panel order CBC & Differential.  Procedure                               Abnormality         Status                     ---------                               -----------         ------                     CBC Auto Differential[[id number]]        Abnormal            Final result                 Please view results for these tests on the individual orders.    Comprehensive Metabolic Panel [[id number]]  (Abnormal) Collected: 08/07/23 0129    Specimen: Blood from Arm, Left Updated: 08/07/23 0157     Glucose 107 mg/dL      BUN 10 mg/dL      Creatinine 0.65 mg/dL      Sodium 136 mmol/L      Potassium 3.9 mmol/L      Chloride 94 mmol/L      CO2 18.7 mmol/L      Calcium 9.4 mg/dL      Total Protein 9.0 g/dL      Albumin 5.2 g/dL      ALT (SGPT) 73 U/L      AST (SGOT) 151 U/L      Alkaline Phosphatase 112 U/L      Total Bilirubin 0.7 mg/dL      Globulin 3.8 gm/dL      A/G Ratio 1.4 g/dL      BUN/Creatinine Ratio 15.4     Anion Gap 23.3 mmol/L      eGFR 120.1 mL/min/1.73     Narrative:      GFR Normal >60  Chronic Kidney Disease <60  Kidney Failure <15      Lipase [[id number]]  (Abnormal) Collected: 08/07/23 0129    Specimen: Blood from Arm, Left Updated: 08/07/23 0157     Lipase 167 U/L     hCG, Serum, Qualitative [[id number]]  (Normal) Collected: 08/07/23 0129    Specimen: Blood from Arm, Left Updated: 08/07/23 0219     HCG Qualitative Negative    CBC Auto Differential [[id number]]  (Abnormal) Collected: 08/07/23 0129    Specimen: Blood from Arm, Left Updated: 08/07/23 0139      WBC 4.93 10*3/mm3      RBC 4.60 10*6/mm3      Hemoglobin 12.3 g/dL      Hematocrit 38.2 %      MCV 83.0 fL      MCH 26.7 pg      MCHC 32.2 g/dL      RDW 16.2 %      RDW-SD 47.7 fl      MPV 9.7 fL      Platelets 270 10*3/mm3      Neutrophil % 44.4 %      Lymphocyte % 46.7 %      Monocyte % 7.1 %      Eosinophil % 0.0 %      Basophil % 1.4 %      Immature Grans % 0.4 %      Neutrophils, Absolute 2.19 10*3/mm3      Lymphocytes, Absolute 2.30 10*3/mm3      Monocytes, Absolute 0.35 10*3/mm3      Eosinophils, Absolute 0.00 10*3/mm3      Basophils, Absolute 0.07 10*3/mm3      Immature Grans, Absolute 0.02 10*3/mm3      nRBC 0.0 /100 WBC     Urinalysis With Microscopic If Indicated (No Culture) - Urine, Clean Catch [[id number]]  (Abnormal) Collected: 08/07/23 0439    Specimen: Urine, Clean Catch Updated: 08/07/23 0513     Color, UA Yellow     Appearance, UA Clear     pH, UA 6.0     Specific Gravity, UA >=1.030     Glucose, UA Negative     Ketones, UA 80 mg/dL (3+)     Bilirubin, UA Negative     Blood, UA Small (1+)     Protein, UA >=300 mg/dL (3+)     Leuk Esterase, UA Negative     Nitrite, UA Negative     Urobilinogen, UA 1.0 E.U./dL    Urine Drug Screen - Urine, Clean Catch [[id number]]  (Normal) Collected: 08/07/23 0439    Specimen: Urine, Clean Catch Updated: 08/07/23 0514     Amphet/Methamphet, Screen Negative     Barbiturates Screen, Urine Negative     Benzodiazepine Screen, Urine Negative     Cocaine Screen, Urine Negative     Opiate Screen Negative     THC, Screen, Urine Negative     Methadone Screen, Urine Negative     Oxycodone Screen, Urine Negative     Fentanyl, Urine Negative    Narrative:      Negative Thresholds Per Drugs Screened:    Amphetamines                 500 ng/ml  Barbiturates                 200 ng/ml  Benzodiazepines              100 ng/ml  Cocaine                      300 ng/ml  Methadone                    300 ng/ml  Opiates                      300 ng/ml  Oxycodone                    100  ng/ml  THC                           50 ng/ml  Fentanyl                       5 ng/ml      The Normal Value for all drugs tested is negative. This report includes final unconfirmed screening results to be used for medical treatment purposes only. Unconfirmed results must not be used for non-medical purposes such as employment or legal testing. Clinical consideration should be applied to any drug of abuse test, particularly when unconfirmed results are used.            Urinalysis, Microscopic Only - Urine, Clean Catch [[id number]] Collected: 08/07/23 0439    Specimen: Urine, Clean Catch Updated: 08/07/23 0538     RBC, UA 0-2 /HPF      WBC, UA None Seen /HPF      Bacteria, UA None Seen /HPF      Squamous Epithelial Cells, UA 0-2 /HPF      Hyaline Casts, UA 0-2 /LPF      Methodology Manual Light Microscopy            Imaging Results (Last 24 Hours)       Procedure Component Value Units Date/Time    CT Abdomen Pelvis With Contrast [[id number]] Collected: 08/07/23 0424     Updated: 08/07/23 0435    Narrative:      CT OF THE ABDOMEN AND PELVIS WITH CONTRAST     HISTORY: Abdominal pain. HISTORY of pancreatitis.     COMPARISON: 04/29/2023     TECHNIQUE: Axial CT imaging was obtained through the abdomen and pelvis.  IV contrast was administered.     FINDINGS:  Area of nodularity is again noted within the left lower lobe. It  measures approximately 1.3 cm, previously 1.7 cm. Additional subpleural  nodule has resolved. Marked diffuse hepatic steatosis is again noted.  The stomach, adrenal glands, spleen, and gallbladder appear  unremarkable. The patient is noted to have inflammatory stranding  surrounding the pancreas, as well some pancreatic edema, suggesting  pancreatitis. Low-attenuation lesion is again noted within the neck of  the pancreas. This measures up to 1.2 cm. This is slightly larger than  on prior exam, when it measured 1 cm. No peripancreatic collections are  seen. Main portal vein and splenic vein are patent.  There is no evidence  of gastric outlet obstruction. There is no biliary dilatation. The liver  is enlarged, measuring up to 18.6 cm in craniocaudal dimensions. The  kidneys enhance symmetrically. There is no hydronephrosis. There is a  simple appearing right renal cyst. No additional follow-up is necessary.  No distal ureteral or bladder stones are seen. Uterus appears normal.  There is no bowel obstruction. Appendix is normal. No acute osseous  abnormalities are seen.       Impression:         1. The patient has peripancreatic stranding and edema, concerning for  acute pancreatitis. Patient is again noted to have a low-attenuation  lesion within the neck of the pancreas. This appears slightly larger  than on prior exam. It is favored to represent a small pseudocyst.  2. Area of nodularity within the left lower lobe has decreased in size,  while another has resolved. This would suggest a benign etiology.     Radiation dose reduction techniques were utilized, including automated  exposure control and exposure modulation based on body size.     This report was finalized on 8/7/2023 4:31 AM by Dr. Diamond Goodman M.D.                   No orders to display        Assessment/Plan     Active Hospital Problems    Diagnosis  POA    **Acute pancreatitis [K85.90]  Yes    Alcohol dependence [F10.20]  Yes    Pulmonary nodule [R91.1]  Yes    Pancreatic pseudocyst [K86.3]  Yes    Alcoholic hepatitis [K70.10]  Yes      Resolved Hospital Problems   No resolved problems to display.       Ms. Gibson is a 33 y.o. woman with alcohol dependence and alcoholic pancreatitis in the past who presents with alcoholic pancreatitis, mild alcoholic hepatitis, intoxication, alcoholic ketoacidosis    Acute pancreatitis-IVF, pain control, bowel regimen  Alcohol dependence with concern for withdrawal-ciwa, mvi, folate, thiamine, access consult  Mild alcoholic hepatitis-check INR to calculate discriminate function, but with normal T bili, I  "suspect her DR will be low   Alcoholic ketoacidosis-IVF  Pulmonary nodule-LLL pulm nodule has decreased in size suggesting benign etiology   Pseudocyst seen on CT-this will require repeat imaging as an outpatient in 3-6 months  I discussed the patient's findings and my recommendations with patient and nursing staff.    VTE Prophylaxis - Pharmacy to dose Lovenox.  Code Status - Full code.       Vikram Salinas MD  [facility]  08/07/23  08:30 EDT     Electronically signed by Vikram Salinas MD at 08/07/23 1353          Emergency Department Notes        Boris Paul, GARDENIA at 08/07/23 0519          Nursing report ED to floor  [facility]  33 y.o.  female    HPI :   Chief Complaint   Patient presents with    Abdominal Pain       Admitting doctor:   Mandeep Bustillo MD    Admitting diagnosis:   The primary encounter diagnosis was Acute pancreatitis, unspecified complication status, unspecified pancreatitis type. A diagnosis of History of alcohol abuse was also pertinent to this visit.    Code status:   Current Code Status       Date Active Code Status Order ID Comments User Context       8/7/2023 0506 CPR (Attempt to Resuscitate) [id number]  Dina Blackburn, APRN ED        Question Answer    Code Status (Patient has no pulse and is not breathing) CPR (Attempt to Resuscitate)    Medical Interventions (Patient has pulse or is breathing) Full Support    Release to patient Routine Release                    Allergies:   Patient has no known allergies.    Isolation:   No active isolations    Intake and Output  No intake or output data in the 24 hours ending 08/07/23 0519    Weight:       08/07/23  0121   Weight: 54.4 kg (120 lb)       Most recent vitals:   Vitals:    08/07/23 0121 08/07/23 0439   BP: 126/85 140/88   Pulse: 115 91   Resp: 16    Temp: 98.2 øF (36.8 øC)    TempSrc: Tympanic    SpO2: 98% 95%   Weight: 54.4 kg (120 lb)    Height: 175.3 cm (69\")        Active LDAs/IV Access:   Lines, " Drains & Airways       Active LDAs       Name Placement date Placement time Site Days    Peripheral IV 08/07/23 0310 Right Antecubital 08/07/23 0310  Antecubital  less than 1                    Labs (abnormal labs have a star):   Labs Reviewed   COMPREHENSIVE METABOLIC PANEL - Abnormal; Notable for the following components:       Result Value    Glucose 107 (*)     Chloride 94 (*)     CO2 18.7 (*)     Total Protein 9.0 (*)     ALT (SGPT) 73 (*)     AST (SGOT) 151 (*)     Anion Gap 23.3 (*)     All other components within normal limits    Narrative:     GFR Normal >60  Chronic Kidney Disease <60  Kidney Failure <15     LIPASE - Abnormal; Notable for the following components:    Lipase 167 (*)     All other components within normal limits   URINALYSIS W/ MICROSCOPIC IF INDICATED (NO CULTURE) - Abnormal; Notable for the following components:    Ketones, UA 80 mg/dL (3+) (*)     Blood, UA Small (1+) (*)     Protein, UA >=300 mg/dL (3+) (*)     All other components within normal limits   CBC WITH AUTO DIFFERENTIAL - Abnormal; Notable for the following components:    RDW 16.2 (*)     Lymphocyte % 46.7 (*)     Eosinophil % 0.0 (*)     All other components within normal limits   HCG, SERUM, QUALITATIVE - Normal   URINE DRUG SCREEN - Normal    Narrative:     Negative Thresholds Per Drugs Screened:    Amphetamines                 500 ng/ml  Barbiturates                 200 ng/ml  Benzodiazepines              100 ng/ml  Cocaine                      300 ng/ml  Methadone                    300 ng/ml  Opiates                      300 ng/ml  Oxycodone                    100 ng/ml  THC                           50 ng/ml  Fentanyl                       5 ng/ml      The Normal Value for all drugs tested is negative. This report includes final unconfirmed screening results to be used for medical treatment purposes only. Unconfirmed results must not be used for non-medical purposes such as employment or legal testing. Clinical  consideration should be applied to any drug of abuse test, particularly when unconfirmed results are used.           RAINBOW DRAW    Narrative:     The following orders were created for panel order [place] Draw.  Procedure                               Abnormality         Status                     ---------                               -----------         ------                     Green Top (Gel)[[id number]]                                  Final result               Lavender Top[[id number]]                                     Final result               Gold Top - SST[[id number]]                                                              Light Blue Top[[id number]]                                   Final result                 Please view results for these tests on the individual orders.   ETHANOL   URINALYSIS, MICROSCOPIC ONLY   BASIC METABOLIC PANEL   CBC WITH AUTO DIFFERENTIAL   LACTIC ACID, PLASMA   PROTIME-INR   CBC AND DIFFERENTIAL    Narrative:     The following orders were created for panel order CBC & Differential.  Procedure                               Abnormality         Status                     ---------                               -----------         ------                     CBC Auto Differential[[id number]]        Abnormal            Final result                 Please view results for these tests on the individual orders.   GREEN TOP   LAVENDER TOP   LIGHT BLUE TOP       EKG:   No orders to display       Meds given in ED:   Medications   sodium chloride 0.9 % flush 10 mL (has no administration in time range)   sodium chloride 0.9 % flush 10 mL (has no administration in time range)   sodium chloride 0.9 % flush 10 mL (has no administration in time range)   sodium chloride 0.9 % infusion 40 mL (has no administration in time range)   sennosides-docusate (PERICOLACE) 8.6-50 MG per tablet 2 tablet (has no administration in time range)     And   polyethylene glycol (MIRALAX) packet 17 g (has no  administration in time range)     And   bisacodyl (DULCOLAX) EC tablet 5 mg (has no administration in time range)     And   bisacodyl (DULCOLAX) suppository 10 mg (has no administration in time range)   Magnesium Standard Dose Replacement - Follow Nurse / BPA Driven Protocol (has no administration in time range)   sodium chloride 0.9 % infusion (has no administration in time range)   acetaminophen (TYLENOL) tablet 650 mg (has no administration in time range)     Or   acetaminophen (TYLENOL) 160 MG/5ML solution 650 mg (has no administration in time range)     Or   acetaminophen (TYLENOL) suppository 650 mg (has no administration in time range)   thiamine (B-1) injection 200 mg (has no administration in time range)     Followed by   thiamine (VITAMIN B-1) tablet 100 mg (has no administration in time range)   folic acid (FOLVITE) tablet 1 mg (has no administration in time range)   LORazepam (ATIVAN) tablet 1 mg (has no administration in time range)     Or   LORazepam (ATIVAN) injection 1 mg (has no administration in time range)     Or   LORazepam (ATIVAN) tablet 2 mg (has no administration in time range)     Or   LORazepam (ATIVAN) injection 2 mg (has no administration in time range)     Or   LORazepam (ATIVAN) injection 2 mg (has no administration in time range)     Or   LORazepam (ATIVAN) injection 2 mg (has no administration in time range)   sodium chloride 0.9 % bolus 1,000 mL (0 mL Intravenous Stopped 8/7/23 0455)   ondansetron (ZOFRAN) injection 4 mg (4 mg Intravenous Given 8/7/23 0321)   HYDROmorphone (DILAUDID) injection 0.5 mg (0.5 mg Intravenous Given 8/7/23 0321)   iopamidol (ISOVUE-300) 61 % injection 100 mL (85 mL Intravenous Given by Other 8/7/23 8309)   ondansetron (ZOFRAN) injection 4 mg (4 mg Intravenous Given 8/7/23 1464)       Imaging results:  CT Abdomen Pelvis With Contrast    Result Date: 8/7/2023   1. The patient has peripancreatic stranding and edema, concerning for acute pancreatitis. Patient  is again noted to have a low-attenuation lesion within the neck of the pancreas. This appears slightly larger than on prior exam. It is favored to represent a small pseudocyst. 2. Area of nodularity within the left lower lobe has decreased in size, while another has resolved. This would suggest a benign etiology.  Radiation dose reduction techniques were utilized, including automated exposure control and exposure modulation based on body size.  This report was finalized on 8/7/2023 4:31 AM by Dr. Diamond Goodman M.D.       Ambulatory status:   - up /c standby assist    Social issues:   Social History     Socioeconomic History    Marital status:    Tobacco Use    Smoking status: Never    Smokeless tobacco: Never   Vaping Use    Vaping Use: Never used   Substance and Sexual Activity    Drug use: Never       NIH Stroke Scale:       Boris Paul RN  08/07/23 05:19 EDT          Electronically signed by Boris Paul RN at 08/07/23 0552       Prince Moseley MD at 08/07/23 0427          MD ATTESTATION NOTE    The ALL and I have discussed this patient's history, physical exam, and treatment plan.  I have reviewed the documentation and personally had a face to face interaction with the patient. I affirm the documentation and agree with the treatment and plan.  The attached note describes my personal findings.      I provided a substantive portion of the care of the patient.  I personally performed the physical exam in its entirety, and below are my findings.       Brief HPI: This patient is a 33-year-old female with a previous history of acute pancreatitis presenting to the emergency room today with generalized abdominal discomfort that has been present for the past several days.  She does report some associated nausea/vomiting/diarrhea but denies fever/chills, back pain, chest pain, or shortness of breath.      PHYSICAL EXAM  ED Triage Vitals [08/07/23 0121]   Temp Heart Rate Resp BP SpO2   98.2 øF (36.8  øC) 115 16 126/85 98 %      Temp src Heart Rate Source Patient Position BP Location FiO2 (%)   Tympanic -- -- -- --         GENERAL: In mild distress due to discomfort, nontoxic in appearance  HENT: nares patent  EYES: no scleral icterus  CV: regular rhythm, normal rate, no M/R/G  RESPIRATORY: normal effort, lungs clear bilaterally  ABDOMEN: soft, mild generalized tenderness, no rebound or guarding  MUSCULOSKELETAL: no deformity, no edema  NEURO: alert, moves all extremities, follows commands  PSYCH:  calm, cooperative  SKIN: warm, dry    Vital signs and nursing notes reviewed.        Plan: We will treat the patient's discomfort and nausea as we obtain labs, urinalysis, as well as a CT scan of the abdomen and pelvis in the ED today.  We will monitor and reassess following.       Prince Moseley MD  08/07/23 0552      Electronically signed by Prince Moseley MD at 08/07/23 0552       Alexander Curry PA at 08/07/23 0312       Attestation signed by Prince Moseley MD at 08/07/23 0556    MD ATTESTATION NOTE    The ALL and I have discussed this patient's history, physical exam, and treatment plan.  I have reviewed the documentation and personally had a face to face interaction with the patient. I affirm the documentation and agree with the treatment and plan.  The separate note describes my personal findings.    I agree with the PA's or NP's findings and plan.  I reviewed the PA's or NP's note.  Prince Moseley MD                 EMERGENCY DEPARTMENT ENCOUNTER    Room Number:  04/04  Date of encounter:  8/7/2023  PCP: Provider, No Known  Patient Care Team:  Provider, No Known as PCP - General   Independent Historians: Patient    HPI:  Chief Complaint: Abdominal pain  A complete HPI/ROS/PMH/PSH/SH/FH are unobtainable due to: N/A    Chronic or social conditions impacting patient care (social determinants of health): None    Context: Lisa Gibson is a 33 y.o. female with past medical history of  alcohol induced pancreatitis who arrives to the ED with complaint of abdominal pain.  Patient states that her symptoms started on Friday morning, has been coming and going but getting progressively worse.  Is been associate with nausea, vomiting, diarrhea, chills and sweats.  Denies any measured fever, no constipation, has had some burning and dark color to her urine but denies any blood.  Patient states her symptoms feel similar to previous episodes of pancreatitis.  Patient was also concerned about sepsis due to recent admission for that.    Review of prior external notes (non-ED): Last hospital admission on 5/2/2023 for severe sepsis related to E. coli bacteremia and pyelonephritis, alcohol abuse and withdrawal, polysubstance abuse and metabolic encephalopathy.    Review of prior external test results outside of this encounter: Most recent imaging of the abdomen from 4/29/2023 that showed a 10 mm hypodensity in the setting of the pancreatic head that was indeterminant.    PAST MEDICAL HISTORY  Active Ambulatory Problems     Diagnosis Date Noted    Alcohol-induced acute pancreatitis without infection or necrosis 09/21/2021    Transaminitis 09/22/2021    Epigastric pain 09/22/2021    Hepatic steatosis 09/24/2021    Gallbladder sludge 09/24/2021    Severe sepsis 04/27/2023    E coli bacteremia 04/30/2023    Alcohol withdrawal 04/30/2023    Alcohol abuse 04/30/2023    Acute pyelonephritis 04/30/2023    Essential hypertension 04/30/2023    Metabolic encephalopathy 04/30/2023    Polysubstance abuse 04/30/2023    Hypokalemia 04/30/2023     Resolved Ambulatory Problems     Diagnosis Date Noted    No Resolved Ambulatory Problems     No Additional Past Medical History       The patient has a COVID HM Topic on their chart, and they are fully vaccinated.    PAST SURGICAL HISTORY  No past surgical history on file.      FAMILY HISTORY  No family history on file.      SOCIAL HISTORY  Social History     Socioeconomic History     Marital status:    Tobacco Use    Smoking status: Never    Smokeless tobacco: Never   Vaping Use    Vaping Use: Never used   Substance and Sexual Activity    Drug use: Never         ALLERGIES  Patient has no known allergies.        REVIEW OF SYSTEMS  Review of Systems     All systems reviewed and negative except for those discussed in HPI.       PHYSICAL EXAM    I have reviewed the triage vital signs and nursing notes.    ED Triage Vitals [08/07/23 0121]   Temp Heart Rate Resp BP SpO2   98.2 øF (36.8 øC) 115 16 126/85 98 %      Temp src Heart Rate Source Patient Position BP Location FiO2 (%)   Tympanic -- -- -- --       Physical Exam    GENERAL: alert and oriented x4, ill-appearing and mildly distressed  HENT: normocephalic, atraumatic, dry mucous membranes  EYES: no scleral icterus, PERRL, EOMI  CV: regular rhythm, tachycardic  RESPIRATORY: normal effort, CTAB  ABDOMEN: diffusely tender with guarding, no rebound, normal bowel sounds  MUSCULOSKELETAL: no deformity  NEURO: alert, moves all extremities, no focal neuro deficits, follows commands  SKIN: warm, dry, no rash   Psych: Appropriate mood and affect      Nursing notes and vital signs reviewed      LAB RESULTS  Recent Results (from the past 24 hour(s))   Comprehensive Metabolic Panel    Collection Time: 08/07/23  1:29 AM    Specimen: Arm, Left; Blood   Result Value Ref Range    Glucose 107 (H) 65 - 99 mg/dL    BUN 10 6 - 20 mg/dL    Creatinine 0.65 0.57 - 1.00 mg/dL    Sodium 136 136 - 145 mmol/L    Potassium 3.9 3.5 - 5.2 mmol/L    Chloride 94 (L) 98 - 107 mmol/L    CO2 18.7 (L) 22.0 - 29.0 mmol/L    Calcium 9.4 8.6 - 10.5 mg/dL    Total Protein 9.0 (H) 6.0 - 8.5 g/dL    Albumin 5.2 3.5 - 5.2 g/dL    ALT (SGPT) 73 (H) 1 - 33 U/L    AST (SGOT) 151 (H) 1 - 32 U/L    Alkaline Phosphatase 112 39 - 117 U/L    Total Bilirubin 0.7 0.0 - 1.2 mg/dL    Globulin 3.8 gm/dL    A/G Ratio 1.4 g/dL    BUN/Creatinine Ratio 15.4 7.0 - 25.0    Anion Gap 23.3 (H) 5.0 -  15.0 mmol/L    eGFR 120.1 >60.0 mL/min/1.73   Lipase    Collection Time: 08/07/23  1:29 AM    Specimen: Arm, Left; Blood   Result Value Ref Range    Lipase 167 (H) 13 - 60 U/L   hCG, Serum, Qualitative    Collection Time: 08/07/23  1:29 AM    Specimen: Arm, Left; Blood   Result Value Ref Range    HCG Qualitative Negative Negative   Green Top (Gel)    Collection Time: 08/07/23  1:29 AM   Result Value Ref Range    Extra Tube Hold for add-ons.    Lavender Top    Collection Time: 08/07/23  1:29 AM   Result Value Ref Range    Extra Tube hold for add-on    Light Blue Top    Collection Time: 08/07/23  1:29 AM   Result Value Ref Range    Extra Tube Hold for add-ons.    CBC Auto Differential    Collection Time: 08/07/23  1:29 AM    Specimen: Arm, Left; Blood   Result Value Ref Range    WBC 4.93 3.40 - 10.80 10*3/mm3    RBC 4.60 3.77 - 5.28 10*6/mm3    Hemoglobin 12.3 12.0 - 15.9 g/dL    Hematocrit 38.2 34.0 - 46.6 %    MCV 83.0 79.0 - 97.0 fL    MCH 26.7 26.6 - 33.0 pg    MCHC 32.2 31.5 - 35.7 g/dL    RDW 16.2 (H) 12.3 - 15.4 %    RDW-SD 47.7 37.0 - 54.0 fl    MPV 9.7 6.0 - 12.0 fL    Platelets 270 140 - 450 10*3/mm3    Neutrophil % 44.4 42.7 - 76.0 %    Lymphocyte % 46.7 (H) 19.6 - 45.3 %    Monocyte % 7.1 5.0 - 12.0 %    Eosinophil % 0.0 (L) 0.3 - 6.2 %    Basophil % 1.4 0.0 - 1.5 %    Immature Grans % 0.4 0.0 - 0.5 %    Neutrophils, Absolute 2.19 1.70 - 7.00 10*3/mm3    Lymphocytes, Absolute 2.30 0.70 - 3.10 10*3/mm3    Monocytes, Absolute 0.35 0.10 - 0.90 10*3/mm3    Eosinophils, Absolute 0.00 0.00 - 0.40 10*3/mm3    Basophils, Absolute 0.07 0.00 - 0.20 10*3/mm3    Immature Grans, Absolute 0.02 0.00 - 0.05 10*3/mm3    nRBC 0.0 0.0 - 0.2 /100 WBC   Urinalysis With Microscopic If Indicated (No Culture) - Urine, Clean Catch    Collection Time: 08/07/23  4:39 AM    Specimen: Urine, Clean Catch   Result Value Ref Range    Color, UA Yellow Yellow, Straw    Appearance, UA Clear Clear    pH, UA 6.0 5.0 - 8.0    Specific Gravity,  UA >=1.030 1.005 - 1.030    Glucose, UA Negative Negative    Ketones, UA 80 mg/dL (3+) (A) Negative    Bilirubin, UA Negative Negative    Blood, UA Small (1+) (A) Negative    Protein, UA >=300 mg/dL (3+) (A) Negative    Leuk Esterase, UA Negative Negative    Nitrite, UA Negative Negative    Urobilinogen, UA 1.0 E.U./dL 0.2 - 1.0 E.U./dL   Urine Drug Screen - Urine, Clean Catch    Collection Time: 08/07/23  4:39 AM    Specimen: Urine, Clean Catch   Result Value Ref Range    Amphet/Methamphet, Screen Negative Negative    Barbiturates Screen, Urine Negative Negative    Benzodiazepine Screen, Urine Negative Negative    Cocaine Screen, Urine Negative Negative    Opiate Screen Negative Negative    THC, Screen, Urine Negative Negative    Methadone Screen, Urine Negative Negative    Oxycodone Screen, Urine Negative Negative    Fentanyl, Urine Negative Negative       Ordered the above labs and independently reviewed and interpreted the results by me.        RADIOLOGY  CT Abdomen Pelvis With Contrast    Result Date: 8/7/2023  CT OF THE ABDOMEN AND PELVIS WITH CONTRAST  HISTORY: Abdominal pain. HISTORY of pancreatitis.  COMPARISON: 04/29/2023  TECHNIQUE: Axial CT imaging was obtained through the abdomen and pelvis. IV contrast was administered.  FINDINGS: Area of nodularity is again noted within the left lower lobe. It measures approximately 1.3 cm, previously 1.7 cm. Additional subpleural nodule has resolved. Marked diffuse hepatic steatosis is again noted. The stomach, adrenal glands, spleen, and gallbladder appear unremarkable. The patient is noted to have inflammatory stranding surrounding the pancreas, as well some pancreatic edema, suggesting pancreatitis. Low-attenuation lesion is again noted within the neck of the pancreas. This measures up to 1.2 cm. This is slightly larger than on prior exam, when it measured 1 cm. No peripancreatic collections are seen. Main portal vein and splenic vein are patent. There is no  evidence of gastric outlet obstruction. There is no biliary dilatation. The liver is enlarged, measuring up to 18.6 cm in craniocaudal dimensions. The kidneys enhance symmetrically. There is no hydronephrosis. There is a simple appearing right renal cyst. No additional follow-up is necessary. No distal ureteral or bladder stones are seen. Uterus appears normal. There is no bowel obstruction. Appendix is normal. No acute osseous abnormalities are seen.       1. The patient has peripancreatic stranding and edema, concerning for acute pancreatitis. Patient is again noted to have a low-attenuation lesion within the neck of the pancreas. This appears slightly larger than on prior exam. It is favored to represent a small pseudocyst. 2. Area of nodularity within the left lower lobe has decreased in size, while another has resolved. This would suggest a benign etiology.  Radiation dose reduction techniques were utilized, including automated exposure control and exposure modulation based on body size.  This report was finalized on 8/7/2023 4:31 AM by Dr. Diamond Goodman M.D.       I ordered the above noted radiological studies.  These were independently interpreted and reviewed by me.  See dictation for official radiology interpretation.      PROCEDURES    Procedures      MEDICATIONS GIVEN IN ER    Medications   sodium chloride 0.9 % flush 10 mL (has no administration in time range)   sodium chloride 0.9 % flush 10 mL (has no administration in time range)   sodium chloride 0.9 % flush 10 mL (has no administration in time range)   sodium chloride 0.9 % infusion 40 mL (has no administration in time range)   sennosides-docusate (PERICOLACE) 8.6-50 MG per tablet 2 tablet (has no administration in time range)     And   polyethylene glycol (MIRALAX) packet 17 g (has no administration in time range)     And   bisacodyl (DULCOLAX) EC tablet 5 mg (has no administration in time range)     And   bisacodyl (DULCOLAX) suppository 10 mg  (has no administration in time range)   Magnesium Standard Dose Replacement - Follow Nurse / BPA Driven Protocol (has no administration in time range)   sodium chloride 0.9 % infusion (has no administration in time range)   acetaminophen (TYLENOL) tablet 650 mg (has no administration in time range)     Or   acetaminophen (TYLENOL) 160 MG/5ML solution 650 mg (has no administration in time range)     Or   acetaminophen (TYLENOL) suppository 650 mg (has no administration in time range)   thiamine (B-1) injection 200 mg (has no administration in time range)     Followed by   thiamine (VITAMIN B-1) tablet 100 mg (has no administration in time range)   folic acid (FOLVITE) tablet 1 mg (has no administration in time range)   LORazepam (ATIVAN) tablet 1 mg (has no administration in time range)     Or   LORazepam (ATIVAN) injection 1 mg (has no administration in time range)     Or   LORazepam (ATIVAN) tablet 2 mg (has no administration in time range)     Or   LORazepam (ATIVAN) injection 2 mg (has no administration in time range)     Or   LORazepam (ATIVAN) injection 2 mg (has no administration in time range)     Or   LORazepam (ATIVAN) injection 2 mg (has no administration in time range)   sodium chloride 0.9 % bolus 1,000 mL (0 mL Intravenous Stopped 8/7/23 0455)   ondansetron (ZOFRAN) injection 4 mg (4 mg Intravenous Given 8/7/23 0321)   HYDROmorphone (DILAUDID) injection 0.5 mg (0.5 mg Intravenous Given 8/7/23 0321)   iopamidol (ISOVUE-300) 61 % injection 100 mL (85 mL Intravenous Given by Other 8/7/23 0345)   ondansetron (ZOFRAN) injection 4 mg (4 mg Intravenous Given 8/7/23 0454)         PROGRESS, DATA ANALYSIS, CONSULTS, AND MEDICAL DECISION MAKING    All labs have been independently reviewed by me.  All radiology studies have been reviewed by me and discussed with radiologist dictating the report.   EKG's independently viewed and interpreted by me.  Discussion below represents my analysis of pertinent findings  related to patient's condition, differential diagnosis, treatment plan and final disposition.    DDx:  Includes, but is not limited to pancreatitis, gastritis, esophagitis, cholelithiasis, cholecystitis, ileus, gastroenteritis, bowel obstruction, pyelonephritis, UTI, kidney stone    After my initial assessment I am concerned about acute pancreatitis and patient may need hospitalization due to pain control.    ED Course as of 08/07/23 0522   Mon Aug 07, 2023   0313 HCG Qualitative: Negative [[place]]   0321 WBC: 4.93 [[place]]   0321 Hemoglobin: 12.3 [[place]]   0321 Hematocrit: 38.2 [[place]]   0321 Platelets: 270 [[place]IA]   0321 Glucose(!): 107 [[place]]   0321 BUN: 10 [[place]]   0321 Creatinine: 0.65 [[place]]   0321 Sodium: 136 [[place]]   0321 Potassium: 3.9 [[place]]   0321 Chloride(!): 94 [[place]]   0321 CO2(!): 18.7 [[place]]   0321 ALT (SGPT)(!): 73 [[place]IA]   0321 AST (SGOT)(!): 151 [[place]IA]   0321 Lipase(!): 167 [GE[place]IA]   0351 CT images independently viewed by me, my interpretation is stranding seen around the pancreas and a 10 mm hypodensity [[place]]   0447 Patient rechecked, pain improved, discussed results of CT scan showing acute appendicitis and recommendation for admission.  Patient expresses understanding and agrees with plan. [[place]]   0503 Patient history, ER presentation and evaluation discussed with ROD Ochoa, will place in observation to a [facility] bed per Dr. Bustillo. [[place]]   0522 Ketones, UA(!): 80 mg/dL (3+) [[place]]      ED Course User Index  [[place]] Alexander Curry PA       MDM: Patient's exam is consistent with acute appendicitis and patient will be admitted for IV fluids, antiemetics, and pain control.    PPE:  The patient wore a mask and I wore a mask and all appropriate PPE throughout the entire patient encounter.      AS OF 05:22 EDT VITALS:    BP - 140/88  HR - 91  TEMP - 98.2 øF (36.8 øC) (Tympanic)  O2 SATS - 95%      DIAGNOSIS  Final diagnoses:   Acute pancreatitis, unspecified complication status, unspecified pancreatitis type   History of alcohol abuse          DISPOSITION  ADMISSION    Discussed treatment plan and reason for admission with pt/family and admitting physician.  Pt/family voiced understanding of the plan for admission for further testing/treatment as needed.       Note Disclaimer: At [facility], we believe that sharing information builds trust and better relationships. You are receiving this note because you recently visited [facility]. It is possible you will see health information before a provider has talked with you about it. This kind of information can be easy to misunderstand. To help you fully understand what it means for your health, we urge you to discuss this note with your provider.       Alexander Curry PA  08/07/23 0508       Alexander Curry PA  08/07/23 0522      Electronically signed by Prince Moseley MD at 08/07/23 0556       Sandra Briggs, RN at 08/07/23 0116          Severe abd pain past 3 days, n/v, chest pain    Electronically signed by Sandra Briggs, RN at 08/07/23 0116       Oxygen Therapy (since admission)       Date/Time SpO2 Device (Oxygen Therapy) Flow (L/min) Oxygen Concentration (%) ETCO2 (mmHg)    08/09/23 0900 -- room air -- -- --    08/09/23 0803 100 -- -- -- --    08/09/23 0000 -- room air -- -- --    08/08/23 2342 96 room air -- -- --    08/08/23 2024 -- room air -- -- --    08/08/23 1406 -- room air -- -- --    08/08/23 1357 97 -- -- -- --    08/08/23 0745 99 -- -- -- --    08/08/23 0744 -- room air -- -- --    08/08/23 0041 97 room air -- -- --    08/08/23 0004 -- room air -- -- --    08/07/23 2200 99 room air -- -- --    08/07/23 2111 -- room air -- -- --    08/07/23 1502 -- nasal cannula 2 -- --    08/07/23 1458 96 nasal cannula -- -- --    08/07/23 1341 95 -- -- -- --    08/07/23 1010 96 nasal cannula 2 -- --    08/07/23 1000 93 room air -- -- --    08/07/23 0944 91 room air -- -- --    08/07/23 0903 -- room air -- -- --    08/07/23 0615 97 room air -- -- --    08/07/23 0439 95 -- -- -- --     08/07/23 0121 98 -- -- -- --          Intake & Output (last 3 days)         08/06 0701 08/07 0700 08/07 0701 08/08 0700 08/08 0701 08/09 0700 08/09 0701  08/10 0700    P.O.  480 240 240    Total Intake(mL/kg)  480 (8.3) 240 (4.2) 240 (4.2)    Net  +480 +240 +240            Urine Unmeasured Occurrence  2 x 1 x     Stool Unmeasured Occurrence   1 x     Emesis Unmeasured Occurrence   3 x            Lines, Drains & Airways       Active LDAs       Name Placement date Placement time Site Days    Peripheral IV 08/07/23 0310 Right Antecubital 08/07/23  0310  Antecubital  2    Peripheral IV 08/08/23 1538 Anterior;Right Forearm 08/08/23  1538  Forearm  less than 1              Inactive LDAs       Name Placement date Placement time Removal date Removal time Site Days    [REMOVED] Peripheral IV 06/01/23 0809 Left Antecubital 06/01/23  0809  08/07/23  0312  Antecubital  66                  Medication Administration Report for Lisa Gibson as of 08/09/23 1133     Legend:    Given Hold Not Given Due Canceled Entry Other Actions    Time Time (Time) Time Time-Action         Discontinued     Completed     Future     MAR Hold     Linked             Medications 08/07/23 08/08/23 08/09/23      acetaminophen (TYLENOL) tablet 650 mg  Dose: 650 mg  Freq: Every 4 Hours PRN Route: PO  PRN Reason: Mild Pain  Start: 08/07/23 0505   Admin Instructions:   Based on patient request - if ordered for moderate or severe pain, provider allows for administration of a medication prescribed for a lower pain scale.  Do not exceed 4 grams of acetaminophen in a 24 hr period. Max dose of 2gm for AST/ALT greater than 120 units/L    If given for fever, use fever parameter: fever greater than 100.4 øF.    If given for pain, use the following pain scale:   Mild Pain = Pain Score of 1-3, CPOT 1-2  Moderate Pain = Pain Score of 4-6, CPOT 3-4  Severe Pain = Pain Score of 7-10, CPOT 5-8         Or  acetaminophen (TYLENOL) 160 MG/5ML solution 650 mg  Dose:  650 mg  Freq: Every 4 Hours PRN Route: PO  PRN Reason: Mild Pain  Start: 08/07/23 0505   Admin Instructions:   Based on patient request - if ordered for moderate or severe pain, provider allows for administration of a medication prescribed for a lower pain scale.  Do not exceed 4 grams of acetaminophen in a 24 hr period. Max dose of 2gm for AST/ALT greater than 120 units/L    If given for fever, use fever parameter: fever greater than 100.4 øF.    If given for pain, use the following pain scale:   Mild Pain = Pain Score of 1-3, CPOT 1-2  Moderate Pain = Pain Score of 4-6, CPOT 3-4  Severe Pain = Pain Score of 7-10, CPOT 5-8         Or  acetaminophen (TYLENOL) suppository 650 mg  Dose: 650 mg  Freq: Every 4 Hours PRN Route: RE  PRN Reason: Mild Pain  Start: 08/07/23 0505   Admin Instructions:   Based on patient request - if ordered for moderate or severe pain, provider allows for administration of a medication prescribed for a lower pain scale.  Do not exceed 4 grams of acetaminophen in a 24 hr period. Max dose of 2gm for AST/ALT greater than 120 units/L    If given for fever, use fever parameter: fever greater than 100.4 øF.    If given for pain, use the following pain scale:   Mild Pain = Pain Score of 1-3, CPOT 1-2  Moderate Pain = Pain Score of 4-6, CPOT 3-4  Severe Pain = Pain Score of 7-10, CPOT 5-8          sennosides-docusate (PERICOLACE) 8.6-50 MG per tablet 2 tablet  Dose: 2 tablet  Freq: 2 Times Daily Route: PO  Start: 08/07/23 0900   Admin Instructions:   HOLD MEDICATION IF PATIENT HAS HAD BOWEL MOVEMENT. Start bowel management regimen if patient has not had a bowel movement after 12 hours.    (0923)-Not Given     (2116)-Not Given         (0945)-Not Given     (2322)-Not Given         (0855)-Not Given     2100           And  polyethylene glycol (MIRALAX) packet 17 g  Dose: 17 g  Freq: Daily PRN Route: PO  PRN Reason: Constipation  PRN Comment: Use if senna-docusate is ineffective  Start: 08/07/23 0504    Admin Instructions:   Use if no bowel movement after 12 hours. Mix in 6-8 ounces of water.  Use 4-8 ounces of water, tea, or juice for each 17 gram dose.         And  bisacodyl (DULCOLAX) EC tablet 5 mg  Dose: 5 mg  Freq: Daily PRN Route: PO  PRN Reason: Constipation  PRN Comment: Use if polyethylene glycol is ineffective  Start: 08/07/23 0504   Admin Instructions:   Use if no bowel movement after 12 hours.  Swallow whole. Do not crush, split, or chew tablet.         And  bisacodyl (DULCOLAX) suppository 10 mg  Dose: 10 mg  Freq: Daily PRN Route: RE  PRN Reason: Constipation  PRN Comment: Use if bisacodyl oral is ineffective  Start: 08/07/23 0504   Admin Instructions:   Use if no bowel movement after 12 hours.  Hold for diarrhea          Enoxaparin Sodium (LOVENOX) syringe 40 mg  Dose: 40 mg  Freq: Every 24 Hours Route: SC  Indications of Use: PROPHYLAXIS OF VENOUS THROMBOEMBOLISM  Start: 08/07/23 1500   Admin Instructions:   Give subcutaneous in abdomen only. Do not massage site after injection.    1505-Given          1434-Given          1500             folic acid (FOLVITE) tablet 1 mg  Dose: 1 mg  Freq: Daily Route: PO  Start: 08/07/23 0900    1045-Given          0923-Given          (0855)-Not Given             HYDROmorphone (DILAUDID) injection 0.5 mg  Dose: 0.5 mg  Freq: Every 4 Hours PRN Route: IV  PRN Reason: Severe Pain  Start: 08/08/23 1145   Admin Instructions:   Based on patient request - if ordered for moderate or severe pain, provider allows for administration of a medication prescribed for a lower pain scale.  If given for pain, use the following pain scale:  Mild Pain = Pain Score of 1-3, CPOT 1-2  Moderate Pain = Pain Score of 4-6, CPOT 3-4  Severe Pain = Pain Score of 7-10, CPOT 5-8     1741-Given     2152-Given         0241-Given     0639-Given     1022-Given           hydrOXYzine pamoate (VISTARIL) capsule 25 mg  Dose: 25 mg  Freq: Every 8 Hours PRN Route: PO  PRN Reason: Anxiety  Start:  08/07/23 0907   Admin Instructions:   Caution: Look alike/sound alike drug alert    1045-Given          0437-Given              LORazepam (ATIVAN) tablet 1 mg  Dose: 1 mg  Freq: Every 1 Hour PRN Route: PO  PRN Reason: Withdrawal  PRN Comment: For CIWA-Ar 8-10  Start: 08/07/23 0505   End: 08/14/23 0504   Admin Instructions:   Reassess 1 Hour After Administration   Caution: Look alike/sound alike drug alert    1701-Not Given:  See Alt          0025-Not Given:  See Alt             Or  LORazepam (ATIVAN) injection 1 mg  Dose: 1 mg  Freq: Every 1 Hour PRN Route: IV  PRN Reason: Withdrawal  PRN Comment: For CIWA-Ar 8-10  Start: 08/07/23 0505   End: 08/14/23 0504   Admin Instructions:   Reassess 1 Hour After Administration   Caution: Look alike/sound alike drug alert. Dilute 1:1 with normal saline.    1701-Not Given:  See Alt          0025-Not Given:  See Alt             Or  LORazepam (ATIVAN) tablet 2 mg  Dose: 2 mg  Freq: Every 1 Hour PRN Route: PO  PRN Reason: Withdrawal  PRN Comment: For CIWA-Ar 11-15 or -160, DBP , -125  Start: 08/07/23 0505   End: 08/14/23 0504   Admin Instructions:   Reassess 1 Hour After Administration.   Caution: Look alike/sound alike drug alert    1701-Not Given:  See Alt          0025-Not Given:  See Alt             Or  LORazepam (ATIVAN) injection 2 mg  Dose: 2 mg  Freq: Every 1 Hour PRN Route: IV  PRN Reason: Withdrawal  PRN Comment: For CIWA-Ar 11-15 or -160, DBP , -125  Start: 08/07/23 0505   End: 08/14/23 0504   Admin Instructions:   Reassess 1 Hour After Administration   Caution: Look alike/sound alike drug alert. Dilute 1:1 with normal saline.    1701-Given          0025-Given             Or  LORazepam (ATIVAN) injection 2 mg  Dose: 2 mg  Freq: Every 15 Minutes PRN Route: IV  PRN Reason: Withdrawal  PRN Comment: For CIWA-Ar Greater Than 15 or SBP greater than 160, DBP greater than 110, or HR greater than 125.  Start: 08/07/23 0505   End: 08/14/23  "0504   Admin Instructions:   Reassess 15 Minutes After Each Administration.  If CIWA-Ar Does Not Decrease Contact Provider To Discuss Transfer to Higher Level of Care.   Caution: Look alike/sound alike drug alert. Dilute 1:1 with normal saline.    1701-Not Given:  See Alt          0025-Not Given:  See Alt             Or  LORazepam (ATIVAN) injection 2 mg  Dose: 2 mg  Freq: Every 15 Minutes PRN Route: IM  PRN Reason: Withdrawal  PRN Comment: For CIWA-Ar Greater Than 15 or SBP greater than 160, DBP greater than 110, or HR greater than 125.  Start: 08/07/23 0505   End: 08/14/23 0504   Admin Instructions:   Reassess 15 Minutes After Each Administration.  If CIWA-Ar Does Not Decrease Contact Provider To Discuss Transfer to Higher Level of Care.   Caution: Look alike/sound alike drug alert. Dilute 1:1 with normal saline.    1701-Not Given:  See Alt          0025-Not Given:  See Alt              Magnesium Standard Dose Replacement - Follow Nurse / BPA Driven Protocol  Freq: As Needed Route: XX  PRN Reason: Other  Start: 08/07/23 0504   Admin Instructions:   Open Order & Select \"[facility] Electrolyte Replacement Protocol Algorithm\" to View Details          multivitamin (THERAGRAN) tablet 1 tablet  Dose: 1 tablet  Freq: Daily Route: PO  Start: 08/07/23 0915   Admin Instructions:       (1226)-Not Given [C]          0923-Given          (0855)-Not Given             nitroglycerin (NITROSTAT) SL tablet 0.4 mg  Dose: 0.4 mg  Freq: Every 5 Minutes PRN Route: SL  PRN Reason: Chest Pain  PRN Comment: Only if SBP Greater Than 100  Start: 08/07/23 1357   Admin Instructions:   If Pain Unrelieved After 3 Doses Notify MD          ondansetron (ZOFRAN) injection 4 mg  Dose: 4 mg  Freq: Every 4 Hours PRN Route: IV  PRN Reasons: Nausea,Vomiting  Start: 08/07/23 1715   End: 08/09/23 1036   Admin Instructions:   \"If multiple N/V medications ordered, use in the following order: Ondansetron, Prochlorperazine, Promethazine. Use PO unless patient refuses " "or patient unable to swallow.\"      2116-Given          0437-Given     1336-Given     1741-Given       2131-Given          0241-Given     0630-Given     1022-Given           ondansetron (ZOFRAN) injection 6 mg  Dose: 6 mg  Freq: Every 4 Hours PRN Route: IV  PRN Reasons: Nausea,Vomiting  Start: 08/09/23 1036   Admin Instructions:   \"If multiple N/V medications ordered, use in the following order: Ondansetron, Prochlorperazine, Promethazine. Use PO unless patient refuses or patient unable to swallow.\"            oxyCODONE-acetaminophen (PERCOCET) 5-325 MG per tablet 1 tablet  Dose: 1 tablet  Freq: Every 6 Hours PRN Route: PO  PRN Reason: Moderate Pain  Start: 08/07/23 0657   End: 08/14/23 0656   Admin Instructions:   Based on patient request - if ordered for moderate or severe pain, provider allows for administration of a medication prescribed for a lower pain scale.  [EVERTON]    Do not exceed 4 grams of acetaminophen in a 24 hr period. Max dose of 2gm for AST/ALT greater than 120 units/L        If given for pain, use the following pain scale:   Mild Pain = Pain Score of 1-3, CPOT 1-2  Moderate Pain = Pain Score of 4-6, CPOT 3-4  Severe Pain = Pain Score of 7-10, CPOT 5-8    2220-Given          0744-Given     1336-Given             prochlorperazine (COMPAZINE) injection 5 mg  Dose: 5 mg  Freq: Every 6 Hours PRN Route: IV  PRN Reasons: Nausea,Vomiting  Start: 08/08/23 1504   Admin Instructions:   \"If multiple N/V medications ordered, use in the following order: Ondansetron, Prochlorperazine, Promethazine. Use PO unless patient refuses or patient unable to swallow.\"       1511-Given     2152-Given         0407-Given             sodium chloride 0.9 % flush 10 mL  Dose: 10 mL  Freq: As Needed Route: IV  PRN Reason: Line Care  Start: 08/07/23 0504      0408-Given             sodium chloride 0.9 % flush 10 mL  Dose: 10 mL  Freq: Every 12 Hours Scheduled Route: IV  Start: 08/07/23 0900    (0903)-Not Given     (2116)-Not " Given         0923-Given     2326-Given         0855-Given     2100            sodium chloride 0.9 % flush 10 mL  Dose: 10 mL  Freq: As Needed Route: IV  PRN Reason: Line Care  Start: 08/07/23 0126          sodium chloride 0.9 % infusion  Rate: 125 mL/hr Dose: 125 mL/hr  Freq: Continuous Route: IV  Start: 08/07/23 0521    0700-New Bag     0924-Currently Infusing     1226-Currently Infusing       1430-Currently Infusing     1510-New Bag     1934-Currently Infusing       2220-New Bag          0021-Currently Infusing     0228-Currently Infusing     0431-Currently Infusing       0534-New Bag     1318-New Bag     2044-New Bag        0402-New Bag     0707-Currently Infusing     0930-Currently Infusing           sodium chloride 0.9 % infusion 40 mL  Dose: 40 mL  Freq: As Needed Route: IV  PRN Reason: Line Care  Start: 08/07/23 0504   Admin Instructions:   Following administration of an IV intermittent medication, flush line with 40mL NS at 100mL/hr.          thiamine (B-1) injection 200 mg  Dose: 200 mg  Freq: Every 8 Hours Scheduled Route: IV  Start: 08/07/23 0600   End: 08/12/23 0959   Admin Instructions:   Doses of up to 250mg, give over 1-2 minutes (IV push). Doses 250mg and above, give over 30 minutes.    (0947)-Not Given [C]     1046-Given     1843-Given        0312-Given     1023-Given     1854-Given        0241-Given     0900-Given     1800          Followed by  thiamine (VITAMIN B-1) tablet 100 mg  Dose: 100 mg  Freq: Daily Route: PO  Start: 08/12/23 1300         Completed Medications  Medications 08/07/23 08/08/23 08/09/23       HYDROmorphone (DILAUDID) injection 0.5 mg  Dose: 0.5 mg  Freq: Once Route: IV  Start: 08/07/23 0330   End: 08/07/23 0321   Admin Instructions:   Based on patient request - if ordered for moderate or severe pain, provider allows for administration of a medication prescribed for a lower pain scale.  If given for pain, use the following pain scale:  Mild Pain = Pain Score of 1-3, CPOT  "1-2  Moderate Pain = Pain Score of 4-6, CPOT 3-4  Severe Pain = Pain Score of 7-10, CPOT 5-8    0321-Given               iopamidol (ISOVUE-300) 61 % injection 100 mL  Dose: 100 mL  Freq: Once in Imaging Route: IV  Start: 08/07/23 0400   End: 08/07/23 0345    0345-Given by Other               ondansetron (ZOFRAN) injection 4 mg  Dose: 4 mg  Freq: Once Route: IV  Start: 08/07/23 0503   End: 08/07/23 0454   Admin Instructions:   \"If multiple N/V medications ordered, use in the following order: Ondansetron, Prochlorperazine, Promethazine. Use PO unless patient refuses or patient unable to swallow.\"      0454-Given               ondansetron (ZOFRAN) injection 4 mg  Dose: 4 mg  Freq: Once Route: IV  Start: 08/07/23 0330   End: 08/07/23 0321   Admin Instructions:   \"If multiple N/V medications ordered, use in the following order: Ondansetron, Prochlorperazine, Promethazine. Use PO unless patient refuses or patient unable to swallow.\"      0321-Given               potassium chloride 10 mEq in 100 mL IVPB  Dose: 10 mEq  Freq: Every 1 Hour Route: IV  Start: 08/08/23 1615   End: 08/08/23 1947   Admin Instructions:   OUTPATIENT/NON-MONITORED UNITS: Potassium Chloride standard bolus infusion rate is a maximum of 10 mEq/hr on unmonitored patients    MONITORED UNITS: Potassium Chloride standard bolus infusion rate is a maximum of 20 mEq/hr on ECG monitored patients ONLY       1537-New Bag     1635-New Bag     1738-New Bag       1847-New Bag              sodium chloride 0.9 % bolus 1,000 mL  Dose: 1,000 mL  Freq: Once Route: IV  Start: 08/07/23 0330   End: 08/07/23 0455 0321-New Bag     0455-Stopped              sodium phosphates 15 mmol in 250 mL 0.9% sodium chloride IVPB  Dose: 15 mmol  Freq: Every 3 Hours Route: IV  Start: 08/08/23 1100   End: 08/08/23 2038     1134-New Bag     1435-New Bag     1738-New Bag           Discontinued Medications  Medications 08/07/23 08/08/23 08/09/23       HYDROmorphone (DILAUDID) injection 0.5 " "mg  Dose: 0.5 mg  Freq: Every 2 Hours PRN Route: IV  PRN Reason: Severe Pain  Start: 08/07/23 0657   End: 08/08/23 1133   Admin Instructions:   Based on patient request - if ordered for moderate or severe pain, provider allows for administration of a medication prescribed for a lower pain scale.  If given for pain, use the following pain scale:  Mild Pain = Pain Score of 1-3, CPOT 1-2  Moderate Pain = Pain Score of 4-6, CPOT 3-4  Severe Pain = Pain Score of 7-10, CPOT 5-8    0720-Given     0924-Given     1226-Given       1431-Given     1651-Given     2116-Given        0018-Given     0437-Given             ondansetron (ZOFRAN) injection 4 mg  Dose: 4 mg  Freq: Every 6 Hours PRN Route: IV  PRN Reasons: Nausea,Vomiting  Start: 08/07/23 0911   End: 08/07/23 1706   Admin Instructions:   \"If multiple N/V medications ordered, use in the following order: Ondansetron, Prochlorperazine, Promethazine. Use PO unless patient refuses or patient unable to swallow.\"      0924-Given     1509-Given              Pharmacy to Dose enoxaparin (LOVENOX)  Freq: Continuous PRN Route: XX  PRN Reason: Consult  Indications of Use: PROPHYLAXIS OF VENOUS THROMBOEMBOLISM  Start: 08/07/23 1352   End: 08/07/23 1416          potassium chloride (K-DUR,KLOR-CON) ER tablet 40 mEq  Dose: 40 mEq  Freq: Every 4 Hours Route: PO  Start: 08/08/23 1100   End: 08/08/23 1859   Admin Instructions:   Swallow whole; do not crush, split, or chew.     1022-Given     (7208)-Not Given                        Blood Administration Record (From admission, onward)      None          Operative/Procedure Notes (all)    No notes of this type exist for this encounter.       CIWA (since admission)       Date/Time CIWA-Ar Score    08/09/23 0900 1    08/09/23 0000 0    08/08/23 2200 0    08/08/23 2000 1    08/08/23 1600 2    08/08/23 1200 2    08/08/23 0744 3    08/08/23 0449 7    08/08/23 0130 0    08/08/23 0020 11    08/08/23 0004 0    08/07/23 2111 4    08/07/23 1842 7    " 23 1654 17    23 1502 8    23 1226 5    23 0900 7    23 0631 5               Physician Progress Notes (all)        Silvio Chaidez, DO at 23 1018              Name: Lisa Gibson ADMIT: 2023   : 1990  PCP: Provider, No Known    MRN: [id number] LOS: 0 days   AGE/SEX: 33 y.o. female  ROOM: [facility]     Subjective   Subjective   Patient seen and examined this morning.  Hospital day 2.  At time of my examination, patient is awake, alert, resting in bed.  She continues to endorse feelings of ongoing nausea, discussed with nursing, she did have episodes of emesis overnight, nonbloody.  Also, continues to endorse ongoing abdominal pain, improved with pain medication, relatively stable from prior.      Objective   Objective   Vital Signs  Temp:  [98.4 øF (36.9 øC)-99.3 øF (37.4 øC)] 98.4 øF (36.9 øC)  Heart Rate:  [81-88] 81  Resp:  [16] 16  BP: (144-167)/() 167/95  SpO2:  [96 %-100 %] 100 %  on   ;   Device (Oxygen Therapy): room air  Body mass index is 18.75 kg/mý.  Physical Exam  Vitals and nursing note reviewed.   Constitutional:       Appearance: She is ill-appearing.      Comments: Appears uncomfortable secondary to nausea and abdominal pain   Eyes:      General: No scleral icterus.  Cardiovascular:      Rate and Rhythm: Normal rate and regular rhythm.      Pulses: Normal pulses.      Heart sounds: Normal heart sounds.   Pulmonary:      Effort: Pulmonary effort is normal. No respiratory distress.      Breath sounds: Normal breath sounds.   Abdominal:      General: Bowel sounds are normal. There is no distension.      Palpations: Abdomen is soft.      Tenderness: There is abdominal tenderness. There is no guarding or rebound.   Skin:     General: Skin is warm and dry.   Neurological:      Mental Status: She is alert.     Results Review     I reviewed the patient's new clinical results.  Results from last 7 days   Lab Units 23  0615 23  1218 23  0129    WBC 10*3/mm3 4.15 5.87 4.93   HEMOGLOBIN g/dL 9.3* 10.6* 12.3   PLATELETS 10*3/mm3 167 234 270     Results from last 7 days   Lab Units 08/09/23  0733 08/08/23  0615 08/07/23  1218 08/07/23  0129   SODIUM mmol/L 136 135* 137 136   POTASSIUM mmol/L 3.2* 3.3* 4.0 3.9   CHLORIDE mmol/L 96* 96* 97* 94*   CO2 mmol/L 22.3 23.4 17.0* 18.7*   BUN mg/dL <2* 4* 8 10   CREATININE mg/dL 0.39* 0.61 0.42* 0.65   GLUCOSE mg/dL 102* 93 94 107*   EGFR mL/min/1.73 135.9 122.0 133.5 120.1     Results from last 7 days   Lab Units 08/09/23 0733 08/08/23  0615 08/07/23  0129   ALBUMIN g/dL 3.9 4.1 5.2   BILIRUBIN mg/dL 0.7 0.9 0.7   ALK PHOS U/L 77 78 112   AST (SGOT) U/L 146* 95* 151*   ALT (SGPT) U/L 53* 50* 73*     Results from last 7 days   Lab Units 08/09/23 0733 08/08/23  0615 08/07/23  1218 08/07/23  0129   CALCIUM mg/dL 9.4 9.3 9.1 9.4   ALBUMIN g/dL 3.9 4.1  --  5.2   MAGNESIUM mg/dL 1.6 2.0  --   --    PHOSPHORUS mg/dL 2.6 1.1*  --   --      Results from last 7 days   Lab Units 08/07/23  1919 08/07/23  1521 08/07/23  1218   LACTATE mmol/L 1.3 2.4* 3.3*     No results found for: HGBA1C, POCGLU    No radiology results for the last day    I have personally reviewed all medications:  Scheduled Medications  enoxaparin, 40 mg, Subcutaneous, Q24H  folic acid, 1 mg, Oral, Daily  multivitamin, 1 tablet, Oral, Daily  senna-docusate sodium, 2 tablet, Oral, BID  sodium chloride, 10 mL, Intravenous, Q12H  thiamine (B-1) IV, 200 mg, Intravenous, Q8H   Followed by  [START ON 8/12/2023] thiamine, 100 mg, Oral, Daily    Infusions  sodium chloride, 125 mL/hr, Last Rate: 125 mL/hr (08/09/23 0930)    Diet  Diet: Gastrointestinal Diets, Vegetarian, Liquid Diets; Pescatarian (Allows fish, dairy, eggs); Full Liquid; Fat-Restricted; Texture: Regular Texture (IDDSI 7); Fluid Consistency: Thin (IDDSI 0)    I have personally reviewed:  [x]  Laboratory   [x]  Microbiology   [x]  Radiology   [x]  EKG/Telemetry  [x]  Cardiology/Vascular   []  Pathology     []  Records      Assessment/Plan     Active Hospital Problems    Diagnosis  POA    **Acute pancreatitis [K85.90]  Yes    Hypophosphatemia [E83.39]  Yes    Nausea with vomiting [R11.2]  Yes    Alcohol dependence [F10.20]  Yes    Pulmonary nodule [R91.1]  Yes    Pancreatic pseudocyst [K86.3]  Yes    Alcoholic hepatitis [K70.10]  Yes    Alcohol withdrawal [F10.939]  Yes    Hypokalemia [E87.6]  Yes    Epigastric pain [R10.13]  Yes      Resolved Hospital Problems   No resolved problems to display.       33 y.o. female admitted with Acute pancreatitis.    Acute pancreatitis  Pancreatic pseudocyst  Epigastric/generalized abdominal pain  Nausea with vomiting  - Patient with complaints of worsening abdominal pain, nausea and vomiting on arrival. Imaging obtained and showed peripancreatic stranding and edema, consistent with acute pancreatitis, along with again noting area of low-attenuation lesion within the neck of the pancreas, felt to be slightly larger than on prior exam, favored to represent a small pseudocyst. Lipase elevated at 167.  - Patient currently receiving IVF, PRN medications for symptom control. However, she continues to have nausea and vomiting, worse after diet was advanced to regular/low fat/GI/vegetarian on 08/08.  - Change diet back to full liquids, given her intolerance to regular diet at present. Increase dosage of Zofran.  - Continue IVF, PRN medications for symptom control, will monitor symptoms closely, advance diet as tolerated. Check EKG to assess QT interval.  - Repeat Lipase in AM. If no improvement and/or worsens, consider GI consultation for further evaluation.  - Will need repeat CT within 3-6 months for evaluation of likely pseudocyst.    Alcohol dependence with intoxication on admission, now with concern for withdrawal  - Patient endorses drinking 1-2 glasses of wine on most days, on arrival per H&P.  - ETOH level on admission: 86.  - At present, she endorses slight restlessness, anxiety,  "nausea with vomiting. CIWA scores reviewed, values low, does not appear to have required Ativan over past 24 hours.  - Access center consulted, note reviewed, they are going to attempt to see again tomorrow (08/10). Will follow their plans/recommendations. Greatly appreciate their help.  - CIWA protocol, MV, thiamine, folic acid  - Telemetry, pulse oximetry, seizure precautions, aspiration precautions.    Transaminitis  Alcoholic hepatitis  - LFT remain elevated on most recent labs, hepatocellular predominant, secondary to alcohol. Values slightly worse on most recent labs when compared to prior.  - Order repeat CMP in AM for reassessment.  Consider further evaluation if indicated, based upon results of future testing.    Hypokalemia  - K low on most recent labs; Will replete via electrolyte protocol. Follow up repeat labs to guide ongoing management decisions. Replete PRN per protocol. Continue to monitor.    Anemia  - Hemoglobin low on most recent labs, slightly worse from prior, no evidence of overt blood loss, etiology likely dilutional in the setting of IVF.  No indications for acute intervention at this time.    - Order repeat CBC in AM for reassessment. Continue to monitor, transfuse for hemoglobin <7.    Pulmonary nodule  - CT obtained following arrival showing \"Area of nodularity within the left lower lobe has decreased in size, while another has resolved. This would suggest a benign etiology. \"  - Will need outpatient follow up with PCP for reassessment.    Hypophosphatemia, resolved  - Previously low, treated with replacement per protocol, improved on repeat testing.     All workup, problems and plans new to me today. First day taking care of patient.    Lovenox 40 mg SC daily for DVT prophylaxis.  Full code.  Discussed with patient and nursing staff.  Anticipate discharge home  TBD, once symptoms improved, patient tolerating regular diet and not requiring ativan .      DO [facility] " [facility]  23  10:18 EDT        Electronically signed by Silvio Chaidez DO at 23 1038       Vikram Salinas MD at 23 1107              Name: Lisa Gibson ADMIT: 2023   : 1990  PCP: Provider, No Known    MRN: [id number] LOS: 0 days   AGE/SEX: 33 y.o. female  ROOM: [facility]     Subjective   Subjective     No events overnight. She reports that her abdominal pain is improving. K and phos are both low, phos very low. She is not currently scoring high enough on ciwa to require ativan      Objective   Objective   Vital Signs  Temp:  [97.7 øF (36.5 øC)-98.6 øF (37 øC)] 97.7 øF (36.5 øC)  Heart Rate:  [69-85] 84  Resp:  [16-18] 16  BP: (131-157)/() 157/104  SpO2:  [95 %-99 %] 99 %  on  Flow (L/min):  [2] 2;   Device (Oxygen Therapy): room air  Body mass index is 18.75 kg/mý.  Physical Exam  Constitutional:       General: She is not in acute distress.     Appearance: She is not toxic-appearing.   Cardiovascular:      Rate and Rhythm: Normal rate and regular rhythm.      Heart sounds: Normal heart sounds.   Pulmonary:      Effort: Pulmonary effort is normal.      Breath sounds: Normal breath sounds.   Abdominal:      General: Bowel sounds are normal. There is no distension.      Palpations: Abdomen is soft.      Tenderness: There is no abdominal tenderness. There is no guarding or rebound.   Musculoskeletal:         General: No tenderness.      Right lower leg: No edema.      Left lower leg: No edema.   Neurological:      Mental Status: She is alert.   Psychiatric:         Mood and Affect: Mood normal.         Behavior: Behavior normal.       Results Review     I reviewed the patient's new clinical results.  Results from last 7 days   Lab Units 23  0615 23  1218 23  0129   WBC 10*3/mm3 4.15 5.87 4.93   HEMOGLOBIN g/dL 9.3* 10.6* 12.3   PLATELETS 10*3/mm3 167 234 270     Results from last 7 days   Lab Units 23  0615 23  1218 23  0129   SODIUM  mmol/L 135* 137 136   POTASSIUM mmol/L 3.3* 4.0 3.9   CHLORIDE mmol/L 96* 97* 94*   CO2 mmol/L 23.4 17.0* 18.7*   BUN mg/dL 4* 8 10   CREATININE mg/dL 0.61 0.42* 0.65   GLUCOSE mg/dL 93 94 107*   Estimated Creatinine Clearance: 120.4 mL/min (by C-G formula based on SCr of 0.61 mg/dL).  Results from last 7 days   Lab Units 08/08/23  0615 08/07/23  0129   ALBUMIN g/dL 4.1 5.2   BILIRUBIN mg/dL 0.9 0.7   ALK PHOS U/L 78 112   AST (SGOT) U/L 95* 151*   ALT (SGPT) U/L 50* 73*     Results from last 7 days   Lab Units 08/08/23  0615 08/07/23  1218 08/07/23  0129   CALCIUM mg/dL 9.3 9.1 9.4   ALBUMIN g/dL 4.1  --  5.2   MAGNESIUM mg/dL 2.0  --   --    PHOSPHORUS mg/dL 1.1*  --   --      Results from last 7 days   Lab Units 08/07/23  1919 08/07/23  1521 08/07/23  1218   LACTATE mmol/L 1.3 2.4* 3.3*     COVID19   Date Value Ref Range Status   04/27/2023 Not Detected Not Detected - Ref. Range Final   09/21/2021 Not Detected Not Detected - Ref. Range Final     No results found for: HGBA1C, POCGLU    CT Abdomen Pelvis With Contrast  Narrative: CT OF THE ABDOMEN AND PELVIS WITH CONTRAST     HISTORY: Abdominal pain. HISTORY of pancreatitis.     COMPARISON: 04/29/2023     TECHNIQUE: Axial CT imaging was obtained through the abdomen and pelvis.  IV contrast was administered.     FINDINGS:  Area of nodularity is again noted within the left lower lobe. It  measures approximately 1.3 cm, previously 1.7 cm. Additional subpleural  nodule has resolved. Marked diffuse hepatic steatosis is again noted.  The stomach, adrenal glands, spleen, and gallbladder appear  unremarkable. The patient is noted to have inflammatory stranding  surrounding the pancreas, as well some pancreatic edema, suggesting  pancreatitis. Low-attenuation lesion is again noted within the neck of  the pancreas. This measures up to 1.2 cm. This is slightly larger than  on prior exam, when it measured 1 cm. No peripancreatic collections are  seen. Main portal vein and  splenic vein are patent. There is no evidence  of gastric outlet obstruction. There is no biliary dilatation. The liver  is enlarged, measuring up to 18.6 cm in craniocaudal dimensions. The  kidneys enhance symmetrically. There is no hydronephrosis. There is a  simple appearing right renal cyst. No additional follow-up is necessary.  No distal ureteral or bladder stones are seen. Uterus appears normal.  There is no bowel obstruction. Appendix is normal. No acute osseous  abnormalities are seen.     Impression:    1. The patient has peripancreatic stranding and edema, concerning for  acute pancreatitis. Patient is again noted to have a low-attenuation  lesion within the neck of the pancreas. This appears slightly larger  than on prior exam. It is favored to represent a small pseudocyst.  2. Area of nodularity within the left lower lobe has decreased in size,  while another has resolved. This would suggest a benign etiology.     Radiation dose reduction techniques were utilized, including automated  exposure control and exposure modulation based on body size.     This report was finalized on 8/7/2023 4:31 AM by Dr. Diamond Goodman M.D.       Scheduled Medications  enoxaparin, 40 mg, Subcutaneous, Q24H  folic acid, 1 mg, Oral, Daily  multivitamin, 1 tablet, Oral, Daily  potassium chloride, 40 mEq, Oral, Q4H  senna-docusate sodium, 2 tablet, Oral, BID  sodium chloride, 10 mL, Intravenous, Q12H  sodium phosphate, 15 mmol, Intravenous, Q3H  thiamine (B-1) IV, 200 mg, Intravenous, Q8H   Followed by  [START ON 8/12/2023] thiamine, 100 mg, Oral, Daily    Infusions  sodium chloride, 125 mL/hr, Last Rate: 125 mL/hr (08/08/23 0534)    Diet  Diet: Liquid Diets, Vegetarian; Vegan (No animal products); Clear Liquid; Texture: Regular Texture (IDDSI 7); Fluid Consistency: Thin (IDDSI 0)      Assessment/Plan     Active Hospital Problems    Diagnosis  POA    **Acute pancreatitis [K85.90]  Yes    Alcohol dependence [F10.20]  Yes     Pulmonary nodule [R91.1]  Yes    Pancreatic pseudocyst [K86.3]  Yes    Alcoholic hepatitis [K70.10]  Yes      Resolved Hospital Problems   No resolved problems to display.       33 y.o. female admitted with Acute pancreatitis.    Acute pancreatitis-pain improving. Advance diet to regular, low fat diet.  Alcohol dependence with intoxication and concern for withdrawal-continue ciwa, folate, mvi, thiamine.  Access consult  Mild alcoholic hepatitis-low DF. No indication for steroids. LFTs improving  Severe hypophosphatemia-replace with iv sodium phos  Hypokalemia-replace orally  LLL pulmonary nodule-this has decreased in size since most recent imaging suggesting a benign etiology  Pseudocyst seen on CT-this will require repeat imaging as an outpatient in 3-6 months  Lovenox 40 mg SC daily for DVT prophylaxis.  Full code.  Discussed with patient and nursing staff.  Anticipate discharge home  1-2 days if not requiring ativan and electrolytes are improved      Vikram Salinas MD  [facility]  08/08/23  11:08 EDT    I wore protective equipment throughout this patient encounter including a face mask, gloves and protective eyewear.  Hand hygiene was performed before donning protective equipment and after removal when leaving the room.         Electronically signed by Vikram Salinas MD at 08/08/23 [id number]       Consult Notes (all)    No notes of this type exist for this encounter.

## 2023-08-10 ENCOUNTER — APPOINTMENT (OUTPATIENT)
Dept: ULTRASOUND IMAGING | Facility: HOSPITAL | Age: 33
DRG: 439 | End: 2023-08-10
Payer: COMMERCIAL

## 2023-08-10 LAB
ALBUMIN SERPL-MCNC: 3.9 G/DL (ref 3.5–5.2)
ALBUMIN/GLOB SERPL: 1.2 G/DL
ALP SERPL-CCNC: 73 U/L (ref 39–117)
ALT SERPL W P-5'-P-CCNC: 78 U/L (ref 1–33)
ANION GAP SERPL CALCULATED.3IONS-SCNC: 14.5 MMOL/L (ref 5–15)
AST SERPL-CCNC: 216 U/L (ref 1–32)
BILIRUB SERPL-MCNC: 0.8 MG/DL (ref 0–1.2)
BUN SERPL-MCNC: 2 MG/DL (ref 6–20)
BUN/CREAT SERPL: 4.3 (ref 7–25)
CALCIUM SPEC-SCNC: 9.6 MG/DL (ref 8.6–10.5)
CHLORIDE SERPL-SCNC: 95 MMOL/L (ref 98–107)
CO2 SERPL-SCNC: 25.5 MMOL/L (ref 22–29)
CREAT SERPL-MCNC: 0.47 MG/DL (ref 0.57–1)
DEPRECATED RDW RBC AUTO: 45.9 FL (ref 37–54)
EGFRCR SERPLBLD CKD-EPI 2021: 129.9 ML/MIN/1.73
ERYTHROCYTE [DISTWIDTH] IN BLOOD BY AUTOMATED COUNT: 15.7 % (ref 12.3–15.4)
GLOBULIN UR ELPH-MCNC: 3.3 GM/DL
GLUCOSE SERPL-MCNC: 120 MG/DL (ref 65–99)
HCT VFR BLD AUTO: 29.5 % (ref 34–46.6)
HGB BLD-MCNC: 9.7 G/DL (ref 12–15.9)
LIPASE SERPL-CCNC: 31 U/L (ref 13–60)
MAGNESIUM SERPL-MCNC: 1.7 MG/DL (ref 1.6–2.6)
MCH RBC QN AUTO: 26.9 PG (ref 26.6–33)
MCHC RBC AUTO-ENTMCNC: 32.9 G/DL (ref 31.5–35.7)
MCV RBC AUTO: 81.7 FL (ref 79–97)
PHOSPHATE SERPL-MCNC: 2.6 MG/DL (ref 2.5–4.5)
PLATELET # BLD AUTO: 169 10*3/MM3 (ref 140–450)
PMV BLD AUTO: 11.1 FL (ref 6–12)
POTASSIUM SERPL-SCNC: 3.6 MMOL/L (ref 3.5–5.2)
POTASSIUM SERPL-SCNC: 3.9 MMOL/L (ref 3.5–5.2)
PROT SERPL-MCNC: 7.2 G/DL (ref 6–8.5)
RBC # BLD AUTO: 3.61 10*6/MM3 (ref 3.77–5.28)
SODIUM SERPL-SCNC: 135 MMOL/L (ref 136–145)
WBC NRBC COR # BLD: 4.16 10*3/MM3 (ref 3.4–10.8)

## 2023-08-10 PROCEDURE — 84100 ASSAY OF PHOSPHORUS: CPT | Performed by: STUDENT IN AN ORGANIZED HEALTH CARE EDUCATION/TRAINING PROGRAM

## 2023-08-10 PROCEDURE — 90791 PSYCH DIAGNOSTIC EVALUATION: CPT

## 2023-08-10 PROCEDURE — 83690 ASSAY OF LIPASE: CPT | Performed by: STUDENT IN AN ORGANIZED HEALTH CARE EDUCATION/TRAINING PROGRAM

## 2023-08-10 PROCEDURE — 25010000002 PROCHLORPERAZINE 10 MG/2ML SOLUTION: Performed by: STUDENT IN AN ORGANIZED HEALTH CARE EDUCATION/TRAINING PROGRAM

## 2023-08-10 PROCEDURE — 83735 ASSAY OF MAGNESIUM: CPT | Performed by: STUDENT IN AN ORGANIZED HEALTH CARE EDUCATION/TRAINING PROGRAM

## 2023-08-10 PROCEDURE — 84132 ASSAY OF SERUM POTASSIUM: CPT | Performed by: STUDENT IN AN ORGANIZED HEALTH CARE EDUCATION/TRAINING PROGRAM

## 2023-08-10 PROCEDURE — 25010000002 ENOXAPARIN PER 10 MG: Performed by: STUDENT IN AN ORGANIZED HEALTH CARE EDUCATION/TRAINING PROGRAM

## 2023-08-10 PROCEDURE — 76700 US EXAM ABDOM COMPLETE: CPT

## 2023-08-10 PROCEDURE — 25010000002 HYDROMORPHONE PER 4 MG: Performed by: STUDENT IN AN ORGANIZED HEALTH CARE EDUCATION/TRAINING PROGRAM

## 2023-08-10 PROCEDURE — 25010000002 ONDANSETRON PER 1 MG: Performed by: STUDENT IN AN ORGANIZED HEALTH CARE EDUCATION/TRAINING PROGRAM

## 2023-08-10 PROCEDURE — 0 POTASSIUM CHLORIDE 10 MEQ/100ML SOLUTION: Performed by: STUDENT IN AN ORGANIZED HEALTH CARE EDUCATION/TRAINING PROGRAM

## 2023-08-10 PROCEDURE — 85027 COMPLETE CBC AUTOMATED: CPT | Performed by: STUDENT IN AN ORGANIZED HEALTH CARE EDUCATION/TRAINING PROGRAM

## 2023-08-10 PROCEDURE — 80053 COMPREHEN METABOLIC PANEL: CPT | Performed by: STUDENT IN AN ORGANIZED HEALTH CARE EDUCATION/TRAINING PROGRAM

## 2023-08-10 PROCEDURE — 25010000002 THIAMINE HCL 200 MG/2ML SOLUTION: Performed by: NURSE PRACTITIONER

## 2023-08-10 RX ORDER — POTASSIUM CHLORIDE 7.45 MG/ML
10 INJECTION INTRAVENOUS
Status: COMPLETED | OUTPATIENT
Start: 2023-08-10 | End: 2023-08-10

## 2023-08-10 RX ADMIN — PROCHLORPERAZINE EDISYLATE 5 MG: 5 INJECTION INTRAMUSCULAR; INTRAVENOUS at 18:33

## 2023-08-10 RX ADMIN — HYDROMORPHONE HYDROCHLORIDE 0.5 MG: 1 INJECTION, SOLUTION INTRAMUSCULAR; INTRAVENOUS; SUBCUTANEOUS at 22:25

## 2023-08-10 RX ADMIN — PROCHLORPERAZINE EDISYLATE 5 MG: 5 INJECTION INTRAMUSCULAR; INTRAVENOUS at 00:26

## 2023-08-10 RX ADMIN — HYDROMORPHONE HYDROCHLORIDE 0.5 MG: 1 INJECTION, SOLUTION INTRAMUSCULAR; INTRAVENOUS; SUBCUTANEOUS at 06:31

## 2023-08-10 RX ADMIN — ENOXAPARIN SODIUM 40 MG: 100 INJECTION SUBCUTANEOUS at 14:24

## 2023-08-10 RX ADMIN — POTASSIUM CHLORIDE 10 MEQ: 7.46 INJECTION, SOLUTION INTRAVENOUS at 04:42

## 2023-08-10 RX ADMIN — POTASSIUM CHLORIDE 10 MEQ: 7.46 INJECTION, SOLUTION INTRAVENOUS at 02:24

## 2023-08-10 RX ADMIN — ONDANSETRON 6 MG: 2 INJECTION INTRAMUSCULAR; INTRAVENOUS at 09:03

## 2023-08-10 RX ADMIN — POTASSIUM CHLORIDE 10 MEQ: 7.46 INJECTION, SOLUTION INTRAVENOUS at 05:41

## 2023-08-10 RX ADMIN — THIAMINE HYDROCHLORIDE 200 MG: 100 INJECTION, SOLUTION INTRAMUSCULAR; INTRAVENOUS at 02:24

## 2023-08-10 RX ADMIN — THIAMINE HYDROCHLORIDE 200 MG: 100 INJECTION, SOLUTION INTRAMUSCULAR; INTRAVENOUS at 09:03

## 2023-08-10 RX ADMIN — HYDROMORPHONE HYDROCHLORIDE 0.5 MG: 1 INJECTION, SOLUTION INTRAMUSCULAR; INTRAVENOUS; SUBCUTANEOUS at 10:16

## 2023-08-10 RX ADMIN — THIAMINE HYDROCHLORIDE 200 MG: 100 INJECTION, SOLUTION INTRAMUSCULAR; INTRAVENOUS at 17:27

## 2023-08-10 RX ADMIN — ONDANSETRON 6 MG: 2 INJECTION INTRAMUSCULAR; INTRAVENOUS at 20:12

## 2023-08-10 RX ADMIN — POTASSIUM CHLORIDE 10 MEQ: 7.46 INJECTION, SOLUTION INTRAVENOUS at 03:37

## 2023-08-10 RX ADMIN — HYDROMORPHONE HYDROCHLORIDE 0.5 MG: 1 INJECTION, SOLUTION INTRAMUSCULAR; INTRAVENOUS; SUBCUTANEOUS at 14:24

## 2023-08-10 RX ADMIN — ONDANSETRON 6 MG: 2 INJECTION INTRAMUSCULAR; INTRAVENOUS at 14:24

## 2023-08-10 RX ADMIN — SODIUM CHLORIDE 125 ML/HR: 9 INJECTION, SOLUTION INTRAVENOUS at 06:36

## 2023-08-10 RX ADMIN — HYDROMORPHONE HYDROCHLORIDE 0.5 MG: 1 INJECTION, SOLUTION INTRAMUSCULAR; INTRAVENOUS; SUBCUTANEOUS at 18:34

## 2023-08-10 RX ADMIN — Medication 10 ML: at 20:12

## 2023-08-10 RX ADMIN — SODIUM CHLORIDE 125 ML/HR: 9 INJECTION, SOLUTION INTRAVENOUS at 00:26

## 2023-08-10 RX ADMIN — HYDROMORPHONE HYDROCHLORIDE 0.5 MG: 1 INJECTION, SOLUTION INTRAMUSCULAR; INTRAVENOUS; SUBCUTANEOUS at 02:24

## 2023-08-10 RX ADMIN — PROCHLORPERAZINE EDISYLATE 5 MG: 5 INJECTION INTRAMUSCULAR; INTRAVENOUS at 12:44

## 2023-08-10 RX ADMIN — PROCHLORPERAZINE EDISYLATE 5 MG: 5 INJECTION INTRAMUSCULAR; INTRAVENOUS at 06:31

## 2023-08-10 RX ADMIN — Medication 10 ML: at 09:04

## 2023-08-10 NOTE — CONSULTS
"  Access center consult.    Met with patient in room #645. Introduced self and role. Patient agreed to be evaluated.     Patient is a 33 y.o. D/B/F. She is alert and oriented x4. Patient lives at home with daughter. [employer]aritan: Hindu. Children: 1 daughter. Occupation: unemployed. Hobbies: reading, sleep. Education: Bachelor's degree. Legal: denies. : Diana Shelton (Mother)  [phone]. : denies. Support system: mother, siblings. Patient reports having a sober network. History of violence/trauma/abuse: denies. Patient feels her home is a safe environment.     Access consulted for ETOH. Patient presented to the ED 8/7/23 with c/o abdominal pain.BAL 80. UDS normal. Last CIWA 6.  Hx. of ETOH induced pancreatitis. Past medical history anxiety, panic attack, ETOH abuse/withdrawal, polysubstance abuse, metabolic encephalopathy. Last ETOH intake 8/6/23. Patient reports she consumes 2-3 drinks daily the past year. She reports initial ETOH intake began age 22 y.o. Patient reports longest sobriety 8 months. Relapse occurred \"at a party.\" She reports goal is to abstain from substance use. Regarding ETOH treatment pt. declines any resources at this time. Inquired if pt. feels she has an ETOH problem pt. states \"no not really.\" She denies being told of having an ETOH problem. Patient denies any past substance use treatment. She denies any illicit drug use.     Patient denies any past inpatient psychiatric care. She denies any past or current mental health providers. Patient denies any SI/HI/A/VH. Denies wish to be dead.  Sleep: poor. Appetite: decreased. Depression: 5/10. Anxiety: 7/10. Current stressors: denies. Patient has been on lexapro in the past approx.2 years ago she stopped taking. She declined inpatient psychiatrist consult/recovery/outpatient psychiatry resources. Throughout encounter, flat affect, cooperative. Access will follow briefly.   "

## 2023-08-10 NOTE — PLAN OF CARE
Goal Outcome Evaluation:  Plan of Care Reviewed With: patient        Progress: no change  Outcome Evaluation: Patient continues with PRN Dilaudid and antiemtic IV meds. Makes needs known. Resting in between care. Continues with IV fluids. No s/s of distress noted.

## 2023-08-10 NOTE — CASE MANAGEMENT/SOCIAL WORK
Discharge Planning Assessment  [facility]     Patient Name: Lisa Gibson  MRN: [id number]  Today's Date: 8/10/2023    Admit Date: 8/7/2023    Plan: Home   Discharge Needs Assessment       Row Name 08/10/23 1708       Living Environment    People in Home child(mushtaq), dependent    Current Living Arrangements apartment    Potentially Unsafe Housing Conditions none    Primary Care Provided by self    Provides Primary Care For child(mushtaq)    Family Caregiver if Needed parent(s)    Quality of Family Relationships supportive    Able to Return to Prior Arrangements yes       Resource/Environmental Concerns    Resource/Environmental Concerns none       Transition Planning    Patient/Family Anticipates Transition to home    Patient/Family Anticipated Services at Transition none    Transportation Anticipated family or friend will provide;car, drives self       Discharge Needs Assessment    Readmission Within the Last 30 Days no previous admission in last 30 days    Equipment Currently Used at Home none    Concerns to be Addressed denies needs/concerns at this time    Anticipated Changes Related to Illness none    Equipment Needed After Discharge none    Provided Post Acute Provider List? N/A    Provided Post Acute Provider Quality & Resource List? N/A                   Discharge Plan       Row Name 08/10/23 1709       Plan    Plan Home    Plan Comments S/w pt at bedside.  Facesheet info confirmed.  Pt lives in an apartment with her dependent dtr.  Pt is IADLs and can drive.  Her emergency contact is her mother Diana Shelton.  Pt plans to return home upon DC and states she will have transportation home.  She did enroll in meds to bed. CCp will continue to follow and assist w/ DC plans if needed. ........Beth TAMAYO/ VALERIA                  Continued Care and Services - Admitted Since 8/7/2023    Coordination has not been started for this encounter.       Expected Discharge Date and Time       Expected Discharge Date Expected  Discharge Time    Aug 11, 2023            Demographic Summary       Row Name 08/10/23 1708       General Information    Arrived From home    Referral Source admission list    Reason for Consult discharge planning    Preferred Language English                   Functional Status       Row Name 08/10/23 1708       Functional Status    Usual Activity Tolerance good    Current Activity Tolerance good       Functional Status, IADL    Medications independent    Meal Preparation independent    Housekeeping independent    Laundry independent    Shopping independent       Mental Status    General Appearance WDL WDL       Mental Status Summary    Recent Changes in Mental Status/Cognitive Functioning no changes                          Beth Bernal, RN

## 2023-08-10 NOTE — PLAN OF CARE
Problem: Adult Inpatient Plan of Care  Goal: Plan of Care Review  8/10/2023 0523 by Danya Pulido RN  Outcome: Ongoing, Progressing  Flowsheets (Taken 8/10/2023 0523)  Progress: no change  Plan of Care Reviewed With: patient  8/10/2023 0523 by Danya Pulido RN  Outcome: Ongoing, Progressing  Flowsheets (Taken 8/10/2023 0523)  Progress: no change  Plan of Care Reviewed With: patient  Goal: Patient-Specific Goal (Individualized)  8/10/2023 0523 by Danya Pulido RN  Outcome: Ongoing, Progressing  8/10/2023 0523 by Danya Pulido RN  Outcome: Ongoing, Progressing  Goal: Absence of Hospital-Acquired Illness or Injury  8/10/2023 0523 by Danya Pulido RN  Outcome: Ongoing, Progressing  8/10/2023 0523 by Danya Pulido RN  Outcome: Ongoing, Progressing  Intervention: Identify and Manage Fall Risk  Recent Flowsheet Documentation  Taken 8/10/2023 0425 by Danya Pulido RN  Safety Promotion/Fall Prevention: safety round/check completed  Taken 8/10/2023 0224 by Danya Pulido RN  Safety Promotion/Fall Prevention: safety round/check completed  Taken 8/10/2023 0026 by Danya Pulido RN  Safety Promotion/Fall Prevention:   safety round/check completed   room organization consistent   nonskid shoes/slippers when out of bed   lighting adjusted   fall prevention program maintained   clutter free environment maintained   assistive device/personal items within reach   activity supervised  Taken 8/9/2023 2215 by Danya Pulido RN  Safety Promotion/Fall Prevention: safety round/check completed  Taken 8/9/2023 2104 by Danya Pulido RN  Safety Promotion/Fall Prevention:   safety round/check completed   room organization consistent   nonskid shoes/slippers when out of bed   lighting adjusted   fall prevention program maintained   clutter free environment maintained   assistive device/personal items within reach   activity supervised  Taken 8/9/2023 2000 by Danya Pulido RN  Safety  Promotion/Fall Prevention: safety round/check completed  Intervention: Prevent Skin Injury  Recent Flowsheet Documentation  Taken 8/10/2023 0425 by Danya Pulido RN  Body Position: position changed independently  Taken 8/10/2023 0224 by Danya Pulido RN  Body Position: position changed independently  Taken 8/10/2023 0026 by Danya Pulido RN  Body Position: position changed independently  Skin Protection: tubing/devices free from skin contact  Taken 8/9/2023 2215 by Danya Pulido RN  Body Position: position changed independently  Taken 8/9/2023 2104 by Danya Pulido RN  Body Position: position changed independently  Skin Protection: tubing/devices free from skin contact  Taken 8/9/2023 2000 by Danya Pulido RN  Body Position: position changed independently  Intervention: Prevent and Manage VTE (Venous Thromboembolism) Risk  Recent Flowsheet Documentation  Taken 8/10/2023 0425 by Danya Pulido RN  Activity Management: activity encouraged  Taken 8/10/2023 0224 by Danya Pulido RN  Activity Management: activity encouraged  Taken 8/10/2023 0026 by Danya Pulido RN  Activity Management: activity encouraged  VTE Prevention/Management:   sequential compression devices off   patient refused intervention  Taken 8/9/2023 2215 by Danya Pulido RN  Activity Management: activity encouraged  Taken 8/9/2023 2104 by Danya Pulido RN  Activity Management: activity encouraged  VTE Prevention/Management:   sequential compression devices off   patient refused intervention  Taken 8/9/2023 2000 by Danya Pulido RN  Activity Management: activity encouraged  Intervention: Prevent Infection  Recent Flowsheet Documentation  Taken 8/10/2023 0425 by Danya Pulido RN  Infection Prevention:   single patient room provided   rest/sleep promoted   hand hygiene promoted  Taken 8/10/2023 0224 by Danya Pulido RN  Infection Prevention:   single patient room provided   rest/sleep  promoted   hand hygiene promoted  Taken 8/10/2023 0026 by Danya Pulido RN  Infection Prevention:   single patient room provided   rest/sleep promoted   equipment surfaces disinfected  Taken 8/9/2023 2215 by Danya Pulido RN  Infection Prevention:   single patient room provided   rest/sleep promoted   hand hygiene promoted  Taken 8/9/2023 2104 by Danya Pulido RN  Infection Prevention:   single patient room provided   rest/sleep promoted   hand hygiene promoted  Taken 8/9/2023 2000 by Danya Pulido RN  Infection Prevention:   single patient room provided   rest/sleep promoted   hand hygiene promoted  Goal: Optimal Comfort and Wellbeing  8/10/2023 0523 by Danya Pulido RN  Outcome: Ongoing, Progressing  8/10/2023 0523 by Danya Pulido RN  Outcome: Ongoing, Progressing  Intervention: Monitor Pain and Promote Comfort  Recent Flowsheet Documentation  Taken 8/10/2023 0425 by Danya Pulido RN  Pain Management Interventions: quiet environment facilitated  Taken 8/10/2023 0224 by Danya Pulido RN  Pain Management Interventions: see MAR  Taken 8/10/2023 0026 by Danya Pulido RN  Pain Management Interventions: pain management plan reviewed with patient/caregiver  Taken 8/9/2023 2215 by Danya Pulido RN  Pain Management Interventions: see MAR  Taken 8/9/2023 2104 by Danya Pulido RN  Pain Management Interventions:   care clustered   pillow support provided   quiet environment facilitated  Intervention: Provide Person-Centered Care  Recent Flowsheet Documentation  Taken 8/10/2023 0026 by Danya Pulido RN  Trust Relationship/Rapport:   care explained   questions encouraged   thoughts/feelings acknowledged  Taken 8/9/2023 2104 by Danya Pulido RN  Trust Relationship/Rapport:   care explained   empathic listening provided   questions encouraged   thoughts/feelings acknowledged  Goal: Readiness for Transition of Care  8/10/2023 0523 by Danya Pulido RN  Outcome:  Ongoing, Progressing  8/10/2023 0523 by Danya Pulido RN  Outcome: Ongoing, Progressing     Problem: Nausea and Vomiting  Goal: Fluid and Electrolyte Balance  8/10/2023 0523 by Danya Pulido RN  Outcome: Ongoing, Progressing  8/10/2023 0523 by Danya Pulido RN  Outcome: Ongoing, Progressing  Intervention: Prevent and Manage Nausea and Vomiting  Recent Flowsheet Documentation  Taken 8/10/2023 0026 by Danya Pulido RN  Nausea/Vomiting Interventions: see MAR  Environmental Support:   calm environment promoted   environmental consistency promoted  Taken 8/9/2023 2104 by Danya Pulido RN  Nausea/Vomiting Interventions: see MAR  Environmental Support:   calm environment promoted   environmental consistency promoted     Problem: Pain Acute  Goal: Acceptable Pain Control and Functional Ability  8/10/2023 0523 by Danya Pulido RN  Outcome: Ongoing, Progressing  8/10/2023 0523 by Danya Pulido RN  Outcome: Ongoing, Progressing  Intervention: Prevent or Manage Pain  Recent Flowsheet Documentation  Taken 8/10/2023 0425 by Danya Pulido RN  Medication Review/Management:   medications reviewed   high-risk medications identified  Taken 8/10/2023 0224 by Danya Pulido RN  Medication Review/Management:   medications reviewed   high-risk medications identified  Taken 8/10/2023 0026 by Danya Pulido RN  Sensory Stimulation Regulation:   auditory stimulation minimized   care clustered   lighting decreased   quiet environment promoted   tactile stimulation minimized   visual stimulation minimized  Sleep/Rest Enhancement:   awakenings minimized   consistent schedule promoted   family presence promoted   noise level reduced   regular sleep/rest pattern promoted   relaxation techniques promoted   room darkened  Medication Review/Management:   medications reviewed   high-risk medications identified  Taken 8/9/2023 2215 by Danya Pulido RN  Medication Review/Management:   medications  reviewed   high-risk medications identified  Taken 8/9/2023 2104 by Danya Pulido, RN  Sensory Stimulation Regulation:   auditory stimulation minimized   care clustered   lighting decreased   quiet environment promoted   tactile stimulation minimized   visual stimulation minimized  Sleep/Rest Enhancement:   awakenings minimized   consistent schedule promoted   noise level reduced   regular sleep/rest pattern promoted   relaxation techniques promoted   room darkened  Medication Review/Management:   medications reviewed   high-risk medications identified  Taken 8/9/2023 2000 by Danya Pulido RN  Medication Review/Management:   medications reviewed   high-risk medications identified  Intervention: Develop Pain Management Plan  Recent Flowsheet Documentation  Taken 8/10/2023 0425 by Danya Pulido RN  Pain Management Interventions: quiet environment facilitated  Taken 8/10/2023 0224 by Danya Pulido RN  Pain Management Interventions: see MAR  Taken 8/10/2023 0026 by Danya Pulido RN  Pain Management Interventions: pain management plan reviewed with patient/caregiver  Taken 8/9/2023 2215 by Danya Pulido RN  Pain Management Interventions: see MAR  Taken 8/9/2023 2104 by Danya Pulido RN  Pain Management Interventions:   care clustered   pillow support provided   quiet environment facilitated  Intervention: Optimize Psychosocial Wellbeing  Recent Flowsheet Documentation  Taken 8/10/2023 0026 by Danya Pulido, RN  Supportive Measures: verbalization of feelings encouraged  Diversional Activities: smartphone  Taken 8/9/2023 2104 by Danya Pulido RN  Supportive Measures:   active listening utilized   counseling provided   goal-setting facilitated   self-responsibility promoted   verbalization of feelings encouraged  Diversional Activities: smartphone     Problem: Skin Injury Risk Increased  Goal: Skin Health and Integrity  8/10/2023 0523 by Danya Pulido, RN  Outcome: Ongoing,  Progressing  8/10/2023 0523 by Danya Pulido RN  Outcome: Ongoing, Progressing  Intervention: Optimize Skin Protection  Recent Flowsheet Documentation  Taken 8/10/2023 0425 by Danya Pulido RN  Head of Bed (HOB) Positioning: HOB elevated  Taken 8/10/2023 0224 by Danya Pulido RN  Head of Bed (HOB) Positioning: HOB elevated  Taken 8/10/2023 0026 by Danya Pulido RN  Pressure Reduction Techniques: frequent weight shift encouraged  Head of Bed (HOB) Positioning: HOB elevated  Pressure Reduction Devices: pressure-redistributing mattress utilized  Skin Protection: tubing/devices free from skin contact  Taken 8/9/2023 2215 by Danya Pulido RN  Head of Bed (HOB) Positioning: HOB elevated  Taken 8/9/2023 2104 by Danya Pulido RN  Pressure Reduction Techniques: frequent weight shift encouraged  Head of Bed (HOB) Positioning: HOB elevated  Pressure Reduction Devices: pressure-redistributing mattress utilized  Skin Protection: tubing/devices free from skin contact  Taken 8/9/2023 2000 by Danya Pulido RN  Head of Bed (HOB) Positioning: HOB elevated     Problem: Fall Injury Risk  Goal: Absence of Fall and Fall-Related Injury  8/10/2023 0523 by Danya Pulido RN  Outcome: Ongoing, Progressing  8/10/2023 0523 by Danya Pulido RN  Outcome: Ongoing, Progressing  Intervention: Identify and Manage Contributors  Recent Flowsheet Documentation  Taken 8/10/2023 0425 by Danya Pulido RN  Medication Review/Management:   medications reviewed   high-risk medications identified  Taken 8/10/2023 0224 by Danya Pulido RN  Medication Review/Management:   medications reviewed   high-risk medications identified  Taken 8/10/2023 0026 by Danya Pulido RN  Medication Review/Management:   medications reviewed   high-risk medications identified  Taken 8/9/2023 2215 by Danya Pulido RN  Medication Review/Management:   medications reviewed   high-risk medications identified  Taken 8/9/2023  2104 by Danya Pulido RN  Medication Review/Management:   medications reviewed   high-risk medications identified  Taken 8/9/2023 2000 by Danya Pulido RN  Medication Review/Management:   medications reviewed   high-risk medications identified  Intervention: Promote Injury-Free Environment  Recent Flowsheet Documentation  Taken 8/10/2023 0425 by Danya Pulido RN  Safety Promotion/Fall Prevention: safety round/check completed  Taken 8/10/2023 0224 by Danya Pulido RN  Safety Promotion/Fall Prevention: safety round/check completed  Taken 8/10/2023 0026 by Danya Pulido RN  Safety Promotion/Fall Prevention:   safety round/check completed   room organization consistent   nonskid shoes/slippers when out of bed   lighting adjusted   fall prevention program maintained   clutter free environment maintained   assistive device/personal items within reach   activity supervised  Taken 8/9/2023 2215 by Danya Pulido RN  Safety Promotion/Fall Prevention: safety round/check completed  Taken 8/9/2023 2104 by Danya Pulido RN  Safety Promotion/Fall Prevention:   safety round/check completed   room organization consistent   nonskid shoes/slippers when out of bed   lighting adjusted   fall prevention program maintained   clutter free environment maintained   assistive device/personal items within reach   activity supervised  Taken 8/9/2023 2000 by Danya Pulido RN  Safety Promotion/Fall Prevention: safety round/check completed   Goal Outcome Evaluation:  Plan of Care Reviewed With: patient        Progress: no change

## 2023-08-10 NOTE — PROGRESS NOTES
Name: Lisa Gibson ADMIT: 2023   : 1990  PCP: Provider, No Known    MRN: [id number] LOS: 1 days   AGE/SEX: 33 y.o. female  ROOM: [facility]     Subjective   Subjective   Patient seen and examined this morning.  Hospital day 3.  At time of my examination, patient is awake, alert, resting in bed.  She continues to have epigastric abdominal pain, nausea and intermittent nonbloody emesis despite placing her back on full liquid diet yesterday.        Objective   Objective   Vital Signs  Temp:  [98.2 øF (36.8 øC)-99.1 øF (37.3 øC)] 98.4 øF (36.9 øC)  Heart Rate:  [80-95] 81  Resp:  [16] 16  BP: (138-152)/() 138/91  SpO2:  [96 %-100 %] 96 %  on  Flow (L/min):  [2] 2;   Device (Oxygen Therapy): room air  Body mass index is 18.75 kg/mý.  Physical Exam  Vitals and nursing note reviewed.   Constitutional:       Appearance: She is ill-appearing.      Comments: Appears uncomfortable secondary to nausea and abdominal pain   Eyes:      General: No scleral icterus.  Cardiovascular:      Rate and Rhythm: Normal rate and regular rhythm.      Pulses: Normal pulses.      Heart sounds: Normal heart sounds.   Pulmonary:      Effort: Pulmonary effort is normal. No respiratory distress.      Breath sounds: Normal breath sounds.   Abdominal:      General: Bowel sounds are normal. There is no distension.      Palpations: Abdomen is soft.      Tenderness: There is abdominal tenderness (Epigastric predominant). There is no guarding or rebound.   Musculoskeletal:      Right lower leg: No edema.      Left lower leg: No edema.   Skin:     General: Skin is warm and dry.   Neurological:      Mental Status: She is alert.     Results Review     I reviewed the patient's new clinical results.  Results from last 7 days   Lab Units 08/10/23  0541 23  1022 23  0615 23  1218   WBC 10*3/mm3 4.16 4.68 4.15 5.87   HEMOGLOBIN g/dL 9.7* 10.7* 9.3* 10.6*   PLATELETS 10*3/mm3 169 180 167 234       Results from last 7 days    Lab Units 08/10/23  0541 08/09/23  2243 08/09/23  0733 08/08/23  0615 08/07/23  1218   SODIUM mmol/L 135*  --  136 135* 137   POTASSIUM mmol/L 3.9 3.4* 3.2* 3.3* 4.0   CHLORIDE mmol/L 95*  --  96* 96* 97*   CO2 mmol/L 25.5  --  22.3 23.4 17.0*   BUN mg/dL 2*  --  <2* 4* 8   CREATININE mg/dL 0.47*  --  0.39* 0.61 0.42*   GLUCOSE mg/dL 120*  --  102* 93 94   EGFR mL/min/1.73 129.9  --  135.9 122.0 133.5       Results from last 7 days   Lab Units 08/10/23  0541 08/09/23  0733 08/08/23  0615 08/07/23  0129   ALBUMIN g/dL 3.9 3.9 4.1 5.2   BILIRUBIN mg/dL 0.8 0.7 0.9 0.7   ALK PHOS U/L 73 77 78 112   AST (SGOT) U/L 216* 146* 95* 151*   ALT (SGPT) U/L 78* 53* 50* 73*       Results from last 7 days   Lab Units 08/10/23  0541 08/09/23  0733 08/08/23  0615 08/07/23  1218 08/07/23  0129   CALCIUM mg/dL 9.6 9.4 9.3 9.1 9.4   ALBUMIN g/dL 3.9 3.9 4.1  --  5.2   MAGNESIUM mg/dL 1.7 1.6 2.0  --   --    PHOSPHORUS mg/dL 2.6 2.6 1.1*  --   --        Results from last 7 days   Lab Units 08/07/23  1919 08/07/23  1521 08/07/23  1218   LACTATE mmol/L 1.3 2.4* 3.3*       No results found for: HGBA1C, POCGLU    No radiology results for the last day    I have personally reviewed all medications:  Scheduled Medications  enoxaparin, 40 mg, Subcutaneous, Q24H  folic acid, 1 mg, Oral, Daily  multivitamin, 1 tablet, Oral, Daily  senna-docusate sodium, 2 tablet, Oral, BID  sodium chloride, 10 mL, Intravenous, Q12H  thiamine (B-1) IV, 200 mg, Intravenous, Q8H   Followed by  [START ON 8/12/2023] thiamine, 100 mg, Oral, Daily    Infusions  sodium chloride, 125 mL/hr, Last Rate: 125 mL/hr (08/10/23 0636)    Diet  Diet: Liquid Diets; Full Liquid; Texture: Regular Texture (IDDSI 7); Fluid Consistency: Thin (IDDSI 0)    I have personally reviewed:  [x]  Laboratory   [x]  Microbiology   [x]  Radiology   [x]  EKG/Telemetry  [x]  Cardiology/Vascular   []  Pathology    []  Records       Assessment/Plan     Active Hospital Problems    Diagnosis  POA     **Acute pancreatitis [K85.90]  Yes    Hypophosphatemia [E83.39]  Yes    Nausea with vomiting [R11.2]  Yes    Alcohol dependence [F10.20]  Yes    Pulmonary nodule [R91.1]  Yes    Pancreatic pseudocyst [K86.3]  Yes    Alcoholic hepatitis [K70.10]  Yes    Alcohol withdrawal [F10.939]  Yes    Hypokalemia [E87.6]  Yes    Epigastric pain [R10.13]  Yes      Resolved Hospital Problems   No resolved problems to display.       33 y.o. female admitted with Acute pancreatitis.    Acute pancreatitis  Pancreatic pseudocyst  Epigastric/generalized abdominal pain  Nausea with vomiting  - Patient with complaints of worsening abdominal pain, nausea and vomiting on arrival. Imaging obtained and showed peripancreatic stranding and edema, consistent with acute pancreatitis, along with again noting area of low-attenuation lesion within the neck of the pancreas, felt to be slightly larger than on prior exam, favored to represent a small pseudocyst. Lipase elevated at 167.  - Patient currently receiving IVF, PRN medications for symptom control. However, she continues to have nausea and vomiting, initially worse after diet was advanced to regular/low fat/GI/vegetarian on 08/08, however, persisting despite regressing diet back to full liquids. On repeat testing, her lipase has improved and is now within normal limits. Going to see how she does with breakfast this morning, if still unable to tolerate liquid diet, will likely plan to consult GI for further evaluation and consider repeat imaging.   - Continue IVF, PRN medications for symptom control, will monitor symptoms closely, advance diet as tolerated.   - Will need repeat CT within 3-6 months for evaluation of likely pseudocyst.    Alcohol dependence with intoxication on admission, now with concern for withdrawal  - Patient endorses drinking 1-2 glasses of wine on most days, on arrival per H&P.  - ETOH level on admission: 86.  - At present, withdrawal symptoms seem to be relatively  "stable. CIWA scores reviewed, values low, does not appear to have required Ativan since 08/08.  - Access center consulted, note reviewed, they are going to attempt to see again today (08/10). Will follow their plans/recommendations. Greatly appreciate their help.  - CIWA protocol, MV, thiamine, folic acid  - Telemetry, pulse oximetry, seizure precautions, aspiration precautions.    Transaminitis  Alcoholic hepatitis  - LFT remain elevated on most recent labs, hepatocellular predominant, secondary to alcohol. Values slightly worse on most recent labs when compared to prior.  - Order repeat CMP in AM for reassessment.  Consider further evaluation if indicated, based upon results of future testing.    Anemia  - Hemoglobin low on most recent labs, relatively stable from prior, did drop from admission, likely dilutional in the setting of IVF.  However, RDW is elevated, will pursue further workup.  - Order iron profile/ferritin, B12, folate.  Follow-up results to guide further management.  - Order repeat CBC in AM for reassessment. Continue to monitor, transfuse for hemoglobin <7.    Pulmonary nodule  - CT obtained following arrival showing \"Area of nodularity within the left lower lobe has decreased in size, while another has resolved. This would suggest a benign etiology. \"  - Will need outpatient follow up with PCP for reassessment.    Hypophosphatemia, resolved  - Previously low, treated with replacement per protocol, improved on repeat testing.     Hypokalemia, resolved  - K low on prior labs, repleted per electrolyte protocol, improved on repeat testing.  Will replete via electrolyte protocol. Follow up repeat labs to guide ongoing management decisions. Replete PRN per protocol. Continue to monitor.    Lovenox 40 mg SC daily for DVT prophylaxis.  Full code.  Discussed with patient and nursing staff.  Anticipate discharge home  TBD, once symptoms improved, patient tolerating regular diet and not requiring ativan " .      Silvio Chaidez [facility]  08/10/23  09:55 EDT

## 2023-08-11 LAB
ALBUMIN SERPL-MCNC: 4.2 G/DL (ref 3.5–5.2)
ALBUMIN/GLOB SERPL: 1.2 G/DL
ALP SERPL-CCNC: 78 U/L (ref 39–117)
ALT SERPL W P-5'-P-CCNC: 96 U/L (ref 1–33)
ANION GAP SERPL CALCULATED.3IONS-SCNC: 13.7 MMOL/L (ref 5–15)
APAP SERPL-MCNC: <5 MCG/ML (ref 0–30)
AST SERPL-CCNC: 211 U/L (ref 1–32)
BILIRUB SERPL-MCNC: 0.8 MG/DL (ref 0–1.2)
BUN SERPL-MCNC: 2 MG/DL (ref 6–20)
BUN/CREAT SERPL: 4.4 (ref 7–25)
CALCIUM SPEC-SCNC: 9.9 MG/DL (ref 8.6–10.5)
CHLORIDE SERPL-SCNC: 95 MMOL/L (ref 98–107)
CO2 SERPL-SCNC: 27.3 MMOL/L (ref 22–29)
CREAT SERPL-MCNC: 0.45 MG/DL (ref 0.57–1)
DEPRECATED RDW RBC AUTO: 45.7 FL (ref 37–54)
EGFRCR SERPLBLD CKD-EPI 2021: 131.3 ML/MIN/1.73
ERYTHROCYTE [DISTWIDTH] IN BLOOD BY AUTOMATED COUNT: 15.8 % (ref 12.3–15.4)
FERRITIN SERPL-MCNC: 285 NG/ML (ref 13–150)
FOLATE SERPL-MCNC: 12.6 NG/ML (ref 4.78–24.2)
GLOBULIN UR ELPH-MCNC: 3.5 GM/DL
GLUCOSE SERPL-MCNC: 113 MG/DL (ref 65–99)
HAV IGM SERPL QL IA: NORMAL
HBV CORE IGM SERPL QL IA: NORMAL
HBV SURFACE AG SERPL QL IA: NORMAL
HCT VFR BLD AUTO: 30 % (ref 34–46.6)
HCV AB SER DONR QL: NORMAL
HGB BLD-MCNC: 9.9 G/DL (ref 12–15.9)
IRON 24H UR-MRATE: 32 MCG/DL (ref 37–145)
IRON SATN MFR SERPL: 8 % (ref 20–50)
MAGNESIUM SERPL-MCNC: 1.7 MG/DL (ref 1.6–2.6)
MCH RBC QN AUTO: 26.7 PG (ref 26.6–33)
MCHC RBC AUTO-ENTMCNC: 33 G/DL (ref 31.5–35.7)
MCV RBC AUTO: 80.9 FL (ref 79–97)
PHOSPHATE SERPL-MCNC: 2.7 MG/DL (ref 2.5–4.5)
PLATELET # BLD AUTO: 185 10*3/MM3 (ref 140–450)
PMV BLD AUTO: 11 FL (ref 6–12)
POTASSIUM SERPL-SCNC: 3.6 MMOL/L (ref 3.5–5.2)
POTASSIUM SERPL-SCNC: 3.6 MMOL/L (ref 3.5–5.2)
PROT SERPL-MCNC: 7.7 G/DL (ref 6–8.5)
RBC # BLD AUTO: 3.71 10*6/MM3 (ref 3.77–5.28)
SALICYLATES SERPL-MCNC: <0.3 MG/DL
SODIUM SERPL-SCNC: 136 MMOL/L (ref 136–145)
TIBC SERPL-MCNC: 402 MCG/DL (ref 298–536)
TRANSFERRIN SERPL-MCNC: 270 MG/DL (ref 200–360)
VIT B12 BLD-MCNC: 567 PG/ML (ref 211–946)
WBC NRBC COR # BLD: 4.33 10*3/MM3 (ref 3.4–10.8)

## 2023-08-11 PROCEDURE — 80179 DRUG ASSAY SALICYLATE: CPT | Performed by: STUDENT IN AN ORGANIZED HEALTH CARE EDUCATION/TRAINING PROGRAM

## 2023-08-11 PROCEDURE — 83735 ASSAY OF MAGNESIUM: CPT | Performed by: STUDENT IN AN ORGANIZED HEALTH CARE EDUCATION/TRAINING PROGRAM

## 2023-08-11 PROCEDURE — 25010000002 THIAMINE PER 100 MG: Performed by: NURSE PRACTITIONER

## 2023-08-11 PROCEDURE — 99221 1ST HOSP IP/OBS SF/LOW 40: CPT | Performed by: INTERNAL MEDICINE

## 2023-08-11 PROCEDURE — 25010000002 THIAMINE HCL 200 MG/2ML SOLUTION: Performed by: NURSE PRACTITIONER

## 2023-08-11 PROCEDURE — 80143 DRUG ASSAY ACETAMINOPHEN: CPT | Performed by: STUDENT IN AN ORGANIZED HEALTH CARE EDUCATION/TRAINING PROGRAM

## 2023-08-11 PROCEDURE — 84466 ASSAY OF TRANSFERRIN: CPT | Performed by: STUDENT IN AN ORGANIZED HEALTH CARE EDUCATION/TRAINING PROGRAM

## 2023-08-11 PROCEDURE — 82728 ASSAY OF FERRITIN: CPT | Performed by: STUDENT IN AN ORGANIZED HEALTH CARE EDUCATION/TRAINING PROGRAM

## 2023-08-11 PROCEDURE — 82607 VITAMIN B-12: CPT | Performed by: STUDENT IN AN ORGANIZED HEALTH CARE EDUCATION/TRAINING PROGRAM

## 2023-08-11 PROCEDURE — 84100 ASSAY OF PHOSPHORUS: CPT | Performed by: STUDENT IN AN ORGANIZED HEALTH CARE EDUCATION/TRAINING PROGRAM

## 2023-08-11 PROCEDURE — 25010000002 ONDANSETRON PER 1 MG: Performed by: STUDENT IN AN ORGANIZED HEALTH CARE EDUCATION/TRAINING PROGRAM

## 2023-08-11 PROCEDURE — 25010000002 HYDROMORPHONE PER 4 MG: Performed by: STUDENT IN AN ORGANIZED HEALTH CARE EDUCATION/TRAINING PROGRAM

## 2023-08-11 PROCEDURE — 80074 ACUTE HEPATITIS PANEL: CPT | Performed by: STUDENT IN AN ORGANIZED HEALTH CARE EDUCATION/TRAINING PROGRAM

## 2023-08-11 PROCEDURE — 84132 ASSAY OF SERUM POTASSIUM: CPT | Performed by: STUDENT IN AN ORGANIZED HEALTH CARE EDUCATION/TRAINING PROGRAM

## 2023-08-11 PROCEDURE — 25010000002 NA FERRIC GLUC CPLX PER 12.5 MG: Performed by: STUDENT IN AN ORGANIZED HEALTH CARE EDUCATION/TRAINING PROGRAM

## 2023-08-11 PROCEDURE — 82746 ASSAY OF FOLIC ACID SERUM: CPT | Performed by: STUDENT IN AN ORGANIZED HEALTH CARE EDUCATION/TRAINING PROGRAM

## 2023-08-11 PROCEDURE — 85027 COMPLETE CBC AUTOMATED: CPT | Performed by: STUDENT IN AN ORGANIZED HEALTH CARE EDUCATION/TRAINING PROGRAM

## 2023-08-11 PROCEDURE — 25010000002 PROCHLORPERAZINE 10 MG/2ML SOLUTION: Performed by: STUDENT IN AN ORGANIZED HEALTH CARE EDUCATION/TRAINING PROGRAM

## 2023-08-11 PROCEDURE — 83540 ASSAY OF IRON: CPT | Performed by: STUDENT IN AN ORGANIZED HEALTH CARE EDUCATION/TRAINING PROGRAM

## 2023-08-11 PROCEDURE — 80053 COMPREHEN METABOLIC PANEL: CPT | Performed by: STUDENT IN AN ORGANIZED HEALTH CARE EDUCATION/TRAINING PROGRAM

## 2023-08-11 PROCEDURE — 25010000002 HYDROMORPHONE 1 MG/ML SOLUTION: Performed by: STUDENT IN AN ORGANIZED HEALTH CARE EDUCATION/TRAINING PROGRAM

## 2023-08-11 RX ORDER — POTASSIUM CHLORIDE 750 MG/1
40 TABLET, FILM COATED, EXTENDED RELEASE ORAL EVERY 4 HOURS
Status: DISCONTINUED | OUTPATIENT
Start: 2023-08-11 | End: 2023-08-11

## 2023-08-11 RX ORDER — DIPHENHYDRAMINE HCL 12.5MG/5ML
12.5 LIQUID (ML) ORAL ONCE
Status: COMPLETED | OUTPATIENT
Start: 2023-08-11 | End: 2023-08-11

## 2023-08-11 RX ORDER — POTASSIUM CHLORIDE 750 MG/1
40 TABLET, FILM COATED, EXTENDED RELEASE ORAL EVERY 4 HOURS
Status: COMPLETED | OUTPATIENT
Start: 2023-08-11 | End: 2023-08-11

## 2023-08-11 RX ORDER — ACETAMINOPHEN 325 MG/1
650 TABLET ORAL ONCE
Status: COMPLETED | OUTPATIENT
Start: 2023-08-11 | End: 2023-08-11

## 2023-08-11 RX ADMIN — HYDROMORPHONE HYDROCHLORIDE 0.5 MG: 1 INJECTION, SOLUTION INTRAMUSCULAR; INTRAVENOUS; SUBCUTANEOUS at 21:28

## 2023-08-11 RX ADMIN — DIPHENHYDRAMINE HYDROCHLORIDE 12.5 MG: 25 SOLUTION ORAL at 10:52

## 2023-08-11 RX ADMIN — HYDROMORPHONE HYDROCHLORIDE 0.5 MG: 1 INJECTION, SOLUTION INTRAMUSCULAR; INTRAVENOUS; SUBCUTANEOUS at 02:46

## 2023-08-11 RX ADMIN — POTASSIUM CHLORIDE 40 MEQ: 750 TABLET, EXTENDED RELEASE ORAL at 13:10

## 2023-08-11 RX ADMIN — SODIUM CHLORIDE 250 MG: 9 INJECTION, SOLUTION INTRAVENOUS at 10:52

## 2023-08-11 RX ADMIN — HYDROMORPHONE HYDROCHLORIDE 0.5 MG: 1 INJECTION, SOLUTION INTRAMUSCULAR; INTRAVENOUS; SUBCUTANEOUS at 08:45

## 2023-08-11 RX ADMIN — HYDROMORPHONE HYDROCHLORIDE 0.5 MG: 1 INJECTION, SOLUTION INTRAMUSCULAR; INTRAVENOUS; SUBCUTANEOUS at 16:59

## 2023-08-11 RX ADMIN — ONDANSETRON 6 MG: 2 INJECTION INTRAMUSCULAR; INTRAVENOUS at 08:45

## 2023-08-11 RX ADMIN — ONDANSETRON 6 MG: 2 INJECTION INTRAMUSCULAR; INTRAVENOUS at 12:49

## 2023-08-11 RX ADMIN — PROCHLORPERAZINE EDISYLATE 5 MG: 5 INJECTION INTRAMUSCULAR; INTRAVENOUS at 02:46

## 2023-08-11 RX ADMIN — SENNOSIDES AND DOCUSATE SODIUM 2 TABLET: 50; 8.6 TABLET ORAL at 08:19

## 2023-08-11 RX ADMIN — Medication 1 TABLET: at 08:19

## 2023-08-11 RX ADMIN — ONDANSETRON 6 MG: 2 INJECTION INTRAMUSCULAR; INTRAVENOUS at 16:59

## 2023-08-11 RX ADMIN — SODIUM CHLORIDE 125 ML/HR: 9 INJECTION, SOLUTION INTRAVENOUS at 06:04

## 2023-08-11 RX ADMIN — FOLIC ACID 1 MG: 1 TABLET ORAL at 08:19

## 2023-08-11 RX ADMIN — Medication 10 ML: at 08:19

## 2023-08-11 RX ADMIN — THIAMINE HYDROCHLORIDE 200 MG: 100 INJECTION, SOLUTION INTRAMUSCULAR; INTRAVENOUS at 02:46

## 2023-08-11 RX ADMIN — POTASSIUM CHLORIDE 40 MEQ: 750 TABLET, EXTENDED RELEASE ORAL at 17:00

## 2023-08-11 RX ADMIN — THIAMINE HYDROCHLORIDE 200 MG: 100 INJECTION, SOLUTION INTRAMUSCULAR; INTRAVENOUS at 17:00

## 2023-08-11 RX ADMIN — THIAMINE HYDROCHLORIDE 200 MG: 100 INJECTION, SOLUTION INTRAMUSCULAR; INTRAVENOUS at 09:36

## 2023-08-11 RX ADMIN — HYDROMORPHONE HYDROCHLORIDE 0.5 MG: 1 INJECTION, SOLUTION INTRAMUSCULAR; INTRAVENOUS; SUBCUTANEOUS at 12:49

## 2023-08-11 RX ADMIN — SENNOSIDES AND DOCUSATE SODIUM 2 TABLET: 50; 8.6 TABLET ORAL at 21:28

## 2023-08-11 RX ADMIN — ACETAMINOPHEN 650 MG: 325 TABLET, FILM COATED ORAL at 10:52

## 2023-08-11 NOTE — PLAN OF CARE
Problem: Adult Inpatient Plan of Care  Goal: Plan of Care Review  Outcome: Ongoing, Progressing  Goal: Patient-Specific Goal (Individualized)  Outcome: Ongoing, Progressing  Goal: Absence of Hospital-Acquired Illness or Injury  Outcome: Ongoing, Progressing  Intervention: Identify and Manage Fall Risk  Recent Flowsheet Documentation  Taken 8/10/2023 2000 by Maria Antonia Aiken RN  Safety Promotion/Fall Prevention:   assistive device/personal items within reach   clutter free environment maintained   fall prevention program maintained   lighting adjusted   nonskid shoes/slippers when out of bed   safety round/check completed  Intervention: Prevent Skin Injury  Recent Flowsheet Documentation  Taken 8/10/2023 2000 by Maria Antonia Aiken RN  Body Position: position changed independently  Intervention: Prevent and Manage VTE (Venous Thromboembolism) Risk  Recent Flowsheet Documentation  Taken 8/10/2023 2000 by Maria Antonia Aiken RN  Activity Management: activity encouraged  Intervention: Prevent Infection  Recent Flowsheet Documentation  Taken 8/10/2023 2000 by Maria Antonia Aiken RN  Infection Prevention:   visitors restricted/screened   single patient room provided   rest/sleep promoted   personal protective equipment utilized   hand hygiene promoted  Goal: Optimal Comfort and Wellbeing  Outcome: Ongoing, Progressing  Goal: Readiness for Transition of Care  Outcome: Ongoing, Progressing     Problem: Nausea and Vomiting  Goal: Fluid and Electrolyte Balance  Outcome: Ongoing, Progressing     Problem: Pain Acute  Goal: Acceptable Pain Control and Functional Ability  Outcome: Ongoing, Progressing  Intervention: Prevent or Manage Pain  Recent Flowsheet Documentation  Taken 8/10/2023 2000 by Maria Antonia Aiken RN  Medication Review/Management: medications reviewed     Problem: Skin Injury Risk Increased  Goal: Skin Health and Integrity  Outcome: Ongoing, Progressing  Intervention: Optimize Skin Protection  Recent Flowsheet  Documentation  Taken 8/10/2023 2000 by Maria Antonia Aiken, RN  Head of Bed (HOB) Positioning: HOB elevated     Problem: Fall Injury Risk  Goal: Absence of Fall and Fall-Related Injury  Outcome: Ongoing, Progressing  Intervention: Identify and Manage Contributors  Recent Flowsheet Documentation  Taken 8/10/2023 2000 by Maria Antonia Aiken, RN  Medication Review/Management: medications reviewed  Intervention: Promote Injury-Free Environment  Recent Flowsheet Documentation  Taken 8/10/2023 2000 by Maria Antonia Aiken, RN  Safety Promotion/Fall Prevention:   assistive device/personal items within reach   clutter free environment maintained   fall prevention program maintained   lighting adjusted   nonskid shoes/slippers when out of bed   safety round/check completed

## 2023-08-11 NOTE — CONSULTS
[facility]  Initial Inpatient Consult Note    Referring Provider: [facility]    Reason for Consultation: Alcoholic pancreatitis    Subjective     History of present illness:    33 y.o. female known from previous evaluation by our service in 2021 with similar complaints.  Presented on August 7 with abdominal pain and associated nausea and vomiting as well as diarrhea several days prior to admission.  Had been advanced on her diet but had recurrence of symptoms.  CT scan on admission showed peripancreatic stranding and edema with a low-attenuation lesion in the neck of the pancreas favored to represent a small pseudocyst.  Ultrasound also described hypoechoic pancreatic lesion as well as hepatic steatosis labs reflect transaminase elevation with an ALT of 96 and an AST of 211.  Patient admits her symptoms are the same as she is experienced in the past.    Past Medical History:  History reviewed. No pertinent past medical history.  Past Surgical History:  History reviewed. No pertinent surgical history.   Social History:   Social History     Tobacco Use    Smoking status: Never    Smokeless tobacco: Never   Substance Use Topics    Alcohol use: Not on file      Family History:  History reviewed. No pertinent family history.    Home Meds:  Medications Prior to Admission   Medication Sig Dispense Refill Last Dose    dicyclomine (BENTYL) 20 MG tablet Take 1 tablet by mouth Every 8 (Eight) Hours As Needed (abdominal pain). 20 tablet 0      Current Meds:   enoxaparin, 40 mg, Subcutaneous, Q24H  folic acid, 1 mg, Oral, Daily  multivitamin, 1 tablet, Oral, Daily  senna-docusate sodium, 2 tablet, Oral, BID  sodium chloride, 10 mL, Intravenous, Q12H  thiamine (B-1) IV, 200 mg, Intravenous, Q8H   Followed by  [START ON 8/12/2023] thiamine, 100 mg, Oral, Daily      Allergies:  No Known Allergies  Review of Systems  Pertinent items are noted in HPI     Objective     Vital Signs  Temp:  [98.1 øF (36.7 øC)-99.5 øF  (37.5 øC)] 99.5 øF (37.5 øC)  Heart Rate:  [] 91  Resp:  [16-18] 16  BP: (134-161)/() 134/91  Physical Exam:  General Appearance:    Alert, cooperative, in no acute distress   Head:    Normocephalic, without obvious abnormality, atraumatic   Eyes:          conjunctivae and sclerae normal, no   icterus   Throat:   no thrush, oral mucosa moist   Neck:   Supple, no adenopathy   Lungs:     Clear to auscultation bilaterally    Heart:    Regular rhythm and normal rate    Chest Wall:    No abnormalities observed   Abdomen:     Soft, nondistended, nontender; normal bowel sounds   Extremities:   no edema, no redness   Skin:   No bruising or rash   Psychiatric:  normal mood and insight     Results Review:   I reviewed the patient's new clinical results.    Results from last 7 days   Lab Units 08/11/23  0630 08/10/23  0541 08/09/23  1022   WBC 10*3/mm3 4.33 4.16 4.68   HEMOGLOBIN g/dL 9.9* 9.7* 10.7*   HEMATOCRIT % 30.0* 29.5* 32.4*   PLATELETS 10*3/mm3 185 169 180     Results from last 7 days   Lab Units 08/11/23  0630 08/10/23  1059 08/10/23  0541 08/09/23  2243 08/09/23  0733   SODIUM mmol/L 136  --  135*  --  136   POTASSIUM mmol/L 3.6 3.6 3.9   < > 3.2*   CHLORIDE mmol/L 95*  --  95*  --  96*   CO2 mmol/L 27.3  --  25.5  --  22.3   BUN mg/dL 2*  --  2*  --  <2*   CREATININE mg/dL 0.45*  --  0.47*  --  0.39*   CALCIUM mg/dL 9.9  --  9.6  --  9.4   BILIRUBIN mg/dL 0.8  --  0.8  --  0.7   ALK PHOS U/L 78  --  73  --  77   ALT (SGPT) U/L 96*  --  78*  --  53*   AST (SGOT) U/L 211*  --  216*  --  146*   GLUCOSE mg/dL 113*  --  120*  --  102*    < > = values in this interval not displayed.     Results from last 7 days   Lab Units 08/07/23  1218   INR  1.13*     Lab Results   Lab Value Date/Time    LIPASE 31 08/10/2023 0541    LIPASE 167 (H) 08/07/2023 0129    LIPASE 10 (L) 06/01/2023 0808    LIPASE 12 (L) 04/27/2023 2003    LIPASE 231 (H) 09/25/2021 0646    LIPASE 495 (H) 09/24/2021 0645    LIPASE 889 (H) 09/23/2021  0745    LIPASE 834 (H) 09/21/2021 2049    LIPASE 11 07/05/2021 1311    LIPASE 158 01/06/2021 0830    LIPASE 126 01/05/2021 1623    LIPASE 917 (H) 11/25/2020 1258    LIPASE 795 (H) 11/25/2020 0439    LIPASE 635 (H) 11/24/2020 0505    LIPASE 1,299 (H) 11/23/2020 0545    LIPASE 2,284 (H) 11/22/2020 0658    LIPASE 530 (H) 11/21/2020 1347       Radiology:  US Abdomen Complete   Final Result       No evidence for acute cholecystitis. No cholelithiasis. No biliary   ductal dilatation. Hepatic steatosis.       Hypoechoic pancreatic lesion, as described.               This report was finalized on 8/10/2023 8:00 PM by Dr. Damon Clay M.D.          CT Abdomen Pelvis With Contrast   Final Result       1. The patient has peripancreatic stranding and edema, concerning for   acute pancreatitis. Patient is again noted to have a low-attenuation   lesion within the neck of the pancreas. This appears slightly larger   than on prior exam. It is favored to represent a small pseudocyst.   2. Area of nodularity within the left lower lobe has decreased in size,   while another has resolved. This would suggest a benign etiology.       Radiation dose reduction techniques were utilized, including automated   exposure control and exposure modulation based on body size.       This report was finalized on 8/7/2023 4:31 AM by Dr. Diamond Goodman M.D.              Assessment & Plan   Active Hospital Problems    Diagnosis     **Acute pancreatitis     Hypophosphatemia     Nausea with vomiting     Alcohol dependence     Pulmonary nodule     Pancreatic pseudocyst     Alcoholic hepatitis     Alcohol withdrawal     Hypokalemia     Epigastric pain        Assessment:  Alcoholic pancreatitis: History of recurrent bouts of pancreatitis and associated with alcohol  Pancreatic pseudocyst  Nausea with vomiting  History of alcohol withdrawal  Epigastric pain    Plan:  Continue routine management with hydration, analgesics as needed, and relative  gut rest  Monitor for withdrawal  Advised alcohol abstinence      I discussed the patients findings and my recommendations with patient.    Kris Walker MD

## 2023-08-11 NOTE — PROGRESS NOTES
[facility] follow up d/t EtOH; this writer reviewed chart and spoke with RN Maria Antonia. Per RN, patient did pretty well, slept overnight; she continues to experience nausea, Zofran increased.  Last CIWA 3 at 20:00; patient declined inpatient psychiatrist consult, outpatient psychiatry resources and/or [facility]/recovery referrals.  Discharge plan likely home; no further needs/concerns noted at this time per RN and/or medical team. [facility] to continue following.

## 2023-08-11 NOTE — NURSING NOTE
Patient refused lovenox, RN education patient on what lovenox is used for and prevention and patient refused. Patient stated she already received lovenox today. RN looked up MAR and last administration dated was 08/10/2023. Patient was informed on date and patient refused medication.

## 2023-08-11 NOTE — PLAN OF CARE
Goal Outcome Evaluation:  Plan of Care Reviewed With: patient         Patient complaining of pain and nausea. Symptom management interventions initiated (see MAR). Room air. Alert and oriented X4. Patient is up ab jason. Patient is tolerating clear liquids.

## 2023-08-11 NOTE — PROGRESS NOTES
"Nutrition Services    Patient Name:  Lisa Gibson  YOB: 1990  MRN: [id number]  Admit Date:  8/7/2023    FOLLOW UP - CLINICAL NUTRITION    Assessment Date:  08/11/23    Encounter Information         Reason for Encounter RD f/u.     Current Issues Diet was advanced to low fat on 8/8, however pt had N/V and diet was regressed back to full liquids. Today pt reports tolerating juice this morning. Endorses vomiting yesterday but none this morning. GI consulted today. Lipase normal yesterday. Will need repeat CT within 3-6 months for evaluation of likely pseudocyst. Encouraged PO diet as tolerated. If pt unable to tolerate diet advancement, may need to consider [place] feeds.      Current Nutrition Orders & Evaluation of Intake       Oral Nutrition     Current PO Diet Diet: Liquid Diets, Vegetarian; Vegan (No animal products); Full Liquid; Texture: Regular Texture (IDDSI 7); Fluid Consistency: Thin (IDDSI 0)   Supplement n/a   PO Evaluation     % PO Intake Minimal    # of Days Evaluated 3    Factors Affecting Intake  nausea, vomiting   --  Anthropometrics          Height    Weight Height: 175.3 cm (69\")  Weight: 57.6 kg (126 lb 15.8 oz) (08/07/23 0615)    BMI kg/m2 Body mass index is 18.75 kg/mý.  Normal/Healthy (18.4 - 24.9)    Weight trend Stable     Labs        Pertinent Labs Reviewed, listed below     Results from last 7 days   Lab Units 08/11/23  0630 08/10/23  1059 08/10/23  0541 08/09/23  2243 08/09/23  0733   SODIUM mmol/L 136  --  135*  --  136   POTASSIUM mmol/L 3.6 3.6 3.9   < > 3.2*   CHLORIDE mmol/L 95*  --  95*  --  96*   CO2 mmol/L 27.3  --  25.5  --  22.3   BUN mg/dL 2*  --  2*  --  <2*   CREATININE mg/dL 0.45*  --  0.47*  --  0.39*   CALCIUM mg/dL 9.9  --  9.6  --  9.4   BILIRUBIN mg/dL 0.8  --  0.8  --  0.7   ALK PHOS U/L 78  --  73  --  77   ALT (SGPT) U/L 96*  --  78*  --  53*   AST (SGOT) U/L 211*  --  216*  --  146*   GLUCOSE mg/dL 113*  --  120*  --  102*    < > = values in this " interval not displayed.     Results from last 7 days   Lab Units 08/11/23  0630 08/10/23  0541 08/09/23  1022 08/09/23  0733   MAGNESIUM mg/dL 1.7 1.7  --  1.6   PHOSPHORUS mg/dL 2.7 2.6  --  2.6   HEMOGLOBIN g/dL 9.9* 9.7*   < >  --    HEMATOCRIT % 30.0* 29.5*   < >  --    WBC 10*3/mm3 4.33 4.16   < >  --    TRIGLYCERIDES mg/dL  --   --   --  60   ALBUMIN g/dL 4.2 3.9  --  3.9    < > = values in this interval not displayed.     Results from last 7 days   Lab Units 08/11/23  0630 08/10/23  0541 08/09/23  1022 08/08/23  0615 08/07/23  1218   INR   --   --   --   --  1.13*   PLATELETS 10*3/mm3 185 169 180 167 234     COVID19   Date Value Ref Range Status   04/27/2023 Not Detected Not Detected - Ref. Range Final     No results found for: HGBA1C       Medications            Scheduled Medications enoxaparin, 40 mg, Subcutaneous, Q24H  folic acid, 1 mg, Oral, Daily  multivitamin, 1 tablet, Oral, Daily  potassium chloride ER, 40 mEq, Oral, Q4H  senna-docusate sodium, 2 tablet, Oral, BID  sodium chloride, 10 mL, Intravenous, Q12H  thiamine (B-1) IV, 200 mg, Intravenous, Q8H   Followed by  [START ON 8/12/2023] thiamine, 100 mg, Oral, Daily        Infusions sodium chloride, 125 mL/hr, Last Rate: 125 mL/hr (08/11/23 0604)        PRN Medications   acetaminophen **OR** acetaminophen **OR** acetaminophen    senna-docusate sodium **AND** polyethylene glycol **AND** bisacodyl **AND** bisacodyl    Calcium Replacement - Follow Nurse / BPA Driven Protocol    HYDROmorphone    hydrOXYzine pamoate    LORazepam **OR** LORazepam **OR** LORazepam **OR** LORazepam **OR** LORazepam **OR** LORazepam    Magnesium Standard Dose Replacement - Follow Nurse / BPA Driven Protocol    nitroglycerin    ondansetron    oxyCODONE-acetaminophen    Phosphorus Replacement - Follow Nurse / BPA Driven Protocol    Potassium Replacement - Follow Nurse / BPA Driven Protocol    prochlorperazine    sodium chloride    sodium chloride    sodium chloride     Physical  Findings          Physical Appearance alert, flat affect   Oral/Mouth Cavity WNL   Edema  no edema   Gastrointestinal abdominal pain, nausea, vomiting, last bowel movement: 8/10   Skin  skin intact   Tubes/Drains/Lines none   NFPE No clinical signs of muscle wasting or fat loss   --  NUTRITION INTERVENTION / PLAN OF CARE  Intervention Goal         Intervention Goal(s) Reduce/improve symptoms, Disease management/therapy, Tolerate PO , Advance diet, and No significant weight loss     Nutrition Intervention         RD Action Encourage intake and Follow Tx Progress     Nutrition Prescription         Diet Prescription     Supplement Prescription n/a   EN/PN Prescription    New Prescription Ordered? No changes at this time   --  Monitor/Evaluation        Monitor Per protocol   Discharge Needs Pending clinical course   Education Will instruct as appropriate   --    RD to follow up per protocol.    Electronically signed by:  Merissa Rider RD  08/11/23 14:59 EDT

## 2023-08-11 NOTE — PROGRESS NOTES
Name: Lisa Gibson ADMIT: 2023   : 1990  PCP: Provider, No Known    MRN: [id number] LOS: 2 days   AGE/SEX: 33 y.o. female  ROOM: [facility]     Subjective   Subjective   Patient seen and examined this morning.  Hospital day 4.  At time of my examination, patient is awake, alert, resting in bed.  Continues to have epigastric pain, nausea, however, symptoms slightly improved today from prior. She has not had any episodes of emesis this morning. GI consulted and will evaluate today.        Objective   Objective   Vital Signs  Temp:  [98.1 øF (36.7 øC)-99.5 øF (37.5 øC)] 99.5 øF (37.5 øC)  Heart Rate:  [] 91  Resp:  [16-18] 16  BP: (134-161)/() 134/91  SpO2:  [89 %-100 %] 98 %  on   ;   Device (Oxygen Therapy): room air  Body mass index is 18.75 kg/mý.  Physical Exam  Vitals and nursing note reviewed.   Constitutional:       Appearance: She is ill-appearing.   Eyes:      General: No scleral icterus.  Cardiovascular:      Rate and Rhythm: Regular rhythm. Tachycardia present.      Pulses: Normal pulses.      Heart sounds: Normal heart sounds.   Pulmonary:      Effort: Pulmonary effort is normal. No respiratory distress.      Breath sounds: Normal breath sounds.   Abdominal:      General: Bowel sounds are normal. There is no distension.      Palpations: Abdomen is soft.      Tenderness: There is abdominal tenderness (Epigastric predominant). There is no guarding or rebound.   Musculoskeletal:      Right lower leg: No edema.      Left lower leg: No edema.   Skin:     General: Skin is warm and dry.   Neurological:      Mental Status: She is alert.     Results Review     I reviewed the patient's new clinical results.  Results from last 7 days   Lab Units 23  0630 08/10/23  0541 23  1022 23  0615   WBC 10*3/mm3 4.33 4.16 4.68 4.15   HEMOGLOBIN g/dL 9.9* 9.7* 10.7* 9.3*   PLATELETS 10*3/mm3 185 169 180 167       Results from last 7 days   Lab Units 23  0630 08/10/23  1059  08/10/23  0541 08/09/23  2243 08/09/23  0733 08/08/23  0615   SODIUM mmol/L 136  --  135*  --  136 135*   POTASSIUM mmol/L 3.6 3.6 3.9 3.4* 3.2* 3.3*   CHLORIDE mmol/L 95*  --  95*  --  96* 96*   CO2 mmol/L 27.3  --  25.5  --  22.3 23.4   BUN mg/dL 2*  --  2*  --  <2* 4*   CREATININE mg/dL 0.45*  --  0.47*  --  0.39* 0.61   GLUCOSE mg/dL 113*  --  120*  --  102* 93   EGFR mL/min/1.73 131.3  --  129.9  --  135.9 122.0       Results from last 7 days   Lab Units 08/11/23  0630 08/10/23  0541 08/09/23  0733 08/08/23  0615   ALBUMIN g/dL 4.2 3.9 3.9 4.1   BILIRUBIN mg/dL 0.8 0.8 0.7 0.9   ALK PHOS U/L 78 73 77 78   AST (SGOT) U/L 211* 216* 146* 95*   ALT (SGPT) U/L 96* 78* 53* 50*       Results from last 7 days   Lab Units 08/11/23  0630 08/10/23  0541 08/09/23  0733 08/08/23  0615   CALCIUM mg/dL 9.9 9.6 9.4 9.3   ALBUMIN g/dL 4.2 3.9 3.9 4.1   MAGNESIUM mg/dL 1.7 1.7 1.6 2.0   PHOSPHORUS mg/dL 2.7 2.6 2.6 1.1*       Results from last 7 days   Lab Units 08/07/23  1919 08/07/23  1521 08/07/23  1218   LACTATE mmol/L 1.3 2.4* 3.3*       No results found for: HGBA1C, POCGLU    US Abdomen Complete    Result Date: 8/10/2023   No evidence for acute cholecystitis. No cholelithiasis. No biliary ductal dilatation. Hepatic steatosis.  Hypoechoic pancreatic lesion, as described.    This report was finalized on 8/10/2023 8:00 PM by Dr. Damon Clay M.D.       I have personally reviewed all medications:  Scheduled Medications  enoxaparin, 40 mg, Subcutaneous, Q24H  folic acid, 1 mg, Oral, Daily  multivitamin, 1 tablet, Oral, Daily  senna-docusate sodium, 2 tablet, Oral, BID  sodium chloride, 10 mL, Intravenous, Q12H  thiamine (B-1) IV, 200 mg, Intravenous, Q8H   Followed by  [START ON 8/12/2023] thiamine, 100 mg, Oral, Daily    Infusions  sodium chloride, 125 mL/hr, Last Rate: 125 mL/hr (08/11/23 0604)    Diet  Diet: Liquid Diets; Full Liquid; Texture: Regular Texture (IDDSI 7); Fluid Consistency: Thin (IDDSI 0)    I have  "personally reviewed:  [x]  Laboratory   [x]  Microbiology   [x]  Radiology   [x]  EKG/Telemetry  [x]  Cardiology/Vascular   []  Pathology    []  Records       Assessment/Plan     Active Hospital Problems    Diagnosis  POA    **Acute pancreatitis [K85.90]  Yes    Hypophosphatemia [E83.39]  Yes    Nausea with vomiting [R11.2]  Yes    Alcohol dependence [F10.20]  Yes    Pulmonary nodule [R91.1]  Yes    Pancreatic pseudocyst [K86.3]  Yes    Alcoholic hepatitis [K70.10]  Yes    Alcohol withdrawal [F10.939]  Yes    Hypokalemia [E87.6]  Yes    Epigastric pain [R10.13]  Yes      Resolved Hospital Problems   No resolved problems to display.       33 y.o. female admitted with Acute pancreatitis.    Acute pancreatitis  Pancreatic pseudocyst  Epigastric/generalized abdominal pain  Nausea with vomiting  - Patient with complaints of worsening abdominal pain, nausea and vomiting on arrival. Imaging obtained and showed peripancreatic stranding and edema, consistent with acute pancreatitis, along with again noting area of low-attenuation lesion within the neck of the pancreas, felt to be slightly larger than on prior exam, favored to represent a small pseudocyst. Lipase elevated at 167.  - Patient currently receiving IVF, PRN medications for symptom control. However, she continues to have nausea and vomiting, initially worse after diet was advanced to regular/low fat/GI/vegetarian on 08/08, however, persisting despite regressing diet back to full liquids. On repeat testing, her lipase has improved and is now within normal limits.   - Symptoms persisted on 08/10, so consulted GI and ordered abdominal US to ensure no acute developing process. US showed \"nonspecific hypoechoic lesion measuring 1.8 x 1.2 x 1.9 cm, without demonstrated internal vascularity, may correspond to the suspected pseudocyst on the prior CT exam.\"   - GI to see this AM. Will follow their plans/recommendations. Greatly appreciate their help.  - Continue IVF, PRN " "medications for symptom control, will monitor symptoms closely, advance diet as tolerated.   - Will need repeat CT within 3-6 months for evaluation of likely pseudocyst.    Alcohol dependence with intoxication on admission, now with concern for withdrawal  - Patient endorses drinking 1-2 glasses of wine on most days, on arrival per H&P.  - ETOH level on admission: 86.  - At present, withdrawal symptoms seem to be relatively stable. CIWA scores reviewed, values low.  - Access center consulted, note reviewed, she declined inpatient psych consult, outpatient psych resources and/or [facility]/recovery referrals at this time. Greatly appreciate their help.  - CIWA protocol, MV, thiamine, folic acid  - Telemetry, pulse oximetry, seizure precautions, aspiration precautions.    Transaminitis  Alcoholic hepatitis  - LFT remain elevated on most recent labs, hepatocellular predominant, most likely secondary to alcohol. US showing diffuse hepatic steatosis. Will pursue further workup to rule out any other possible contributing etiologies.  - Order tylenol/salicylate level, hepatitis panel.  - Order repeat CMP in AM for reassessment.     Anemia  - Hemoglobin low on most recent labs, relatively stable from prior, did drop from admission, likely dilutional in the setting of IVF.  However, RDW is elevated, so further workup ordered. B12/folate normal. Iron studies showing low iron, normal TIBC most likely consistent with iron deficiency anemia. Ferritin is elevated, but likely skewed in setting of acute illness.  - Assuming target hemoglobin of 12, calculated iron deficit 788 mg.  - Will give x1 dose of IV iron now, can consider initiation of oral therapy at discharge, once patient able to tolerate PO intake.  - Order repeat CBC in AM for reassessment. Continue to monitor, transfuse for hemoglobin <7.    Pulmonary nodule  - CT obtained following arrival showing \"Area of nodularity within the left lower lobe has decreased in size, while " "another has resolved. This would suggest a benign etiology. \"  - Will need outpatient follow up with PCP for reassessment.    Hypophosphatemia, resolved  - Previously low, treated with replacement per protocol, improved on repeat testing.     Hypokalemia, resolved  - K low on prior labs, repleted per electrolyte protocol, improved on repeat testing.  Will replete via electrolyte protocol. Follow up repeat labs to guide ongoing management decisions. Replete PRN per protocol. Continue to monitor.    Lovenox 40 mg SC daily for DVT prophylaxis.  Full code.  Discussed with patient and nursing staff.  Anticipate discharge home  TBD, once symptoms improved, patient tolerating regular diet .      Silvio Chaidez DO  [facility]  08/11/23  08:59 EDT      "

## 2023-08-12 VITALS
TEMPERATURE: 98.7 F | RESPIRATION RATE: 19 BRPM | HEART RATE: 102 BPM | WEIGHT: 126.98 LBS | SYSTOLIC BLOOD PRESSURE: 131 MMHG | BODY MASS INDEX: 18.81 KG/M2 | OXYGEN SATURATION: 100 % | DIASTOLIC BLOOD PRESSURE: 93 MMHG | HEIGHT: 69 IN

## 2023-08-12 LAB
ALBUMIN SERPL-MCNC: 3.8 G/DL (ref 3.5–5.2)
ALBUMIN/GLOB SERPL: 1.7 G/DL
ALP SERPL-CCNC: 61 U/L (ref 39–117)
ALT SERPL W P-5'-P-CCNC: 75 U/L (ref 1–33)
ANION GAP SERPL CALCULATED.3IONS-SCNC: 9 MMOL/L (ref 5–15)
AST SERPL-CCNC: 100 U/L (ref 1–32)
BILIRUB SERPL-MCNC: 0.4 MG/DL (ref 0–1.2)
BUN SERPL-MCNC: 2 MG/DL (ref 6–20)
BUN/CREAT SERPL: 5.7 (ref 7–25)
CALCIUM SPEC-SCNC: 9.3 MG/DL (ref 8.6–10.5)
CHLORIDE SERPL-SCNC: 102 MMOL/L (ref 98–107)
CO2 SERPL-SCNC: 28 MMOL/L (ref 22–29)
CREAT SERPL-MCNC: 0.35 MG/DL (ref 0.57–1)
DEPRECATED RDW RBC AUTO: 46.1 FL (ref 37–54)
EGFRCR SERPLBLD CKD-EPI 2021: 139.5 ML/MIN/1.73
ERYTHROCYTE [DISTWIDTH] IN BLOOD BY AUTOMATED COUNT: 16 % (ref 12.3–15.4)
GLOBULIN UR ELPH-MCNC: 2.3 GM/DL
GLUCOSE SERPL-MCNC: 133 MG/DL (ref 65–99)
HCT VFR BLD AUTO: 25.4 % (ref 34–46.6)
HGB BLD-MCNC: 8.3 G/DL (ref 12–15.9)
MAGNESIUM SERPL-MCNC: 1.7 MG/DL (ref 1.6–2.6)
MCH RBC QN AUTO: 26.3 PG (ref 26.6–33)
MCHC RBC AUTO-ENTMCNC: 32.7 G/DL (ref 31.5–35.7)
MCV RBC AUTO: 80.6 FL (ref 79–97)
PHOSPHATE SERPL-MCNC: 2.1 MG/DL (ref 2.5–4.5)
PLATELET # BLD AUTO: 190 10*3/MM3 (ref 140–450)
PMV BLD AUTO: 11.3 FL (ref 6–12)
POTASSIUM SERPL-SCNC: 3.5 MMOL/L (ref 3.5–5.2)
PROT SERPL-MCNC: 6.1 G/DL (ref 6–8.5)
RBC # BLD AUTO: 3.15 10*6/MM3 (ref 3.77–5.28)
SODIUM SERPL-SCNC: 139 MMOL/L (ref 136–145)
WBC NRBC COR # BLD: 4.42 10*3/MM3 (ref 3.4–10.8)

## 2023-08-12 PROCEDURE — 84100 ASSAY OF PHOSPHORUS: CPT | Performed by: STUDENT IN AN ORGANIZED HEALTH CARE EDUCATION/TRAINING PROGRAM

## 2023-08-12 PROCEDURE — 85027 COMPLETE CBC AUTOMATED: CPT | Performed by: STUDENT IN AN ORGANIZED HEALTH CARE EDUCATION/TRAINING PROGRAM

## 2023-08-12 PROCEDURE — 25010000002 HYDROMORPHONE PER 4 MG: Performed by: STUDENT IN AN ORGANIZED HEALTH CARE EDUCATION/TRAINING PROGRAM

## 2023-08-12 PROCEDURE — 80053 COMPREHEN METABOLIC PANEL: CPT | Performed by: STUDENT IN AN ORGANIZED HEALTH CARE EDUCATION/TRAINING PROGRAM

## 2023-08-12 PROCEDURE — 83735 ASSAY OF MAGNESIUM: CPT | Performed by: STUDENT IN AN ORGANIZED HEALTH CARE EDUCATION/TRAINING PROGRAM

## 2023-08-12 PROCEDURE — 25010000002 THIAMINE HCL 200 MG/2ML SOLUTION: Performed by: NURSE PRACTITIONER

## 2023-08-12 RX ORDER — DIPHENOXYLATE HYDROCHLORIDE AND ATROPINE SULFATE 2.5; .025 MG/1; MG/1
1 TABLET ORAL DAILY
Qty: 30 TABLET | Refills: 0 | Status: SHIPPED | OUTPATIENT
Start: 2023-08-13

## 2023-08-12 RX ORDER — OXYCODONE HYDROCHLORIDE AND ACETAMINOPHEN 5; 325 MG/1; MG/1
1 TABLET ORAL EVERY 6 HOURS PRN
Qty: 5 TABLET | Refills: 0 | Status: SHIPPED | OUTPATIENT
Start: 2023-08-12 | End: 2023-08-14

## 2023-08-12 RX ORDER — LANOLIN ALCOHOL/MO/W.PET/CERES
100 CREAM (GRAM) TOPICAL DAILY
Qty: 30 TABLET | Refills: 0 | Status: SHIPPED | OUTPATIENT
Start: 2023-08-12

## 2023-08-12 RX ORDER — ONDANSETRON 4 MG/1
4 TABLET, FILM COATED ORAL EVERY 8 HOURS PRN
Qty: 10 TABLET | Refills: 0 | Status: SHIPPED | OUTPATIENT
Start: 2023-08-12

## 2023-08-12 RX ORDER — FOLIC ACID 1 MG/1
1 TABLET ORAL DAILY
Qty: 30 TABLET | Refills: 0 | Status: SHIPPED | OUTPATIENT
Start: 2023-08-13

## 2023-08-12 RX ADMIN — HYDROMORPHONE HYDROCHLORIDE 0.5 MG: 1 INJECTION, SOLUTION INTRAMUSCULAR; INTRAVENOUS; SUBCUTANEOUS at 01:33

## 2023-08-12 RX ADMIN — Medication 1 TABLET: at 08:18

## 2023-08-12 RX ADMIN — FOLIC ACID 1 MG: 1 TABLET ORAL at 08:18

## 2023-08-12 RX ADMIN — THIAMINE HYDROCHLORIDE 200 MG: 100 INJECTION, SOLUTION INTRAMUSCULAR; INTRAVENOUS at 01:39

## 2023-08-12 RX ADMIN — Medication 10 ML: at 08:18

## 2023-08-12 NOTE — DISCHARGE SUMMARY
"    Patient Name: Lisa Gibson  : 1990  MRN: [id number]    Date of Admission: 2023  Date of Discharge:  2023  Primary Care Physician: Provider, No Known      Chief Complaint:   Abdominal Pain      Discharge Diagnoses     Active Hospital Problems    Diagnosis  POA    **Acute pancreatitis [K85.90]  Yes    Hypophosphatemia [E83.39]  Yes    Nausea with vomiting [R11.2]  Yes    Alcohol dependence [F10.20]  Yes    Pulmonary nodule [R91.1]  Yes    Pancreatic pseudocyst [K86.3]  Yes    Alcoholic hepatitis [K70.10]  Yes    Alcohol withdrawal [F10.939]  Yes    Hypokalemia [E87.6]  Yes    Epigastric pain [R10.13]  Yes      Resolved Hospital Problems   No resolved problems to display.        Hospital Course     Very pleasant 33 y.o. woman admitted with acute alcoholic pancreatitis. Please see below for details of admission:     Acute alcoholic pancreatitis  Pancreatic pseudocyst  Epigastric/generalized abdominal pain  Nausea with vomiting  - Doing well this AM, tolerating regular diet, eager to go home   - Will need repeat CT within 3-6 months for evaluation of what is likely a pseudocyst--have encouraged her to f/u with GI for this  - home with #5 Percocets and #10 Zofran tabs to use as needed     Alcohol dependence with intoxication on admission, then with concern for withdrawal  - no longer withdrawing, [facility]  - Access Center consulted, note reviewed, she declined inpatient Psych consult, outpatient psych resources and/or [facility]/recovery referrals at this time.   - home on MVI, folate, and thiamine supplements     Alcoholic hepatitis  - US showed diffuse hepatic steatosis.  - Tylenol/salicylate level fine, acute viral hepatitis panel negative.  - LFTs trending down  - f/u with PCP and GI     Anemia, iron deficiency  - S/p x1 dose of IV iron, consider oral iron therapy as outpt, f/u with PCP and/or GI to discuss     Pulmonary nodule, LLL  - CT showed \"area of nodularity within the left " "lower lobe has decreased in size, while another has resolved. This would suggest a benign etiology\"  - Will need outpatient follow up with PCP for monitoring     Hypophosphatemia, resolved  - Replaced per protocol     Hypokalemia, resolved  - Replaced per protocol    Lovenox for DVT ppx  Full code  Discussed with pt and RN  Home with family today  F/u with PCP of choice in 1 week--CCP to provide PCP info  F/u with GI in 3 months    Day of Discharge     Subjective:  Feeling good today. Tolerating regular diet including peanut butter and crackers w/o N/V/abd pain. Eager to go home. No SOA or CP. No F/C/NS. Voiding well. Slept well last night.     Physical Exam:  Temp:  [98 øF (36.7 øC)-98.8 øF (37.1 øC)] 98.7 øF (37.1 øC)  Heart Rate:  [] 102  Resp:  [16-19] 19  BP: (131-138)/(90-99) 131/93  Body mass index is 18.75 kg/mý.  Physical Exam  Vitals and nursing note reviewed.   Constitutional:       General: She is not in acute distress.     Appearance: She is not ill-appearing, toxic-appearing or diaphoretic.   HENT:      Head: Normocephalic.      Nose: Nose normal.      Mouth/Throat:      Mouth: Mucous membranes are moist.      Pharynx: Oropharynx is clear.   Eyes:      General: No scleral icterus.        Right eye: No discharge.         Left eye: No discharge.      Extraocular Movements: Extraocular movements intact.      Conjunctiva/sclera: Conjunctivae normal.   Cardiovascular:      Rate and Rhythm: Normal rate and regular rhythm.      Pulses: Normal pulses.   Pulmonary:      Effort: Pulmonary effort is normal. No respiratory distress.      Breath sounds: Normal breath sounds. No wheezing or rales.   Abdominal:      General: Bowel sounds are normal. There is no distension.      Palpations: Abdomen is soft.      Tenderness: There is no abdominal tenderness.   Musculoskeletal:         General: No swelling or deformity. Normal range of motion.      Cervical back: Neck supple. No rigidity.   Lymphadenopathy:      " Cervical: No cervical adenopathy.   Skin:     General: Skin is warm and dry.      Capillary Refill: Capillary refill takes less than 2 seconds.      Coloration: Skin is not jaundiced.   Neurological:      General: No focal deficit present.      Mental Status: She is alert and oriented to person, place, and time. Mental status is at baseline.      Cranial Nerves: No cranial nerve deficit.      Coordination: Coordination normal.   Psychiatric:         Mood and Affect: Mood normal.         Behavior: Behavior normal.         Thought Content: Thought content normal.      Consultants     Consult Orders (all) (From admission, onward)       Start     Ordered    08/10/23 1822  Inpatient Gastroenterology Consult  Once        Specialty:  Gastroenterology  Provider:  Durga Horne MD    08/10/23 1824    08/08/23 1129  Inpatient Access Center Consult  Once        Provider:  (Not yet assigned)    08/08/23 1128    08/07/23 0452  [facility] (on-call MD unless specified) Details  Once        Specialty:  Hospitalist  Provider:  (Not yet assigned)    08/07/23 0451                  Procedures     * Surgery not found *    Imaging Results (All)       Procedure Component Value Units Date/Time    US Abdomen Complete [[id number]] Collected: 08/10/23 1953     Updated: 08/10/23 2003    Narrative:      US ABDOMEN COMPLETE-     INDICATIONS: Transaminitis, pancreatitis     TECHNIQUE: Ultrasound of the abdomen     COMPARISON: CT from 08/07/2023     FINDINGS:     The gallbladder is nontender, with no stones demonstrated. No  gallbladder wall thickening or pericholecystic fluid.     No intrahepatic or extrahepatic biliary ductal dilatation is  demonstrated. The common biliary duct caliber is remeasured at 5 mm.     No liver lesion is identified. Diffusely, the echogenicity of the liver  is increased, compatible with steatosis.     The pancreas shows a nonspecific hypoechoic lesion measuring 1.8 x 1.2 x  1.9 cm, without demonstrated internal vascularity,  may correspond to the  suspected pseudocyst on the prior CT exam; MRI correlation can be  obtained as indicated.     The right kidney, 12.8 cm, and left kidney, 12.9 cm, appear  unremarkable.     The spleen measures 8.2 cm, appears unremarkable.     Unremarkable appearance of the aorta, inferior vena cava.          Impression:         No evidence for acute cholecystitis. No cholelithiasis. No biliary  ductal dilatation. Hepatic steatosis.     Hypoechoic pancreatic lesion, as described.           This report was finalized on 8/10/2023 8:00 PM by Dr. Damon Clay M.D.       CT Abdomen Pelvis With Contrast [[id number]] Collected: 08/07/23 0424     Updated: 08/07/23 0435    Narrative:      CT OF THE ABDOMEN AND PELVIS WITH CONTRAST     HISTORY: Abdominal pain. HISTORY of pancreatitis.     COMPARISON: 04/29/2023     TECHNIQUE: Axial CT imaging was obtained through the abdomen and pelvis.  IV contrast was administered.     FINDINGS:  Area of nodularity is again noted within the left lower lobe. It  measures approximately 1.3 cm, previously 1.7 cm. Additional subpleural  nodule has resolved. Marked diffuse hepatic steatosis is again noted.  The stomach, adrenal glands, spleen, and gallbladder appear  unremarkable. The patient is noted to have inflammatory stranding  surrounding the pancreas, as well some pancreatic edema, suggesting  pancreatitis. Low-attenuation lesion is again noted within the neck of  the pancreas. This measures up to 1.2 cm. This is slightly larger than  on prior exam, when it measured 1 cm. No peripancreatic collections are  seen. Main portal vein and splenic vein are patent. There is no evidence  of gastric outlet obstruction. There is no biliary dilatation. The liver  is enlarged, measuring up to 18.6 cm in craniocaudal dimensions. The  kidneys enhance symmetrically. There is no hydronephrosis. There is a  simple appearing right renal cyst. No additional follow-up is necessary.  No distal  ureteral or bladder stones are seen. Uterus appears normal.  There is no bowel obstruction. Appendix is normal. No acute osseous  abnormalities are seen.       Impression:         1. The patient has peripancreatic stranding and edema, concerning for  acute pancreatitis. Patient is again noted to have a low-attenuation  lesion within the neck of the pancreas. This appears slightly larger  than on prior exam. It is favored to represent a small pseudocyst.  2. Area of nodularity within the left lower lobe has decreased in size,  while another has resolved. This would suggest a benign etiology.     Radiation dose reduction techniques were utilized, including automated  exposure control and exposure modulation based on body size.     This report was finalized on 8/7/2023 4:31 AM by Dr. Diamond Goodman M.D.                 Pertinent Labs     Results from last 7 days   Lab Units 08/12/23  0644 08/11/23  0630 08/10/23  0541 08/09/23  1022   WBC 10*3/mm3 4.42 4.33 4.16 4.68   HEMOGLOBIN g/dL 8.3* 9.9* 9.7* 10.7*   PLATELETS 10*3/mm3 190 185 169 180     Results from last 7 days   Lab Units 08/12/23  0644 08/11/23  2201 08/11/23  0630 08/10/23  1059 08/10/23  0541 08/09/23  2243 08/09/23  0733   SODIUM mmol/L 139  --  136  --  135*  --  136   POTASSIUM mmol/L 3.5 3.6 3.6 3.6 3.9   < > 3.2*   CHLORIDE mmol/L 102  --  95*  --  95*  --  96*   CO2 mmol/L 28.0  --  27.3  --  25.5  --  22.3   BUN mg/dL 2*  --  2*  --  2*  --  <2*   CREATININE mg/dL 0.35*  --  0.45*  --  0.47*  --  0.39*   GLUCOSE mg/dL 133*  --  113*  --  120*  --  102*   EGFR mL/min/1.73 139.5  --  131.3  --  129.9  --  135.9    < > = values in this interval not displayed.     Results from last 7 days   Lab Units 08/12/23 0644 08/11/23  0630 08/10/23  0541 08/09/23  0733   ALBUMIN g/dL 3.8 4.2 3.9 3.9   BILIRUBIN mg/dL 0.4 0.8 0.8 0.7   ALK PHOS U/L 61 78 73 77   AST (SGOT) U/L 100* 211* 216* 146*   ALT (SGPT) U/L 75* 96* 78* 53*     Results from last 7 days    Lab Units 08/12/23  0644 08/11/23  0630 08/10/23  0541 08/09/23  0733   CALCIUM mg/dL 9.3 9.9 9.6 9.4   ALBUMIN g/dL 3.8 4.2 3.9 3.9   MAGNESIUM mg/dL 1.7 1.7 1.7 1.6   PHOSPHORUS mg/dL 2.1* 2.7 2.6 2.6     Results from last 7 days   Lab Units 08/10/23  0541 08/07/23  0129   LIPASE U/L 31 167*         Results from last 7 days   Lab Units 08/09/23  0733   CHOLESTEROL mg/dL 205*   TRIGLYCERIDES mg/dL 60   HDL CHOL mg/dL 59   LDL CHOL mg/dL 135*             Test Results Pending at Discharge       Discharge Details        Discharge Medications        New Medications        Instructions Start Date   folic acid 1 MG tablet  Commonly known as: FOLVITE   1 mg, Oral, Daily   Start Date: August 13, 2023     multivitamin tablet tablet   1 tablet, Oral, Daily   Start Date: August 13, 2023     ondansetron 4 MG tablet  Commonly known as: Zofran   4 mg, Oral, Every 8 Hours PRN      oxyCODONE-acetaminophen 5-325 MG per tablet  Commonly known as: PERCOCET   1 tablet, Oral, Every 6 Hours PRN      thiamine 100 MG tablet  Commonly known as: VITAMIN B1   100 mg, Oral, Daily             Stop These Medications      dicyclomine 20 MG tablet  Commonly known as: BENTYL              No Known Allergies    Discharge Disposition:  Home or Self Care      Discharge Diet:  Diet Order   Procedures    Diet: Vegetarian, Gastrointestinal Diets; Vegan (No animal products); Low Irritant; Texture: Regular Texture (IDDSI 7); Fluid Consistency: Thin (IDDSI 0)       Discharge Activity:   As tolerated, can return to work on Monday 8/14    CODE STATUS:    Code Status and Medical Interventions:   Ordered at: 08/07/23 0506     Code Status (Patient has no pulse and is not breathing):    CPR (Attempt to Resuscitate)     Medical Interventions (Patient has pulse or is breathing):    Full Support     Release to patient:    Routine Release       No future appointments.  Additional Instructions for the Follow-ups that You Need to Schedule       Discharge Follow-up  with PCP   As directed       Currently Documented PCP:    Provider, No Known    PCP Phone Number:    [phone]     Follow Up Details: PCP of choice in 1 week        Discharge Follow-up with Specified Provider: Dr. Walker ([facility]) for monitoring of pseudocyst; 3 Months   As directed      To: Dr. Walker ([facility]) for monitoring of pseudocyst   Follow Up: 3 Months               Follow-up Information       Provider, No Known .    Why: PCP of choice in 1 week  Contact information:  [place] [address]  [phone]                             Additional Instructions for the Follow-ups that You Need to Schedule       Discharge Follow-up with PCP   As directed       Currently Documented PCP:    Provider, No Known    PCP Phone Number:    [phone]     Follow Up Details: PCP of choice in 1 week        Discharge Follow-up with Specified Provider: Dr. Walker ([facility]) for monitoring of pseudocyst; 3 Months   As directed      To: Dr. Walker ([facility]) for monitoring of pseudocyst   Follow Up: 3 Months            Time Spent on Discharge:  Greater than 30 minutes      Alexander Jiménez MD  [facility]  08/12/23  09:31 EDT

## 2023-08-12 NOTE — NURSING NOTE
Spoke to case management regarding setting up PCP provider Per Dr. Jiménez's request.  Per Case management a PCP could not be scheduled today on Saturday.  [facility] and will be sent home with patient.

## 2023-08-12 NOTE — PLAN OF CARE
Goal Outcome Evaluation:  Plan of Care Reviewed With: patient        Progress: improving  Outcome Evaluation: patient to discharge home this afternoon.  Per MD patient may return to work Monday 8/14/23.  List provided to set up PCP for follow up

## 2023-08-12 NOTE — PROGRESS NOTES
Name: Lisa Gibson ADMIT: 2023   : 1990  PCP: Provider, No Known    MRN: [id number] LOS: 3 days   AGE/SEX: 33 y.o. female  ROOM: [facility]     Subjective   Subjective   Feeling good today. Tolerating regular diet including peanut butter and crackers w/o N/V/abd pain. Eager to go home. No SOA or CP. No F/C/NS. Voiding well. Slept well last night.       Objective   Objective   Vital Signs  Temp:  [98 øF (36.7 øC)-98.8 øF (37.1 øC)] 98.7 øF (37.1 øC)  Heart Rate:  [] 102  Resp:  [16-19] 19  BP: (131-138)/(90-99) 131/93  SpO2:  [26 %-100 %] 100 %  on   ;   Device (Oxygen Therapy): room air  Body mass index is 18.75 kg/mý.  Physical Exam  Vitals and nursing note reviewed.   Constitutional:       General: She is not in acute distress.     Appearance: She is not ill-appearing, toxic-appearing or diaphoretic.   HENT:      Head: Normocephalic.      Nose: Nose normal.      Mouth/Throat:      Mouth: Mucous membranes are moist.      Pharynx: Oropharynx is clear.   Eyes:      General: No scleral icterus.        Right eye: No discharge.         Left eye: No discharge.      Extraocular Movements: Extraocular movements intact.      Conjunctiva/sclera: Conjunctivae normal.   Cardiovascular:      Rate and Rhythm: Normal rate and regular rhythm.      Pulses: Normal pulses.   Pulmonary:      Effort: Pulmonary effort is normal. No respiratory distress.      Breath sounds: Normal breath sounds. No wheezing or rales.   Abdominal:      General: Bowel sounds are normal. There is no distension.      Palpations: Abdomen is soft.      Tenderness: There is no abdominal tenderness.   Musculoskeletal:         General: No swelling or deformity. Normal range of motion.      Cervical back: Neck supple. No rigidity.   Lymphadenopathy:      Cervical: No cervical adenopathy.   Skin:     General: Skin is warm and dry.      Capillary Refill: Capillary refill takes less than 2 seconds.      Coloration: Skin is not jaundiced.    Neurological:      General: No focal deficit present.      Mental Status: She is alert and oriented to person, place, and time. Mental status is at baseline.      Cranial Nerves: No cranial nerve deficit.      Coordination: Coordination normal.   Psychiatric:         Mood and Affect: Mood normal.         Behavior: Behavior normal.         Thought Content: Thought content normal.     Results Review     I reviewed the patient's new clinical results.  Results from last 7 days   Lab Units 08/12/23  0644 08/11/23  0630 08/10/23  0541 08/09/23  1022   WBC 10*3/mm3 4.42 4.33 4.16 4.68   HEMOGLOBIN g/dL 8.3* 9.9* 9.7* 10.7*   PLATELETS 10*3/mm3 190 185 169 180     Results from last 7 days   Lab Units 08/12/23  0644 08/11/23  2201 08/11/23  0630 08/10/23  1059 08/10/23  0541 08/09/23  2243 08/09/23  0733   SODIUM mmol/L 139  --  136  --  135*  --  136   POTASSIUM mmol/L 3.5 3.6 3.6 3.6 3.9   < > 3.2*   CHLORIDE mmol/L 102  --  95*  --  95*  --  96*   CO2 mmol/L 28.0  --  27.3  --  25.5  --  22.3   BUN mg/dL 2*  --  2*  --  2*  --  <2*   CREATININE mg/dL 0.35*  --  0.45*  --  0.47*  --  0.39*   GLUCOSE mg/dL 133*  --  113*  --  120*  --  102*   EGFR mL/min/1.73 139.5  --  131.3  --  129.9  --  135.9    < > = values in this interval not displayed.     Results from last 7 days   Lab Units 08/12/23 0644 08/11/23  0630 08/10/23  0541 08/09/23  0733   ALBUMIN g/dL 3.8 4.2 3.9 3.9   BILIRUBIN mg/dL 0.4 0.8 0.8 0.7   ALK PHOS U/L 61 78 73 77   AST (SGOT) U/L 100* 211* 216* 146*   ALT (SGPT) U/L 75* 96* 78* 53*     Results from last 7 days   Lab Units 08/12/23  0644 08/11/23  0630 08/10/23  0541 08/09/23  0733   CALCIUM mg/dL 9.3 9.9 9.6 9.4   ALBUMIN g/dL 3.8 4.2 3.9 3.9   MAGNESIUM mg/dL 1.7 1.7 1.7 1.6   PHOSPHORUS mg/dL 2.1* 2.7 2.6 2.6     Results from last 7 days   Lab Units 08/07/23  1919 08/07/23  1521 08/07/23  1218   LACTATE mmol/L 1.3 2.4* 3.3*     No results found for: HGBA1C, POCGLU    US Abdomen Complete    Result  Date: 8/10/2023   No evidence for acute cholecystitis. No cholelithiasis. No biliary ductal dilatation. Hepatic steatosis.  Hypoechoic pancreatic lesion, as described.    This report was finalized on 8/10/2023 8:00 PM by Dr. Damon Clay M.D.       I have personally reviewed all medications:  Scheduled Medications  enoxaparin, 40 mg, Subcutaneous, Q24H  folic acid, 1 mg, Oral, Daily  multivitamin, 1 tablet, Oral, Daily  senna-docusate sodium, 2 tablet, Oral, BID  sodium chloride, 10 mL, Intravenous, Q12H  thiamine, 100 mg, Oral, Daily    Infusions  sodium chloride, 125 mL/hr, Last Rate: 125 mL/hr (08/11/23 0604)    Diet  Diet: Vegetarian, Gastrointestinal Diets; Vegan (No animal products); Low Irritant; Texture: Regular Texture (IDDSI 7); Fluid Consistency: Thin (IDDSI 0)    I have personally reviewed:  [x]  Laboratory   []  Microbiology   [x]  Radiology   [x]  EKG/Telemetry  []  Cardiology/Vascular   []  Pathology    [x]  Records       Assessment/Plan     Active Hospital Problems    Diagnosis  POA    **Acute pancreatitis [K85.90]  Yes    Hypophosphatemia [E83.39]  Yes    Nausea with vomiting [R11.2]  Yes    Alcohol dependence [F10.20]  Yes    Pulmonary nodule [R91.1]  Yes    Pancreatic pseudocyst [K86.3]  Yes    Alcoholic hepatitis [K70.10]  Yes    Alcohol withdrawal [F10.939]  Yes    Hypokalemia [E87.6]  Yes    Epigastric pain [R10.13]  Yes      Resolved Hospital Problems   No resolved problems to display.       33 y.o. female admitted with Acute pancreatitis.    Acute pancreatitis  Pancreatic pseudocyst  Epigastric/generalized abdominal pain  Nausea with vomiting  - Patient presented with complaints of worsening abdominal pain, nausea and vomiting on arrival. Imaging obtained and showed peripancreatic stranding and edema, consistent with acute pancreatitis, along with an area of low-attenuation lesion within the neck of the pancreas, felt to be slightly larger than on prior exam, favored to represent a  "small pseudocyst. Lipase elevated at 167.  - Patient treated with IVF and PRN medications for symptom control. She continued to have nausea and vomiting, initially worse after diet was advanced to regular/low fat/GI/vegetarian on 08/08, and persisted despite regressing diet back to full liquids. On repeat testing, her lipase has improved and is now within normal limits.   - Symptoms persisted on 08/10, so consulted GI and ordered abdominal US to ensure no acute developing process. US showed \"nonspecific hypoechoic lesion measuring 1.8 x 1.2 x 1.9 cm, without demonstrated internal vascularity, may correspond to the suspected pseudocyst on the prior CT exam.\"   - GI has seen, nothing new to add at present. Will follow their plans/recommendations. Greatly appreciate their help.  - Continue IVF, PRN medications for symptom control, will monitor symptoms closely, diet now advanced to regular this AM.   - Will need repeat CT within 3-6 months for evaluation of what is likely a pseudocyst.     Alcohol dependence with intoxication on admission, then with concern for withdrawal  - Patient endorses drinking 1-2 glasses of wine on most days, on arrival per H&P.  - ETOH level on admission: 86.  - At present, withdrawal symptoms seem to be relatively stable. CIWA scores reviewed, values low.  - [facility] consulted, note reviewed, she declined inpatient Psych consult, outpatient psych resources and/or [facility]/recovery referrals at this time.  - Continue PRN Ativan per CIWA protocol, MVI, thiamine, and folic acid  - Continue telemetry, pulse oximetry, seizure precautions, aspiration precautions.     Alcoholic hepatitis  - LFTs remained elevated on most recent labs, hepatocellular predominant, most likely secondary to alcohol. US showed diffuse hepatic steatosis.  - Tylenol/salicylate level fine, acute viral hepatitis panel negative.  - Numbers trending down  - GI following     Anemia, iron deficiency  - Hemoglobin low on most " "recent labs, relatively stable from prior, did drop from admission, likely dilutional in the setting of IVF. However, RDW is elevated, so further workup ordered. B12/folate normal. Iron studies showing low iron, normal TIBC most likely consistent with iron deficiency anemia. Ferritin is elevated, but likely skewed in setting of acute illness.  - Assuming target hemoglobin of 12, calculated iron deficit 788 mg.  - Now s/p x1 dose of IV iron, can consider initiation of oral therapy at discharge.  - Continue to monitor Hgb, transfuse for hemoglobin <7.     Pulmonary nodule, LLL  - CT obtained following arrival showing \"Area of nodularity within the left lower lobe has decreased in size, while another has resolved. This would suggest a benign etiology. \"  - Will need outpatient follow up with PCP for reassessment.     Hypophosphatemia, resolved  - Continue to replace per protocol     Hypokalemia, resolved  - K+ low on prior labs, repleted per electrolyte protocol, improved on repeat testing.       Lovenox 40 mg SC daily for DVT prophylaxis.  Full code.  Discussed with patient.  Anticipate discharge home with family today--please see dc summary for details.      Alexander Jiménez MD  [facility]  08/12/23  09:06 EDT      "

## 2023-08-12 NOTE — PLAN OF CARE
Problem: Adult Inpatient Plan of Care  Goal: Plan of Care Review  Outcome: Ongoing, Progressing  Flowsheets (Taken 8/12/2023 0522)  Progress: improving  Plan of Care Reviewed With: patient  Goal: Patient-Specific Goal (Individualized)  Outcome: Ongoing, Progressing  Goal: Absence of Hospital-Acquired Illness or Injury  Outcome: Ongoing, Progressing  Intervention: Identify and Manage Fall Risk  Recent Flowsheet Documentation  Taken 8/12/2023 0400 by Mayra Baig RN  Safety Promotion/Fall Prevention: safety round/check completed  Taken 8/12/2023 0200 by Mayra Baig RN  Safety Promotion/Fall Prevention: safety round/check completed  Taken 8/12/2023 0000 by Mayra Baig RN  Safety Promotion/Fall Prevention: safety round/check completed  Taken 8/11/2023 2158 by Mayra Baig RN  Safety Promotion/Fall Prevention: safety round/check completed  Taken 8/11/2023 2100 by Mayra Baig RN  Safety Promotion/Fall Prevention: safety round/check completed  Intervention: Prevent Skin Injury  Recent Flowsheet Documentation  Taken 8/12/2023 0400 by Mayra Baig RN  Body Position: position changed independently  Taken 8/12/2023 0200 by Mayra Baig RN  Body Position: position changed independently  Taken 8/12/2023 0000 by Mayra Baig RN  Body Position: position changed independently  Taken 8/11/2023 2158 by Mayra Baig RN  Body Position: position changed independently  Taken 8/11/2023 2100 by Mayra Baig RN  Body Position: position changed independently  Intervention: Prevent and Manage VTE (Venous Thromboembolism) Risk  Recent Flowsheet Documentation  Taken 8/11/2023 2100 by Mayra Baig RN  Activity Management: ambulated in room  Intervention: Prevent Infection  Recent Flowsheet Documentation  Taken 8/12/2023 0000 by Mayra Baig RN  Infection Prevention: environmental surveillance performed  Taken 8/11/2023 2100 by Mayra Baig RN  Infection Prevention:   single patient room provided   rest/sleep  promoted   hand hygiene promoted   environmental surveillance performed  Goal: Optimal Comfort and Wellbeing  Outcome: Ongoing, Progressing  Intervention: Monitor Pain and Promote Comfort  Recent Flowsheet Documentation  Taken 8/11/2023 2100 by Mayra Baig RN  Pain Management Interventions: see MAR  Intervention: Provide Person-Centered Care  Recent Flowsheet Documentation  Taken 8/11/2023 2100 by Mayra Baig RN  Trust Relationship/Rapport:   care explained   choices provided   emotional support provided   questions answered  Goal: Readiness for Transition of Care  Outcome: Ongoing, Progressing     Problem: Nausea and Vomiting  Goal: Fluid and Electrolyte Balance  Outcome: Ongoing, Progressing     Problem: Pain Acute  Goal: Acceptable Pain Control and Functional Ability  Outcome: Ongoing, Progressing  Intervention: Prevent or Manage Pain  Recent Flowsheet Documentation  Taken 8/11/2023 2100 by Mayra Baig RN  Medication Review/Management: medications reviewed  Intervention: Develop Pain Management Plan  Recent Flowsheet Documentation  Taken 8/11/2023 2100 by Mayra Baig RN  Pain Management Interventions: see MAR  Intervention: Optimize Psychosocial Wellbeing  Recent Flowsheet Documentation  Taken 8/11/2023 2100 by Mayra Baig RN  Diversional Activities: smartphone     Problem: Skin Injury Risk Increased  Goal: Skin Health and Integrity  Outcome: Ongoing, Progressing  Intervention: Optimize Skin Protection  Recent Flowsheet Documentation  Taken 8/11/2023 2100 by Mayra Baig RN  Head of Bed (HOB) Positioning: HOB elevated     Problem: Fall Injury Risk  Goal: Absence of Fall and Fall-Related Injury  Outcome: Ongoing, Progressing  Intervention: Identify and Manage Contributors  Recent Flowsheet Documentation  Taken 8/11/2023 2100 by Mayra Baig RN  Medication Review/Management: medications reviewed  Intervention: Promote Injury-Free Environment  Recent Flowsheet Documentation  Taken 8/12/2023 0400  by Sturza, Mayra, RN  Safety Promotion/Fall Prevention: safety round/check completed  Taken 8/12/2023 0200 by Mayra Baig RN  Safety Promotion/Fall Prevention: safety round/check completed  Taken 8/12/2023 0000 by Mayra Baig RN  Safety Promotion/Fall Prevention: safety round/check completed  Taken 8/11/2023 2158 by Mayra Baig RN  Safety Promotion/Fall Prevention: safety round/check completed  Taken 8/11/2023 2100 by Mayra Baig RN  Safety Promotion/Fall Prevention: safety round/check completed   Goal Outcome Evaluation:  Patient alert oriented / not in any distress during night / pain controled with PRN's /  IVF fluids tolerated well / denied nausea during  tour / diet tolerate as tolerate / VS stable

## 2023-08-13 ENCOUNTER — READMISSION MANAGEMENT (OUTPATIENT)
Dept: CALL CENTER | Facility: HOSPITAL | Age: 33
End: 2023-08-13
Payer: COMMERCIAL

## 2023-08-13 NOTE — OUTREACH NOTE
Prep Survey      Flowsheet Row Responses   [facility] [place] patient discharged from? [facility]   Is LACE score < 7 ? No   Eligibility Not Eligible   What are the reasons patient is not eligible? Other   Does the patient have one of the following disease processes/diagnoses(primary or secondary)? Other   Prep survey completed? Yes            Carolynn HOFF - Registered Nurse

## 2023-08-13 NOTE — PAYOR COMM NOTE
"Lisa Talley (33 y.o. Female)        PLEASE SEE ATTACHED DC SUMMARY    REF#[id number]     THANK YOU    AZAM ALLISON LPN CCP   Date of Birth   1990    Social Security Number       Address   [address] [place] [address]    Home Phone   [phone]    MRN   [id number]       Hindu   None    Marital Status                               Admission Date   23    Admission Type   Emergency    Admitting Provider   Mandeep Bustillo MD    Attending Provider       Department, Room/Bed   [address], [address]/1       Discharge Date   2023    Discharge Disposition   Home or Self Care    Discharge Destination                                 Attending Provider: (none)   Allergies: No Known Allergies    Isolation: None   Infection: None   Code Status: Prior    Ht: 175.3 cm (69\")   Wt: 57.6 kg (126 lb 15.8 oz)    Admission Cmt: None   Principal Problem: Acute pancreatitis [K85.90]                   Active Insurance as of 2023       Primary Coverage       Payor Plan Insurance Group Employer/Plan Group    [employer] [id number]       Payor Plan Address Payor Plan Phone Number Payor Plan Fax Number Effective Dates    [address] [phone]  2021 - None Entered    [facility] [id number]         Subscriber Name Subscriber Birth Date Member ID       LISA TALLEY 1990 [id number]                     Emergency Contacts        (Rel.) Home Phone Work Phone Mobile Phone    Diana Shelton (Mother) -- -- [phone]                 Discharge Summary        Alexander Jiménez MD at 23 0931              Patient Name: Lisa Talley  : 1990  MRN: [id number]    Date of Admission: 2023  Date of Discharge:  2023  Primary Care Physician: Provider, No Known      Chief Complaint:   Abdominal Pain      Discharge Diagnoses     Active Hospital Problems    Diagnosis  POA    **Acute pancreatitis [K85.90]  Yes    " "Hypophosphatemia [E83.39]  Yes    Nausea with vomiting [R11.2]  Yes    Alcohol dependence [F10.20]  Yes    Pulmonary nodule [R91.1]  Yes    Pancreatic pseudocyst [K86.3]  Yes    Alcoholic hepatitis [K70.10]  Yes    Alcohol withdrawal [F10.939]  Yes    Hypokalemia [E87.6]  Yes    Epigastric pain [R10.13]  Yes      Resolved Hospital Problems   No resolved problems to display.        Hospital Course     Very pleasant 33 y.o. woman admitted with acute alcoholic pancreatitis. Please see below for details of admission:     Acute alcoholic pancreatitis  Pancreatic pseudocyst  Epigastric/generalized abdominal pain  Nausea with vomiting  - Doing well this AM, tolerating regular diet, eager to go home   - Will need repeat CT within 3-6 months for evaluation of what is likely a pseudocyst--have encouraged her to f/u with GI for this  - home with #5 Percocets and #10 Zofran tabs to use as needed     Alcohol dependence with intoxication on admission, then with concern for withdrawal  - no longer withdrawing, [facility] low  - [facility] consulted, note reviewed, she declined inpatient Psych consult, outpatient psych resources and/or [facility]/recovery referrals at this time.   - home on MVI, folate, and thiamine supplements     Alcoholic hepatitis  - US showed diffuse hepatic steatosis.  - Tylenol/salicylate level fine, acute viral hepatitis panel negative.  - LFTs trending down  - f/u with PCP and GI     Anemia, iron deficiency  - S/p x1 dose of IV iron, consider oral iron therapy as outpt, f/u with PCP and/or GI to discuss     Pulmonary nodule, LLL  - CT showed \"area of nodularity within the left lower lobe has decreased in size, while another has resolved. This would suggest a benign etiology\"  - Will need outpatient follow up with PCP for monitoring     Hypophosphatemia, resolved  - Replaced per protocol     Hypokalemia, resolved  - Replaced per protocol    Lovenox for DVT ppx  Full code  Discussed with pt and RN  Home with " family today  F/u with PCP of choice in 1 week--CCP to provide PCP info  F/u with GI in 3 months    Day of Discharge     Subjective:  Feeling good today. Tolerating regular diet including peanut butter and crackers w/o N/V/abd pain. Eager to go home. No SOA or CP. No F/C/NS. Voiding well. Slept well last night.     Physical Exam:  Temp:  [98 øF (36.7 øC)-98.8 øF (37.1 øC)] 98.7 øF (37.1 øC)  Heart Rate:  [] 102  Resp:  [16-19] 19  BP: (131-138)/(90-99) 131/93  Body mass index is 18.75 kg/mý.  Physical Exam  Vitals and nursing note reviewed.   Constitutional:       General: She is not in acute distress.     Appearance: She is not ill-appearing, toxic-appearing or diaphoretic.   HENT:      Head: Normocephalic.      Nose: Nose normal.      Mouth/Throat:      Mouth: Mucous membranes are moist.      Pharynx: Oropharynx is clear.   Eyes:      General: No scleral icterus.        Right eye: No discharge.         Left eye: No discharge.      Extraocular Movements: Extraocular movements intact.      Conjunctiva/sclera: Conjunctivae normal.   Cardiovascular:      Rate and Rhythm: Normal rate and regular rhythm.      Pulses: Normal pulses.   Pulmonary:      Effort: Pulmonary effort is normal. No respiratory distress.      Breath sounds: Normal breath sounds. No wheezing or rales.   Abdominal:      General: Bowel sounds are normal. There is no distension.      Palpations: Abdomen is soft.      Tenderness: There is no abdominal tenderness.   Musculoskeletal:         General: No swelling or deformity. Normal range of motion.      Cervical back: Neck supple. No rigidity.   Lymphadenopathy:      Cervical: No cervical adenopathy.   Skin:     General: Skin is warm and dry.      Capillary Refill: Capillary refill takes less than 2 seconds.      Coloration: Skin is not jaundiced.   Neurological:      General: No focal deficit present.      Mental Status: She is alert and oriented to person, place, and time. Mental status is at  baseline.      Cranial Nerves: No cranial nerve deficit.      Coordination: Coordination normal.   Psychiatric:         Mood and Affect: Mood normal.         Behavior: Behavior normal.         Thought Content: Thought content normal.      Consultants     Consult Orders (all) (From admission, onward)       Start     Ordered    08/10/23 1822  Inpatient Gastroenterology Consult  Once        Specialty:  Gastroenterology  Provider:  Durga Horne MD    08/10/23 1824 08/08/23 1129  Inpatient Access Center Consult  Once        Provider:  (Not yet assigned)    08/08/23 1128    08/07/23 0452  [facility] (on-call MD unless specified) Details  Once        Specialty:  Hospitalist  Provider:  (Not yet assigned)    08/07/23 0451                  Procedures     * Surgery not found *    Imaging Results (All)       Procedure Component Value Units Date/Time    US Abdomen Complete [[id number]] Collected: 08/10/23 1953     Updated: 08/10/23 2003    Narrative:      US ABDOMEN COMPLETE-     INDICATIONS: Transaminitis, pancreatitis     TECHNIQUE: Ultrasound of the abdomen     COMPARISON: CT from 08/07/2023     FINDINGS:     The gallbladder is nontender, with no stones demonstrated. No  gallbladder wall thickening or pericholecystic fluid.     No intrahepatic or extrahepatic biliary ductal dilatation is  demonstrated. The common biliary duct caliber is remeasured at 5 mm.     No liver lesion is identified. Diffusely, the echogenicity of the liver  is increased, compatible with steatosis.     The pancreas shows a nonspecific hypoechoic lesion measuring 1.8 x 1.2 x  1.9 cm, without demonstrated internal vascularity, may correspond to the  suspected pseudocyst on the prior CT exam; MRI correlation can be  obtained as indicated.     The right kidney, 12.8 cm, and left kidney, 12.9 cm, appear  unremarkable.     The spleen measures 8.2 cm, appears unremarkable.     Unremarkable appearance of the aorta, inferior vena cava.          Impression:          No evidence for acute cholecystitis. No cholelithiasis. No biliary  ductal dilatation. Hepatic steatosis.     Hypoechoic pancreatic lesion, as described.           This report was finalized on 8/10/2023 8:00 PM by Dr. Damon Clay M.D.       CT Abdomen Pelvis With Contrast [[id number]] Collected: 08/07/23 0424     Updated: 08/07/23 0435    Narrative:      CT OF THE ABDOMEN AND PELVIS WITH CONTRAST     HISTORY: Abdominal pain. HISTORY of pancreatitis.     COMPARISON: 04/29/2023     TECHNIQUE: Axial CT imaging was obtained through the abdomen and pelvis.  IV contrast was administered.     FINDINGS:  Area of nodularity is again noted within the left lower lobe. It  measures approximately 1.3 cm, previously 1.7 cm. Additional subpleural  nodule has resolved. Marked diffuse hepatic steatosis is again noted.  The stomach, adrenal glands, spleen, and gallbladder appear  unremarkable. The patient is noted to have inflammatory stranding  surrounding the pancreas, as well some pancreatic edema, suggesting  pancreatitis. Low-attenuation lesion is again noted within the neck of  the pancreas. This measures up to 1.2 cm. This is slightly larger than  on prior exam, when it measured 1 cm. No peripancreatic collections are  seen. Main portal vein and splenic vein are patent. There is no evidence  of gastric outlet obstruction. There is no biliary dilatation. The liver  is enlarged, measuring up to 18.6 cm in craniocaudal dimensions. The  kidneys enhance symmetrically. There is no hydronephrosis. There is a  simple appearing right renal cyst. No additional follow-up is necessary.  No distal ureteral or bladder stones are seen. Uterus appears normal.  There is no bowel obstruction. Appendix is normal. No acute osseous  abnormalities are seen.       Impression:         1. The patient has peripancreatic stranding and edema, concerning for  acute pancreatitis. Patient is again noted to have a low-attenuation  lesion within the  neck of the pancreas. This appears slightly larger  than on prior exam. It is favored to represent a small pseudocyst.  2. Area of nodularity within the left lower lobe has decreased in size,  while another has resolved. This would suggest a benign etiology.     Radiation dose reduction techniques were utilized, including automated  exposure control and exposure modulation based on body size.     This report was finalized on 8/7/2023 4:31 AM by Dr. Diamond Goodman M.D.                 Pertinent Labs     Results from last 7 days   Lab Units 08/12/23  0644 08/11/23  0630 08/10/23  0541 08/09/23  1022   WBC 10*3/mm3 4.42 4.33 4.16 4.68   HEMOGLOBIN g/dL 8.3* 9.9* 9.7* 10.7*   PLATELETS 10*3/mm3 190 185 169 180     Results from last 7 days   Lab Units 08/12/23 0644 08/11/23  2201 08/11/23  0630 08/10/23  1059 08/10/23  0541 08/09/23  2243 08/09/23  0733   SODIUM mmol/L 139  --  136  --  135*  --  136   POTASSIUM mmol/L 3.5 3.6 3.6 3.6 3.9   < > 3.2*   CHLORIDE mmol/L 102  --  95*  --  95*  --  96*   CO2 mmol/L 28.0  --  27.3  --  25.5  --  22.3   BUN mg/dL 2*  --  2*  --  2*  --  <2*   CREATININE mg/dL 0.35*  --  0.45*  --  0.47*  --  0.39*   GLUCOSE mg/dL 133*  --  113*  --  120*  --  102*   EGFR mL/min/1.73 139.5  --  131.3  --  129.9  --  135.9    < > = values in this interval not displayed.     Results from last 7 days   Lab Units 08/12/23  0644 08/11/23  0630 08/10/23  0541 08/09/23  0733   ALBUMIN g/dL 3.8 4.2 3.9 3.9   BILIRUBIN mg/dL 0.4 0.8 0.8 0.7   ALK PHOS U/L 61 78 73 77   AST (SGOT) U/L 100* 211* 216* 146*   ALT (SGPT) U/L 75* 96* 78* 53*     Results from last 7 days   Lab Units 08/12/23  0644 08/11/23  0630 08/10/23  0541 08/09/23  0733   CALCIUM mg/dL 9.3 9.9 9.6 9.4   ALBUMIN g/dL 3.8 4.2 3.9 3.9   MAGNESIUM mg/dL 1.7 1.7 1.7 1.6   PHOSPHORUS mg/dL 2.1* 2.7 2.6 2.6     Results from last 7 days   Lab Units 08/10/23  0541 08/07/23  0129   LIPASE U/L 31 167*         Results from last 7 days   Lab Units  08/09/23  0733   CHOLESTEROL mg/dL 205*   TRIGLYCERIDES mg/dL 60   HDL CHOL mg/dL 59   LDL CHOL mg/dL 135*             Test Results Pending at Discharge       Discharge Details        Discharge Medications        New Medications        Instructions Start Date   folic acid 1 MG tablet  Commonly known as: FOLVITE   1 mg, Oral, Daily   Start Date: August 13, 2023     multivitamin tablet tablet   1 tablet, Oral, Daily   Start Date: August 13, 2023     ondansetron 4 MG tablet  Commonly known as: Zofran   4 mg, Oral, Every 8 Hours PRN      oxyCODONE-acetaminophen 5-325 MG per tablet  Commonly known as: PERCOCET   1 tablet, Oral, Every 6 Hours PRN      thiamine 100 MG tablet  Commonly known as: VITAMIN B1   100 mg, Oral, Daily             Stop These Medications      dicyclomine 20 MG tablet  Commonly known as: BENTYL              No Known Allergies    Discharge Disposition:  Home or Self Care      Discharge Diet:  Diet Order   Procedures    Diet: Vegetarian, Gastrointestinal Diets; Vegan (No animal products); Low Irritant; Texture: Regular Texture (IDDSI 7); Fluid Consistency: Thin (IDDSI 0)       Discharge Activity:   As tolerated, can return to work on Monday 8/14    CODE STATUS:    Code Status and Medical Interventions:   Ordered at: 08/07/23 0506     Code Status (Patient has no pulse and is not breathing):    CPR (Attempt to Resuscitate)     Medical Interventions (Patient has pulse or is breathing):    Full Support     Release to patient:    Routine Release       No future appointments.  Additional Instructions for the Follow-ups that You Need to Schedule       Discharge Follow-up with PCP   As directed       Currently Documented PCP:    Provider, No Known    PCP Phone Number:    [phone]     Follow Up Details: PCP of choice in 1 week        Discharge Follow-up with Specified Provider: Dr. Wakler (GI) for monitoring of pseudocyst; 3 Months   As directed      To: Dr. Walker (GI) for monitoring of pseudocyst    Follow Up: 3 Months               Follow-up Information       Provider, No Known .    Why: PCP of choice in 1 week  Contact information:  [facility] [address]  [phone]                             Additional Instructions for the Follow-ups that You Need to Schedule       Discharge Follow-up with PCP   As directed       Currently Documented PCP:    Provider, No Known    PCP Phone Number:    [phone]     Follow Up Details: PCP of choice in 1 week        Discharge Follow-up with Specified Provider: Dr. Walker () for monitoring of pseudocyst; 3 Months   As directed      To: Dr. Walker () for monitoring of pseudocyst   Follow Up: 3 Months            Time Spent on Discharge:  Greater than 30 minutes      Alexander Jiménez MD  [facility]  08/12/23  09:31 EDT                Electronically signed by Alexander Jiménez MD at 08/12/23 0938       Discharge Order (From admission, onward)       Start     Ordered    08/12/23 0928  Discharge patient  Once        Expected Discharge Date: 08/12/23   Expected Discharge Time: Morning   Discharge Disposition: Home or Self Care   Physician of Record for Attribution - Please select from Treatment Team: PATRICIO IRVING [[id number]]   Review needed by CMO to determine Physician of Record: No      Question Answer Comment   Physician of Record for Attribution - Please select from Treatment Team PATRICIO IRVING    Review needed by CMO to determine Physician of Record No        08/12/23 0931

## 2023-08-14 NOTE — CASE MANAGEMENT/SOCIAL WORK
Case Management Discharge Note      Final Note: home no needs    Provided Post Acute Provider List?: N/A  Provided Post Acute Provider Quality & Resource List?: N/A    Selected Continued Care - Discharged on 8/12/2023 Admission date: 8/7/2023 - Discharge disposition: Home or Self Care      Destination    No services have been selected for the patient.                Durable Medical Equipment    No services have been selected for the patient.                Dialysis/Infusion    No services have been selected for the patient.                Home Medical Care    No services have been selected for the patient.                Therapy    No services have been selected for the patient.                Community Resources    No services have been selected for the patient.                Community & DME    No services have been selected for the patient.                    Transportation Services  Private: Car    Final Discharge Disposition Code: 01 - home or self-care

## 2023-10-27 ENCOUNTER — HOSPITAL ENCOUNTER (EMERGENCY)
Facility: HOSPITAL | Age: 33
Discharge: HOME | End: 2023-10-27
Attending: STUDENT IN AN ORGANIZED HEALTH CARE EDUCATION/TRAINING PROGRAM
Payer: COMMERCIAL

## 2023-10-27 ENCOUNTER — APPOINTMENT (OUTPATIENT)
Dept: CT IMAGING | Facility: HOSPITAL | Age: 33
End: 2023-10-27
Payer: COMMERCIAL

## 2023-10-27 VITALS
BODY MASS INDEX: 19.99 KG/M2 | SYSTOLIC BLOOD PRESSURE: 113 MMHG | WEIGHT: 135 LBS | TEMPERATURE: 98.1 F | HEIGHT: 69 IN | OXYGEN SATURATION: 94 % | DIASTOLIC BLOOD PRESSURE: 78 MMHG | HEART RATE: 136 BPM | RESPIRATION RATE: 16 BRPM

## 2023-10-27 DIAGNOSIS — K29.20 ACUTE ALCOHOLIC GASTRITIS WITHOUT HEMORRHAGE: Primary | ICD-10-CM

## 2023-10-27 LAB
ALBUMIN SERPL-MCNC: 5 G/DL (ref 3.5–5.2)
ALBUMIN/GLOB SERPL: 1.3 G/DL
ALP SERPL-CCNC: 136 U/L (ref 39–117)
ALT SERPL W P-5'-P-CCNC: 61 U/L (ref 1–33)
ANION GAP SERPL CALCULATED.3IONS-SCNC: 26 MMOL/L (ref 5–15)
AST SERPL-CCNC: 306 U/L (ref 1–32)
BACTERIA UR QL AUTO: ABNORMAL /HPF
BASOPHILS # BLD AUTO: 0.09 10*3/MM3 (ref 0–0.2)
BASOPHILS NFR BLD AUTO: 2.4 % (ref 0–1.5)
BILIRUB SERPL-MCNC: 0.7 MG/DL (ref 0–1.2)
BILIRUB UR QL STRIP: NEGATIVE
BUN SERPL-MCNC: 7 MG/DL (ref 6–20)
BUN/CREAT SERPL: 12.3 (ref 7–25)
CALCIUM SPEC-SCNC: 9.6 MG/DL (ref 8.6–10.5)
CHLORIDE SERPL-SCNC: 93 MMOL/L (ref 98–107)
CLARITY UR: CLEAR
CO2 SERPL-SCNC: 19 MMOL/L (ref 22–29)
COLOR UR: YELLOW
CREAT SERPL-MCNC: 0.57 MG/DL (ref 0.57–1)
DEPRECATED RDW RBC AUTO: 49.5 FL (ref 37–54)
EGFRCR SERPLBLD CKD-EPI 2021: 124 ML/MIN/1.73
EOSINOPHIL # BLD AUTO: 0 10*3/MM3 (ref 0–0.4)
EOSINOPHIL NFR BLD AUTO: 0 % (ref 0.3–6.2)
ERYTHROCYTE [DISTWIDTH] IN BLOOD BY AUTOMATED COUNT: 15.8 % (ref 12.3–15.4)
ETHANOL BLD-MCNC: 405 MG/DL (ref 0–10)
ETHANOL UR QL: 0.41 %
GLOBULIN UR ELPH-MCNC: 3.8 GM/DL
GLUCOSE SERPL-MCNC: 91 MG/DL (ref 65–99)
GLUCOSE UR STRIP-MCNC: NEGATIVE MG/DL
HCG SERPL QL: NEGATIVE
HCT VFR BLD AUTO: 36.1 % (ref 34–46.6)
HGB BLD-MCNC: 11.6 G/DL (ref 12–15.9)
HGB UR QL STRIP.AUTO: ABNORMAL
HYALINE CASTS UR QL AUTO: ABNORMAL /LPF
IMM GRANULOCYTES # BLD AUTO: 0.02 10*3/MM3 (ref 0–0.05)
IMM GRANULOCYTES NFR BLD AUTO: 0.5 % (ref 0–0.5)
KETONES UR QL STRIP: ABNORMAL
LEUKOCYTE ESTERASE UR QL STRIP.AUTO: NEGATIVE
LIPASE SERPL-CCNC: 64 U/L (ref 13–60)
LYMPHOCYTES # BLD AUTO: 1.77 10*3/MM3 (ref 0.7–3.1)
LYMPHOCYTES NFR BLD AUTO: 47.2 % (ref 19.6–45.3)
MCH RBC QN AUTO: 27.8 PG (ref 26.6–33)
MCHC RBC AUTO-ENTMCNC: 32.1 G/DL (ref 31.5–35.7)
MCV RBC AUTO: 86.6 FL (ref 79–97)
MONOCYTES # BLD AUTO: 0.21 10*3/MM3 (ref 0.1–0.9)
MONOCYTES NFR BLD AUTO: 5.6 % (ref 5–12)
NEUTROPHILS NFR BLD AUTO: 1.66 10*3/MM3 (ref 1.7–7)
NEUTROPHILS NFR BLD AUTO: 44.3 % (ref 42.7–76)
NITRITE UR QL STRIP: NEGATIVE
NRBC BLD AUTO-RTO: 1.3 /100 WBC (ref 0–0.2)
PH UR STRIP.AUTO: 6 [PH] (ref 5–8)
PLATELET # BLD AUTO: 166 10*3/MM3 (ref 140–450)
PMV BLD AUTO: 11.3 FL (ref 6–12)
POTASSIUM SERPL-SCNC: 4.4 MMOL/L (ref 3.5–5.2)
PROT SERPL-MCNC: 8.8 G/DL (ref 6–8.5)
PROT UR QL STRIP: ABNORMAL
RBC # BLD AUTO: 4.17 10*6/MM3 (ref 3.77–5.28)
RBC # UR STRIP: ABNORMAL /HPF
REF LAB TEST METHOD: ABNORMAL
SODIUM SERPL-SCNC: 138 MMOL/L (ref 136–145)
SP GR UR STRIP: 1.02 (ref 1–1.03)
SQUAMOUS #/AREA URNS HPF: ABNORMAL /HPF
UROBILINOGEN UR QL STRIP: ABNORMAL
WBC # UR STRIP: ABNORMAL /HPF
WBC NRBC COR # BLD: 3.75 10*3/MM3 (ref 3.4–10.8)

## 2023-10-27 PROCEDURE — 99285 EMERGENCY DEPT VISIT HI MDM: CPT

## 2023-10-27 PROCEDURE — 82077 ASSAY SPEC XCP UR&BREATH IA: CPT | Performed by: STUDENT IN AN ORGANIZED HEALTH CARE EDUCATION/TRAINING PROGRAM

## 2023-10-27 PROCEDURE — 96374 THER/PROPH/DIAG INJ IV PUSH: CPT

## 2023-10-27 PROCEDURE — 85025 COMPLETE CBC W/AUTO DIFF WBC: CPT | Performed by: STUDENT IN AN ORGANIZED HEALTH CARE EDUCATION/TRAINING PROGRAM

## 2023-10-27 PROCEDURE — 25510000001 IOPAMIDOL 61 % SOLUTION: Performed by: STUDENT IN AN ORGANIZED HEALTH CARE EDUCATION/TRAINING PROGRAM

## 2023-10-27 PROCEDURE — 81001 URINALYSIS AUTO W/SCOPE: CPT | Performed by: STUDENT IN AN ORGANIZED HEALTH CARE EDUCATION/TRAINING PROGRAM

## 2023-10-27 PROCEDURE — 80053 COMPREHEN METABOLIC PANEL: CPT | Performed by: STUDENT IN AN ORGANIZED HEALTH CARE EDUCATION/TRAINING PROGRAM

## 2023-10-27 PROCEDURE — 74177 CT ABD & PELVIS W/CONTRAST: CPT

## 2023-10-27 PROCEDURE — 83690 ASSAY OF LIPASE: CPT | Performed by: STUDENT IN AN ORGANIZED HEALTH CARE EDUCATION/TRAINING PROGRAM

## 2023-10-27 PROCEDURE — 25810000003 SODIUM CHLORIDE 0.9 % SOLUTION: Performed by: STUDENT IN AN ORGANIZED HEALTH CARE EDUCATION/TRAINING PROGRAM

## 2023-10-27 PROCEDURE — 84703 CHORIONIC GONADOTROPIN ASSAY: CPT | Performed by: STUDENT IN AN ORGANIZED HEALTH CARE EDUCATION/TRAINING PROGRAM

## 2023-10-27 PROCEDURE — 25010000002 ONDANSETRON PER 1 MG: Performed by: STUDENT IN AN ORGANIZED HEALTH CARE EDUCATION/TRAINING PROGRAM

## 2023-10-27 RX ORDER — PANTOPRAZOLE SODIUM 40 MG/1
40 TABLET, DELAYED RELEASE ORAL DAILY
Qty: 30 TABLET | Refills: 0 | Status: SHIPPED | OUTPATIENT
Start: 2023-10-27 | End: 2023-11-26

## 2023-10-27 RX ORDER — ONDANSETRON 2 MG/ML
4 INJECTION INTRAMUSCULAR; INTRAVENOUS ONCE
Status: COMPLETED | OUTPATIENT
Start: 2023-10-27 | End: 2023-10-27

## 2023-10-27 RX ORDER — ONDANSETRON 4 MG/1
4 TABLET, ORALLY DISINTEGRATING ORAL 4 TIMES DAILY PRN
Qty: 30 TABLET | Refills: 0 | Status: SHIPPED | OUTPATIENT
Start: 2023-10-27

## 2023-10-27 RX ORDER — SUCRALFATE 1 G/1
1 TABLET ORAL 4 TIMES DAILY
Qty: 120 TABLET | Refills: 0 | Status: SHIPPED | OUTPATIENT
Start: 2023-10-27 | End: 2023-11-26

## 2023-10-27 RX ADMIN — IOPAMIDOL 85 ML: 612 INJECTION, SOLUTION INTRAVENOUS at 09:48

## 2023-10-27 RX ADMIN — SODIUM CHLORIDE 1000 ML: 9 INJECTION, SOLUTION INTRAVENOUS at 09:18

## 2023-10-27 RX ADMIN — ONDANSETRON 4 MG: 2 INJECTION INTRAMUSCULAR; INTRAVENOUS at 09:19

## 2023-10-27 NOTE — ED NOTES
Pt has abd pain since yest.  She has had nausea and vomiting.  She comes to triage drinking orange juice.  She has burning c urination and her urine is odorous.  She has cough and it hurts when she coughs

## 2023-10-27 NOTE — ED PROVIDER NOTES
EMERGENCY DEPARTMENT ENCOUNTER    Room Number:  34/34  PCP: Provider, No Known  Historian: Patient, family member at bedside      HPI:  Chief Complaint: Abdominal pain, nausea, vomiting  Context: Lisa Gibson is a 33 y.o. female who presents to the ED c/o abdominal pain, nausea, vomiting.  Patient additionally has had dysuria.  Patient has a history of UTIs and states this feels similar.  Patient states symptoms been going on 3 to 4 days but have worsened considerably over the last 24 hours.  Patient states she had multiple episodes of vomiting.  Patient denies fever but states she does have chills.  Abdominal pain is diffuse in nature with no point tenderness.  Patient denies previous abdominal surgery.  Patient has no changes to bowel patterns.  Patient denies vaginal bleeding, discharge.            PAST MEDICAL HISTORY  Active Ambulatory Problems     Diagnosis Date Noted    Alcohol-induced acute pancreatitis without infection or necrosis 09/21/2021    Transaminitis 09/22/2021    Epigastric pain 09/22/2021    Hepatic steatosis 09/24/2021    Gallbladder sludge 09/24/2021    Severe sepsis 04/27/2023    E coli bacteremia 04/30/2023    Alcohol withdrawal 04/30/2023    Alcohol abuse 04/30/2023    Acute pyelonephritis 04/30/2023    Essential hypertension 04/30/2023    Metabolic encephalopathy 04/30/2023    Polysubstance abuse 04/30/2023    Hypokalemia 04/30/2023    Acute pancreatitis 08/07/2023    Alcohol dependence 08/07/2023    Pulmonary nodule 08/07/2023    Pancreatic pseudocyst 08/07/2023    Alcoholic hepatitis 08/07/2023    Hypophosphatemia 08/09/2023    Nausea with vomiting 08/09/2023     Resolved Ambulatory Problems     Diagnosis Date Noted    No Resolved Ambulatory Problems     No Additional Past Medical History         PAST SURGICAL HISTORY  History reviewed. No pertinent surgical history.      FAMILY HISTORY  History reviewed. No pertinent family history.      SOCIAL HISTORY  Social History      Socioeconomic History    Marital status:    Tobacco Use    Smoking status: Never    Smokeless tobacco: Never   Vaping Use    Vaping Use: Never used   Substance and Sexual Activity    Drug use: Never         ALLERGIES  Patient has no known allergies.        REVIEW OF SYSTEMS  Review of Systems   Constitutional:  Positive for appetite change and chills.   Gastrointestinal:  Positive for abdominal pain, nausea and vomiting.   Genitourinary:  Positive for dysuria.   All other systems reviewed and are negative.           PHYSICAL EXAM  ED Triage Vitals   Temp Heart Rate Resp BP SpO2   10/27/23 0809 10/27/23 0809 10/27/23 0809 10/27/23 0815 10/27/23 0809   98.1 °F (36.7 °C) (!) 136 16 140/95 94 %      Temp src Heart Rate Source Patient Position BP Location FiO2 (%)   10/27/23 0809 10/27/23 0809 10/27/23 0815 10/27/23 0815 --   Tympanic Monitor Lying Right arm        Physical Exam      GENERAL: no acute distress  HENT: nares patent  EYES: no scleral icterus  CV: regular rhythm, normal rate  RESPIRATORY: normal effort  ABDOMEN: soft, tender to palpation greatest in the epigastric and right upper quadrant regions, no guarding  MUSCULOSKELETAL: no deformity  NEURO: alert, moves all extremities, follows commands  PSYCH:  calm, cooperative  SKIN: warm, dry    Vital signs and nursing notes reviewed.          LAB RESULTS  Recent Results (from the past 24 hour(s))   Comprehensive Metabolic Panel    Collection Time: 10/27/23  8:29 AM    Specimen: Blood   Result Value Ref Range    Glucose 91 65 - 99 mg/dL    BUN 7 6 - 20 mg/dL    Creatinine 0.57 0.57 - 1.00 mg/dL    Sodium 138 136 - 145 mmol/L    Potassium 4.4 3.5 - 5.2 mmol/L    Chloride 93 (L) 98 - 107 mmol/L    CO2 19.0 (L) 22.0 - 29.0 mmol/L    Calcium 9.6 8.6 - 10.5 mg/dL    Total Protein 8.8 (H) 6.0 - 8.5 g/dL    Albumin 5.0 3.5 - 5.2 g/dL    ALT (SGPT) 61 (H) 1 - 33 U/L    AST (SGOT) 306 (H) 1 - 32 U/L    Alkaline Phosphatase 136 (H) 39 - 117 U/L    Total  Bilirubin 0.7 0.0 - 1.2 mg/dL    Globulin 3.8 gm/dL    A/G Ratio 1.3 g/dL    BUN/Creatinine Ratio 12.3 7.0 - 25.0    Anion Gap 26.0 (H) 5.0 - 15.0 mmol/L    eGFR 124.0 >60.0 mL/min/1.73   hCG, Serum, Qualitative    Collection Time: 10/27/23  8:29 AM    Specimen: Blood   Result Value Ref Range    HCG Qualitative Negative Negative   Lipase    Collection Time: 10/27/23  8:29 AM    Specimen: Blood   Result Value Ref Range    Lipase 64 (H) 13 - 60 U/L   CBC Auto Differential    Collection Time: 10/27/23  8:29 AM    Specimen: Blood   Result Value Ref Range    WBC 3.75 3.40 - 10.80 10*3/mm3    RBC 4.17 3.77 - 5.28 10*6/mm3    Hemoglobin 11.6 (L) 12.0 - 15.9 g/dL    Hematocrit 36.1 34.0 - 46.6 %    MCV 86.6 79.0 - 97.0 fL    MCH 27.8 26.6 - 33.0 pg    MCHC 32.1 31.5 - 35.7 g/dL    RDW 15.8 (H) 12.3 - 15.4 %    RDW-SD 49.5 37.0 - 54.0 fl    MPV 11.3 6.0 - 12.0 fL    Platelets 166 140 - 450 10*3/mm3    Neutrophil % 44.3 42.7 - 76.0 %    Lymphocyte % 47.2 (H) 19.6 - 45.3 %    Monocyte % 5.6 5.0 - 12.0 %    Eosinophil % 0.0 (L) 0.3 - 6.2 %    Basophil % 2.4 (H) 0.0 - 1.5 %    Immature Grans % 0.5 0.0 - 0.5 %    Neutrophils, Absolute 1.66 (L) 1.70 - 7.00 10*3/mm3    Lymphocytes, Absolute 1.77 0.70 - 3.10 10*3/mm3    Monocytes, Absolute 0.21 0.10 - 0.90 10*3/mm3    Eosinophils, Absolute 0.00 0.00 - 0.40 10*3/mm3    Basophils, Absolute 0.09 0.00 - 0.20 10*3/mm3    Immature Grans, Absolute 0.02 0.00 - 0.05 10*3/mm3    nRBC 1.3 (H) 0.0 - 0.2 /100 WBC   Urinalysis With Microscopic If Indicated (No Culture) - Urine, Clean Catch    Collection Time: 10/27/23  8:30 AM    Specimen: Urine, Clean Catch   Result Value Ref Range    Color, UA Yellow Yellow, Straw    Appearance, UA Clear Clear    pH, UA 6.0 5.0 - 8.0    Specific Gravity, UA 1.025 1.005 - 1.030    Glucose, UA Negative Negative    Ketones, UA 80 mg/dL (3+) (A) Negative    Bilirubin, UA Negative Negative    Blood, UA Small (1+) (A) Negative    Protein, UA >=300 mg/dL (3+) (A)  Negative    Leuk Esterase, UA Negative Negative    Nitrite, UA Negative Negative    Urobilinogen, UA 1.0 E.U./dL 0.2 - 1.0 E.U./dL   Urinalysis, Microscopic Only - Urine, Clean Catch    Collection Time: 10/27/23  8:30 AM    Specimen: Urine, Clean Catch   Result Value Ref Range    RBC, UA 3-5 (A) None Seen, 0-2 /HPF    WBC, UA 21-50 (A) None Seen, 0-2 /HPF    Bacteria, UA None Seen None Seen /HPF    Squamous Epithelial Cells, UA 3-6 (A) None Seen, 0-2 /HPF    Hyaline Casts, UA 7-12 None Seen /LPF    Methodology Automated Microscopy        Ordered the above labs and reviewed the results.        RADIOLOGY  CT Abdomen Pelvis With Contrast    Result Date: 10/27/2023  CT ABDOMEN AND PELVIS WITH IV CONTRAST  HISTORY: Abdominal pain, nausea and vomiting  TECHNIQUE: Radiation dose reduction techniques were utilized, including automated exposure control and exposure modulation based on body size. 3 mm images were obtained through the abdomen and pelvis after the administration of IV contrast.  COMPARISON: CT abdomen pelvis 8/7/2023, 4/29/2023 and 9/21/2021   FINDINGS: Unchanged small left lower lobe pulmonary sequestration with direct arterial supply from the descending thoracic aorta.  Pancreatic head 0.6 cm round hypoattenuating focus (series 2/image 78) previously 1.2 cm. No peripancreatic inflammatory changes or fluid collection. Increased main pancreatic duct dilation in the tail measuring up to 3 mm (series 2/image 61) previously 2 mm. Patent regional vasculature without surrounding soft tissue. No abdominal lymphadenopathy.  Noncirrhotic liver morphology with very similar hepatomegaly (25 cm) and diffuse severe hepatic steatosis. Patent hepatic and portal veins.  Right renal cyst. Remaining solid organs bowel including the appendix are normal. No focal osseous lesion.       1. No acute abdominopelvic abnormality. 2. Sequela of prior pancreatitis with smaller area of likely walled off necrosis in the pancreatic head.  Increased main pancreatic duct dilation in the pancreatic tail needs further evaluation with follow-up abdominal MRI/MRCP. 3. Noncirrhotic liver morphology with similar hepatomegaly and severe diffuse hepatic steatosis. 4. 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  This report was finalized on 10/27/2023 10:14 AM by Dr. Dmitriy Fisher M.D on Workstation: [id number]       Ordered the above noted radiological studies. Reviewed by me in PACS.            MEDICATIONS GIVEN IN ER  Medications   sodium chloride 0.9 % bolus 1,000 mL (1,000 mL Intravenous New Bag 10/27/23 0918)   ondansetron (ZOFRAN) injection 4 mg (4 mg Intravenous Given 10/27/23 0919)   iopamidol (ISOVUE-300) 61 % injection 100 mL (85 mL Intravenous Given 10/27/23 0948)                   MEDICAL DECISION MAKING, PROGRESS, and CONSULTS    All labs have been independently reviewed by me.  All radiology studies have been reviewed by me and I have also reviewed the radiology report.   EKG's independently viewed and interpreted by me.  Discussion below represents my analysis of pertinent findings related to patient's condition, differential diagnosis, treatment plan and final disposition.      Additional sources:  - Discussed/ obtained information from independent historians: Family member at bedside    - External (non-ED) record review: Discharge summary from 8/12/2023 reviewed and notable for admission secondary to acute alcoholic pancreatitis.  Patient was monitored for several days and able to demonstrate improvement.  Patient eventually able to be discharged home in stable condition.    - Chronic or social conditions impacting care: Chronic alcohol use, recurrent pancreatitis, alcoholic hepatitis    - Shared decision making: Using shared decision-making techniques we discussed today's findings and plan moving forward.  At this time we elect to treat for presumed alcoholic gastritis with pantoprazole, Carafate and  Zofran.      Orders placed during this visit:  Orders Placed This Encounter   Procedures    CT Abdomen Pelvis With Contrast    Comprehensive Metabolic Panel    hCG, Serum, Qualitative    Urinalysis With Microscopic If Indicated (No Culture) - Urine, Clean Catch    Lipase    CBC Auto Differential    Urinalysis, Microscopic Only - Urine, Clean Catch    Ethanol    CBC & Differential       Differential diagnosis includes but is not limited to:    Pancreatitis, hepatitis, UTI      Independent interpretation of labs, radiology studies, and discussions with consultants:  ED Course as of 10/27/23 1032   Fri Oct 27, 2023   0935 CO2(!): 19.0 [MW]   0935 Alkaline Phosphatase(!): 136 [MW]   0935 AST (SGOT)(!): 306 [MW]   0935 ALT (SGPT)(!): 61 [MW]   1030 CT abdomen interpreted by me and demonstrates no evidence of bowel obstruction, free air. [MW]   1031 Work-up in the emergency department is notable for elevation of AST greater than ALT consistent with alcoholic liver damage.  Urinalysis is significant for 80 ketones.  Patient provided IV fluids and IV Zofran.  On reassessment patient demonstrates improvement.  CT abdomen is overall unremarkable with the exception of chronic appearing changes to the pancreas.  Suspect patient's symptoms today are likely secondary to alcoholic gastritis.  Will treat symptomatically with pantoprazole, Carafate and Zofran.  Patient counseled on abstaining from alcohol use and following up with primary care physician.  Patient discharged in stable condition with return precautions discussed [MW]      ED Course User Index  [MW] Dmitriy Solorzano MD           DIAGNOSIS  Final diagnoses:   Acute alcoholic gastritis without hemorrhage         DISPOSITION  DISCHARGE    Patient discharged in stable condition.    Reviewed implications of results, diagnosis, meds, responsibility to follow up, warning signs and symptoms of possible worsening, potential complications and reasons to return to ER, including  uncontrolled abdominal pain, nausea, vomiting, fevers, chills.    Patient/Family voiced understanding of above instructions.    Discussed plan for discharge, as there is no emergent indication for admission. Patient referred to primary care provider for BP management due to today's BP. Pt/family is agreeable and understands need for follow up and repeat testing.  Pt is aware that discharge does not mean that nothing is wrong but it indicates no emergency is present that requires admission and they must continue care with follow-up as given below or physician of their choice.     FOLLOW-UP  Provider, No Known  [place] [id number] [phone]    In 1 week           Medication List        New Prescriptions      ondansetron ODT 4 MG disintegrating tablet  Commonly known as: ZOFRAN-ODT  Place 1 tablet on the tongue 4 (Four) Times a Day As Needed for Nausea or Vomiting.     pantoprazole 40 MG EC tablet  Commonly known as: PROTONIX  Take 1 tablet by mouth Daily for 30 days.     sucralfate 1 g tablet  Commonly known as: CARAFATE  Take 1 tablet by mouth 4 (Four) Times a Day for 30 days.            Stop      ondansetron 4 MG tablet  Commonly known as: Zofran               Where to Get Your Medications        These medications were sent to [id number] - [place], [place] - [address] AT [address] - [phone] PH - [phone]   [address] [address]      Phone: [phone]   ondansetron ODT 4 MG disintegrating tablet  pantoprazole 40 MG EC tablet  sucralfate 1 g tablet                   Latest Documented Vital Signs:  As of 10:32 EDT  BP- 140/95 HR- (!) 136 Temp- 98.1 °F (36.7 °C) (Tympanic) O2 sat- 94%              --    Please note that portions of this were completed with a voice recognition program.       Note Disclaimer: At [facility], we believe that sharing information builds trust and better relationships.  You are receiving this note because you are receiving care at [facility] or recently visited. It is possible you will see health information before a provider has talked with you about it. This kind of information can be easy to misunderstand. To help you fully understand what it means for your health, we urge you to discuss this note with your provider.             Dmitriy Solorzano MD  10/27/23 1032

## 2023-10-27 NOTE — DISCHARGE INSTRUCTIONS
Please take all medication as prescribed    Please follow-up with your primary care physician within 1 week for repeat evaluation    Please return to the ER with new or worsening symptoms including but not limited to uncontrolled abdominal pain, nausea, vomiting, fevers, chills

## 2023-12-22 ENCOUNTER — APPOINTMENT (OUTPATIENT)
Dept: CT IMAGING | Facility: HOSPITAL | Age: 33
End: 2023-12-22
Payer: COMMERCIAL

## 2023-12-22 ENCOUNTER — HOSPITAL ENCOUNTER (INPATIENT)
Facility: HOSPITAL | Age: 33
LOS: 4 days | Discharge: HOME | End: 2023-12-26
Attending: STUDENT IN AN ORGANIZED HEALTH CARE EDUCATION/TRAINING PROGRAM | Admitting: INTERNAL MEDICINE
Payer: COMMERCIAL

## 2023-12-22 DIAGNOSIS — E87.29 ALCOHOLIC KETOACIDOSIS: Primary | ICD-10-CM

## 2023-12-22 LAB
ALBUMIN SERPL-MCNC: 4.8 G/DL (ref 3.5–5.2)
ALBUMIN/GLOB SERPL: 1 G/DL
ALP SERPL-CCNC: 212 U/L (ref 39–117)
ALT SERPL W P-5'-P-CCNC: 54 U/L (ref 1–33)
ANION GAP SERPL CALCULATED.3IONS-SCNC: 40.5 MMOL/L (ref 5–15)
AST SERPL-CCNC: 311 U/L (ref 1–32)
ATMOSPHERIC PRESS: 753.8 MMHG
B-OH-BUTYR SERPL-SCNC: 13.29 MMOL/L (ref 0.02–0.27)
BASE EXCESS BLDV CALC-SCNC: -16.8 MMOL/L (ref -2–2)
BASOPHILS # BLD AUTO: 0.02 10*3/MM3 (ref 0–0.2)
BASOPHILS NFR BLD AUTO: 0.3 % (ref 0–1.5)
BILIRUB SERPL-MCNC: 1.6 MG/DL (ref 0–1.2)
BUN SERPL-MCNC: 15 MG/DL (ref 6–20)
BUN/CREAT SERPL: 13 (ref 7–25)
CALCIUM SPEC-SCNC: 9.1 MG/DL (ref 8.6–10.5)
CHLORIDE SERPL-SCNC: 85 MMOL/L (ref 98–107)
CO2 BLDA-SCNC: 10 MMOL/L (ref 23–27)
CO2 SERPL-SCNC: 6.5 MMOL/L (ref 22–29)
CREAT SERPL-MCNC: 1.15 MG/DL (ref 0.57–1)
D-LACTATE SERPL-SCNC: 3.3 MMOL/L (ref 0.5–2)
D-LACTATE SERPL-SCNC: 6 MMOL/L (ref 0.5–2)
DEPRECATED RDW RBC AUTO: 45 FL (ref 37–54)
DEVICE COMMENT: ABNORMAL
EGFRCR SERPLBLD CKD-EPI 2021: 65 ML/MIN/1.73
EOSINOPHIL # BLD AUTO: 0 10*3/MM3 (ref 0–0.4)
EOSINOPHIL NFR BLD AUTO: 0 % (ref 0.3–6.2)
ERYTHROCYTE [DISTWIDTH] IN BLOOD BY AUTOMATED COUNT: 14.3 % (ref 12.3–15.4)
ETHANOL BLD-MCNC: 60 MG/DL (ref 0–10)
ETHANOL UR QL: 0.06 %
GLOBULIN UR ELPH-MCNC: 4.6 GM/DL
GLUCOSE BLDC GLUCOMTR-MCNC: 167 MG/DL (ref 70–130)
GLUCOSE SERPL-MCNC: 282 MG/DL (ref 65–99)
HCG SERPL QL: NEGATIVE
HCO3 BLDV-SCNC: 9.3 MMOL/L (ref 22–28)
HCT VFR BLD AUTO: 37.2 % (ref 34–46.6)
HGB BLD-MCNC: 12.1 G/DL (ref 12–15.9)
IMM GRANULOCYTES # BLD AUTO: 0.03 10*3/MM3 (ref 0–0.05)
IMM GRANULOCYTES NFR BLD AUTO: 0.4 % (ref 0–0.5)
INR PPP: 1.31 (ref 0.9–1.1)
LIPASE SERPL-CCNC: 151 U/L (ref 13–60)
LYMPHOCYTES # BLD AUTO: 0.9 10*3/MM3 (ref 0.7–3.1)
LYMPHOCYTES NFR BLD AUTO: 13.2 % (ref 19.6–45.3)
MCH RBC QN AUTO: 28.8 PG (ref 26.6–33)
MCHC RBC AUTO-ENTMCNC: 32.5 G/DL (ref 31.5–35.7)
MCV RBC AUTO: 88.6 FL (ref 79–97)
MODALITY: ABNORMAL
MONOCYTES # BLD AUTO: 0.51 10*3/MM3 (ref 0.1–0.9)
MONOCYTES NFR BLD AUTO: 7.5 % (ref 5–12)
NEUTROPHILS NFR BLD AUTO: 5.38 10*3/MM3 (ref 1.7–7)
NEUTROPHILS NFR BLD AUTO: 78.6 % (ref 42.7–76)
NOTIFIED WHO: ABNORMAL
NRBC BLD AUTO-RTO: 1.5 /100 WBC (ref 0–0.2)
PCO2 BLDV: 23.3 MM HG (ref 41–51)
PH BLDV: 7.21 PH UNITS (ref 7.31–7.41)
PLATELET # BLD AUTO: 162 10*3/MM3 (ref 140–450)
PMV BLD AUTO: 11.1 FL (ref 6–12)
PO2 BLDV: 41.3 MM HG (ref 35–45)
POTASSIUM SERPL-SCNC: 4.9 MMOL/L (ref 3.5–5.2)
PROT SERPL-MCNC: 9.4 G/DL (ref 6–8.5)
PROTHROMBIN TIME: 16.5 SECONDS (ref 11.7–14.2)
RBC # BLD AUTO: 4.2 10*6/MM3 (ref 3.77–5.28)
SAO2 % BLDCOV: 67.4 % (ref 45–75)
SODIUM SERPL-SCNC: 132 MMOL/L (ref 136–145)
TOTAL RATE: 20 BREATHS/MINUTE
WBC NRBC COR # BLD AUTO: 6.84 10*3/MM3 (ref 3.4–10.8)

## 2023-12-22 PROCEDURE — 85610 PROTHROMBIN TIME: CPT | Performed by: INTERNAL MEDICINE

## 2023-12-22 PROCEDURE — 25010000002 MIDAZOLAM PER 1 MG: Performed by: INTERNAL MEDICINE

## 2023-12-22 PROCEDURE — 25510000001 IOPAMIDOL 61 % SOLUTION: Performed by: STUDENT IN AN ORGANIZED HEALTH CARE EDUCATION/TRAINING PROGRAM

## 2023-12-22 PROCEDURE — 25810000003 SODIUM CHLORIDE 0.9 % SOLUTION: Performed by: STUDENT IN AN ORGANIZED HEALTH CARE EDUCATION/TRAINING PROGRAM

## 2023-12-22 PROCEDURE — 25010000002 ONDANSETRON PER 1 MG: Performed by: STUDENT IN AN ORGANIZED HEALTH CARE EDUCATION/TRAINING PROGRAM

## 2023-12-22 PROCEDURE — 82803 BLOOD GASES ANY COMBINATION: CPT

## 2023-12-22 PROCEDURE — 82948 REAGENT STRIP/BLOOD GLUCOSE: CPT

## 2023-12-22 PROCEDURE — 74177 CT ABD & PELVIS W/CONTRAST: CPT

## 2023-12-22 PROCEDURE — 85025 COMPLETE CBC W/AUTO DIFF WBC: CPT | Performed by: STUDENT IN AN ORGANIZED HEALTH CARE EDUCATION/TRAINING PROGRAM

## 2023-12-22 PROCEDURE — 80053 COMPREHEN METABOLIC PANEL: CPT | Performed by: STUDENT IN AN ORGANIZED HEALTH CARE EDUCATION/TRAINING PROGRAM

## 2023-12-22 PROCEDURE — 82077 ASSAY SPEC XCP UR&BREATH IA: CPT | Performed by: STUDENT IN AN ORGANIZED HEALTH CARE EDUCATION/TRAINING PROGRAM

## 2023-12-22 PROCEDURE — 25810000003 SODIUM CHLORIDE 0.9 % SOLUTION: Performed by: INTERNAL MEDICINE

## 2023-12-22 PROCEDURE — 82010 KETONE BODYS QUAN: CPT | Performed by: INTERNAL MEDICINE

## 2023-12-22 PROCEDURE — 63710000001 INSULIN REGULAR HUMAN PER 5 UNITS: Performed by: INTERNAL MEDICINE

## 2023-12-22 PROCEDURE — 25010000002 DROPERIDOL PER 5 MG: Performed by: STUDENT IN AN ORGANIZED HEALTH CARE EDUCATION/TRAINING PROGRAM

## 2023-12-22 PROCEDURE — 25010000002 HEPARIN (PORCINE) PER 1000 UNITS: Performed by: INTERNAL MEDICINE

## 2023-12-22 PROCEDURE — 83690 ASSAY OF LIPASE: CPT | Performed by: STUDENT IN AN ORGANIZED HEALTH CARE EDUCATION/TRAINING PROGRAM

## 2023-12-22 PROCEDURE — 36415 COLL VENOUS BLD VENIPUNCTURE: CPT

## 2023-12-22 PROCEDURE — 99285 EMERGENCY DEPT VISIT HI MDM: CPT

## 2023-12-22 PROCEDURE — 87040 BLOOD CULTURE FOR BACTERIA: CPT | Performed by: STUDENT IN AN ORGANIZED HEALTH CARE EDUCATION/TRAINING PROGRAM

## 2023-12-22 PROCEDURE — 83605 ASSAY OF LACTIC ACID: CPT | Performed by: STUDENT IN AN ORGANIZED HEALTH CARE EDUCATION/TRAINING PROGRAM

## 2023-12-22 PROCEDURE — 84703 CHORIONIC GONADOTROPIN ASSAY: CPT | Performed by: STUDENT IN AN ORGANIZED HEALTH CARE EDUCATION/TRAINING PROGRAM

## 2023-12-22 RX ORDER — SODIUM CHLORIDE 0.9 % (FLUSH) 0.9 %
10 SYRINGE (ML) INJECTION EVERY 12 HOURS SCHEDULED
Status: DISCONTINUED | OUTPATIENT
Start: 2023-12-22 | End: 2023-12-26 | Stop reason: HOSPADM

## 2023-12-22 RX ORDER — HEPARIN SODIUM 5000 [USP'U]/ML
5000 INJECTION, SOLUTION INTRAVENOUS; SUBCUTANEOUS EVERY 12 HOURS SCHEDULED
Status: DISCONTINUED | OUTPATIENT
Start: 2023-12-22 | End: 2023-12-25

## 2023-12-22 RX ORDER — SODIUM CHLORIDE 9 MG/ML
125 INJECTION, SOLUTION INTRAVENOUS CONTINUOUS
Status: DISCONTINUED | OUTPATIENT
Start: 2023-12-22 | End: 2023-12-23

## 2023-12-22 RX ORDER — NITROGLYCERIN 0.4 MG/1
0.4 TABLET SUBLINGUAL
Status: DISCONTINUED | OUTPATIENT
Start: 2023-12-22 | End: 2023-12-26 | Stop reason: HOSPADM

## 2023-12-22 RX ORDER — MIDAZOLAM HYDROCHLORIDE 1 MG/ML
8 INJECTION INTRAMUSCULAR; INTRAVENOUS
Status: DISCONTINUED | OUTPATIENT
Start: 2023-12-22 | End: 2023-12-26 | Stop reason: HOSPADM

## 2023-12-22 RX ORDER — MIDAZOLAM HYDROCHLORIDE 1 MG/ML
4 INJECTION INTRAMUSCULAR; INTRAVENOUS
Status: DISCONTINUED | OUTPATIENT
Start: 2023-12-22 | End: 2023-12-26 | Stop reason: HOSPADM

## 2023-12-22 RX ORDER — IBUPROFEN 600 MG/1
1 TABLET ORAL
Status: DISCONTINUED | OUTPATIENT
Start: 2023-12-22 | End: 2023-12-23

## 2023-12-22 RX ORDER — LORAZEPAM 1 MG/1
2 TABLET ORAL
Status: DISCONTINUED | OUTPATIENT
Start: 2023-12-22 | End: 2023-12-26 | Stop reason: HOSPADM

## 2023-12-22 RX ORDER — BISACODYL 10 MG
10 SUPPOSITORY, RECTAL RECTAL DAILY PRN
Status: DISCONTINUED | OUTPATIENT
Start: 2023-12-22 | End: 2023-12-26 | Stop reason: HOSPADM

## 2023-12-22 RX ORDER — LORAZEPAM 1 MG/1
4 TABLET ORAL
Status: DISCONTINUED | OUTPATIENT
Start: 2023-12-22 | End: 2023-12-26 | Stop reason: HOSPADM

## 2023-12-22 RX ORDER — LORAZEPAM 1 MG/1
1 TABLET ORAL
Status: DISCONTINUED | OUTPATIENT
Start: 2023-12-22 | End: 2023-12-26 | Stop reason: HOSPADM

## 2023-12-22 RX ORDER — DEXTROSE MONOHYDRATE 25 G/50ML
25 INJECTION, SOLUTION INTRAVENOUS
Status: DISCONTINUED | OUTPATIENT
Start: 2023-12-22 | End: 2023-12-23

## 2023-12-22 RX ORDER — MIDAZOLAM HYDROCHLORIDE 5 MG/ML
4 INJECTION INTRAMUSCULAR; INTRAVENOUS
Status: DISCONTINUED | OUTPATIENT
Start: 2023-12-22 | End: 2023-12-26 | Stop reason: HOSPADM

## 2023-12-22 RX ORDER — PANTOPRAZOLE SODIUM 40 MG/10ML
40 INJECTION, POWDER, LYOPHILIZED, FOR SOLUTION INTRAVENOUS EVERY 12 HOURS SCHEDULED
Status: DISCONTINUED | OUTPATIENT
Start: 2023-12-22 | End: 2023-12-26 | Stop reason: HOSPADM

## 2023-12-22 RX ORDER — DROPERIDOL 2.5 MG/ML
2.5 INJECTION, SOLUTION INTRAMUSCULAR; INTRAVENOUS ONCE
Status: COMPLETED | OUTPATIENT
Start: 2023-12-22 | End: 2023-12-22

## 2023-12-22 RX ORDER — ONDANSETRON 2 MG/ML
4 INJECTION INTRAMUSCULAR; INTRAVENOUS ONCE
Status: COMPLETED | OUTPATIENT
Start: 2023-12-22 | End: 2023-12-22

## 2023-12-22 RX ORDER — MIDAZOLAM HYDROCHLORIDE 1 MG/ML
4 INJECTION INTRAMUSCULAR; INTRAVENOUS EVERY 6 HOURS
Status: DISCONTINUED | OUTPATIENT
Start: 2023-12-22 | End: 2023-12-23

## 2023-12-22 RX ORDER — NICOTINE POLACRILEX 4 MG
15 LOZENGE BUCCAL
Status: DISCONTINUED | OUTPATIENT
Start: 2023-12-22 | End: 2023-12-23

## 2023-12-22 RX ORDER — SODIUM CHLORIDE 0.9 % (FLUSH) 0.9 %
10 SYRINGE (ML) INJECTION AS NEEDED
Status: DISCONTINUED | OUTPATIENT
Start: 2023-12-22 | End: 2023-12-26 | Stop reason: HOSPADM

## 2023-12-22 RX ORDER — MIDAZOLAM HYDROCHLORIDE 1 MG/ML
2 INJECTION INTRAMUSCULAR; INTRAVENOUS EVERY 6 HOURS
Status: DISCONTINUED | OUTPATIENT
Start: 2023-12-23 | End: 2023-12-23

## 2023-12-22 RX ORDER — POLYETHYLENE GLYCOL 3350 17 G/17G
17 POWDER, FOR SOLUTION ORAL DAILY PRN
Status: DISCONTINUED | OUTPATIENT
Start: 2023-12-22 | End: 2023-12-26 | Stop reason: HOSPADM

## 2023-12-22 RX ORDER — BISACODYL 5 MG/1
5 TABLET, DELAYED RELEASE ORAL DAILY PRN
Status: DISCONTINUED | OUTPATIENT
Start: 2023-12-22 | End: 2023-12-26 | Stop reason: HOSPADM

## 2023-12-22 RX ORDER — SODIUM CHLORIDE 9 MG/ML
40 INJECTION, SOLUTION INTRAVENOUS AS NEEDED
Status: DISCONTINUED | OUTPATIENT
Start: 2023-12-22 | End: 2023-12-26 | Stop reason: HOSPADM

## 2023-12-22 RX ORDER — THIAMINE HYDROCHLORIDE 100 MG/ML
200 INJECTION, SOLUTION INTRAMUSCULAR; INTRAVENOUS EVERY 8 HOURS SCHEDULED
Status: DISCONTINUED | OUTPATIENT
Start: 2023-12-25 | End: 2023-12-26 | Stop reason: HOSPADM

## 2023-12-22 RX ORDER — MIDAZOLAM HYDROCHLORIDE 1 MG/ML
2 INJECTION INTRAMUSCULAR; INTRAVENOUS
Status: DISCONTINUED | OUTPATIENT
Start: 2023-12-22 | End: 2023-12-26 | Stop reason: HOSPADM

## 2023-12-22 RX ORDER — ONDANSETRON 2 MG/ML
4 INJECTION INTRAMUSCULAR; INTRAVENOUS EVERY 6 HOURS PRN
Status: DISCONTINUED | OUTPATIENT
Start: 2023-12-22 | End: 2023-12-26 | Stop reason: HOSPADM

## 2023-12-22 RX ORDER — AMOXICILLIN 250 MG
2 CAPSULE ORAL 2 TIMES DAILY
Status: DISCONTINUED | OUTPATIENT
Start: 2023-12-22 | End: 2023-12-26 | Stop reason: HOSPADM

## 2023-12-22 RX ADMIN — ONDANSETRON HYDROCHLORIDE 4 MG: 2 INJECTION, SOLUTION INTRAMUSCULAR; INTRAVENOUS at 19:29

## 2023-12-22 RX ADMIN — Medication 10 ML: at 23:23

## 2023-12-22 RX ADMIN — SODIUM CHLORIDE 1000 ML: 9 INJECTION, SOLUTION INTRAVENOUS at 23:12

## 2023-12-22 RX ADMIN — HEPARIN SODIUM 5000 UNITS: 5000 INJECTION INTRAVENOUS; SUBCUTANEOUS at 23:11

## 2023-12-22 RX ADMIN — DROPERIDOL 2.5 MG: 2.5 INJECTION, SOLUTION INTRAMUSCULAR; INTRAVENOUS at 20:28

## 2023-12-22 RX ADMIN — SODIUM CHLORIDE 1000 ML: 9 INJECTION, SOLUTION INTRAVENOUS at 20:27

## 2023-12-22 RX ADMIN — INSULIN HUMAN 2 UNITS: 100 INJECTION, SOLUTION PARENTERAL at 23:10

## 2023-12-22 RX ADMIN — SENNOSIDES AND DOCUSATE SODIUM 2 TABLET: 50; 8.6 TABLET ORAL at 23:11

## 2023-12-22 RX ADMIN — SODIUM CHLORIDE 125 ML/HR: 9 INJECTION, SOLUTION INTRAVENOUS at 23:10

## 2023-12-22 RX ADMIN — ONDANSETRON 4 MG: 2 INJECTION INTRAMUSCULAR; INTRAVENOUS at 18:43

## 2023-12-22 RX ADMIN — IOPAMIDOL 85 ML: 612 INJECTION, SOLUTION INTRAVENOUS at 20:15

## 2023-12-22 RX ADMIN — PANTOPRAZOLE SODIUM 40 MG: 40 INJECTION, POWDER, FOR SOLUTION INTRAVENOUS at 23:10

## 2023-12-22 RX ADMIN — SODIUM CHLORIDE 1000 ML: 9 INJECTION, SOLUTION INTRAVENOUS at 18:43

## 2023-12-22 RX ADMIN — MIDAZOLAM HYDROCHLORIDE 4 MG: 2 INJECTION, SOLUTION INTRAMUSCULAR; INTRAVENOUS at 23:11

## 2023-12-22 NOTE — LETTER
[facility]  [phone]    ACCESS CENTER STATEMENT OF DISPOSITION        I, Lisa Gibson, was assessed in the [facility] at [facility] on 12/25/2023.  I understand the recommendations below and what follow-up action is expected of me.    [facility]   [phone]    [facility]  [phone]    [facility]  [phone]    [facility]  [phone]    [facility]   [phone]    [facility]  [phone]    [facility]  [phone]      ________________________________  Clinician Signature    12/25/2023  08:11 EST

## 2023-12-22 NOTE — ED PROVIDER NOTES
EMERGENCY DEPARTMENT ENCOUNTER    Room Number:  38/38  PCP: Provider, No Known  Historian: Patient      HPI:  Chief Complaint: Nausea, vomiting, abdominal pain  Context: Lisa Gibson is a 33 y.o. female who presents to the ED c/o nausea, vomiting, abdominal pain.  Patient states symptoms have been going on for 2 days.  Patient is a daily alcohol drinker.  Patient is overall previous admissions for alcoholic pancreatitis.  Patient additionally states she has severe abdominal pain.            PAST MEDICAL HISTORY  Active Ambulatory Problems     Diagnosis Date Noted    Alcohol-induced acute pancreatitis without infection or necrosis 09/21/2021    Transaminitis 09/22/2021    Epigastric pain 09/22/2021    Hepatic steatosis 09/24/2021    Gallbladder sludge 09/24/2021    Severe sepsis 04/27/2023    E coli bacteremia 04/30/2023    Alcohol withdrawal 04/30/2023    Alcohol abuse 04/30/2023    Acute pyelonephritis 04/30/2023    Essential hypertension 04/30/2023    Metabolic encephalopathy 04/30/2023    Polysubstance abuse 04/30/2023    Hypokalemia 04/30/2023    Acute pancreatitis 08/07/2023    Alcohol dependence 08/07/2023    Pulmonary nodule 08/07/2023    Pancreatic pseudocyst 08/07/2023    Alcoholic hepatitis 08/07/2023    Hypophosphatemia 08/09/2023    Nausea with vomiting 08/09/2023     Resolved Ambulatory Problems     Diagnosis Date Noted    No Resolved Ambulatory Problems     No Additional Past Medical History         PAST SURGICAL HISTORY  No past surgical history on file.      FAMILY HISTORY  No family history on file.      SOCIAL HISTORY  Social History     Socioeconomic History    Marital status:    Tobacco Use    Smoking status: Never    Smokeless tobacco: Never   Vaping Use    Vaping Use: Never used   Substance and Sexual Activity    Drug use: Never         ALLERGIES  Patient has no known allergies.        REVIEW OF SYSTEMS  Review of Systems   Gastrointestinal:  Positive for abdominal pain, nausea  and vomiting.   All other systems reviewed and are negative.       PHYSICAL EXAM  ED Triage Vitals [12/22/23 1806]   Temp Heart Rate Resp BP SpO2   97.7 °F (36.5 °C) (!) 140 16 -- 100 %      Temp src Heart Rate Source Patient Position BP Location FiO2 (%)   Tympanic Monitor -- -- --       Physical Exam      GENERAL: Actively retching  HENT: nares patent  EYES: no scleral icterus  CV: regular rhythm, tachycardic  RESPIRATORY: normal effort  ABDOMEN: soft, mild tenderness  MUSCULOSKELETAL: no deformity  NEURO: alert, moves all extremities, follows commands  PSYCH:  calm, cooperative  SKIN: warm, dry    Vital signs and nursing notes reviewed.          LAB RESULTS  Recent Results (from the past 24 hour(s))   Comprehensive Metabolic Panel    Collection Time: 12/22/23  6:25 PM    Specimen: Blood   Result Value Ref Range    Glucose 282 (H) 65 - 99 mg/dL    BUN 15 6 - 20 mg/dL    Creatinine 1.15 (H) 0.57 - 1.00 mg/dL    Sodium 132 (L) 136 - 145 mmol/L    Potassium 4.9 3.5 - 5.2 mmol/L    Chloride 85 (L) 98 - 107 mmol/L    CO2 6.5 (L) 22.0 - 29.0 mmol/L    Calcium 9.1 8.6 - 10.5 mg/dL    Total Protein 9.4 (H) 6.0 - 8.5 g/dL    Albumin 4.8 3.5 - 5.2 g/dL    ALT (SGPT) 54 (H) 1 - 33 U/L    AST (SGOT) 311 (H) 1 - 32 U/L    Alkaline Phosphatase 212 (H) 39 - 117 U/L    Total Bilirubin 1.6 (H) 0.0 - 1.2 mg/dL    Globulin 4.6 gm/dL    A/G Ratio 1.0 g/dL    BUN/Creatinine Ratio 13.0 7.0 - 25.0    Anion Gap 40.5 (H) 5.0 - 15.0 mmol/L    eGFR 65.0 >60.0 mL/min/1.73   hCG, Serum, Qualitative    Collection Time: 12/22/23  6:25 PM    Specimen: Blood   Result Value Ref Range    HCG Qualitative Negative Negative   Lipase    Collection Time: 12/22/23  6:25 PM    Specimen: Blood   Result Value Ref Range    Lipase 151 (H) 13 - 60 U/L   Lactic Acid, Plasma    Collection Time: 12/22/23  6:25 PM    Specimen: Blood   Result Value Ref Range    Lactate 6.0 (C) 0.5 - 2.0 mmol/L   CBC Auto Differential    Collection Time: 12/22/23  6:25 PM     Specimen: Blood   Result Value Ref Range    WBC 6.84 3.40 - 10.80 10*3/mm3    RBC 4.20 3.77 - 5.28 10*6/mm3    Hemoglobin 12.1 12.0 - 15.9 g/dL    Hematocrit 37.2 34.0 - 46.6 %    MCV 88.6 79.0 - 97.0 fL    MCH 28.8 26.6 - 33.0 pg    MCHC 32.5 31.5 - 35.7 g/dL    RDW 14.3 12.3 - 15.4 %    RDW-SD 45.0 37.0 - 54.0 fl    MPV 11.1 6.0 - 12.0 fL    Platelets 162 140 - 450 10*3/mm3    Neutrophil % 78.6 (H) 42.7 - 76.0 %    Lymphocyte % 13.2 (L) 19.6 - 45.3 %    Monocyte % 7.5 5.0 - 12.0 %    Eosinophil % 0.0 (L) 0.3 - 6.2 %    Basophil % 0.3 0.0 - 1.5 %    Immature Grans % 0.4 0.0 - 0.5 %    Neutrophils, Absolute 5.38 1.70 - 7.00 10*3/mm3    Lymphocytes, Absolute 0.90 0.70 - 3.10 10*3/mm3    Monocytes, Absolute 0.51 0.10 - 0.90 10*3/mm3    Eosinophils, Absolute 0.00 0.00 - 0.40 10*3/mm3    Basophils, Absolute 0.02 0.00 - 0.20 10*3/mm3    Immature Grans, Absolute 0.03 0.00 - 0.05 10*3/mm3    nRBC 1.5 (H) 0.0 - 0.2 /100 WBC   Ethanol    Collection Time: 12/22/23  6:25 PM    Specimen: Blood   Result Value Ref Range    Ethanol 60 (H) 0 - 10 mg/dL    Ethanol % 0.060 %   Blood Gas, Venous -    Collection Time: 12/22/23  8:19 PM    Specimen: Venous Blood   Result Value Ref Range    pH, Venous 7.209 (C) 7.310 - 7.410 pH Units    pCO2, Venous 23.3 (L) 41.0 - 51.0 mm Hg    pO2, Venous 41.3 35.0 - 45.0 mm Hg    HCO3, Venous 9.3 (L) 22.0 - 28.0 mmol/L    Base Excess, Venous -16.8 (L) -2.0 - 2.0 mmol/L    O2 Saturation, Venous 67.4 45.0 - 75.0 %    CO2 Content 10.0 (L) 23 - 27 mmol/L    Barometric Pressure for Blood Gas 753.8000 mmHg    Modality Room Air     Rate 20 Breaths/minute    Notified Who marilee ruffin     Device Comment drawn by [id number] at 1952    STAT Lactic Acid, Reflex    Collection Time: 12/22/23  9:33 PM    Specimen: Arm, Right; Blood   Result Value Ref Range    Lactate 3.3 (C) 0.5 - 2.0 mmol/L       Ordered the above labs and reviewed the results.        RADIOLOGY  CT Abdomen Pelvis With Contrast    Result Date:  12/22/2023  CT ABDOMEN PELVIS W CONTRAST-  HISTORY: Abdominal pain, vomiting. Fever.  TECHNIQUE:  CT of the abdomen and pelvis was performed following the administration of intravenous contrast.  Radiation dose reduction techniques were utilized, including automated exposure control and exposure modulation based on body size.  COMPARISON: CT abdomen and pelvis 10/27/2023  FINDINGS:  Heart size is normal. There is no pericardial effusion. Pleural spaces are clear. The liver is markedly enlarged, measuring 25.0 cm in craniocaudal length. There is marked diffuse hepatic steatosis. The gallbladder is present. The spleen is normal in size. The pancreatic duct within the tail is prominent to 0.4 cm, similar to 10/27/2023, previously 0.3 cm. The previously noted hypodense focus in the pancreatic head is not clearly visualized on the current examination. The adrenal glands are within normal limits. A hypodense focus in the superior pole of the right kidney is too small to accurately characterize but not significantly changed. There is no hydronephrosis. No pathologically enlarged abdominal or pelvic lymph nodes are identified. The abdominal aorta is normal in caliber. There is no bowel obstruction. The appendix is within normal limits. The small bowel and colon are diffusely decompressed and poorly assessed. The bladder is well distended and within normal limits. The uterus is present. There is no adnexal mass. The visualized osseous fractures are age-appropriate.        1.  Marked hepatomegaly with marked diffuse hepatic steatosis. 2.  Pancreatic ductal prominence within the tail is similar to prior. The previously noted hypodense focus in the pancreatic head is not clearly visualized on the current examination. Follow-up contrast-enhanced MRCP is again recommended. 3.  No bowel obstruction or CT evidence of acute appendicitis.  This report was finalized on 12/22/2023 8:54 PM by Dr. Keyona Root M.D on Workstation:  [id number]       Ordered the above noted radiological studies. Reviewed by me in PACS.        MEDICATIONS GIVEN IN ER  Medications   sodium chloride 0.9 % bolus 1,000 mL (0 mL Intravenous Stopped 12/22/23 2029)   ondansetron (ZOFRAN) injection 4 mg (4 mg Intravenous Given 12/22/23 1843)   ondansetron (ZOFRAN) injection 4 mg (4 mg Intravenous Given 12/22/23 1929)   iopamidol (ISOVUE-300) 61 % injection 100 mL (85 mL Intravenous Given by Other 12/22/23 2015)   droperidol (INAPSINE) injection 2.5 mg (2.5 mg Intravenous Given 12/22/23 2028)   sodium chloride 0.9 % bolus 1,000 mL (1,000 mL Intravenous New Bag 12/22/23 2027)                   MEDICAL DECISION MAKING, PROGRESS, and CONSULTS    All labs have been independently reviewed by me.  All radiology studies have been reviewed by me and I have also reviewed the radiology report.   EKG's independently viewed and interpreted by me.  Discussion below represents my analysis of pertinent findings related to patient's condition, differential diagnosis, treatment plan and final disposition.      Additional sources:  - External (non-ED) record review: Discharge summary from 8/12/2023 reviewed and notable for admission secondary to acute alcoholic pancreatitis.  Patient treated symptomatically managed inpatient until able to be discharged home.    - Chronic or social conditions impacting care: Daily alcohol user    - Shared decision making: Utilizing shared decision-making techniques we elected to proceed with inpatient management      Orders placed during this visit:  Orders Placed This Encounter   Procedures    Blood Culture - Blood,    Blood Culture - Blood,    CT Abdomen Pelvis With Contrast    Comprehensive Metabolic Panel    hCG, Serum, Qualitative    Urinalysis With Microscopic If Indicated (No Culture) - Urine, Clean Catch    Lipase    Lactic Acid, Plasma    CBC Auto Differential    Ethanol    STAT Lactic Acid, Reflex    Blood Gas, Venous -    Blood Gas, Venous -     STAT Lactic Acid, Reflex    [facility] (on-call MD unless specified) Details    Pulmonology (on-call MD unless specified)    Inpatient Admission    CBC & Differential         Differential diagnosis includes but is not limited to:    DKA, alcoholic ketoacidosis, hyperemesis      Independent interpretation of labs, radiology studies, and discussions with consultants:  ED Course as of 12/22/23 2206   Fri Dec 22, 2023   1901 Lactate(!!): 6.0 [MW]   1901 Ethanol(!): 60 [MW]   1901 Lipase(!): 151 [MW]   1923 CO2(!): 6.5 [MW]   2047 pH, Venous(!!): 7.209 [MW]   2055 Laboratory evaluation is notable for acidosis with a lactic acid of 6, bicarb of 6.5, elevated liver enzymes and a pH of 7.2.  Patient received 2 L IV fluid in the emergency department and multiple doses of antiemetics including droperidol.  CT abdomen is overall unremarkable.  Findings are concerning for alcoholic ketoacidosis.  Patient is noted to have elevated glucose but no history of diabetes and I do not believe findings are consistent with DKA.  Will continue on with IV fluid rehydration. [MW]   2144 Discussed with Dr. Dowling of [facility] who requests pulmonology consultation for ICU placement given patient's severe acidosis with a bicarb of 6. [MW]   2146 Discussed with Dr. Hartmann of pulmonology/ICU who agrees to accept patient to the ICU for further evaluation and management. [MW]      ED Course User Index  [MW] Dmitriy Solorzano MD         DIAGNOSIS  Final diagnoses:   Alcoholic ketoacidosis         DISPOSITION  ED Disposition       ED Disposition   Decision to Admit    Condition   --    Comment   Level of Care: Critical Care [6]   Diagnosis: Alcoholic ketoacidosis [029138]   Admitting Physician: OUSMANE HARTMANN JR [[id number]]   Attending Physician: OUSMANE HARTMANN JR [[id number]]   Certification: I Certify That Inpatient Hospital Services Are Medically Necessary For Greater Than 2 Midnights                           Latest Documented Vital Signs:  As of 22:06  EST  BP- 106/78 HR- 114 Temp- 97.7 °F (36.5 °C) (Tympanic) O2 sat- 100%              --    Please note that portions of this were completed with a voice recognition program.       Note Disclaimer: At [facility], we believe that sharing information builds trust and better relationships. You are receiving this note because you are receiving care at [facility] or recently visited. It is possible you will see health information before a provider has talked with you about it. This kind of information can be easy to misunderstand. To help you fully understand what it means for your health, we urge you to discuss this note with your provider.             Dmitriy Solorzano MD  12/22/23 4994

## 2023-12-22 NOTE — LETTER
[facility]  [facility]  [phone]    ACCESS CENTER STATEMENT OF DISPOSITION        I, Lisasarah Scottmad, was assessed in the [facility] at [facility] on 12/25/2023.  I understand the recommendations below and what follow-up action is expected of me.      {Follow-ups:[id number]}                ________________________________  Patient/Parent/Guardian/POA Signature    ________________________________  Clinician Signature    12/25/2023  08:15 EST

## 2023-12-23 LAB
ALBUMIN SERPL-MCNC: 4.1 G/DL (ref 3.5–5.2)
ALBUMIN/GLOB SERPL: 1.4 G/DL
ALP SERPL-CCNC: 154 U/L (ref 39–117)
ALT SERPL W P-5'-P-CCNC: 37 U/L (ref 1–33)
AMPHET+METHAMPHET UR QL: NEGATIVE
ANION GAP SERPL CALCULATED.3IONS-SCNC: 14.4 MMOL/L (ref 5–15)
ANION GAP SERPL CALCULATED.3IONS-SCNC: 15.4 MMOL/L (ref 5–15)
ANION GAP SERPL CALCULATED.3IONS-SCNC: 17 MMOL/L (ref 5–15)
ANION GAP SERPL CALCULATED.3IONS-SCNC: 25 MMOL/L (ref 5–15)
ANION GAP SERPL CALCULATED.3IONS-SCNC: 29 MMOL/L (ref 5–15)
ARTERIAL PATENCY WRIST A: POSITIVE
AST SERPL-CCNC: 189 U/L (ref 1–32)
ATMOSPHERIC PRESS: 757.7 MMHG
B-OH-BUTYR SERPL-SCNC: 3.32 MMOL/L (ref 0.02–0.27)
BACTERIA UR QL AUTO: ABNORMAL /HPF
BACTERIA UR QL AUTO: ABNORMAL /HPF
BARBITURATES UR QL SCN: NEGATIVE
BASE EXCESS BLDA CALC-SCNC: -7.3 MMOL/L (ref 0–2)
BASOPHILS # BLD AUTO: 0.01 10*3/MM3 (ref 0–0.2)
BASOPHILS NFR BLD AUTO: 0.2 % (ref 0–1.5)
BDY SITE: ABNORMAL
BENZODIAZ UR QL SCN: POSITIVE
BILIRUB SERPL-MCNC: 1.6 MG/DL (ref 0–1.2)
BILIRUB UR QL STRIP: NEGATIVE
BILIRUB UR QL STRIP: NEGATIVE
BUN SERPL-MCNC: 11 MG/DL (ref 6–20)
BUN SERPL-MCNC: 11 MG/DL (ref 6–20)
BUN SERPL-MCNC: 4 MG/DL (ref 6–20)
BUN SERPL-MCNC: 6 MG/DL (ref 6–20)
BUN SERPL-MCNC: 7 MG/DL (ref 6–20)
BUN/CREAT SERPL: 11.1 (ref 7–25)
BUN/CREAT SERPL: 11.7 (ref 7–25)
BUN/CREAT SERPL: 13.4 (ref 7–25)
BUN/CREAT SERPL: 6.1 (ref 7–25)
BUN/CREAT SERPL: 9.4 (ref 7–25)
CA-I BLD-MCNC: 4.7 MG/DL (ref 4.6–5.4)
CA-I SERPL ISE-MCNC: 1.17 MMOL/L (ref 1.15–1.35)
CALCIUM SPEC-SCNC: 7.7 MG/DL (ref 8.6–10.5)
CALCIUM SPEC-SCNC: 7.7 MG/DL (ref 8.6–10.5)
CALCIUM SPEC-SCNC: 8.4 MG/DL (ref 8.6–10.5)
CALCIUM SPEC-SCNC: 8.6 MG/DL (ref 8.6–10.5)
CALCIUM SPEC-SCNC: 8.6 MG/DL (ref 8.6–10.5)
CANNABINOIDS SERPL QL: NEGATIVE
CHLORIDE SERPL-SCNC: 100 MMOL/L (ref 98–107)
CHLORIDE SERPL-SCNC: 101 MMOL/L (ref 98–107)
CHLORIDE SERPL-SCNC: 102 MMOL/L (ref 98–107)
CHLORIDE SERPL-SCNC: 103 MMOL/L (ref 98–107)
CHLORIDE SERPL-SCNC: 107 MMOL/L (ref 98–107)
CLARITY UR: CLEAR
CLARITY UR: CLEAR
CO2 BLDA-SCNC: 17.4 MMOL/L (ref 23–27)
CO2 SERPL-SCNC: 10 MMOL/L (ref 22–29)
CO2 SERPL-SCNC: 14 MMOL/L (ref 22–29)
CO2 SERPL-SCNC: 18.6 MMOL/L (ref 22–29)
CO2 SERPL-SCNC: 20 MMOL/L (ref 22–29)
CO2 SERPL-SCNC: 20.6 MMOL/L (ref 22–29)
COCAINE UR QL: NEGATIVE
COLOR UR: YELLOW
COLOR UR: YELLOW
CREAT SERPL-MCNC: 0.63 MG/DL (ref 0.57–1)
CREAT SERPL-MCNC: 0.64 MG/DL (ref 0.57–1)
CREAT SERPL-MCNC: 0.66 MG/DL (ref 0.57–1)
CREAT SERPL-MCNC: 0.82 MG/DL (ref 0.57–1)
CREAT SERPL-MCNC: 0.94 MG/DL (ref 0.57–1)
D-LACTATE SERPL-SCNC: 2 MMOL/L (ref 0.5–2)
DEPRECATED RDW RBC AUTO: 46.1 FL (ref 37–54)
EGFRCR SERPLBLD CKD-EPI 2021: 119.7 ML/MIN/1.73
EGFRCR SERPLBLD CKD-EPI 2021: 120.6 ML/MIN/1.73
EGFRCR SERPLBLD CKD-EPI 2021: 121 ML/MIN/1.73
EGFRCR SERPLBLD CKD-EPI 2021: 82.9 ML/MIN/1.73
EGFRCR SERPLBLD CKD-EPI 2021: 97.6 ML/MIN/1.73
EOSINOPHIL # BLD AUTO: 0 10*3/MM3 (ref 0–0.4)
EOSINOPHIL NFR BLD AUTO: 0 % (ref 0.3–6.2)
ERYTHROCYTE [DISTWIDTH] IN BLOOD BY AUTOMATED COUNT: 14.5 % (ref 12.3–15.4)
FENTANYL UR-MCNC: NEGATIVE NG/ML
GLOBULIN UR ELPH-MCNC: 3 GM/DL
GLUCOSE BLDC GLUCOMTR-MCNC: 134 MG/DL (ref 70–130)
GLUCOSE BLDC GLUCOMTR-MCNC: 137 MG/DL (ref 70–130)
GLUCOSE BLDC GLUCOMTR-MCNC: 137 MG/DL (ref 70–130)
GLUCOSE BLDC GLUCOMTR-MCNC: 142 MG/DL (ref 70–130)
GLUCOSE BLDC GLUCOMTR-MCNC: 155 MG/DL (ref 70–130)
GLUCOSE BLDC GLUCOMTR-MCNC: 166 MG/DL (ref 70–130)
GLUCOSE BLDC GLUCOMTR-MCNC: 168 MG/DL (ref 70–130)
GLUCOSE BLDC GLUCOMTR-MCNC: 169 MG/DL (ref 70–130)
GLUCOSE BLDC GLUCOMTR-MCNC: 171 MG/DL (ref 70–130)
GLUCOSE BLDC GLUCOMTR-MCNC: 175 MG/DL (ref 70–130)
GLUCOSE BLDC GLUCOMTR-MCNC: 183 MG/DL (ref 70–130)
GLUCOSE BLDC GLUCOMTR-MCNC: 199 MG/DL (ref 70–130)
GLUCOSE BLDC GLUCOMTR-MCNC: 201 MG/DL (ref 70–130)
GLUCOSE BLDC GLUCOMTR-MCNC: 270 MG/DL (ref 70–130)
GLUCOSE BLDC GLUCOMTR-MCNC: 83 MG/DL (ref 70–130)
GLUCOSE SERPL-MCNC: 113 MG/DL (ref 65–99)
GLUCOSE SERPL-MCNC: 165 MG/DL (ref 65–99)
GLUCOSE SERPL-MCNC: 175 MG/DL (ref 65–99)
GLUCOSE SERPL-MCNC: 193 MG/DL (ref 65–99)
GLUCOSE SERPL-MCNC: 196 MG/DL (ref 65–99)
GLUCOSE UR STRIP-MCNC: NEGATIVE MG/DL
GLUCOSE UR STRIP-MCNC: NEGATIVE MG/DL
HBA1C MFR BLD: 5.9 % (ref 4.8–5.6)
HCO3 BLDA-SCNC: 16.6 MMOL/L (ref 22–28)
HCT VFR BLD AUTO: 30 % (ref 34–46.6)
HEMODILUTION: NO
HGB BLD-MCNC: 9.5 G/DL (ref 12–15.9)
HGB UR QL STRIP.AUTO: ABNORMAL
HGB UR QL STRIP.AUTO: ABNORMAL
HYALINE CASTS UR QL AUTO: ABNORMAL /LPF
HYALINE CASTS UR QL AUTO: ABNORMAL /LPF
IMM GRANULOCYTES # BLD AUTO: 0.03 10*3/MM3 (ref 0–0.05)
IMM GRANULOCYTES NFR BLD AUTO: 0.5 % (ref 0–0.5)
INHALED O2 CONCENTRATION: 21 %
KETONES UR QL STRIP: ABNORMAL
KETONES UR QL STRIP: ABNORMAL
LEUKOCYTE ESTERASE UR QL STRIP.AUTO: NEGATIVE
LEUKOCYTE ESTERASE UR QL STRIP.AUTO: NEGATIVE
LIPASE SERPL-CCNC: 92 U/L (ref 13–60)
LYMPHOCYTES # BLD AUTO: 0.83 10*3/MM3 (ref 0.7–3.1)
LYMPHOCYTES NFR BLD AUTO: 15.2 % (ref 19.6–45.3)
MAGNESIUM SERPL-MCNC: 1.8 MG/DL (ref 1.6–2.6)
MAGNESIUM SERPL-MCNC: 1.8 MG/DL (ref 1.6–2.6)
MAGNESIUM SERPL-MCNC: 1.9 MG/DL (ref 1.6–2.6)
MAGNESIUM SERPL-MCNC: 2.1 MG/DL (ref 1.6–2.6)
MCH RBC QN AUTO: 28 PG (ref 26.6–33)
MCHC RBC AUTO-ENTMCNC: 31.7 G/DL (ref 31.5–35.7)
MCV RBC AUTO: 88.5 FL (ref 79–97)
METHADONE UR QL SCN: NEGATIVE
MODALITY: ABNORMAL
MONOCYTES # BLD AUTO: 0.64 10*3/MM3 (ref 0.1–0.9)
MONOCYTES NFR BLD AUTO: 11.7 % (ref 5–12)
NEUTROPHILS NFR BLD AUTO: 3.96 10*3/MM3 (ref 1.7–7)
NEUTROPHILS NFR BLD AUTO: 72.4 % (ref 42.7–76)
NITRITE UR QL STRIP: NEGATIVE
NITRITE UR QL STRIP: NEGATIVE
NRBC BLD AUTO-RTO: 0.4 /100 WBC (ref 0–0.2)
O2 A-A PPRESDIFF RESPIRATORY: 0.8 MMHG
OPIATES UR QL: NEGATIVE
OSMOLALITY SERPL: 313 MOSM/KG (ref 275–300)
OXYCODONE UR QL SCN: NEGATIVE
PCO2 BLDA: 27.6 MM HG (ref 35–45)
PH BLDA: 7.39 PH UNITS (ref 7.35–7.45)
PH UR STRIP.AUTO: 6 [PH] (ref 5–8)
PH UR STRIP.AUTO: 6.5 [PH] (ref 5–8)
PHOSPHATE SERPL-MCNC: 0.4 MG/DL (ref 2.5–4.5)
PHOSPHATE SERPL-MCNC: 0.7 MG/DL (ref 2.5–4.5)
PHOSPHATE SERPL-MCNC: 0.9 MG/DL (ref 2.5–4.5)
PHOSPHATE SERPL-MCNC: 2 MG/DL (ref 2.5–4.5)
PLATELET # BLD AUTO: 106 10*3/MM3 (ref 140–450)
PMV BLD AUTO: 11 FL (ref 6–12)
PO2 BLDA: 102.9 MM HG (ref 80–100)
POTASSIUM SERPL-SCNC: 3.4 MMOL/L (ref 3.5–5.2)
POTASSIUM SERPL-SCNC: 3.8 MMOL/L (ref 3.5–5.2)
POTASSIUM SERPL-SCNC: 3.9 MMOL/L (ref 3.5–5.2)
POTASSIUM SERPL-SCNC: 4.3 MMOL/L (ref 3.5–5.2)
POTASSIUM SERPL-SCNC: 4.4 MMOL/L (ref 3.5–5.2)
PROT SERPL-MCNC: 7.1 G/DL (ref 6–8.5)
PROT UR QL STRIP: ABNORMAL
PROT UR QL STRIP: ABNORMAL
RBC # BLD AUTO: 3.39 10*6/MM3 (ref 3.77–5.28)
RBC # UR STRIP: ABNORMAL /HPF
RBC # UR STRIP: ABNORMAL /HPF
REF LAB TEST METHOD: ABNORMAL
REF LAB TEST METHOD: ABNORMAL
SAO2 % BLDCOA: 98 % (ref 92–98.5)
SODIUM SERPL-SCNC: 138 MMOL/L (ref 136–145)
SODIUM SERPL-SCNC: 139 MMOL/L (ref 136–145)
SODIUM SERPL-SCNC: 139 MMOL/L (ref 136–145)
SODIUM SERPL-SCNC: 140 MMOL/L (ref 136–145)
SODIUM SERPL-SCNC: 141 MMOL/L (ref 136–145)
SP GR UR STRIP: 1.02 (ref 1–1.03)
SP GR UR STRIP: >=1.03 (ref 1–1.03)
SQUAMOUS #/AREA URNS HPF: ABNORMAL /HPF
SQUAMOUS #/AREA URNS HPF: ABNORMAL /HPF
TOTAL RATE: 17 BREATHS/MINUTE
UROBILINOGEN UR QL STRIP: ABNORMAL
UROBILINOGEN UR QL STRIP: ABNORMAL
WBC # UR STRIP: ABNORMAL /HPF
WBC # UR STRIP: ABNORMAL /HPF
WBC NRBC COR # BLD AUTO: 5.47 10*3/MM3 (ref 3.4–10.8)

## 2023-12-23 PROCEDURE — 83930 ASSAY OF BLOOD OSMOLALITY: CPT | Performed by: INTERNAL MEDICINE

## 2023-12-23 PROCEDURE — 80307 DRUG TEST PRSMV CHEM ANLYZR: CPT | Performed by: INTERNAL MEDICINE

## 2023-12-23 PROCEDURE — 63710000001 INSULIN LISPRO (HUMAN) PER 5 UNITS: Performed by: INTERNAL MEDICINE

## 2023-12-23 PROCEDURE — 25810000003 SODIUM CHLORIDE 0.9 % SOLUTION: Performed by: INTERNAL MEDICINE

## 2023-12-23 PROCEDURE — 25010000002 THIAMINE PER 100 MG: Performed by: INTERNAL MEDICINE

## 2023-12-23 PROCEDURE — 84100 ASSAY OF PHOSPHORUS: CPT | Performed by: HOSPITALIST

## 2023-12-23 PROCEDURE — 83690 ASSAY OF LIPASE: CPT | Performed by: INTERNAL MEDICINE

## 2023-12-23 PROCEDURE — 83605 ASSAY OF LACTIC ACID: CPT | Performed by: STUDENT IN AN ORGANIZED HEALTH CARE EDUCATION/TRAINING PROGRAM

## 2023-12-23 PROCEDURE — 36600 WITHDRAWAL OF ARTERIAL BLOOD: CPT

## 2023-12-23 PROCEDURE — 94799 UNLISTED PULMONARY SVC/PX: CPT

## 2023-12-23 PROCEDURE — 83735 ASSAY OF MAGNESIUM: CPT | Performed by: INTERNAL MEDICINE

## 2023-12-23 PROCEDURE — 82948 REAGENT STRIP/BLOOD GLUCOSE: CPT

## 2023-12-23 PROCEDURE — 63710000001 DIPHENHYDRAMINE PER 50 MG: Performed by: INTERNAL MEDICINE

## 2023-12-23 PROCEDURE — 63710000001 INSULIN GLARGINE PER 5 UNITS: Performed by: INTERNAL MEDICINE

## 2023-12-23 PROCEDURE — 80053 COMPREHEN METABOLIC PANEL: CPT | Performed by: INTERNAL MEDICINE

## 2023-12-23 PROCEDURE — 25010000002 ONDANSETRON PER 1 MG: Performed by: INTERNAL MEDICINE

## 2023-12-23 PROCEDURE — 82010 KETONE BODYS QUAN: CPT | Performed by: INTERNAL MEDICINE

## 2023-12-23 PROCEDURE — 83036 HEMOGLOBIN GLYCOSYLATED A1C: CPT | Performed by: INTERNAL MEDICINE

## 2023-12-23 PROCEDURE — 99221 1ST HOSP IP/OBS SF/LOW 40: CPT | Performed by: INTERNAL MEDICINE

## 2023-12-23 PROCEDURE — 84100 ASSAY OF PHOSPHORUS: CPT | Performed by: INTERNAL MEDICINE

## 2023-12-23 PROCEDURE — 25010000002 HEPARIN (PORCINE) PER 1000 UNITS: Performed by: INTERNAL MEDICINE

## 2023-12-23 PROCEDURE — 81001 URINALYSIS AUTO W/SCOPE: CPT | Performed by: INTERNAL MEDICINE

## 2023-12-23 PROCEDURE — 25010000002 CEFTRIAXONE PER 250 MG: Performed by: INTERNAL MEDICINE

## 2023-12-23 PROCEDURE — 25810000003 SODIUM CHLORIDE 0.9 % SOLUTION

## 2023-12-23 PROCEDURE — 82330 ASSAY OF CALCIUM: CPT | Performed by: INTERNAL MEDICINE

## 2023-12-23 PROCEDURE — 25010000002 MIDAZOLAM PER 1 MG: Performed by: INTERNAL MEDICINE

## 2023-12-23 PROCEDURE — 85025 COMPLETE CBC W/AUTO DIFF WBC: CPT | Performed by: INTERNAL MEDICINE

## 2023-12-23 PROCEDURE — 81001 URINALYSIS AUTO W/SCOPE: CPT | Performed by: STUDENT IN AN ORGANIZED HEALTH CARE EDUCATION/TRAINING PROGRAM

## 2023-12-23 PROCEDURE — 82803 BLOOD GASES ANY COMBINATION: CPT

## 2023-12-23 RX ORDER — SODIUM CHLORIDE AND POTASSIUM CHLORIDE 150; 450 MG/100ML; MG/100ML
250 INJECTION, SOLUTION INTRAVENOUS CONTINUOUS PRN
Status: DISCONTINUED | OUTPATIENT
Start: 2023-12-23 | End: 2023-12-23

## 2023-12-23 RX ORDER — SODIUM CHLORIDE AND POTASSIUM CHLORIDE 300; 900 MG/100ML; MG/100ML
250 INJECTION, SOLUTION INTRAVENOUS CONTINUOUS PRN
Status: DISCONTINUED | OUTPATIENT
Start: 2023-12-23 | End: 2023-12-23

## 2023-12-23 RX ORDER — SODIUM CHLORIDE AND POTASSIUM CHLORIDE 150; 900 MG/100ML; MG/100ML
250 INJECTION, SOLUTION INTRAVENOUS CONTINUOUS PRN
Status: DISCONTINUED | OUTPATIENT
Start: 2023-12-23 | End: 2023-12-23

## 2023-12-23 RX ORDER — DEXTROSE AND SODIUM CHLORIDE 5; .9 G/100ML; G/100ML
150 INJECTION, SOLUTION INTRAVENOUS CONTINUOUS PRN
Status: DISCONTINUED | OUTPATIENT
Start: 2023-12-23 | End: 2023-12-23

## 2023-12-23 RX ORDER — SODIUM CHLORIDE 450 MG/100ML
250 INJECTION, SOLUTION INTRAVENOUS CONTINUOUS PRN
Status: DISCONTINUED | OUTPATIENT
Start: 2023-12-23 | End: 2023-12-23

## 2023-12-23 RX ORDER — DIPHENHYDRAMINE HCL 25 MG
25 CAPSULE ORAL ONCE
Status: COMPLETED | OUTPATIENT
Start: 2023-12-23 | End: 2023-12-23

## 2023-12-23 RX ORDER — DEXTROSE AND SODIUM CHLORIDE 5; .45 G/100ML; G/100ML
150 INJECTION, SOLUTION INTRAVENOUS CONTINUOUS PRN
Status: DISCONTINUED | OUTPATIENT
Start: 2023-12-23 | End: 2023-12-23

## 2023-12-23 RX ORDER — DEXTROSE MONOHYDRATE, SODIUM CHLORIDE, AND POTASSIUM CHLORIDE 50; 1.49; 4.5 G/1000ML; G/1000ML; G/1000ML
150 INJECTION, SOLUTION INTRAVENOUS CONTINUOUS PRN
Status: DISCONTINUED | OUTPATIENT
Start: 2023-12-23 | End: 2023-12-23

## 2023-12-23 RX ORDER — SODIUM CHLORIDE 0.9 % (FLUSH) 0.9 %
10 SYRINGE (ML) INJECTION AS NEEDED
Status: DISCONTINUED | OUTPATIENT
Start: 2023-12-23 | End: 2023-12-23

## 2023-12-23 RX ORDER — SODIUM CHLORIDE 0.9 % (FLUSH) 0.9 %
10 SYRINGE (ML) INJECTION EVERY 12 HOURS SCHEDULED
Status: DISCONTINUED | OUTPATIENT
Start: 2023-12-23 | End: 2023-12-23

## 2023-12-23 RX ORDER — FENTANYL/ROPIVACAINE/NS/PF 2-625MCG/1
15 PLASTIC BAG, INJECTION (ML) EPIDURAL
Status: COMPLETED | OUTPATIENT
Start: 2023-12-23 | End: 2023-12-23

## 2023-12-23 RX ORDER — IBUPROFEN 600 MG/1
1 TABLET ORAL
Status: DISCONTINUED | OUTPATIENT
Start: 2023-12-23 | End: 2023-12-26 | Stop reason: HOSPADM

## 2023-12-23 RX ORDER — DEXTROSE MONOHYDRATE, SODIUM CHLORIDE, AND POTASSIUM CHLORIDE 50; 2.98; 4.5 G/1000ML; G/1000ML; G/1000ML
150 INJECTION, SOLUTION INTRAVENOUS CONTINUOUS PRN
Status: DISCONTINUED | OUTPATIENT
Start: 2023-12-23 | End: 2023-12-23

## 2023-12-23 RX ORDER — DEXTROSE MONOHYDRATE 25 G/50ML
10-50 INJECTION, SOLUTION INTRAVENOUS
Status: DISCONTINUED | OUTPATIENT
Start: 2023-12-23 | End: 2023-12-23

## 2023-12-23 RX ORDER — IBUPROFEN 600 MG/1
1 TABLET ORAL
Status: DISCONTINUED | OUTPATIENT
Start: 2023-12-23 | End: 2023-12-23

## 2023-12-23 RX ORDER — SODIUM CHLORIDE 9 MG/ML
75 INJECTION, SOLUTION INTRAVENOUS CONTINUOUS
Status: DISCONTINUED | OUTPATIENT
Start: 2023-12-23 | End: 2023-12-26

## 2023-12-23 RX ORDER — SODIUM CHLORIDE 9 MG/ML
40 INJECTION, SOLUTION INTRAVENOUS AS NEEDED
Status: DISCONTINUED | OUTPATIENT
Start: 2023-12-23 | End: 2023-12-23

## 2023-12-23 RX ORDER — DEXTROSE MONOHYDRATE, SODIUM CHLORIDE, AND POTASSIUM CHLORIDE 50; 2.98; 9 G/1000ML; G/1000ML; G/1000ML
150 INJECTION, SOLUTION INTRAVENOUS CONTINUOUS PRN
Status: DISCONTINUED | OUTPATIENT
Start: 2023-12-23 | End: 2023-12-23

## 2023-12-23 RX ORDER — INSULIN LISPRO 100 [IU]/ML
3-14 INJECTION, SOLUTION INTRAVENOUS; SUBCUTANEOUS
Status: DISCONTINUED | OUTPATIENT
Start: 2023-12-23 | End: 2023-12-26 | Stop reason: HOSPADM

## 2023-12-23 RX ORDER — DEXTROSE MONOHYDRATE, SODIUM CHLORIDE, AND POTASSIUM CHLORIDE 50; 1.49; 4.5 G/1000ML; G/1000ML; G/1000ML
150 INJECTION, SOLUTION INTRAVENOUS CONTINUOUS
Status: DISCONTINUED | OUTPATIENT
Start: 2023-12-23 | End: 2023-12-23

## 2023-12-23 RX ORDER — SODIUM CHLORIDE 9 MG/ML
250 INJECTION, SOLUTION INTRAVENOUS CONTINUOUS PRN
Status: DISCONTINUED | OUTPATIENT
Start: 2023-12-23 | End: 2023-12-23

## 2023-12-23 RX ORDER — DEXTROSE MONOHYDRATE 25 G/50ML
25 INJECTION, SOLUTION INTRAVENOUS
Status: DISCONTINUED | OUTPATIENT
Start: 2023-12-23 | End: 2023-12-26 | Stop reason: HOSPADM

## 2023-12-23 RX ORDER — NICOTINE POLACRILEX 4 MG
15 LOZENGE BUCCAL
Status: DISCONTINUED | OUTPATIENT
Start: 2023-12-23 | End: 2023-12-23

## 2023-12-23 RX ORDER — DEXTROSE MONOHYDRATE, SODIUM CHLORIDE, AND POTASSIUM CHLORIDE 50; 1.49; 9 G/1000ML; G/1000ML; G/1000ML
150 INJECTION, SOLUTION INTRAVENOUS CONTINUOUS PRN
Status: DISCONTINUED | OUTPATIENT
Start: 2023-12-23 | End: 2023-12-23

## 2023-12-23 RX ORDER — NICOTINE POLACRILEX 4 MG
15 LOZENGE BUCCAL
Status: DISCONTINUED | OUTPATIENT
Start: 2023-12-23 | End: 2023-12-26 | Stop reason: HOSPADM

## 2023-12-23 RX ADMIN — POTASSIUM CHLORIDE, DEXTROSE MONOHYDRATE AND SODIUM CHLORIDE 150 ML/HR: 150; 5; 450 INJECTION, SOLUTION INTRAVENOUS at 10:04

## 2023-12-23 RX ADMIN — MIDAZOLAM HYDROCHLORIDE 4 MG: 2 INJECTION, SOLUTION INTRAMUSCULAR; INTRAVENOUS at 04:14

## 2023-12-23 RX ADMIN — CEFTRIAXONE 2000 MG: 2 INJECTION, POWDER, FOR SOLUTION INTRAMUSCULAR; INTRAVENOUS at 01:45

## 2023-12-23 RX ADMIN — POTASSIUM PHOSPHATE, MONOBASIC POTASSIUM PHOSPHATE, DIBASIC 15 MMOL: 224; 236 INJECTION, SOLUTION, CONCENTRATE INTRAVENOUS at 13:56

## 2023-12-23 RX ADMIN — INSULIN GLARGINE 10 UNITS: 100 INJECTION, SOLUTION SUBCUTANEOUS at 15:48

## 2023-12-23 RX ADMIN — PANTOPRAZOLE SODIUM 40 MG: 40 INJECTION, POWDER, FOR SOLUTION INTRAVENOUS at 20:28

## 2023-12-23 RX ADMIN — INSULIN HUMAN 1.2 UNITS/HR: 1 INJECTION, SOLUTION INTRAVENOUS at 02:07

## 2023-12-23 RX ADMIN — Medication 10 ML: at 08:03

## 2023-12-23 RX ADMIN — THIAMINE HYDROCHLORIDE 500 MG: 100 INJECTION, SOLUTION INTRAMUSCULAR; INTRAVENOUS at 13:53

## 2023-12-23 RX ADMIN — FOLIC ACID 1 MG: 5 INJECTION, SOLUTION INTRAMUSCULAR; INTRAVENOUS; SUBCUTANEOUS at 08:03

## 2023-12-23 RX ADMIN — SODIUM CHLORIDE 1000 ML/HR: 9 INJECTION, SOLUTION INTRAVENOUS at 02:09

## 2023-12-23 RX ADMIN — Medication 10 ML: at 21:18

## 2023-12-23 RX ADMIN — Medication 10 ML: at 01:00

## 2023-12-23 RX ADMIN — ONDANSETRON HYDROCHLORIDE 4 MG: 2 INJECTION, SOLUTION INTRAMUSCULAR; INTRAVENOUS at 08:44

## 2023-12-23 RX ADMIN — SENNOSIDES AND DOCUSATE SODIUM 2 TABLET: 50; 8.6 TABLET ORAL at 20:28

## 2023-12-23 RX ADMIN — SODIUM CHLORIDE 75 ML/HR: 9 INJECTION, SOLUTION INTRAVENOUS at 16:56

## 2023-12-23 RX ADMIN — HEPARIN SODIUM 5000 UNITS: 5000 INJECTION INTRAVENOUS; SUBCUTANEOUS at 08:03

## 2023-12-23 RX ADMIN — ONDANSETRON HYDROCHLORIDE 4 MG: 2 INJECTION, SOLUTION INTRAMUSCULAR; INTRAVENOUS at 01:58

## 2023-12-23 RX ADMIN — MIDAZOLAM HYDROCHLORIDE 4 MG: 2 INJECTION, SOLUTION INTRAMUSCULAR; INTRAVENOUS at 01:59

## 2023-12-23 RX ADMIN — POTASSIUM PHOSPHATE, MONOBASIC POTASSIUM PHOSPHATE, DIBASIC 15 MMOL: 224; 236 INJECTION, SOLUTION, CONCENTRATE INTRAVENOUS at 11:25

## 2023-12-23 RX ADMIN — PANTOPRAZOLE SODIUM 40 MG: 40 INJECTION, POWDER, FOR SOLUTION INTRAVENOUS at 08:02

## 2023-12-23 RX ADMIN — THIAMINE HYDROCHLORIDE 500 MG: 100 INJECTION, SOLUTION INTRAMUSCULAR; INTRAVENOUS at 22:12

## 2023-12-23 RX ADMIN — DIPHENHYDRAMINE HYDROCHLORIDE 25 MG: 25 CAPSULE ORAL at 22:29

## 2023-12-23 RX ADMIN — THIAMINE HYDROCHLORIDE 500 MG: 100 INJECTION, SOLUTION INTRAMUSCULAR; INTRAVENOUS at 00:57

## 2023-12-23 RX ADMIN — THIAMINE HYDROCHLORIDE 500 MG: 100 INJECTION, SOLUTION INTRAMUSCULAR; INTRAVENOUS at 06:32

## 2023-12-23 RX ADMIN — POTASSIUM PHOSPHATE, MONOBASIC POTASSIUM PHOSPHATE, DIBASIC 15 MMOL: 224; 236 INJECTION, SOLUTION, CONCENTRATE INTRAVENOUS at 08:02

## 2023-12-23 RX ADMIN — ONDANSETRON HYDROCHLORIDE 4 MG: 2 INJECTION, SOLUTION INTRAMUSCULAR; INTRAVENOUS at 20:28

## 2023-12-23 RX ADMIN — HEPARIN SODIUM 5000 UNITS: 5000 INJECTION INTRAVENOUS; SUBCUTANEOUS at 20:28

## 2023-12-23 RX ADMIN — ONDANSETRON HYDROCHLORIDE 4 MG: 2 INJECTION, SOLUTION INTRAMUSCULAR; INTRAVENOUS at 14:46

## 2023-12-23 RX ADMIN — INSULIN LISPRO 3 UNITS: 100 INJECTION, SOLUTION INTRAVENOUS; SUBCUTANEOUS at 16:56

## 2023-12-23 RX ADMIN — POTASSIUM CHLORIDE, DEXTROSE MONOHYDRATE AND SODIUM CHLORIDE 150 ML/HR: 150; 5; 450 INJECTION, SOLUTION INTRAVENOUS at 15:48

## 2023-12-23 RX ADMIN — POTASSIUM CHLORIDE, DEXTROSE MONOHYDRATE AND SODIUM CHLORIDE 150 ML/HR: 150; 5; 450 INJECTION, SOLUTION INTRAVENOUS at 01:46

## 2023-12-23 NOTE — PROGRESS NOTES
Insulin drip has been off since 10 AM according to the nurse.  Now phosphorus is 2.0.  Most recent BMP reviewed and beta hydroxy is 3.  Bicarb is 20.  It is not clear to me that this patient still requires Glucomander insulin protocol.  I have asked the RN to draw another BMP now.  Will discontinue the Glucomander protocol.  Will give the patient 10 units of Lantus insulin.  Will keep the patient on D5 half-normal saline as requested by Dr. Hernandez.  Decide on further insulin etc. after reviewing this most recent BMP    Electronically signed by Forrest Escobar MD, 12/23/23, 3:42 PM EST.

## 2023-12-23 NOTE — PLAN OF CARE
Goal Outcome Evaluation:  Plan of Care Reviewed With: patient        Progress: improving  Outcome Evaluation: A+Ox4. Very weak. Became dizzy when attempted to get OOB to bedside commode. Using purwick. Insulin gtt stopped and off glucommander protocol. Phosphorous replaced and trending up. Tolerating clear liquids. States nausea and abdominal pain are improving. VSS.

## 2023-12-23 NOTE — ED NOTES
Nursing report ED to floor  Lisa Gibson  33 y.o.  female    HPI :   Chief Complaint   Patient presents with    Abdominal Pain    Vomiting    Fever       Admitting doctor:   Varun Hartmann Jr., MD    Admitting diagnosis:   The encounter diagnosis was Alcoholic ketoacidosis.    Code status:   Current Code Status       Date Active Code Status Order ID Comments User Context       Prior            Allergies:   Patient has no known allergies.    Isolation:   No active isolations    Intake and Output    Intake/Output Summary (Last 24 hours) at 12/22/2023 2159  Last data filed at 12/22/2023 2029  Gross per 24 hour   Intake 1000 ml   Output --   Net 1000 ml       Weight:       12/22/23 1814   Weight: 59 kg (130 lb)       Most recent vitals:   Vitals:    12/22/23 2101 12/22/23 2104 12/22/23 2120 12/22/23 2130   BP: 120/76   106/78   Pulse: (!) 128 118 117 114   Resp:       Temp:       TempSrc:       SpO2: 100% 100% 98% 100%   Weight:       Height:           Active LDAs/IV Access:   Lines, Drains & Airways       Active LDAs       Name Placement date Placement time Site Days    Peripheral IV 12/22/23 1843 Right Antecubital 12/22/23 1843  Antecubital  less than 1                    Labs (abnormal labs have a star):   Labs Reviewed   COMPREHENSIVE METABOLIC PANEL - Abnormal; Notable for the following components:       Result Value    Glucose 282 (*)     Creatinine 1.15 (*)     Sodium 132 (*)     Chloride 85 (*)     CO2 6.5 (*)     Total Protein 9.4 (*)     ALT (SGPT) 54 (*)     AST (SGOT) 311 (*)     Alkaline Phosphatase 212 (*)     Total Bilirubin 1.6 (*)     Anion Gap 40.5 (*)     All other components within normal limits    Narrative:     GFR Normal >60  Chronic Kidney Disease <60  Kidney Failure <15     LIPASE - Abnormal; Notable for the following components:    Lipase 151 (*)     All other components within normal limits   LACTIC ACID, PLASMA - Abnormal; Notable for the following components:    Lactate 6.0 (*)      All other components within normal limits   CBC WITH AUTO DIFFERENTIAL - Abnormal; Notable for the following components:    Neutrophil % 78.6 (*)     Lymphocyte % 13.2 (*)     Eosinophil % 0.0 (*)     nRBC 1.5 (*)     All other components within normal limits   ETHANOL - Abnormal; Notable for the following components:    Ethanol 60 (*)     All other components within normal limits   LACTIC ACID, REFLEX - Abnormal; Notable for the following components:    Lactate 3.3 (*)     All other components within normal limits   BLOOD GAS, VENOUS - Abnormal; Notable for the following components:    pH, Venous 7.209 (*)     pCO2, Venous 23.3 (*)     HCO3, Venous 9.3 (*)     Base Excess, Venous -16.8 (*)     CO2 Content 10.0 (*)     All other components within normal limits   HCG, SERUM, QUALITATIVE - Normal   BLOOD CULTURE   BLOOD CULTURE   BLOOD GAS, VENOUS   URINALYSIS W/ MICROSCOPIC IF INDICATED (NO CULTURE)   LACTIC ACID, REFLEX   CBC AND DIFFERENTIAL    Narrative:     The following orders were created for panel order CBC & Differential.  Procedure                               Abnormality         Status                     ---------                               -----------         ------                     CBC Auto Differential[[id number]]        Abnormal            Final result                 Please view results for these tests on the individual orders.       EKG:   No orders to display       Meds given in ED:   Medications   sodium chloride 0.9 % bolus 1,000 mL (0 mL Intravenous Stopped 12/22/23 2029)   ondansetron (ZOFRAN) injection 4 mg (4 mg Intravenous Given 12/22/23 1843)   ondansetron (ZOFRAN) injection 4 mg (4 mg Intravenous Given 12/22/23 1929)   iopamidol (ISOVUE-300) 61 % injection 100 mL (85 mL Intravenous Given by Other 12/22/23 2015)   droperidol (INAPSINE) injection 2.5 mg (2.5 mg Intravenous Given 12/22/23 2028)   sodium chloride 0.9 % bolus 1,000 mL (1,000 mL Intravenous New Bag 12/22/23 2027)        Imaging results:  CT Abdomen Pelvis With Contrast    Result Date: 12/22/2023   1.  Marked hepatomegaly with marked diffuse hepatic steatosis. 2.  Pancreatic ductal prominence within the tail is similar to prior. The previously noted hypodense focus in the pancreatic head is not clearly visualized on the current examination. Follow-up contrast-enhanced MRCP is again recommended. 3.  No bowel obstruction or CT evidence of acute appendicitis.  This report was finalized on 12/22/2023 8:54 PM by Dr. Keyona Root M.D on Workstation: [id number]           Social issues:   Social History     Socioeconomic History    Marital status:    Tobacco Use    Smoking status: Never    Smokeless tobacco: Never   Vaping Use    Vaping Use: Never used   Substance and Sexual Activity    Drug use: Never       NIH Stroke Scale:       Soni Connolly RN  12/22/23 21:59 EST

## 2023-12-23 NOTE — CONSULTS
[facility]  Initial Inpatient Consult Note    Referring Provider: Dr Hartmann    Reason for Consultation: Alcohol intoxication    Subjective     History of present illness:    33 y.o. female presenting with N/V, mild EtOH pancreatitis and hepatitis.  Elevated EtOH level on admission.  Hb and platelets have dropped after fluid resuscitation there has been no overt bleeding.  CT shows marked hepatomegaly and steatosis.      Past Medical History:  No past medical history on file.  Past Surgical History:  No past surgical history on file.   Social History:   Social History     Tobacco Use    Smoking status: Never    Smokeless tobacco: Never   Substance Use Topics    Alcohol use: Not on file      Family History:  No family history on file.    Home Meds:  Medications Prior to Admission   Medication Sig Dispense Refill Last Dose    folic acid (FOLVITE) 1 MG tablet Take 1 tablet by mouth Daily. 30 tablet 0     multivitamin (THERAGRAN) tablet tablet Take 1 tablet by mouth Daily. 30 tablet 0     ondansetron ODT (ZOFRAN-ODT) 4 MG disintegrating tablet Place 1 tablet on the tongue 4 (Four) Times a Day As Needed for Nausea or Vomiting. 30 tablet 0     thiamine (VITAMIN B1) 100 MG tablet Take 1 tablet by mouth Daily. 30 tablet 0      Current Meds:   cefTRIAXone, 2,000 mg, Intravenous, Q24H  folic acid 1 mg in sodium chloride 0.9 % 50 mL IVPB, 1 mg, Intravenous, Daily  heparin (porcine), 5,000 Units, Subcutaneous, Q12H  midazolam, 4 mg, Intravenous, Q6H   Followed by  midazolam, 2 mg, Intravenous, Q6H  pantoprazole, 40 mg, Intravenous, Q12H  potassium phosphate, 15 mmol, Intravenous, Q3H  senna-docusate sodium, 2 tablet, Oral, BID  sodium chloride, 10 mL, Intravenous, Q12H  sodium chloride, 10 mL, Intravenous, Q12H  thiamine (B-1) IV, 500 mg, Intravenous, Q8H   Followed by  [START ON 12/25/2023] thiamine (B-1) IV, 200 mg, Intravenous, Q8H   Followed by  [START ON 12/30/2023] thiamine, 100 mg, Oral,  Daily      Allergies:  No Known Allergies    Objective     Vital Signs  Temp:  [97.7 °F (36.5 °C)-101.1 °F (38.4 °C)] 100.1 °F (37.8 °C)  Heart Rate:  [104-140] 122  Resp:  [16] 16  BP: (106-127)/(70-95) 116/75  Physical Exam:  General Appearance:     Alert, cooperative, in no acute distress   Abdomen:     Normal bowel sounds, no masses, no organomegaly, soft     nontender, nondistended, no guarding, no rebound                 tenderness   Rectal:     Deferred       Results Review:   I reviewed the patient's new clinical results.    Results from last 7 days   Lab Units 12/23/23  0035 12/22/23  1825   WBC 10*3/mm3 5.47 6.84   HEMOGLOBIN g/dL 9.5* 12.1   HEMATOCRIT % 30.0* 37.2   PLATELETS 10*3/mm3 106* 162     Results from last 7 days   Lab Units 12/23/23  0339 12/23/23 0035 12/22/23  1825   SODIUM mmol/L 139 141 132*   POTASSIUM mmol/L 3.8 3.9 4.9   CHLORIDE mmol/L 100 102 85*   CO2 mmol/L 14.0* 10.0* 6.5*   BUN mg/dL 11 11 15   CREATININE mg/dL 0.94 0.82 1.15*   CALCIUM mg/dL 8.4* 7.7* 9.1   BILIRUBIN mg/dL  --  1.6* 1.6*   ALK PHOS U/L  --  154* 212*   ALT (SGPT) U/L  --  37* 54*   AST (SGOT) U/L  --  189* 311*   GLUCOSE mg/dL 193* 175* 282*     Results from last 7 days   Lab Units 12/22/23  2222   INR  1.31*     Lab Results   Lab Value Date/Time    LIPASE 92 (H) 12/23/2023 0035    LIPASE 151 (H) 12/22/2023 1825    LIPASE 64 (H) 10/27/2023 0829    LIPASE 31 08/10/2023 0541    LIPASE 167 (H) 08/07/2023 0129    LIPASE 10 (L) 06/01/2023 0808    LIPASE 12 (L) 04/27/2023 2003    LIPASE 231 (H) 09/25/2021 0646    LIPASE 495 (H) 09/24/2021 0645    LIPASE 889 (H) 09/23/2021 0745    LIPASE 834 (H) 09/21/2021 2049    LIPASE 11 07/05/2021 1311    LIPASE 158 01/06/2021 0830    LIPASE 126 01/05/2021 1623    LIPASE 917 (H) 11/25/2020 1258    LIPASE 795 (H) 11/25/2020 0439    LIPASE 635 (H) 11/24/2020 0505    LIPASE 1,299 (H) 11/23/2020 0545    LIPASE 2,284 (H) 11/22/2020 0658    LIPASE 530 (H) 11/21/2020 1347        Radiology:  CT Abdomen Pelvis With Contrast   Final Result       1.  Marked hepatomegaly with marked diffuse hepatic steatosis.   2.  Pancreatic ductal prominence within the tail is similar to prior.   The previously noted hypodense focus in the pancreatic head is not   clearly visualized on the current examination. Follow-up   contrast-enhanced MRCP is again recommended.   3.  No bowel obstruction or CT evidence of acute appendicitis.       This report was finalized on 12/22/2023 8:54 PM by Dr. Keyona Root M.D   on Workstation: [id number]              Assessment & Plan   Assessment:    EtOH pancreatitis   EtOH hepatitis, DF 22   Bicytopenia, likely BM suppression for EtOH    Plan:   No indication for steroids, DF <32  Supportive care with IVFs, clear liquid diet as tolerated  Needs complete EtOH cessation going forward      I discussed the patients findings and my recommendations with patient.         Jason Veliz M.D.  [facility]  [address] [place] [address]  Office: [phone]

## 2023-12-23 NOTE — PROGRESS NOTES
"  Intensive Care Unit Daily Progress Note.   [facility] INTENSIVE CARE  12/23/2023    Patient Name:  Lisa Gibson  MRN:  [id number]  YOB: 1990  Age: 33 y.o.  Sex: female         Reason for Admission / Chief Complaint:  Urinary tract infection, alcoholic ketoacidosis    Hospital Course:   33-year-old female with alcohol abuse history who presents for urinary tract infection and alcoholic ketoacidosis and admitted to the ICU for insulin drip and alcohol withdrawal protocol.    Interval History:  -Admitted overnight to the ICU  -States that nausea and vomiting have improved, Zofran is also helping  -Feels very thirsty  -Denies any chest pain or palpitations  -Denies any shortness of breath or cough    Physical Exam:  /76   Pulse 109   Temp 99.1 °F (37.3 °C) (Oral)   Resp 16   Ht 175.3 cm (69\")   Wt 53.9 kg (118 lb 13.3 oz)   LMP 11/15/2023 (Approximate)   SpO2 97%   BMI 17.55 kg/m²   Body mass index is 17.55 kg/m².    Intake/Output    Intake/Output Summary (Last 24 hours) at 12/23/2023 0913  Last data filed at 12/23/2023 0700  Gross per 24 hour   Intake 3056 ml   Output 600 ml   Net 2456 ml     General: Alert, weak appearing  HEENT: NC/AT, EOMI, MMM  Neck: Supple, trachea midline  Cardiac: RRR, no murmur, gallops, rubs  Pulmonary: Clear to auscultation bilaterally, no adventitious breath sounds, normal respiratory effort  GI: Soft, mildly tender, non-distended, normal bowel sounds  Extremities: Warm, well perfused, no LE edema  Skin: no visible rash  Neuro: CN II - XII grossly intact  Psychiatry: Normal mood and affect    Data Review:  Notable Labs:  Results from last 7 days   Lab Units 12/23/23  0035 12/22/23  1825   WBC 10*3/mm3 5.47 6.84   HEMOGLOBIN g/dL 9.5* 12.1   PLATELETS 10*3/mm3 106* 162     Results from last 7 days   Lab Units 12/23/23  0339 12/23/23  0035 12/22/23  1825   SODIUM mmol/L 139 141 132*   POTASSIUM mmol/L 3.8 3.9 4.9   CHLORIDE mmol/L 100 102 85* "   CO2 mmol/L 14.0* 10.0* 6.5*   BUN mg/dL 11 11 15   CREATININE mg/dL 0.94 0.82 1.15*   GLUCOSE mg/dL 193* 175* 282*   CALCIUM mg/dL 8.4* 7.7* 9.1   MAGNESIUM mg/dL 2.1 1.8  --    PHOSPHORUS mg/dL 0.7* 0.9*  --    Estimated Creatinine Clearance: 73.1 mL/min (by C-G formula based on SCr of 0.94 mg/dL).    Results from last 7 days   Lab Units 12/23/23  0035 12/22/23  2133 12/22/23  1825   AST (SGOT) U/L 189*  --  311*   ALT (SGPT) U/L 37*  --  54*   LACTATE mmol/L 2.0 3.3* 6.0*   PLATELETS 10*3/mm3 106*  --  162       Results from last 7 days   Lab Units 12/23/23  0405   PH, ARTERIAL pH units 7.386   PCO2, ARTERIAL mm Hg 27.6*   PO2 ART mm Hg 102.9*   HCO3 ART mmol/L 16.6*       Imaging:  Reviewed chest images personally from past 3 days    ASSESSMENT  /  PLAN:    Alcoholic ketoacidosis  Alcoholic pancreatitis  Alcoholic hepatitis  Metabolic anion gap acidosis  Acute kidney injury  Bicytopenia  Hyponatremia  Alcohol abuse    -Patient with significant alcohol abuse history and now presents with alcoholic ketoacidosis on insulin drip with improving gap acidosis.  -Patient also with alcoholic hepatitis with discriminant function less than 32 and not requiring steroids.  -Appreciate GI recommendations  -Will transition off insulin drip once acidosis is corrected  -Currently not in withdrawal, alcohol level was positive on admission, suspect that she may go into withdrawal in the next 1 to 2 days.  Continue CIWA protocol.  No history of seizures or DTs.  -Continue antibiotics for urinary tract infection, will complete course.  -Suspect ИРИНА will slowly improve with fluid resuscitation  -Continue thiamine and folate    GI prophylaxis: Pantoprazole  DVT prophylaxis: Heparin   Bah catheter: No  Bowel regimen: Ordered  Diet: N.p.o.    Discharge: Continue to monitor in the ICU due to critical status, likely transition to floor tomorrow.    Karlo Hernandez MD  [facility]  Pulmonary and Critical Care Medicine,  Interventional Pulmonology    Parts of this note may be an electronic transcription/translation of spoken language to printed text using the Dragon dictation system.

## 2023-12-23 NOTE — H&P
Patient Care Team:  Provider, No Known as PCP - General      Subjective     Patient is a 33 y.o. female.  Asked to see and admit to the ICU for probable alcoholic ketosis possible mild pancreatitis possible UTI.  Thin female who is not very vigorous with her answers.  Says she has been throwing up not able to eat for 3 days she admits to drinking alcohol says she last drank 2 days ago although her alcohol level was still positive in the emergency department on presentation.  She has been throwing up some black material she is having abdominal pain.  She has had no melena or hematochezia denies any dysuria or hematuria denies that she is pregnant last period was about 6 weeks ago.  Her significant other in the room reports that he found to patrolman bottles of empty he does not know over what time she drank those.  She has had some alcohol withdrawal but never DTs or seizures in the past.      Review of Systems:  No acute headaches or visual changes no difficulty swallowing no bad heartburn or indigestion.  No shortness of breath or cough no chest pain or palpitations no history of easy bleeding or bruising she denies smoking or illicit drug use      History  No past medical history on file.  No past surgical history on file.  Social History     Socioeconomic History    Marital status:    Tobacco Use    Smoking status: Never    Smokeless tobacco: Never   Vaping Use    Vaping Use: Never used   Substance and Sexual Activity    Drug use: Never     No family history on file.      Allergies:  Patient has no known allergies.    Medications:  Prior to Admission medications    Medication Sig Start Date End Date Taking? Authorizing Provider   folic acid (FOLVITE) 1 MG tablet Take 1 tablet by mouth Daily. 8/13/23   Alexander Jiménez MD   multivitamin (THERAGRAN) tablet tablet Take 1 tablet by mouth Daily. 8/13/23   Alexander Jiménez MD   ondansetron ODT (ZOFRAN-ODT) 4 MG disintegrating tablet Place 1 tablet  "on the tongue 4 (Four) Times a Day As Needed for Nausea or Vomiting. 10/27/23   Dmitriy Solorzano MD   thiamine (VITAMIN B1) 100 MG tablet Take 1 tablet by mouth Daily. 8/12/23   Alexander Jiménez MD       No current facility-administered medications for this encounter.      Objective     Vital Signs  Vital Sign Min/Max for last 24 hours  Temp  Min: 97.7 °F (36.5 °C)  Max: 97.7 °F (36.5 °C)   BP  Min: 106/78  Max: 127/95   Pulse  Min: 112  Max: 140   Resp  Min: 16  Max: 16   SpO2  Min: 98 %  Max: 100 %   No data recorded   Weight  Min: 59 kg (130 lb)  Max: 59 kg (130 lb)       Intake/Output Summary (Last 24 hours) at 12/22/2023 2201  Last data filed at 12/22/2023 2029  Gross per 24 hour   Intake 1000 ml   Output --   Net 1000 ml     I/O this shift:  In: 1000 [IV Piggyback:1000]  Out: -   Last Weight and Admission Weight        12/22/23 1814   Weight: 59 kg (130 lb)     Flowsheet Rows      Flowsheet Row First Filed Value   Admission Height 175.3 cm (69\") Documented at 12/22/2023 1814   Admission Weight 59 kg (130 lb) Documented at 12/22/2023 1814            Body mass index is 19.2 kg/m².           Physical Exam:  General Appearance: Thin almost cachectic appearing black female resting on a stretcher she is a little tremulous  Eyes: Conjunctiva are clear and anicteric pupils are equal about 3-1/2 mm they do react to light  ENT: Oral mucous membranes are dry no erythema no exudates, nasal septum midline nasal mucosa is also dry  Neck: Trachea midline no jugular venous distention and no hepatojugular reflux present no adenopathy or thyromegaly  Lungs: Clear nonlabored symmetric expansion  Cardiac: Tachycardic heart rates in the 1 teens regular rhythm no murmur no gallop  Abdomen: She reports tenderness with pressure diffusely but no point tenderness no rebound no guarding no palpable spleen her liver is at least 3 fingerbreadths below the costal margin in the right midclavicular line  : Not examined  Musculoskeletal: " He has almost no muscle mass and actually has some wasting of the thenar hyperthenar interosseous muscles.  No significant subcu fat  Skin: Warm and dry no jaundice no petechiae noted on exposed surfaces  Neuro: She is awake she is answering questions she is moving her extremities, she does have some tremor.  Extremities/P Vascular: No clubbing no cyanosis no edema palpable radial and dorsalis pedis pulses  MSE: She talks in just a whisper she does not make good eye contact      Labs:  Results from last 7 days   Lab Units 12/22/23  1825   GLUCOSE mg/dL 282*   SODIUM mmol/L 132*   POTASSIUM mmol/L 4.9   CO2 mmol/L 6.5*   CHLORIDE mmol/L 85*   ANION GAP mmol/L 40.5*   CREATININE mg/dL 1.15*   BUN mg/dL 15   BUN / CREAT RATIO  13.0   CALCIUM mg/dL 9.1   ALK PHOS U/L 212*   TOTAL PROTEIN g/dL 9.4*   ALT (SGPT) U/L 54*   AST (SGOT) U/L 311*   BILIRUBIN mg/dL 1.6*   ALBUMIN g/dL 4.8   GLOBULIN gm/dL 4.6     Estimated Creatinine Clearance: 65.4 mL/min (A) (by C-G formula based on SCr of 1.15 mg/dL (H)).      Results from last 7 days   Lab Units 12/22/23  1825   WBC 10*3/mm3 6.84   RBC 10*6/mm3 4.20   HEMOGLOBIN g/dL 12.1   HEMATOCRIT % 37.2   MCV fL 88.6   MCH pg 28.8   MCHC g/dL 32.5   RDW % 14.3   RDW-SD fl 45.0   MPV fL 11.1   PLATELETS 10*3/mm3 162   NEUTROPHIL % % 78.6*   LYMPHOCYTE % % 13.2*   MONOCYTES % % 7.5   EOSINOPHIL % % 0.0*   BASOPHIL % % 0.3   IMM GRAN % % 0.4   NEUTROS ABS 10*3/mm3 5.38   LYMPHS ABS 10*3/mm3 0.90   MONOS ABS 10*3/mm3 0.51   EOS ABS 10*3/mm3 0.00   BASOS ABS 10*3/mm3 0.02   IMMATURE GRANS (ABS) 10*3/mm3 0.03   NRBC /100 WBC 1.5*                     Results from last 7 days   Lab Units 12/22/23 2133 12/22/23  1825   LACTATE mmol/L 3.3* 6.0*         Microbiology Results (last 10 days)       ** No results found for the last 240 hours. **              Diagnostics:  CT Abdomen Pelvis With Contrast    Result Date: 12/22/2023  CT ABDOMEN PELVIS W CONTRAST-  HISTORY: Abdominal pain, vomiting.  Fever.  TECHNIQUE:  CT of the abdomen and pelvis was performed following the administration of intravenous contrast.  Radiation dose reduction techniques were utilized, including automated exposure control and exposure modulation based on body size.  COMPARISON: CT abdomen and pelvis 10/27/2023  FINDINGS:  Heart size is normal. There is no pericardial effusion. Pleural spaces are clear. The liver is markedly enlarged, measuring 25.0 cm in craniocaudal length. There is marked diffuse hepatic steatosis. The gallbladder is present. The spleen is normal in size. The pancreatic duct within the tail is prominent to 0.4 cm, similar to 10/27/2023, previously 0.3 cm. The previously noted hypodense focus in the pancreatic head is not clearly visualized on the current examination. The adrenal glands are within normal limits. A hypodense focus in the superior pole of the right kidney is too small to accurately characterize but not significantly changed. There is no hydronephrosis. No pathologically enlarged abdominal or pelvic lymph nodes are identified. The abdominal aorta is normal in caliber. There is no bowel obstruction. The appendix is within normal limits. The small bowel and colon are diffusely decompressed and poorly assessed. The bladder is well distended and within normal limits. The uterus is present. There is no adnexal mass. The visualized osseous fractures are age-appropriate.        1.  Marked hepatomegaly with marked diffuse hepatic steatosis. 2.  Pancreatic ductal prominence within the tail is similar to prior. The previously noted hypodense focus in the pancreatic head is not clearly visualized on the current examination. Follow-up contrast-enhanced MRCP is again recommended. 3.  No bowel obstruction or CT evidence of acute appendicitis.  This report was finalized on 12/22/2023 8:54 PM by Dr. Keyona Root M.D on Workstation: [id number]          Did review the CT scan and the radiology report as  above.    Assessment & Plan     Nausea vomiting and abdominal pain looks like she has some mild pancreatitis I think alcohol withdrawal may be a component may be some alcoholic gastritis even.  Will keep her n.p.o. fluid resuscitate her and put her on PPI, antiemetics and follow  Metabolic anion gap acidosis she has a lactic acidosis she may have a starvation ketosis as well I am going to check a beta hydroxybutyrate I doubt she has DKA at blood sugars are elevated and no history of diabetes.  Her lactate is already improving with fluid resuscitation and she still very dry  Acute kidney injury her baseline creatinine is looks like less than 0.5 hopefully this is predominantly prerenal will follow renal function as we hydrate  Acute hepatitis with enlarged liver probably alcoholic hepatitis with steatohepatitis as well will check a pro time check discriminant function see if she would benefit from steroids potentially and with her liver enlargement to go ahead and get gastroenterology involved.    ID she forted fever at home she is afebrile here normal white count she has had a urinary tract infection recently here couple months ago I am going to try and get urine we will follow-up on this low threshold to start antibiotics but I really do not see anything right now unless the urine suggest UTI to warrant antibiotics.  Hyponatremia I suspect this will correct with reviewed excessive Tatian of her volume status and holding alcohol.  Alcoholism with what looks like already showing signs of withdrawal with still alcohol in her system suggest to me the severity of her alcoholism will get her on benzodiazepine CIWA protocol.  Replace thiamine and folate and monitor closely  Addendum beta hydroxybutyrate is really quite high this may be DKA could all be just starvation ketosis hard to know for certain I think have to go ahead and put her on the DKA protocol for right now    Varun Hartmann Jr, MD  12/22/23  22:01  EST    Time: Critical care time 50 minutes   52F pmhx of IBS, hypothyroidism, HLD, and HTN who presents with constant, worsening lower quadrant abd pain x 5 days.  She came to ED on Monday for similar pain and was dx w/ diverticulitis and UTI and sent home with Cipro and Flagyl which she has been taking for the last week until today when she was too nauseated to take it.  She says the pain was initially dull, right and left lower quadrants and associated with nausea but now worsening to sharp, stabbing pain on both lower quadrants now.  She also notes she has hx of iBS and ovarian cysts but has not had pain like this.  She has had diverticulitis in the past but not in several years and felt that the pain was worse at that time.  She is having soft regular BMs, no fever, some chills and no vomiting.    In ED:  She was given IVF, Morphine and Cipro/flagyl.

## 2023-12-24 LAB
ANION GAP SERPL CALCULATED.3IONS-SCNC: 13 MMOL/L (ref 5–15)
BUN SERPL-MCNC: <2 MG/DL (ref 6–20)
BUN/CREAT SERPL: ABNORMAL
CALCIUM SPEC-SCNC: 7.6 MG/DL (ref 8.6–10.5)
CHLORIDE SERPL-SCNC: 104 MMOL/L (ref 98–107)
CO2 SERPL-SCNC: 22 MMOL/L (ref 22–29)
CREAT SERPL-MCNC: 0.41 MG/DL (ref 0.57–1)
EGFRCR SERPLBLD CKD-EPI 2021: 134.3 ML/MIN/1.73
GLUCOSE BLDC GLUCOMTR-MCNC: 132 MG/DL (ref 70–130)
GLUCOSE BLDC GLUCOMTR-MCNC: 136 MG/DL (ref 70–130)
GLUCOSE BLDC GLUCOMTR-MCNC: 141 MG/DL (ref 70–130)
GLUCOSE BLDC GLUCOMTR-MCNC: 78 MG/DL (ref 70–130)
GLUCOSE BLDC GLUCOMTR-MCNC: 94 MG/DL (ref 70–130)
GLUCOSE SERPL-MCNC: 83 MG/DL (ref 65–99)
MAGNESIUM SERPL-MCNC: 1.5 MG/DL (ref 1.6–2.6)
PHOSPHATE SERPL-MCNC: 1.3 MG/DL (ref 2.5–4.5)
POTASSIUM SERPL-SCNC: 2.7 MMOL/L (ref 3.5–5.2)
SODIUM SERPL-SCNC: 139 MMOL/L (ref 136–145)

## 2023-12-24 PROCEDURE — 83735 ASSAY OF MAGNESIUM: CPT | Performed by: HOSPITALIST

## 2023-12-24 PROCEDURE — 63710000001 INSULIN GLARGINE PER 5 UNITS: Performed by: INTERNAL MEDICINE

## 2023-12-24 PROCEDURE — 99232 SBSQ HOSP IP/OBS MODERATE 35: CPT | Performed by: INTERNAL MEDICINE

## 2023-12-24 PROCEDURE — 25010000002 THIAMINE PER 100 MG: Performed by: INTERNAL MEDICINE

## 2023-12-24 PROCEDURE — 25810000003 SODIUM CHLORIDE 0.9 % SOLUTION: Performed by: HOSPITALIST

## 2023-12-24 PROCEDURE — 80048 BASIC METABOLIC PNL TOTAL CA: CPT | Performed by: HOSPITALIST

## 2023-12-24 PROCEDURE — 82948 REAGENT STRIP/BLOOD GLUCOSE: CPT

## 2023-12-24 PROCEDURE — 25010000002 HEPARIN (PORCINE) PER 1000 UNITS: Performed by: INTERNAL MEDICINE

## 2023-12-24 PROCEDURE — 84100 ASSAY OF PHOSPHORUS: CPT | Performed by: HOSPITALIST

## 2023-12-24 PROCEDURE — 25010000002 ONDANSETRON PER 1 MG: Performed by: INTERNAL MEDICINE

## 2023-12-24 PROCEDURE — 25010000002 CEFTRIAXONE PER 250 MG: Performed by: INTERNAL MEDICINE

## 2023-12-24 PROCEDURE — 25010000002 THIAMINE HCL 200 MG/2ML SOLUTION 2 ML VIAL: Performed by: INTERNAL MEDICINE

## 2023-12-24 PROCEDURE — 25010000002 POTASSIUM CHLORIDE 10 MEQ/100ML SOLUTION: Performed by: INTERNAL MEDICINE

## 2023-12-24 PROCEDURE — 25010000002 MAGNESIUM SULFATE 2 GM/50ML SOLUTION: Performed by: INTERNAL MEDICINE

## 2023-12-24 PROCEDURE — 25810000003 SODIUM CHLORIDE 0.9 % SOLUTION: Performed by: INTERNAL MEDICINE

## 2023-12-24 RX ORDER — POTASSIUM CHLORIDE 7.45 MG/ML
10 INJECTION INTRAVENOUS
Status: COMPLETED | OUTPATIENT
Start: 2023-12-24 | End: 2023-12-24

## 2023-12-24 RX ORDER — FENTANYL/ROPIVACAINE/NS/PF 2-625MCG/1
15 PLASTIC BAG, INJECTION (ML) EPIDURAL ONCE
Status: COMPLETED | OUTPATIENT
Start: 2023-12-24 | End: 2023-12-24

## 2023-12-24 RX ORDER — MAGNESIUM SULFATE HEPTAHYDRATE 40 MG/ML
2 INJECTION, SOLUTION INTRAVENOUS
Status: COMPLETED | OUTPATIENT
Start: 2023-12-24 | End: 2023-12-24

## 2023-12-24 RX ADMIN — HEPARIN SODIUM 5000 UNITS: 5000 INJECTION INTRAVENOUS; SUBCUTANEOUS at 20:58

## 2023-12-24 RX ADMIN — MAGNESIUM SULFATE HEPTAHYDRATE 2 G: 40 INJECTION, SOLUTION INTRAVENOUS at 14:38

## 2023-12-24 RX ADMIN — POTASSIUM CHLORIDE 10 MEQ: 7.46 INJECTION, SOLUTION INTRAVENOUS at 19:42

## 2023-12-24 RX ADMIN — Medication 10 ML: at 08:10

## 2023-12-24 RX ADMIN — MAGNESIUM SULFATE HEPTAHYDRATE 2 G: 40 INJECTION, SOLUTION INTRAVENOUS at 18:38

## 2023-12-24 RX ADMIN — ONDANSETRON HYDROCHLORIDE 4 MG: 2 INJECTION, SOLUTION INTRAMUSCULAR; INTRAVENOUS at 07:51

## 2023-12-24 RX ADMIN — PANTOPRAZOLE SODIUM 40 MG: 40 INJECTION, POWDER, FOR SOLUTION INTRAVENOUS at 20:58

## 2023-12-24 RX ADMIN — SODIUM CHLORIDE 75 ML/HR: 9 INJECTION, SOLUTION INTRAVENOUS at 19:11

## 2023-12-24 RX ADMIN — POTASSIUM CHLORIDE 10 MEQ: 7.46 INJECTION, SOLUTION INTRAVENOUS at 16:36

## 2023-12-24 RX ADMIN — POTASSIUM CHLORIDE 10 MEQ: 7.46 INJECTION, SOLUTION INTRAVENOUS at 17:37

## 2023-12-24 RX ADMIN — MAGNESIUM SULFATE HEPTAHYDRATE 2 G: 40 INJECTION, SOLUTION INTRAVENOUS at 16:36

## 2023-12-24 RX ADMIN — THIAMINE HYDROCHLORIDE 500 MG: 100 INJECTION, SOLUTION INTRAMUSCULAR; INTRAVENOUS at 13:43

## 2023-12-24 RX ADMIN — PANTOPRAZOLE SODIUM 40 MG: 40 INJECTION, POWDER, FOR SOLUTION INTRAVENOUS at 07:51

## 2023-12-24 RX ADMIN — FOLIC ACID 1 MG: 5 INJECTION, SOLUTION INTRAMUSCULAR; INTRAVENOUS; SUBCUTANEOUS at 07:52

## 2023-12-24 RX ADMIN — Medication 10 ML: at 20:58

## 2023-12-24 RX ADMIN — POTASSIUM PHOSPHATE, MONOBASIC POTASSIUM PHOSPHATE, DIBASIC 15 MMOL: 224; 236 INJECTION, SOLUTION, CONCENTRATE INTRAVENOUS at 17:38

## 2023-12-24 RX ADMIN — THIAMINE HYDROCHLORIDE 500 MG: 100 INJECTION, SOLUTION INTRAMUSCULAR; INTRAVENOUS at 05:36

## 2023-12-24 RX ADMIN — THIAMINE HYDROCHLORIDE 500 MG: 100 INJECTION, SOLUTION INTRAMUSCULAR; INTRAVENOUS at 22:07

## 2023-12-24 RX ADMIN — POTASSIUM CHLORIDE 10 MEQ: 7.46 INJECTION, SOLUTION INTRAVENOUS at 18:38

## 2023-12-24 RX ADMIN — CEFTRIAXONE 2000 MG: 2 INJECTION, POWDER, FOR SOLUTION INTRAMUSCULAR; INTRAVENOUS at 01:29

## 2023-12-24 RX ADMIN — POTASSIUM CHLORIDE 10 MEQ: 7.46 INJECTION, SOLUTION INTRAVENOUS at 15:35

## 2023-12-24 RX ADMIN — INSULIN GLARGINE 10 UNITS: 100 INJECTION, SOLUTION SUBCUTANEOUS at 10:43

## 2023-12-24 RX ADMIN — HEPARIN SODIUM 5000 UNITS: 5000 INJECTION INTRAVENOUS; SUBCUTANEOUS at 07:51

## 2023-12-24 RX ADMIN — ONDANSETRON HYDROCHLORIDE 4 MG: 2 INJECTION, SOLUTION INTRAMUSCULAR; INTRAVENOUS at 02:10

## 2023-12-24 RX ADMIN — POTASSIUM CHLORIDE 10 MEQ: 7.46 INJECTION, SOLUTION INTRAVENOUS at 14:38

## 2023-12-24 NOTE — PROGRESS NOTES
"  Intensive Care Unit Daily Progress Note.   [facility] INTENSIVE CARE  12/24/2023    Patient Name:  Lisa Gibson  MRN:  [id number]  YOB: 1990  Age: 33 y.o.  Sex: female         Reason for Admission / Chief Complaint:  Urinary tract infection, alcoholic ketoacidosis    Hospital Course:   33-year-old female with alcohol abuse history who presents for urinary tract infection and alcoholic ketoacidosis and admitted to the ICU for insulin drip and alcohol withdrawal protocol.    Interval History:  -Transitioned off Glucomander yesterday  -No further episodes of emesis  -Overall feeling slightly better  -No shortness of breath or cough  -No chest pain or rotations    Physical Exam:  /80   Pulse 79   Temp 99.6 °F (37.6 °C)   Resp 18   Ht 175.3 cm (69\")   Wt 53.9 kg (118 lb 13.3 oz)   LMP 11/15/2023 (Approximate)   SpO2 98%   BMI 17.55 kg/m²   Body mass index is 17.55 kg/m².    Intake/Output    Intake/Output Summary (Last 24 hours) at 12/24/2023 0859  Last data filed at 12/24/2023 0536  Gross per 24 hour   Intake 2160.67 ml   Output 2200 ml   Net -39.33 ml     General: Alert, weak appearing  HEENT: NC/AT, EOMI, MMM  Neck: Supple, trachea midline  Cardiac: RRR, no murmur, gallops, rubs  Pulmonary: Clear to auscultation bilaterally, no adventitious breath sounds, normal respiratory effort  GI: Soft, mildly tender, non-distended, normal bowel sounds  Extremities: Warm, well perfused, no LE edema  Skin: no visible rash  Neuro: CN II - XII grossly intact  Psychiatry: Normal mood and affect    Data Review:  Notable Labs:  Results from last 7 days   Lab Units 12/23/23  0035 12/22/23  1825   WBC 10*3/mm3 5.47 6.84   HEMOGLOBIN g/dL 9.5* 12.1   PLATELETS 10*3/mm3 106* 162     Results from last 7 days   Lab Units 12/23/23  1556 12/23/23  1411 12/23/23  1321 12/23/23  1204 12/23/23  0843 12/23/23  0339 12/23/23  0035 12/22/23  1825   SODIUM mmol/L 139  --  138  --  140 139 141 132* "   POTASSIUM mmol/L 4.3  --  4.4  --  3.4* 3.8 3.9 4.9   CHLORIDE mmol/L 103  --  101  --  107 100 102 85*   CO2 mmol/L 20.6*  --  20.0*  --  18.6* 14.0* 10.0* 6.5*   BUN mg/dL 4*  --  6  --  7 11 11 15   CREATININE mg/dL 0.66  --  0.64  --  0.63 0.94 0.82 1.15*   GLUCOSE mg/dL 165*  --  196*  --  113* 193* 175* 282*   CALCIUM mg/dL 8.6  --  8.6  --  7.7* 8.4* 7.7* 9.1   MAGNESIUM mg/dL  --   --   --  1.8 1.9 2.1 1.8  --    PHOSPHORUS mg/dL  --  2.0*  --   --  0.4* 0.7* 0.9*  --    Estimated Creatinine Clearance: 104.1 mL/min (by C-G formula based on SCr of 0.66 mg/dL).    Results from last 7 days   Lab Units 12/23/23  0035 12/22/23  2133 12/22/23  1825   AST (SGOT) U/L 189*  --  311*   ALT (SGPT) U/L 37*  --  54*   LACTATE mmol/L 2.0 3.3* 6.0*   PLATELETS 10*3/mm3 106*  --  162       Results from last 7 days   Lab Units 12/23/23  0405   PH, ARTERIAL pH units 7.386   PCO2, ARTERIAL mm Hg 27.6*   PO2 ART mm Hg 102.9*   HCO3 ART mmol/L 16.6*       Imaging:  Reviewed chest images personally from past 3 days    ASSESSMENT  /  PLAN:    Alcoholic ketoacidosis  Alcoholic pancreatitis  Alcoholic hepatitis  Metabolic anion gap acidosis  Acute kidney injury  Bicytopenia  Hyponatremia  Alcohol abuse    -Patient with significant alcohol abuse history and presented with alcoholic ketoacidosis on insulin drip now off.   -Patient also with alcoholic hepatitis with discriminant function less than 32 and not requiring steroids.  -Appreciate GI recommendations  -Clear liquid diet for alcoholic pancreatitis, slowly advance as tolerated.  -Currently not in withdrawal, alcohol level was positive on admission. Continue CIWA protocol.  No history of seizures or DTs.  -Continue antibiotics for urinary tract infection, will complete course.  -ИРИНА improving  -Continue thiamine and folate    GI prophylaxis: Pantoprazole  DVT prophylaxis: Heparin   Bah catheter: No  Bowel regimen: Ordered  Diet: Clear liquid diet    Discharge: Transfer to  floor.    Karlo Hernandez MD  [facility]  Pulmonary and Critical Care Medicine, Interventional Pulmonology    Parts of this note may be an electronic transcription/translation of spoken language to printed text using the Dragon dictation system.

## 2023-12-24 NOTE — PLAN OF CARE
Problem: Adult Inpatient Plan of Care  Goal: Plan of Care Review  Outcome: Ongoing, Progressing  Flowsheets (Taken 12/24/2023 7964)  Progress: improving  Plan of Care Reviewed With: patient  Outcome Evaluation: Pt vitals stable. Transferred from unit today. Pt potassium, mag and phos replaced. Tolerated clears. Pt safety maintained.

## 2023-12-24 NOTE — PROGRESS NOTES
Gastroenterology   Inpatient Progress Note    Reason for Follow Up:  ETOH pancreatitis and hepatitis    Subjective     Interval History:   Feels better today.  She was having some nausea overnight and required frequent dosing of Zofran.  She has been able to tolerate clear liquids.    Her CIWA score is 6 and she reports no specific symptoms of withdrawal currently    Discussed with staff    Current Facility-Administered Medications:     sennosides-docusate (PERICOLACE) 8.6-50 MG per tablet 2 tablet, 2 tablet, Oral, BID, 2 tablet at 12/23/23 2028 **AND** polyethylene glycol (MIRALAX) packet 17 g, 17 g, Oral, Daily PRN **AND** bisacodyl (DULCOLAX) EC tablet 5 mg, 5 mg, Oral, Daily PRN **AND** bisacodyl (DULCOLAX) suppository 10 mg, 10 mg, Rectal, Daily PRN, Varun Hartmann Jr., MD    Calcium Replacement - Follow Nurse / BPA Driven Protocol, , Does not apply, PRN, Varun Hartmann Jr., MD    cefTRIAXone (ROCEPHIN) 2,000 mg in sodium chloride 0.9 % 100 mL IVPB-VTB, 2,000 mg, Intravenous, Q24H, Varun Hartmann Jr., MD, Last Rate: 200 mL/hr at 12/24/23 0129, 2,000 mg at 12/24/23 0129    dextrose (D50W) (25 g/50 mL) IV injection 25 g, 25 g, Intravenous, Q15 Min PRN, Forrest Escobar MD    dextrose (GLUTOSE) oral gel 15 g, 15 g, Oral, Q15 Min PRN, Forrest Escobar MD    folic acid 1 mg in sodium chloride 0.9 % 50 mL IVPB, 1 mg, Intravenous, Daily, Varun Hartmann Jr., MD, Last Rate: 100 mL/hr at 12/23/23 0803, 1 mg at 12/23/23 0803    glucagon (GLUCAGEN) injection 1 mg, 1 mg, Intramuscular, Q15 Min PRN, Forrest Escobar MD    heparin (porcine) 5000 UNIT/ML injection 5,000 Units, 5,000 Units, Subcutaneous, Q12H, Varun Hartmann Jr., MD, 5,000 Units at 12/23/23 2028    insulin glargine (LANTUS, SEMGLEE) injection 10 Units, 10 Units, Subcutaneous, Daily, Forrest Escobar MD, 10 Units at 12/23/23 1548    insulin lispro (HUMALOG/ADMELOG) injection 3-14 Units, 3-14 Units, Subcutaneous, 4x Daily AC & at Bedtime, Forrest Escobar,  MD, 3 Units at 12/23/23 1656    LORazepam (ATIVAN) tablet 1 mg, 1 mg, Oral, Q1H PRN **OR** midazolam (VERSED) injection 2 mg, 2 mg, Intravenous, Q1H PRN **OR** LORazepam (ATIVAN) tablet 2 mg, 2 mg, Oral, Q1H PRN **OR** midazolam (VERSED) injection 4 mg, 4 mg, Intravenous, Q1H PRN, 4 mg at 12/23/23 0159 **OR** midazolam (VERSED) injection 4 mg, 4 mg, Intravenous, Q15 Min PRN **OR** midazolam (VERSED) injection 4 mg, 4 mg, Intramuscular, Q15 Min PRN **OR** LORazepam (ATIVAN) tablet 4 mg, 4 mg, Oral, Q1H PRN **OR** midazolam (VERSED) injection 8 mg, 8 mg, Intravenous, Q1H PRN, Varun Hartmann Jr., MD    Magnesium Standard Dose Replacement - Follow Nurse / BPA Driven Protocol, , Does not apply, Shahbaz WOMACK Harold Dale Jr., MD    Magnesium Standard Dose Replacement - Follow Nurse / BPA Driven Protocol, , Does not apply, Shahbaz WOMACK Harold Dale Jr., MD    nitroglycerin (NITROSTAT) SL tablet 0.4 mg, 0.4 mg, Sublingual, Q5 Min PRN, Varun Hartmann Jr., MD    ondansetron (ZOFRAN) injection 4 mg, 4 mg, Intravenous, Q6H PRN, Varun Hartmann Jr., MD, 4 mg at 12/24/23 0210    pantoprazole (PROTONIX) injection 40 mg, 40 mg, Intravenous, Q12H, Varun Hartmann Jr., MD, 40 mg at 12/23/23 2028    Phosphorus Replacement - Follow Nurse / BPA Driven Protocol, , Does not apply, Shahbaz WOMACK Harold Dale Jr., MD    Potassium Replacement - Follow Nurse / BPA Driven Protocol, , Does not apply, Shahbaz WOMACK Harold Dale Jr., MD    sodium chloride 0.9 % flush 10 mL, 10 mL, Intravenous, Q12H, Varun Hartmann Jr., MD, 10 mL at 12/23/23 2118    sodium chloride 0.9 % flush 10 mL, 10 mL, Intravenous, PRN, Varun Hartmann Jr., MD    sodium chloride 0.9 % infusion 40 mL, 40 mL, Intravenous, PRN, Varun Hartmann Jr., MD    sodium chloride 0.9 % infusion, 75 mL/hr, Intravenous, Continuous, Forrest Escobar MD, Last Rate: 75 mL/hr at 12/23/23 1805, 75 mL/hr at 12/23/23 1805    thiamine (B-1) 500 mg in sodium chloride 0.9 % 100  mL IVPB, 500 mg, Intravenous, Q8H, Last Rate: 200 mL/hr at 12/24/23 0536, 500 mg at 12/24/23 0536 **FOLLOWED BY** [START ON 12/25/2023] thiamine (B-1) injection 200 mg, 200 mg, Intravenous, Q8H **FOLLOWED BY** [START ON 12/30/2023] thiamine (VITAMIN B-1) tablet 100 mg, 100 mg, Oral, Daily, Varun Hartmann Jr., MD  Review of Systems:               All systems were reviewed and negative except for:  Gastrointestinal: positive for  nausea    Objective     Vital Signs  Temp:  [99.2 °F (37.3 °C)-100 °F (37.8 °C)] 99.6 °F (37.6 °C)  Heart Rate:  [] 79  Resp:  [18] 18  BP: ()/() 111/80  Body mass index is 17.55 kg/m².    Intake/Output Summary (Last 24 hours) at 12/24/2023 0710  Last data filed at 12/24/2023 0536  Gross per 24 hour   Intake 2160.67 ml   Output 2200 ml   Net -39.33 ml     No intake/output data recorded.                Physical Exam:              General: patient awake, alert and cooperative              Eyes: no scleral icterus              Skin: warm and dry, not jaundiced              Abdomen: soft, nontender, nondistended; normal bowel sounds, no masses palpated, no periumbical lymphadenopathy              Psychiatric: Appropriate affect and behavior                Results Review:                I reviewed the patient's new clinical results.    Results from last 7 days   Lab Units 12/23/23  0035 12/22/23  1825   WBC 10*3/mm3 5.47 6.84   HEMOGLOBIN g/dL 9.5* 12.1   HEMATOCRIT % 30.0* 37.2   PLATELETS 10*3/mm3 106* 162     Results from last 7 days   Lab Units 12/23/23  1556 12/23/23  1321 12/23/23  0843 12/23/23  0339 12/23/23  0035 12/22/23  1825   SODIUM mmol/L 139 138 140   < > 141 132*   POTASSIUM mmol/L 4.3 4.4 3.4*   < > 3.9 4.9   CHLORIDE mmol/L 103 101 107   < > 102 85*   CO2 mmol/L 20.6* 20.0* 18.6*   < > 10.0* 6.5*   BUN mg/dL 4* 6 7   < > 11 15   CREATININE mg/dL 0.66 0.64 0.63   < > 0.82 1.15*   CALCIUM mg/dL 8.6 8.6 7.7*   < > 7.7* 9.1   BILIRUBIN mg/dL  --   --   --   --   1.6* 1.6*   ALK PHOS U/L  --   --   --   --  154* 212*   ALT (SGPT) U/L  --   --   --   --  37* 54*   AST (SGOT) U/L  --   --   --   --  189* 311*   GLUCOSE mg/dL 165* 196* 113*   < > 175* 282*    < > = values in this interval not displayed.     Results from last 7 days   Lab Units 12/22/23  2222   INR  1.31*     Lab Results   Lab Value Date/Time    LIPASE 92 (H) 12/23/2023 0035    LIPASE 151 (H) 12/22/2023 1825    LIPASE 64 (H) 10/27/2023 0829    LIPASE 31 08/10/2023 0541    LIPASE 167 (H) 08/07/2023 0129    LIPASE 10 (L) 06/01/2023 0808    LIPASE 12 (L) 04/27/2023 2003    LIPASE 231 (H) 09/25/2021 0646    LIPASE 495 (H) 09/24/2021 0645    LIPASE 889 (H) 09/23/2021 0745    LIPASE 834 (H) 09/21/2021 2049    LIPASE 11 07/05/2021 1311    LIPASE 158 01/06/2021 0830    LIPASE 126 01/05/2021 1623    LIPASE 917 (H) 11/25/2020 1258    LIPASE 795 (H) 11/25/2020 0439    LIPASE 635 (H) 11/24/2020 0505    LIPASE 1,299 (H) 11/23/2020 0545    LIPASE 2,284 (H) 11/22/2020 0658    LIPASE 530 (H) 11/21/2020 1347       Radiology:  CT Abdomen Pelvis With Contrast   Final Result       1.  Marked hepatomegaly with marked diffuse hepatic steatosis.   2.  Pancreatic ductal prominence within the tail is similar to prior.   The previously noted hypodense focus in the pancreatic head is not   clearly visualized on the current examination. Follow-up   contrast-enhanced MRCP is again recommended.   3.  No bowel obstruction or CT evidence of acute appendicitis.       This report was finalized on 12/22/2023 8:54 PM by Dr. Keyona Root M.D   on Workstation: [id number]              Assessment & Plan     Active Hospital Problems    Diagnosis     **Alcoholic ketoacidosis        Assessment:  Alcoholic hepatitis with an elevated discriminant function but not elevated to indicate steroids  Alcoholic pancreatitis  Decreased hemoglobin and platelets likely bone marrow suppression from alcohol use  CT scan with marked steatosis and  hepatomegaly  Thrombocytopenia  Anemia  All problems new to me today      Plan:  Discriminant function remains below the threshold for initiation of steroids  Treatment remains supportive care  IV fluids  Diet as tolerated-would keep on clear liquids today as she had some nausea overnight.  In 24 hours we may be able to advance her diet some  Watch for alcohol withdrawal  Education and resources for alcohol cessation  Okay from GI for transfer out of the ICU if other services are comfortable as well  Await labs from today  I discussed the patients findings and my recommendations with patient and nursing staff.             Adan Hastings M.D.  [facility] [place] [address]  Office: [phone]

## 2023-12-25 LAB
ANION GAP SERPL CALCULATED.3IONS-SCNC: 13 MMOL/L (ref 5–15)
BASOPHILS # BLD AUTO: 0.05 10*3/MM3 (ref 0–0.2)
BASOPHILS NFR BLD AUTO: 1.3 % (ref 0–1.5)
BUN SERPL-MCNC: <2 MG/DL (ref 6–20)
BUN/CREAT SERPL: ABNORMAL
CALCIUM SPEC-SCNC: 8.4 MG/DL (ref 8.6–10.5)
CHLORIDE SERPL-SCNC: 102 MMOL/L (ref 98–107)
CO2 SERPL-SCNC: 26 MMOL/L (ref 22–29)
CREAT SERPL-MCNC: 0.44 MG/DL (ref 0.57–1)
DEPRECATED RDW RBC AUTO: 47.6 FL (ref 37–54)
EGFRCR SERPLBLD CKD-EPI 2021: 132 ML/MIN/1.73
EOSINOPHIL # BLD AUTO: 0.09 10*3/MM3 (ref 0–0.4)
EOSINOPHIL NFR BLD AUTO: 2.3 % (ref 0.3–6.2)
ERYTHROCYTE [DISTWIDTH] IN BLOOD BY AUTOMATED COUNT: 14.5 % (ref 12.3–15.4)
GLUCOSE BLDC GLUCOMTR-MCNC: 109 MG/DL (ref 70–130)
GLUCOSE BLDC GLUCOMTR-MCNC: 110 MG/DL (ref 70–130)
GLUCOSE BLDC GLUCOMTR-MCNC: 110 MG/DL (ref 70–130)
GLUCOSE BLDC GLUCOMTR-MCNC: 116 MG/DL (ref 70–130)
GLUCOSE SERPL-MCNC: 151 MG/DL (ref 65–99)
HCT VFR BLD AUTO: 26.8 % (ref 34–46.6)
HGB BLD-MCNC: 9.1 G/DL (ref 12–15.9)
LYMPHOCYTES # BLD AUTO: 1.65 10*3/MM3 (ref 0.7–3.1)
LYMPHOCYTES NFR BLD AUTO: 42.1 % (ref 19.6–45.3)
MAGNESIUM SERPL-MCNC: 2.9 MG/DL (ref 1.6–2.6)
MCH RBC QN AUTO: 30.5 PG (ref 26.6–33)
MCHC RBC AUTO-ENTMCNC: 34 G/DL (ref 31.5–35.7)
MCV RBC AUTO: 89.9 FL (ref 79–97)
MONOCYTES # BLD AUTO: 0.31 10*3/MM3 (ref 0.1–0.9)
MONOCYTES NFR BLD AUTO: 7.9 % (ref 5–12)
NEUTROPHILS NFR BLD AUTO: 1.77 10*3/MM3 (ref 1.7–7)
NEUTROPHILS NFR BLD AUTO: 45.1 % (ref 42.7–76)
PHOSPHATE SERPL-MCNC: 1.7 MG/DL (ref 2.5–4.5)
PLATELET # BLD AUTO: 98 10*3/MM3 (ref 140–450)
PMV BLD AUTO: 12.4 FL (ref 6–12)
POTASSIUM SERPL-SCNC: 3.2 MMOL/L (ref 3.5–5.2)
RBC # BLD AUTO: 2.98 10*6/MM3 (ref 3.77–5.28)
SODIUM SERPL-SCNC: 141 MMOL/L (ref 136–145)
WBC NRBC COR # BLD AUTO: 3.92 10*3/MM3 (ref 3.4–10.8)

## 2023-12-25 PROCEDURE — 84100 ASSAY OF PHOSPHORUS: CPT | Performed by: HOSPITALIST

## 2023-12-25 PROCEDURE — 82948 REAGENT STRIP/BLOOD GLUCOSE: CPT

## 2023-12-25 PROCEDURE — 83735 ASSAY OF MAGNESIUM: CPT | Performed by: HOSPITALIST

## 2023-12-25 PROCEDURE — 86022 PLATELET ANTIBODIES: CPT | Performed by: HOSPITALIST

## 2023-12-25 PROCEDURE — 80048 BASIC METABOLIC PNL TOTAL CA: CPT | Performed by: HOSPITALIST

## 2023-12-25 PROCEDURE — 99232 SBSQ HOSP IP/OBS MODERATE 35: CPT | Performed by: INTERNAL MEDICINE

## 2023-12-25 PROCEDURE — 90791 PSYCH DIAGNOSTIC EVALUATION: CPT

## 2023-12-25 PROCEDURE — 85025 COMPLETE CBC W/AUTO DIFF WBC: CPT | Performed by: HOSPITALIST

## 2023-12-25 PROCEDURE — 25010000002 THIAMINE PER 100 MG: Performed by: INTERNAL MEDICINE

## 2023-12-25 PROCEDURE — 25010000002 THIAMINE HCL 200 MG/2ML SOLUTION: Performed by: INTERNAL MEDICINE

## 2023-12-25 PROCEDURE — 63710000001 INSULIN GLARGINE PER 5 UNITS: Performed by: INTERNAL MEDICINE

## 2023-12-25 PROCEDURE — 25810000003 SODIUM CHLORIDE 0.9 % SOLUTION: Performed by: INTERNAL MEDICINE

## 2023-12-25 PROCEDURE — 25010000002 HEPARIN (PORCINE) PER 1000 UNITS: Performed by: INTERNAL MEDICINE

## 2023-12-25 PROCEDURE — 25010000002 CEFTRIAXONE PER 250 MG: Performed by: INTERNAL MEDICINE

## 2023-12-25 RX ORDER — POTASSIUM CHLORIDE 1.5 G/1.58G
40 POWDER, FOR SOLUTION ORAL EVERY 4 HOURS
Status: COMPLETED | OUTPATIENT
Start: 2023-12-25 | End: 2023-12-25

## 2023-12-25 RX ADMIN — PANTOPRAZOLE SODIUM 40 MG: 40 INJECTION, POWDER, FOR SOLUTION INTRAVENOUS at 20:59

## 2023-12-25 RX ADMIN — Medication 2 PACKET: at 05:17

## 2023-12-25 RX ADMIN — INSULIN GLARGINE 10 UNITS: 100 INJECTION, SOLUTION SUBCUTANEOUS at 09:57

## 2023-12-25 RX ADMIN — SODIUM CHLORIDE 75 ML/HR: 9 INJECTION, SOLUTION INTRAVENOUS at 10:05

## 2023-12-25 RX ADMIN — THIAMINE HYDROCHLORIDE 200 MG: 100 INJECTION, SOLUTION INTRAMUSCULAR; INTRAVENOUS at 21:00

## 2023-12-25 RX ADMIN — PANTOPRAZOLE SODIUM 40 MG: 40 INJECTION, POWDER, FOR SOLUTION INTRAVENOUS at 09:57

## 2023-12-25 RX ADMIN — POTASSIUM CHLORIDE 40 MEQ: 1.5 POWDER, FOR SOLUTION ORAL at 05:17

## 2023-12-25 RX ADMIN — POTASSIUM CHLORIDE 40 MEQ: 1.5 POWDER, FOR SOLUTION ORAL at 09:57

## 2023-12-25 RX ADMIN — Medication 10 ML: at 20:59

## 2023-12-25 RX ADMIN — CEFTRIAXONE 2000 MG: 2 INJECTION, POWDER, FOR SOLUTION INTRAMUSCULAR; INTRAVENOUS at 00:26

## 2023-12-25 RX ADMIN — FOLIC ACID 1 MG: 5 INJECTION, SOLUTION INTRAMUSCULAR; INTRAVENOUS; SUBCUTANEOUS at 10:04

## 2023-12-25 RX ADMIN — HEPARIN SODIUM 5000 UNITS: 5000 INJECTION INTRAVENOUS; SUBCUTANEOUS at 09:56

## 2023-12-25 RX ADMIN — THIAMINE HYDROCHLORIDE 500 MG: 100 INJECTION, SOLUTION INTRAMUSCULAR; INTRAVENOUS at 16:16

## 2023-12-25 RX ADMIN — THIAMINE HYDROCHLORIDE 500 MG: 100 INJECTION, SOLUTION INTRAMUSCULAR; INTRAVENOUS at 05:14

## 2023-12-25 RX ADMIN — Medication 10 ML: at 09:58

## 2023-12-25 NOTE — CONSULTS
"[facility] consulted regarding alcohol; BAL on admission was 60.  Patient known to [facility] for same; see chart for those notes.  Patient admitted regarding alcoholic ketoacidosis.  Upon entering room, she is awake RIB watching her phone.  She is alert and oriented x 4.  She is pleasant and cooperative though guarded and does not provide much eye contact.  Affect is flat.  Speech is soft/quiet and minimal as she does not engage in conversation.    She is a 34 yo D/F.  She currently lives with her boyfriend and 5 yo daughter.  Her daughter is currently with patient's mom in [place].  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  She cites her mom as support.  She is of the [employer].  She reports being safe at home, denies abuse, trauma, and violent behavior.    She reports a several year history of alcohol use.  She typically consumes 2 shots of tequila daily but may have more during social events.  She denies history of memory loss/black outs when drinking.  She admits she \"shouldn't do it every day\" but also that she plans on stopping; however, she does not provide any plan on how to do so other than \"throw it all away\" and feels she can stop on her own.  She reports her boyfriend would be supportive.  She denies previous ONESIMO tx.  She denies history of significant alcohol withdrawal only reports tremors.  She denies history of seizures.  Her longest time of sobriety is one week.    She denies past/current SI, denies HI and A/V hallucinations.  She reports difficulty falling asleep.  She reports decreased appetite in times of stress.  She denies history of IP psych admissions.  She denies tobacco and illicit drug use.    She was open to receiving ONESIMO tx and mental health resources which were provided.    Access will follow though she hopes to discharge later today.        "

## 2023-12-25 NOTE — PROGRESS NOTES
"  Intensive Care Unit Daily Progress Note.   [address]  12/25/2023    Patient Name:  Lisa Gibson  MRN:  [id number]  YOB: 1990  Age: 33 y.o.  Sex: female         Reason for Admission / Chief Complaint:  Urinary tract infection, alcoholic ketoacidosis    Hospital Course:   33-year-old female with alcohol abuse history who presents for urinary tract infection and alcoholic ketoacidosis and admitted to the ICU for insulin drip and alcohol withdrawal protocol.    Interval History:  Transferred to floor yesterday  Slowly advancing diet, tolerating well  Blood sugar control  No shortness of breath or cough  No chest pain or palpitations  Platelets  slowly downtrending    Physical Exam:  /76 (BP Location: Right arm, Patient Position: Lying)   Pulse 79   Temp 99 °F (37.2 °C) (Oral)   Resp 18   Ht 175.3 cm (69\")   Wt 55.2 kg (121 lb 9.6 oz)   LMP 11/15/2023 (Approximate)   SpO2 100%   BMI 17.96 kg/m²   Body mass index is 17.96 kg/m².    Intake/Output    Intake/Output Summary (Last 24 hours) at 12/25/2023 1341  Last data filed at 12/25/2023 1330  Gross per 24 hour   Intake 720 ml   Output 4300 ml   Net -3580 ml     General: Alert, weak appearing  HEENT: NC/AT, EOMI, MMM  Neck: Supple, trachea midline  Cardiac: RRR, no murmur, gallops, rubs  Pulmonary: Clear to auscultation bilaterally, no adventitious breath sounds, normal respiratory effort  GI: Soft, mildly tender, non-distended, normal bowel sounds  Extremities: Warm, well perfused, no LE edema  Skin: no visible rash  Neuro: CN II - XII grossly intact  Psychiatry: Normal mood and affect    Data Review:  Notable Labs:  Results from last 7 days   Lab Units 12/25/23  0146 12/23/23  0035 12/22/23  1825   WBC 10*3/mm3 3.92 5.47 6.84   HEMOGLOBIN g/dL 9.1* 9.5* 12.1   PLATELETS 10*3/mm3 98* 106* 162     Results from last 7 days   Lab Units 12/25/23  0146 12/24/23  1008 12/23/23  1556 12/23/23  1411 12/23/23  1321 " 12/23/23  1204 12/23/23  0843 12/23/23  0339 12/23/23  0035   SODIUM mmol/L 141 139 139  --  138  --  140 139 141   POTASSIUM mmol/L 3.2* 2.7* 4.3  --  4.4  --  3.4* 3.8 3.9   CHLORIDE mmol/L 102 104 103  --  101  --  107 100 102   CO2 mmol/L 26.0 22.0 20.6*  --  20.0*  --  18.6* 14.0* 10.0*   BUN mg/dL <2* <2* 4*  --  6  --  7 11 11   CREATININE mg/dL 0.44* 0.41* 0.66  --  0.64  --  0.63 0.94 0.82   GLUCOSE mg/dL 151* 83 165*  --  196*  --  113* 193* 175*   CALCIUM mg/dL 8.4* 7.6* 8.6  --  8.6  --  7.7* 8.4* 7.7*   MAGNESIUM mg/dL 2.9* 1.5*  --   --   --  1.8 1.9 2.1 1.8   PHOSPHORUS mg/dL 1.7* 1.3*  --  2.0*  --   --  0.4* 0.7* 0.9*   Estimated Creatinine Clearance: 160 mL/min (A) (by C-G formula based on SCr of 0.44 mg/dL (L)).    Results from last 7 days   Lab Units 12/25/23  0146 12/23/23  0035 12/22/23  2133 12/22/23  1825   AST (SGOT) U/L  --  189*  --  311*   ALT (SGPT) U/L  --  37*  --  54*   LACTATE mmol/L  --  2.0 3.3* 6.0*   PLATELETS 10*3/mm3 98* 106*  --  162       Results from last 7 days   Lab Units 12/23/23  0405   PH, ARTERIAL pH units 7.386   PCO2, ARTERIAL mm Hg 27.6*   PO2 ART mm Hg 102.9*   HCO3 ART mmol/L 16.6*       Imaging:  Reviewed chest images personally from past 3 days    ASSESSMENT  /  PLAN:    Alcoholic ketoacidosis  Alcoholic pancreatitis  Alcoholic hepatitis  Metabolic anion gap acidosis  Acute kidney injury  Bicytopenia  Hyponatremia  Alcohol abuse    -Blood sugars improved  -Platelets decreased, heparin discontinued, HIT panel ordered, hematology consulted.  -Patient also with alcoholic hepatitis with discriminant function less than 32 and not requiring steroids.  -Appreciate GI recommendations  -Soft diet, tolerating well  -Currently not in withdrawal, alcohol level was positive on admission. Continue CIWA protocol.  No history of seizures or DTs.  -Continue antibiotics for urinary tract infection, will complete course.  -ИРИНА resolved  -Continue thiamine and folate    GI  prophylaxis: Pantoprazole  DVT prophylaxis: Heparin   Bah catheter: No  Bowel regimen: Ordered  Diet: Clear liquid diet    Discharge: Remain inpatient, anticipate discharge in the next 24 to 48 hours.    Karlo Hernandez MD  [facility]  Pulmonary and Critical Care Medicine, Interventional Pulmonology    Parts of this note may be an electronic transcription/translation of spoken language to printed text using the Dragon dictation system.

## 2023-12-25 NOTE — PLAN OF CARE
Goal Outcome Evaluation:              Outcome Evaluation: VSS. phos and potassium replaced. Diet upgraded per GI. Tolertating well. Blood sugars stable. heparin d/c.  HIT labs pending.  No other complaints. Needs met at this time.

## 2023-12-25 NOTE — NURSING NOTE
Paged Annabelle Padilla APRN with Dr. Hartmann to report pt's phosphorus came back critical at 1.7 and her potassium is low at 3.2.  Pt's Cr is not elevated and the pt has PRN protocol input but the BPA replacement protocol is not firing for some reason.      Had called and asked Sruthi Carrizales whether there was anything I was missing causing BPA not to fire.  Pharmacist advised we normally go ahead and input replacement per the protocol if not firing and criteria is met.      Received call back from Pulmonology APRN and I reported the above.  Okay'd to input replacement doses.  Repeated and verified.

## 2023-12-25 NOTE — PROGRESS NOTES
[facility]  Inpatient Progress Note    Reason for Followup: Follow-up pancreatitis, hepatitis    Subjective     Interval History:   Has been moved out of the ICU  Potassium 3.2  Phosphorus 1.7  Calcium 8.4  Glucose 151  Hemoglobin 9.1, stable  Platelets 98    Patient reports feeling better this morning with less nausea.  Wanting to try more diet and improving pain patient denies any withdrawal symptoms    Current Facility-Administered Medications:     sennosides-docusate (PERICOLACE) 8.6-50 MG per tablet 2 tablet, 2 tablet, Oral, BID, 2 tablet at 12/23/23 2028 **AND** polyethylene glycol (MIRALAX) packet 17 g, 17 g, Oral, Daily PRN **AND** bisacodyl (DULCOLAX) EC tablet 5 mg, 5 mg, Oral, Daily PRN **AND** bisacodyl (DULCOLAX) suppository 10 mg, 10 mg, Rectal, Daily PRN, Varun Hartmann Jr., MD    Calcium Replacement - Follow Nurse / BPA Driven Protocol, , Does not apply, PRN, Varun Hartmann Jr., MD    cefTRIAXone (ROCEPHIN) 2,000 mg in sodium chloride 0.9 % 100 mL IVPB-VTB, 2,000 mg, Intravenous, Q24H, Varun Hartmann Jr., MD, Last Rate: 200 mL/hr at 12/25/23 0026, 2,000 mg at 12/25/23 0026    dextrose (D50W) (25 g/50 mL) IV injection 25 g, 25 g, Intravenous, Q15 Min PRN, Forrest Escobar MD    dextrose (GLUTOSE) oral gel 15 g, 15 g, Oral, Q15 Min PRN, Forrest Escobar MD    folic acid 1 mg in sodium chloride 0.9 % 50 mL IVPB, 1 mg, Intravenous, Daily, Varun Hartmann Jr., MD, Last Rate: 100 mL/hr at 12/24/23 0752, 1 mg at 12/24/23 0752    glucagon (GLUCAGEN) injection 1 mg, 1 mg, Intramuscular, Q15 Min PRN, Forrest Escobar MD    heparin (porcine) 5000 UNIT/ML injection 5,000 Units, 5,000 Units, Subcutaneous, Q12H, Varun Hartmann Jr., MD, 5,000 Units at 12/24/23 2058    insulin glargine (LANTUS, SEMGLEE) injection 10 Units, 10 Units, Subcutaneous, Daily, Forrest Escobar MD, 10 Units at 12/24/23 1043    insulin lispro (HUMALOG/ADMELOG) injection 3-14 Units, 3-14 Units, Subcutaneous,  4x Daily AC & at Bedtime, Forrest Escobar MD, 3 Units at 12/23/23 1656    LORazepam (ATIVAN) tablet 1 mg, 1 mg, Oral, Q1H PRN **OR** midazolam (VERSED) injection 2 mg, 2 mg, Intravenous, Q1H PRN **OR** LORazepam (ATIVAN) tablet 2 mg, 2 mg, Oral, Q1H PRN **OR** midazolam (VERSED) injection 4 mg, 4 mg, Intravenous, Q1H PRN, 4 mg at 12/23/23 0159 **OR** midazolam (VERSED) injection 4 mg, 4 mg, Intravenous, Q15 Min PRN **OR** midazolam (VERSED) injection 4 mg, 4 mg, Intramuscular, Q15 Min PRN **OR** LORazepam (ATIVAN) tablet 4 mg, 4 mg, Oral, Q1H PRN **OR** midazolam (VERSED) injection 8 mg, 8 mg, Intravenous, Q1H PRN, Varun Hartmann Jr., MD    Magnesium Standard Dose Replacement - Follow Nurse / BPA Driven Protocol, , Does not apply, PRShahbaz BALDWIN Harold Dale Jr., MD    Magnesium Standard Dose Replacement - Follow Nurse / BPA Driven Protocol, , Does not apply, Shahbaz WOMACK Harold Dale Jr., MD    nitroglycerin (NITROSTAT) SL tablet 0.4 mg, 0.4 mg, Sublingual, Q5 Min PRN, Varun Hartmann Jr., MD    ondansetron (ZOFRAN) injection 4 mg, 4 mg, Intravenous, Q6H PRN, Varun Hartmann Jr., MD, 4 mg at 12/24/23 0751    pantoprazole (PROTONIX) injection 40 mg, 40 mg, Intravenous, Q12H, Varun Hartmann Jr., MD, 40 mg at 12/24/23 2058    Phosphorus Replacement - Follow Nurse / BPA Driven Protocol, , Does not apply, Shahbaz WOMACK Harold Dale Jr., MD    potassium chloride (KLOR-CON) packet 40 mEq, 40 mEq, Oral, Q4H, Annabelle Padilla, APRN, 40 mEq at 12/25/23 0517    Potassium Replacement - Follow Nurse / BPA Driven Protocol, , Does not apply, PRN, Shahbaz, Varun Monty Jr., MD    sodium chloride 0.9 % flush 10 mL, 10 mL, Intravenous, Q12H, Varun Hartmann Jr., MD, 10 mL at 12/24/23 2058    sodium chloride 0.9 % flush 10 mL, 10 mL, Intravenous, Shahbaz WOMACK Harold Dale Jr., MD    sodium chloride 0.9 % infusion 40 mL, 40 mL, Intravenous, PRShahbaz BALDWIN Harold Dale Jr., MD    sodium chloride 0.9 % infusion, 75 mL/hr,  Intravenous, Continuous, Forrest Escobar MD, Last Rate: 75 mL/hr at 12/24/23 1911, 75 mL/hr at 12/24/23 1911    thiamine (B-1) 500 mg in sodium chloride 0.9 % 100 mL IVPB, 500 mg, Intravenous, Q8H, Last Rate: 200 mL/hr at 12/25/23 0514, 500 mg at 12/25/23 0514 **FOLLOWED BY** thiamine (B-1) injection 200 mg, 200 mg, Intravenous, Q8H **FOLLOWED BY** [START ON 12/30/2023] thiamine (VITAMIN B-1) tablet 100 mg, 100 mg, Oral, Daily, Varun Hartmann Jr., MD  Review of Systems:    The following systems were reviewed and negative;  gastrointestinal    Objective     Vital Signs  Temp:  [97.5 °F (36.4 °C)-99.1 °F (37.3 °C)] 99 °F (37.2 °C)  Heart Rate:  [69-98] 79  Resp:  [16-20] 18  BP: (102-119)/(58-85) 119/85  Body mass index is 17.96 kg/m².    Intake/Output Summary (Last 24 hours) at 12/25/2023 0724  Last data filed at 12/25/2023 0532  Gross per 24 hour   Intake 720 ml   Output 3700 ml   Net -2980 ml     No intake/output data recorded.     Physical Exam:   General: patient awake, alert and cooperative   Eyes: Normal lids and lashes, no scleral icterus   Neck: supple, normal ROM   Skin: warm and dry, not jaundiced   Cardiovascular: regular rhythm and rate, no murmurs auscultated   Pulm: clear to auscultation bilaterally, regular and unlabored   Abdomen: soft, nontender, nondistended; normal bowel sounds   Rectal: deferred   Extremities: no rash or edema   Psychiatric: Normal mood and behavior; memory intact     Results Review:     I reviewed the patient's new clinical results.    Results from last 7 days   Lab Units 12/25/23  0146 12/23/23  0035 12/22/23  1825   WBC 10*3/mm3 3.92 5.47 6.84   HEMOGLOBIN g/dL 9.1* 9.5* 12.1   HEMATOCRIT % 26.8* 30.0* 37.2   PLATELETS 10*3/mm3 98* 106* 162     Results from last 7 days   Lab Units 12/25/23  0146 12/24/23  1008 12/23/23  1556 12/23/23  0339 12/23/23  0035 12/22/23  1825   SODIUM mmol/L 141 139 139   < > 141 132*   POTASSIUM mmol/L 3.2* 2.7* 4.3   < > 3.9 4.9   CHLORIDE mmol/L  102 104 103   < > 102 85*   CO2 mmol/L 26.0 22.0 20.6*   < > 10.0* 6.5*   BUN mg/dL <2* <2* 4*   < > 11 15   CREATININE mg/dL 0.44* 0.41* 0.66   < > 0.82 1.15*   CALCIUM mg/dL 8.4* 7.6* 8.6   < > 7.7* 9.1   BILIRUBIN mg/dL  --   --   --   --  1.6* 1.6*   ALK PHOS U/L  --   --   --   --  154* 212*   ALT (SGPT) U/L  --   --   --   --  37* 54*   AST (SGOT) U/L  --   --   --   --  189* 311*   GLUCOSE mg/dL 151* 83 165*   < > 175* 282*    < > = values in this interval not displayed.     Results from last 7 days   Lab Units 12/22/23  2222   INR  1.31*     Lab Results   Lab Value Date/Time    LIPASE 92 (H) 12/23/2023 0035    LIPASE 151 (H) 12/22/2023 1825    LIPASE 64 (H) 10/27/2023 0829    LIPASE 31 08/10/2023 0541    LIPASE 167 (H) 08/07/2023 0129    LIPASE 10 (L) 06/01/2023 0808    LIPASE 12 (L) 04/27/2023 2003    LIPASE 231 (H) 09/25/2021 0646    LIPASE 495 (H) 09/24/2021 0645    LIPASE 889 (H) 09/23/2021 0745    LIPASE 834 (H) 09/21/2021 2049    LIPASE 11 07/05/2021 1311    LIPASE 158 01/06/2021 0830    LIPASE 126 01/05/2021 1623    LIPASE 917 (H) 11/25/2020 1258    LIPASE 795 (H) 11/25/2020 0439    LIPASE 635 (H) 11/24/2020 0505    LIPASE 1,299 (H) 11/23/2020 0545    LIPASE 2,284 (H) 11/22/2020 0658    LIPASE 530 (H) 11/21/2020 1347       Radiology:  [unfilled]      Assessment & Plan   Assessment:   1.  Alcoholic hepatitis  2.  Alcoholic pancreatitis  3.  Nausea  4.  Bone marrow suppression, platelets continuing to decrease  5.  Alcohol use  6.  CT scan with steatosis and hepatomegaly  7.  Anemia, stable      Plan:   1.  Supportive treatment for her pancreatitis and her hepatitis.  2.  Patient highly encouraged to discontinue all alcohol use  3.  Progressive thrombocytopenia.  ? Hematology evalaution  4.  Progress diet as tolerated, patient is vegetarian but advance to soft have discussed with nursing staff    I discussed the patient's findings and my recommendations with patient and nursing staff.         Adan  ALEXANDREA Hastings M.D.  [facility]  [address] [address]  [phone]

## 2023-12-25 NOTE — PLAN OF CARE
Goal Outcome Evaluation:         Pt up with x1 assist to [facility], weakness noted but pt did not stumble; IVFs and IV electrolytes replaced on dayshift into nightshift.  Phos and potassium still low; addressed with APRN for Dr. Hartmann; replacement ordered.  Pt has not had nausea or vomiting this shift and has tolerated clear liquids without issue, is requesting an increase to her diet with attention to her vegetarian lifestyle.  Denies pain, VSS.               Problem: Adult Inpatient Plan of Care  Goal: Plan of Care Review  Outcome: Ongoing, Progressing  Goal: Absence of Hospital-Acquired Illness or Injury  Outcome: Ongoing, Progressing  Intervention: Identify and Manage Fall Risk  Description: Perform standard risk assessment on admission using a validated tool or comprehensive approach appropriate to the patient; reassess fall risk frequently, with change in status or transfer to another level of care.  Communicate fall injury risk to interprofessional healthcare team.  Determine need for increased observation, equipment and environmental modification, such as low bed, signage and supportive, nonskid footwear.  Adjust safety measures to individual developmental age, stage and identified risk factors.  Reinforce the importance of safety and physical activity with patient and family.  Perform regular intentional rounding to assess need for position change, pain assessment and personal needs, including assistance with toileting.  Recent Flowsheet Documentation  Taken 12/25/2023 0414 by Betty Navarro, RN  Safety Promotion/Fall Prevention: safety round/check completed  Taken 12/25/2023 0206 by Betty Navarro, RN  Safety Promotion/Fall Prevention: safety round/check completed  Taken 12/25/2023 0026 by Betty Navarro, RN  Safety Promotion/Fall Prevention: safety round/check completed  Taken 12/24/2023 2208 by Betty Navarro, RN  Safety Promotion/Fall Prevention: safety round/check completed  Taken 12/24/2023 1958 by  Betty Navarro, RN  Safety Promotion/Fall Prevention: safety round/check completed  Intervention: Prevent Skin Injury  Description: Perform a screening for skin injury risk, such as pressure or moisture associated skin damage on admission and at regular intervals throughout hospital stay.  Keep all areas of skin (especially folds) clean and dry.  Maintain adequate skin hydration.  Relieve and redistribute pressure and protect bony prominences; implement measures based on patient-specific risk factors.  Match turning and repositioning schedule to clinical condition.  Encourage weight shift frequently; assist with reposition if unable to complete independently.  Float heels off bed; avoid pressure on the Achilles tendon.  Keep skin free from extended contact with medical devices.  Encourage functional activity and mobility, as early as tolerated.  Use aids (e.g., slide boards, mechanical lift) during transfer.  Recent Flowsheet Documentation  Taken 12/25/2023 0414 by Betty Navarro RN  Body Position:   position changed independently   supine, legs elevated  Taken 12/25/2023 0206 by Betty Navarro RN  Body Position:   position changed independently   sitting up in bed  Taken 12/25/2023 0026 by Betty Navarro RN  Body Position: position changed independently  Skin Protection:   adhesive use limited   incontinence pads utilized   transparent dressing maintained   tubing/devices free from skin contact  Taken 12/24/2023 2208 by Betty Navarro RN  Body Position:   position changed independently   sitting up in bed  Taken 12/24/2023 1958 by Betty Navarro RN  Body Position:   position changed independently   supine  Skin Protection:   adhesive use limited   incontinence pads utilized   tubing/devices free from skin contact   transparent dressing maintained  Intervention: Prevent and Manage VTE (Venous Thromboembolism) Risk  Description: Assess for VTE (venous thromboembolism) risk.  Encourage and assist with  early ambulation.  Initiate and maintain compression or other therapy, as indicated, based on identified risk in accordance with organizational protocol and provider order.  Encourage both active and passive leg exercises while in bed, if unable to ambulate.  Recent Flowsheet Documentation  Taken 12/25/2023 0206 by Betty Navarro, RN  Activity Management:   up to bedside commode   back to bed  Taken 12/25/2023 0026 by Betty Navarro, RN  Activity Management: back to bed  VTE Prevention/Management:   bilateral   sequential compression devices off   patient refused intervention  Taken 12/24/2023 1958 by Betty Navarro, RN  Activity Management: activity encouraged  VTE Prevention/Management:   bilateral   sequential compression devices off   patient refused intervention  Goal: Optimal Comfort and Wellbeing  Outcome: Ongoing, Progressing  Intervention: Provide Person-Centered Care  Description: Use a family-focused approach to care.  Develop trust and rapport by proactively providing information, encouraging questions, addressing concerns and offering reassurance.  Acknowledge emotional response to hospitalization.  Recognize and utilize personal coping strategies.  Honor spiritual and cultural preferences.  Recent Flowsheet Documentation  Taken 12/25/2023 0026 by Betty Navarro, RN  Trust Relationship/Rapport:   care explained   choices provided   questions answered   thoughts/feelings acknowledged   reassurance provided   questions encouraged   emotional support provided  Taken 12/24/2023 1958 by Betty Navarro, RN  Trust Relationship/Rapport:   care explained   emotional support provided   choices provided   questions encouraged   thoughts/feelings acknowledged   reassurance provided   questions answered  Goal: Readiness for Transition of Care  Outcome: Ongoing, Progressing     Problem: Fall Injury Risk  Goal: Absence of Fall and Fall-Related Injury  Outcome: Ongoing, Progressing  Intervention: Identify and  Manage Contributors  Description: Develop a fall prevention plan with the patient and caregiver/family.  Provide reorientation, appropriate sensory stimulation and routines with changes in mental status to decrease risk of fall.  Promote use of personal vision and auditory aids.  Assess assistance level required for safe and effective self-care; provide support as needed, such as toileting, mobilization. For age 65 and older, implement timed toileting with assistance.  Encourage physical activity, such as performance of mobility and self-care at highest level of patient ability, multicomponent exercise program and provision of appropriate assistive devices.  If fall occurs, assess the severity of injury; implement fall injury protocol. Determine the cause and revise fall injury prevention plan.  Regularly review medication contribution to fall risk; adjust medication administration times to minimize risk of falling.  Consider risk related to polypharmacy and age.  Balance adequate pain management with potential for oversedation.  Recent Flowsheet Documentation  Taken 12/25/2023 0414 by Betty Navarro RN  Medication Review/Management:   medications reviewed   high-risk medications identified  Taken 12/25/2023 0206 by Betty Navarro RN  Medication Review/Management:   medications reviewed   high-risk medications identified  Taken 12/25/2023 0026 by Betty Navarro RN  Medication Review/Management:   medications reviewed   high-risk medications identified  Self-Care Promotion:   independence encouraged   BADL personal objects within reach   safe use of adaptive equipment encouraged  Taken 12/24/2023 2208 by Betty Navarro, RN  Medication Review/Management:   medications reviewed   high-risk medications identified  Taken 12/24/2023 1958 by Betty Navarro, RN  Medication Review/Management:   medications reviewed   high-risk medications identified  Self-Care Promotion:   independence encouraged   BADL personal  objects within reach   safe use of adaptive equipment encouraged  Intervention: Promote Injury-Free Environment  Description: Provide a safe, barrier-free environment that encourages independent activity.  Keep care area uncluttered and well-lighted.  Determine need for increased observation or monitoring.  Avoid use of devices that minimize mobility, such as restraints or indwelling urinary catheter.  Recent Flowsheet Documentation  Taken 12/25/2023 0414 by Betty Navarro, RN  Safety Promotion/Fall Prevention: safety round/check completed  Taken 12/25/2023 0206 by Betty Navarro, RN  Safety Promotion/Fall Prevention: safety round/check completed  Taken 12/25/2023 0026 by Betty Navarro, RN  Safety Promotion/Fall Prevention: safety round/check completed  Taken 12/24/2023 2208 by Betty Navarro RN  Safety Promotion/Fall Prevention: safety round/check completed  Taken 12/24/2023 1958 by Betty Navarro RN  Safety Promotion/Fall Prevention: safety round/check completed     Problem: Adjustment to Illness (Sepsis/Septic Shock)  Goal: Optimal Coping  Outcome: Ongoing, Progressing  Intervention: Optimize Psychosocial Adjustment to Illness  Description: Acknowledge, normalize, validate intensity and complexity of patient and support system response to situation.  Provide opportunity for expression of thoughts, feelings and concerns; respond with compassion and reassurance.  Decrease stress and anxiety by providing information about patient’s status and treatment.  Facilitate support system presence and participation in care; consider providing a diary in intensive care situation.  Support coping by recognizing current coping strategies; provide aid in developing new strategies.  Acknowledge and normalize difficulty in managing life-long lifestyle changes and expectations.  Assess and monitor for signs and symptoms of psychologic distress, anxiety and depression.  Consider palliative care consult for goals of care  conversation, if the condition is worsening despite treatment.  Recent Flowsheet Documentation  Taken 12/25/2023 0026 by Betty Navarro RN  Supportive Measures:   active listening utilized   decision-making supported  Taken 12/24/2023 1958 by Betty Navarro RN  Supportive Measures:   active listening utilized   decision-making supported     Problem: Nutrition Impaired (Sepsis/Septic Shock)  Goal: Optimal Nutrition Intake  Outcome: Ongoing, Progressing     Problem: Skin Injury Risk Increased  Goal: Skin Health and Integrity  Outcome: Ongoing, Progressing  Intervention: Promote and Optimize Oral Intake  Description: Perform a nutrition assessment; include a nutrition-focused physical exam.  Determine calorie, protein, vitamin, mineral and fluid requirements.  Assess for micronutrient deficiencies; supplement if depleted.  Assess need and assist with meal set-up and feeding.  Adjust diet or meal schedule based on preferences and tolerance.  Offer oral supplemental food or drinks to enhance calorie and protein intake.  Establish bowel elimination program to increase comfort and appetite.  Minimize unnecessary dietary restrictions to increase oral intake.  Provide and encourage oral hygiene to enhance desire to eat.  Consider enteral nutrition support if oral intake remains inadequate; provide parenteral nutrition if enteral is contraindicated.  Recent Flowsheet Documentation  Taken 12/25/2023 0026 by Betty Navarro, RN  Oral Nutrition Promotion:   rest periods promoted   safe use of adaptive equipment encouraged  Taken 12/24/2023 1958 by Betty Navarro RN  Oral Nutrition Promotion:   rest periods promoted   safe use of adaptive equipment encouraged  Intervention: Optimize Skin Protection  Description: Perform a full pressure injury risk assessment, as indicated by screening, upon admission to care unit.  Reassess skin (injury risk, full inspection) frequently (e.g., scheduled interval, with change in  condition) to provide optimal early detection and prevention.  Maintain adequate tissue perfusion (e.g., encourage fluid balance; avoid crossing legs, constrictive clothing or devices) to promote tissue oxygenation.  Maintain head of bed at lowest degree of elevation tolerated, considering medical condition and other restrictions.  Avoid positioning onto an area that remains reddened.  Minimize incontinence and moisture (e.g., toileting schedule; moisture-wicking pad, diaper or incontinence collection device; skin moisture barrier).  Cleanse skin promptly and gently when soiled utilizing a pH-balanced cleanser.  Relieve and redistribute pressure (e.g., scheduled position changes, weight shifts, use of support surface, medical device repositioning, protective dressing application, use of positioning device, microclimate control, use of pressure-injury-monitor  Encourage increased activity, such as sitting in a chair at the bedside or early mobilization, when able to tolerate.  Recent Flowsheet Documentation  Taken 12/25/2023 0414 by Betty Navarro RN  Head of Bed (HOB) Positioning: HOB elevated  Taken 12/25/2023 0206 by Betty Navarro RN  Head of Bed ([facility]) Positioning: HOB elevated  Taken 12/25/2023 0026 by Betty Navarro RN  Pressure Reduction Techniques: frequent weight shift encouraged  Head of Bed (HOB) Positioning: HOB elevated  Pressure Reduction Devices:   pressure-redistributing mattress utilized   positioning supports utilized  Skin Protection:   adhesive use limited   incontinence pads utilized   transparent dressing maintained   tubing/devices free from skin contact  Taken 12/24/2023 2208 by Betty Navarro RN  Head of Bed (HOB) Positioning: HOB at 20-30 degrees  Taken 12/24/2023 1958 by Betty Navarro RN  Pressure Reduction Techniques: frequent weight shift encouraged  Head of Bed (HOB) Positioning: HOB lowered  Pressure Reduction Devices:   positioning supports utilized    pressure-redistributing mattress utilized  Skin Protection:   adhesive use limited   incontinence pads utilized   tubing/devices free from skin contact   transparent dressing maintained

## 2023-12-26 ENCOUNTER — READMISSION MANAGEMENT (OUTPATIENT)
Dept: CALL CENTER | Facility: HOSPITAL | Age: 33
End: 2023-12-26
Payer: COMMERCIAL

## 2023-12-26 VITALS
TEMPERATURE: 98.8 F | OXYGEN SATURATION: 98 % | SYSTOLIC BLOOD PRESSURE: 106 MMHG | BODY MASS INDEX: 18.2 KG/M2 | HEART RATE: 94 BPM | DIASTOLIC BLOOD PRESSURE: 73 MMHG | HEIGHT: 69 IN | WEIGHT: 122.9 LBS | RESPIRATION RATE: 18 BRPM

## 2023-12-26 DIAGNOSIS — D64.9 NORMOCYTIC ANEMIA: ICD-10-CM

## 2023-12-26 DIAGNOSIS — D69.6 THROMBOCYTOPENIA: Primary | ICD-10-CM

## 2023-12-26 PROBLEM — E87.29 ALCOHOLIC KETOACIDOSIS: Status: RESOLVED | Noted: 2023-12-22 | Resolved: 2023-12-26

## 2023-12-26 LAB
ANION GAP SERPL CALCULATED.3IONS-SCNC: 11.5 MMOL/L (ref 5–15)
APTT PPP: 26.9 SECONDS (ref 22.7–35.4)
BASOPHILS # BLD AUTO: 0.06 10*3/MM3 (ref 0–0.2)
BASOPHILS NFR BLD AUTO: 1.7 % (ref 0–1.5)
BUN SERPL-MCNC: <2 MG/DL (ref 6–20)
BUN/CREAT SERPL: ABNORMAL
CALCIUM SPEC-SCNC: 9 MG/DL (ref 8.6–10.5)
CHLORIDE SERPL-SCNC: 103 MMOL/L (ref 98–107)
CO2 SERPL-SCNC: 23.5 MMOL/L (ref 22–29)
CREAT SERPL-MCNC: 0.28 MG/DL (ref 0.57–1)
DEPRECATED RDW RBC AUTO: 44.8 FL (ref 37–54)
EGFRCR SERPLBLD CKD-EPI 2021: 147.2 ML/MIN/1.73
EOSINOPHIL # BLD AUTO: 0.11 10*3/MM3 (ref 0–0.4)
EOSINOPHIL NFR BLD AUTO: 3.1 % (ref 0.3–6.2)
ERYTHROCYTE [DISTWIDTH] IN BLOOD BY AUTOMATED COUNT: 13.6 % (ref 12.3–15.4)
FERRITIN SERPL-MCNC: 646 NG/ML (ref 13–150)
FOLATE SERPL-MCNC: 7.78 NG/ML (ref 4.78–24.2)
GLUCOSE BLDC GLUCOMTR-MCNC: 106 MG/DL (ref 70–130)
GLUCOSE BLDC GLUCOMTR-MCNC: 88 MG/DL (ref 70–130)
GLUCOSE SERPL-MCNC: 79 MG/DL (ref 65–99)
HCT VFR BLD AUTO: 26.2 % (ref 34–46.6)
HGB BLD-MCNC: 8.9 G/DL (ref 12–15.9)
IMM GRANULOCYTES # BLD AUTO: 0.01 10*3/MM3 (ref 0–0.05)
IMM GRANULOCYTES NFR BLD AUTO: 0.3 % (ref 0–0.5)
INR PPP: 1.04 (ref 0.9–1.1)
IRON 24H UR-MRATE: 73 MCG/DL (ref 37–145)
IRON SATN MFR SERPL: 27 % (ref 20–50)
LYMPHOCYTES # BLD AUTO: 1.56 10*3/MM3 (ref 0.7–3.1)
LYMPHOCYTES NFR BLD AUTO: 44.6 % (ref 19.6–45.3)
MAGNESIUM SERPL-MCNC: 2.5 MG/DL (ref 1.6–2.6)
MCH RBC QN AUTO: 30.5 PG (ref 26.6–33)
MCHC RBC AUTO-ENTMCNC: 34 G/DL (ref 31.5–35.7)
MCV RBC AUTO: 89.7 FL (ref 79–97)
MONOCYTES # BLD AUTO: 0.41 10*3/MM3 (ref 0.1–0.9)
MONOCYTES NFR BLD AUTO: 11.7 % (ref 5–12)
NEUTROPHILS NFR BLD AUTO: 1.35 10*3/MM3 (ref 1.7–7)
NEUTROPHILS NFR BLD AUTO: 38.6 % (ref 42.7–76)
NRBC BLD AUTO-RTO: 0.6 /100 WBC (ref 0–0.2)
PHOSPHATE SERPL-MCNC: 1.7 MG/DL (ref 2.5–4.5)
PLATELET # BLD AUTO: 96 10*3/MM3 (ref 140–450)
PLATELET # BLD AUTO: 96 10*3/MM3 (ref 140–450)
PLATELETS.RETICULATED NFR BLD AUTO: 14 % (ref 0.9–6.5)
PMV BLD AUTO: 13 FL (ref 6–12)
POTASSIUM SERPL-SCNC: 4.8 MMOL/L (ref 3.5–5.2)
PROTHROMBIN TIME: 13.7 SECONDS (ref 11.7–14.2)
RBC # BLD AUTO: 2.92 10*6/MM3 (ref 3.77–5.28)
SODIUM SERPL-SCNC: 138 MMOL/L (ref 136–145)
TIBC SERPL-MCNC: 267 MCG/DL (ref 298–536)
TRANSFERRIN SERPL-MCNC: 179 MG/DL (ref 200–360)
VIT B12 BLD-MCNC: 737 PG/ML (ref 211–946)
WBC NRBC COR # BLD AUTO: 3.5 10*3/MM3 (ref 3.4–10.8)

## 2023-12-26 PROCEDURE — 82948 REAGENT STRIP/BLOOD GLUCOSE: CPT

## 2023-12-26 PROCEDURE — 83540 ASSAY OF IRON: CPT | Performed by: INTERNAL MEDICINE

## 2023-12-26 PROCEDURE — 25010000002 CEFTRIAXONE PER 250 MG: Performed by: INTERNAL MEDICINE

## 2023-12-26 PROCEDURE — 97161 PT EVAL LOW COMPLEX 20 MIN: CPT

## 2023-12-26 PROCEDURE — 82746 ASSAY OF FOLIC ACID SERUM: CPT | Performed by: INTERNAL MEDICINE

## 2023-12-26 PROCEDURE — 84466 ASSAY OF TRANSFERRIN: CPT | Performed by: INTERNAL MEDICINE

## 2023-12-26 PROCEDURE — 82728 ASSAY OF FERRITIN: CPT | Performed by: INTERNAL MEDICINE

## 2023-12-26 PROCEDURE — 83735 ASSAY OF MAGNESIUM: CPT | Performed by: HOSPITALIST

## 2023-12-26 PROCEDURE — 85610 PROTHROMBIN TIME: CPT | Performed by: INTERNAL MEDICINE

## 2023-12-26 PROCEDURE — 25810000003 SODIUM CHLORIDE 0.9 % SOLUTION: Performed by: INTERNAL MEDICINE

## 2023-12-26 PROCEDURE — 82607 VITAMIN B-12: CPT | Performed by: INTERNAL MEDICINE

## 2023-12-26 PROCEDURE — 25010000002 THIAMINE HCL 200 MG/2ML SOLUTION: Performed by: INTERNAL MEDICINE

## 2023-12-26 PROCEDURE — 85025 COMPLETE CBC W/AUTO DIFF WBC: CPT | Performed by: HOSPITALIST

## 2023-12-26 PROCEDURE — 99222 1ST HOSP IP/OBS MODERATE 55: CPT | Performed by: INTERNAL MEDICINE

## 2023-12-26 PROCEDURE — 84100 ASSAY OF PHOSPHORUS: CPT | Performed by: HOSPITALIST

## 2023-12-26 PROCEDURE — 80048 BASIC METABOLIC PNL TOTAL CA: CPT | Performed by: HOSPITALIST

## 2023-12-26 PROCEDURE — 85055 RETICULATED PLATELET ASSAY: CPT | Performed by: INTERNAL MEDICINE

## 2023-12-26 PROCEDURE — 85730 THROMBOPLASTIN TIME PARTIAL: CPT | Performed by: INTERNAL MEDICINE

## 2023-12-26 PROCEDURE — 99232 SBSQ HOSP IP/OBS MODERATE 35: CPT | Performed by: INTERNAL MEDICINE

## 2023-12-26 PROCEDURE — 63710000001 INSULIN GLARGINE PER 5 UNITS: Performed by: INTERNAL MEDICINE

## 2023-12-26 RX ORDER — CEPHALEXIN 500 MG/1
500 CAPSULE ORAL 2 TIMES DAILY
Qty: 6 CAPSULE | Refills: 0 | Status: SHIPPED | OUTPATIENT
Start: 2023-12-27 | End: 2023-12-30

## 2023-12-26 RX ORDER — SODIUM PHOSPHATE IN 0.9 % NACL 15MMOL/100
15 PLASTIC BAG, INJECTION (ML) INTRAVENOUS ONCE
Status: COMPLETED | OUTPATIENT
Start: 2023-12-26 | End: 2023-12-26

## 2023-12-26 RX ADMIN — SODIUM PHOSPHATE, MONOBASIC, MONOHYDRATE AND SODIUM PHOSPHATE, DIBASIC, ANHYDROUS 15 MMOL: 142; 276 INJECTION, SOLUTION INTRAVENOUS at 12:55

## 2023-12-26 RX ADMIN — CEFTRIAXONE 2000 MG: 2 INJECTION, POWDER, FOR SOLUTION INTRAMUSCULAR; INTRAVENOUS at 01:18

## 2023-12-26 RX ADMIN — THIAMINE HYDROCHLORIDE 200 MG: 100 INJECTION, SOLUTION INTRAMUSCULAR; INTRAVENOUS at 13:00

## 2023-12-26 RX ADMIN — SODIUM CHLORIDE 75 ML/HR: 9 INJECTION, SOLUTION INTRAVENOUS at 01:18

## 2023-12-26 RX ADMIN — Medication 10 ML: at 08:58

## 2023-12-26 RX ADMIN — FOLIC ACID 1 MG: 5 INJECTION, SOLUTION INTRAMUSCULAR; INTRAVENOUS; SUBCUTANEOUS at 11:13

## 2023-12-26 RX ADMIN — THIAMINE HYDROCHLORIDE 200 MG: 100 INJECTION, SOLUTION INTRAMUSCULAR; INTRAVENOUS at 05:22

## 2023-12-26 RX ADMIN — PANTOPRAZOLE SODIUM 40 MG: 40 INJECTION, POWDER, FOR SOLUTION INTRAVENOUS at 08:58

## 2023-12-26 RX ADMIN — INSULIN GLARGINE 10 UNITS: 100 INJECTION, SOLUTION SUBCUTANEOUS at 08:57

## 2023-12-26 NOTE — PLAN OF CARE
Goal Outcome Evaluation:         Pt receiving IVFs and IV antibiotics, IV thiamine; up with x1 assist to BR and pt is ambulating but is very weak with a sluggish gait that puts her at risk for falls despite denying drowsiness and appearing to be alert/awake; her movements are excessively sluggish and exageratedly measured.  VSS, pt denies pain.               Problem: Adult Inpatient Plan of Care  Goal: Plan of Care Review  Outcome: Ongoing, Progressing  Goal: Absence of Hospital-Acquired Illness or Injury  Outcome: Ongoing, Progressing  Intervention: Identify and Manage Fall Risk  Description: Perform standard risk assessment on admission using a validated tool or comprehensive approach appropriate to the patient; reassess fall risk frequently, with change in status or transfer to another level of care.  Communicate fall injury risk to interprofessional healthcare team.  Determine need for increased observation, equipment and environmental modification, such as low bed, signage and supportive, nonskid footwear.  Adjust safety measures to individual developmental age, stage and identified risk factors.  Reinforce the importance of safety and physical activity with patient and family.  Perform regular intentional rounding to assess need for position change, pain assessment and personal needs, including assistance with toileting.  Recent Flowsheet Documentation  Taken 12/26/2023 0600 by Betty Navarro, RN  Safety Promotion/Fall Prevention: safety round/check completed  Taken 12/26/2023 0400 by Betty Navarro, RN  Safety Promotion/Fall Prevention: safety round/check completed  Taken 12/26/2023 0200 by Betty Navarro, RN  Safety Promotion/Fall Prevention: safety round/check completed  Taken 12/26/2023 0000 by Betty Navarro, RN  Safety Promotion/Fall Prevention: safety round/check completed  Taken 12/25/2023 2200 by Betty Navarro, RN  Safety Promotion/Fall Prevention: safety round/check completed  Taken  12/25/2023 2000 by Betty Navarro RN  Safety Promotion/Fall Prevention: safety round/check completed  Intervention: Prevent Skin Injury  Description: Perform a screening for skin injury risk, such as pressure or moisture associated skin damage on admission and at regular intervals throughout hospital stay.  Keep all areas of skin (especially folds) clean and dry.  Maintain adequate skin hydration.  Relieve and redistribute pressure and protect bony prominences; implement measures based on patient-specific risk factors.  Match turning and repositioning schedule to clinical condition.  Encourage weight shift frequently; assist with reposition if unable to complete independently.  Float heels off bed; avoid pressure on the Achilles tendon.  Keep skin free from extended contact with medical devices.  Encourage functional activity and mobility, as early as tolerated.  Use aids (e.g., slide boards, mechanical lift) during transfer.  Recent Flowsheet Documentation  Taken 12/26/2023 0600 by Betty Navarro RN  Body Position:   sitting up in bed   position changed independently  Taken 12/26/2023 0400 by Betty Navarro RN  Body Position:   sitting up in bed   position changed independently  Taken 12/26/2023 0200 by Betty Navarro RN  Body Position:   tilted   right  Taken 12/26/2023 0000 by Betty Navarro RN  Body Position:   position changed independently   sitting up in bed  Skin Protection:   adhesive use limited   incontinence pads utilized   tubing/devices free from skin contact   transparent dressing maintained  Taken 12/25/2023 2200 by Betty Navarro RN  Body Position: position changed independently  Taken 12/25/2023 2000 by Betty Navarro RN  Body Position: sitting up in bed  Skin Protection:   adhesive use limited   transparent dressing maintained   tubing/devices free from skin contact  Intervention: Prevent and Manage VTE (Venous Thromboembolism) Risk  Description: Assess for VTE (venous  thromboembolism) risk.  Encourage and assist with early ambulation.  Initiate and maintain compression or other therapy, as indicated, based on identified risk in accordance with organizational protocol and provider order.  Encourage both active and passive leg exercises while in bed, if unable to ambulate.  Recent Flowsheet Documentation  Taken 12/26/2023 0000 by Betty Navarro, RN  Activity Management:   activity encouraged   ambulated to bathroom   back to bed  Taken 12/25/2023 2000 by Betty Navarro RN  Activity Management:   activity encouraged   ambulated to bathroom   back to bed  Goal: Optimal Comfort and Wellbeing  Outcome: Ongoing, Progressing  Intervention: Provide Person-Centered Care  Description: Use a family-focused approach to care.  Develop trust and rapport by proactively providing information, encouraging questions, addressing concerns and offering reassurance.  Acknowledge emotional response to hospitalization.  Recognize and utilize personal coping strategies.  Honor spiritual and cultural preferences.  Recent Flowsheet Documentation  Taken 12/26/2023 0000 by Betty Navarro RN  Trust Relationship/Rapport:   care explained   choices provided   questions encouraged   thoughts/feelings acknowledged   reassurance provided   questions answered   empathic listening provided  Taken 12/25/2023 2000 by Betty Navarro RN  Trust Relationship/Rapport:   care explained   choices provided   questions answered   questions encouraged   thoughts/feelings acknowledged   reassurance provided   empathic listening provided  Goal: Readiness for Transition of Care  Outcome: Ongoing, Progressing     Problem: Fall Injury Risk  Goal: Absence of Fall and Fall-Related Injury  Outcome: Ongoing, Progressing  Intervention: Identify and Manage Contributors  Description: Develop a fall prevention plan with the patient and caregiver/family.  Provide reorientation, appropriate sensory stimulation and routines with  changes in mental status to decrease risk of fall.  Promote use of personal vision and auditory aids.  Assess assistance level required for safe and effective self-care; provide support as needed, such as toileting, mobilization. For age 65 and older, implement timed toileting with assistance.  Encourage physical activity, such as performance of mobility and self-care at highest level of patient ability, multicomponent exercise program and provision of appropriate assistive devices.  If fall occurs, assess the severity of injury; implement fall injury protocol. Determine the cause and revise fall injury prevention plan.  Regularly review medication contribution to fall risk; adjust medication administration times to minimize risk of falling.  Consider risk related to polypharmacy and age.  Balance adequate pain management with potential for oversedation.  Recent Flowsheet Documentation  Taken 12/26/2023 0600 by Betty Navarro RN  Medication Review/Management:   medications reviewed   high-risk medications identified  Taken 12/26/2023 0400 by Betty Navarro RN  Medication Review/Management: medications reviewed  Taken 12/26/2023 0200 by Betty Navarro RN  Medication Review/Management:   medications reviewed   high-risk medications identified  Taken 12/26/2023 0000 by Betty Navarro RN  Medication Review/Management:   medications reviewed   high-risk medications identified  Self-Care Promotion:   independence encouraged   BADL personal objects within reach   safe use of adaptive equipment encouraged  Taken 12/25/2023 2200 by Betty Navarro, RN  Medication Review/Management: medications reviewed  Taken 12/25/2023 2000 by Betty Navarro, RN  Medication Review/Management:   medications reviewed   high-risk medications identified  Self-Care Promotion:   independence encouraged   BADL personal objects within reach   safe use of adaptive equipment encouraged  Intervention: Promote Injury-Free  Environment  Description: Provide a safe, barrier-free environment that encourages independent activity.  Keep care area uncluttered and well-lighted.  Determine need for increased observation or monitoring.  Avoid use of devices that minimize mobility, such as restraints or indwelling urinary catheter.  Recent Flowsheet Documentation  Taken 12/26/2023 0600 by Betty Navarro RN  Safety Promotion/Fall Prevention: safety round/check completed  Taken 12/26/2023 0400 by Betty Navarro RN  Safety Promotion/Fall Prevention: safety round/check completed  Taken 12/26/2023 0200 by Betty Navarro RN  Safety Promotion/Fall Prevention: safety round/check completed  Taken 12/26/2023 0000 by Betty Navarro RN  Safety Promotion/Fall Prevention: safety round/check completed  Taken 12/25/2023 2200 by Betty Navarro RN  Safety Promotion/Fall Prevention: safety round/check completed  Taken 12/25/2023 2000 by Betty Navarro RN  Safety Promotion/Fall Prevention: safety round/check completed     Problem: Adjustment to Illness (Sepsis/Septic Shock)  Goal: Optimal Coping  Outcome: Ongoing, Progressing  Intervention: Optimize Psychosocial Adjustment to Illness  Description: Acknowledge, normalize, validate intensity and complexity of patient and support system response to situation.  Provide opportunity for expression of thoughts, feelings and concerns; respond with compassion and reassurance.  Decrease stress and anxiety by providing information about patient’s status and treatment.  Facilitate support system presence and participation in care; consider providing a diary in intensive care situation.  Support coping by recognizing current coping strategies; provide aid in developing new strategies.  Acknowledge and normalize difficulty in managing life-long lifestyle changes and expectations.  Assess and monitor for signs and symptoms of psychologic distress, anxiety and depression.  Consider palliative care consult for  goals of care conversation, if the condition is worsening despite treatment.  Recent Flowsheet Documentation  Taken 12/26/2023 0000 by Betty Navarro RN  Supportive Measures:   active listening utilized   decision-making supported  Taken 12/25/2023 2000 by Betty Navarro RN  Supportive Measures:   active listening utilized   verbalization of feelings encouraged     Problem: Infection Progression (Sepsis/Septic Shock)  Goal: Absence of Infection Signs and Symptoms  Outcome: Ongoing, Progressing  Intervention: Initiate Sepsis Management  Description: Provide fluid therapy, such as crystalloid or albumin, to increase intravascular volume, organ perfusion and oxygen delivery.  Provide respiratory support, such as oxygen therapy, noninvasive or invasive positive pressure ventilation, to achieve oxygenation and ventilation goal; avoid hyperoxemia.  Obtain cultures prior to initiating antimicrobial therapy when possible. Do not delay for laboratory results in the presence of high suspicion or clinical indicators.  Administer intravenous broad-spectrum antimicrobial therapy promptly.  Implement hemodynamic monitoring to guide intravascular support based on individual targeted parameters.  Determine and address underlying source of infection aggressively; implement transmission-based precautions and isolation, as indicated.  Recent Flowsheet Documentation  Taken 12/26/2023 0000 by Betty Navarro RN  Stabilization Measures: legs elevated  Infection Management: aseptic technique maintained  Taken 12/25/2023 2000 by Betty Navarro RN  Infection Management: aseptic technique maintained  Intervention: Promote Recovery  Description: Encourage pulmonary hygiene, such as cough-enhancement and airway-clearance techniques, that may include use of incentive spirometry, deep breathing and cough.  Encourage early rehabilitation and physical activity to optimize functional ability and activity tolerance, as well as minimize  delirium.  Promote energy conservation; minimize oxygen demand and consumption by adjusting environment, decreasing stimulation, maintaining normothermia and treating pain.  Optimize fluid balance, nutrition intake, sleep and glycemic control to maintain tissue perfusion and enhance immune response.  Recent Flowsheet Documentation  Taken 12/26/2023 0000 by Betty Navarro, RN  Activity Management:   activity encouraged   ambulated to bathroom   back to bed  Sleep/Rest Enhancement:   awakenings minimized   consistent schedule promoted   room darkened  Taken 12/25/2023 2000 by Betty Navarro RN  Activity Management:   activity encouraged   ambulated to bathroom   back to bed  Sleep/Rest Enhancement:   awakenings minimized   consistent schedule promoted   room darkened  Intervention: Promote Stabilization  Description: Monitor for signs of fluid responsiveness and overload; consider fluid adjustment and diuretic therapy.  Anticipate use of vasoactive agent to support microperfusion and oxygen delivery; titrate to response.  Monitor laboratory value, diagnostic test and clinical status trends for signs of infection progression and multiple organ failure.  Assess effectiveness of and potential for de-escalation of the antimicrobial regimen daily.  Provide fever-reduction and comfort measures.  Monitor and manage electrolyte imbalance, such as hypocalcemia.  Use lung protective ventilation measures, such as low volume, inspiratory pressure, optimal positive end-expiratory pressure, to minimize the risk of ventilator-induced lung injury; ensure minute volume demands.  Prepare for supportive therapy, such as corticosteroid therapy, coagulopathy management, CRRT (continuous renal replacement therapy), hemofiltration and cardiac-assist device.  Recent Flowsheet Documentation  Taken 12/26/2023 0000 by Betty Navarro, RN  Fluid/Electrolyte Management: fluids provided  Taken 12/25/2023 2000 by Betty Navarro,  RN  Fluid/Electrolyte Management: fluids provided     Problem: Skin Injury Risk Increased  Goal: Skin Health and Integrity  Outcome: Ongoing, Progressing  Intervention: Promote and Optimize Oral Intake  Description: Perform a nutrition assessment; include a nutrition-focused physical exam.  Determine calorie, protein, vitamin, mineral and fluid requirements.  Assess for micronutrient deficiencies; supplement if depleted.  Assess need and assist with meal set-up and feeding.  Adjust diet or meal schedule based on preferences and tolerance.  Offer oral supplemental food or drinks to enhance calorie and protein intake.  Establish bowel elimination program to increase comfort and appetite.  Minimize unnecessary dietary restrictions to increase oral intake.  Provide and encourage oral hygiene to enhance desire to eat.  Consider enteral nutrition support if oral intake remains inadequate; provide parenteral nutrition if enteral is contraindicated.  Recent Flowsheet Documentation  Taken 12/26/2023 0000 by Betty Navarro RN  Oral Nutrition Promotion:   rest periods promoted   safe use of adaptive equipment encouraged  Taken 12/25/2023 2000 by Betty Navarro RN  Oral Nutrition Promotion:   rest periods promoted   safe use of adaptive equipment encouraged  Intervention: Optimize Skin Protection  Description: Perform a full pressure injury risk assessment, as indicated by screening, upon admission to care unit.  Reassess skin (injury risk, full inspection) frequently (e.g., scheduled interval, with change in condition) to provide optimal early detection and prevention.  Maintain adequate tissue perfusion (e.g., encourage fluid balance; avoid crossing legs, constrictive clothing or devices) to promote tissue oxygenation.  Maintain head of bed at lowest degree of elevation tolerated, considering medical condition and other restrictions.  Avoid positioning onto an area that remains reddened.  Minimize incontinence and  moisture (e.g., toileting schedule; moisture-wicking pad, diaper or incontinence collection device; skin moisture barrier).  Cleanse skin promptly and gently when soiled utilizing a pH-balanced cleanser.  Relieve and redistribute pressure (e.g., scheduled position changes, weight shifts, use of support surface, medical device repositioning, protective dressing application, use of positioning device, microclimate control, use of pressure-injury-monitor  Encourage increased activity, such as sitting in a chair at the bedside or early mobilization, when able to tolerate.  Recent Flowsheet Documentation  Taken 12/26/2023 0600 by Betty Navarro RN  Head of Bed ([facility]) Positioning: HOB elevated  Taken 12/26/2023 0400 by Betty Navarro RN  Head of Bed ([facility]) Positioning: HOB elevated  Taken 12/26/2023 0200 by Betty Navarro RN  Head of Bed ([facility]) Positioning: HOB at 20-30 degrees  Taken 12/26/2023 0000 by Betty Navarro RN  Pressure Reduction Techniques: frequent weight shift encouraged  Head of Bed ([facility]) Positioning: [facility] elevated  Pressure Reduction Devices:   positioning supports utilized   pressure-redistributing mattress utilized  Skin Protection:   adhesive use limited   incontinence pads utilized   tubing/devices free from skin contact   transparent dressing maintained  Taken 12/25/2023 2200 by Betty Navarro RN  Head of Bed ([facility]) Positioning: HOB at 30-45 degrees  Taken 12/25/2023 2000 by Betty Navarro RN  Pressure Reduction Techniques: frequent weight shift encouraged  Head of Bed ([facility]) Positioning: [facility] elevated  Pressure Reduction Devices:   positioning supports utilized   pressure-redistributing mattress utilized  Skin Protection:   adhesive use limited   transparent dressing maintained   tubing/devices free from skin contact

## 2023-12-26 NOTE — CONSULTS
"Nutrition Services    Patient Name:  Lisa Gibson  YOB: 1990  MRN: [id number]  Admit Date:  12/22/2023  Assessment Date:  12/26/23    Summary: Nutrition screen per RN admission screen for decrease po intake   This is a 34 yo female who was admitted for N/V, abd pain and fever.  She has a hx of ETOH abuse  Labs k 3.2, phos 1.7  Meds: Folic acid, thiamine, IVF's at 75  BM 12/25  Pt now tolerating diet--Po 75%  BMI 18.15, wt overall stable.  Visited pt and discussed food preferences.    RD to follow    CLINICAL NUTRITION ASSESSMENT      Reason for Assessment MST score 2+, Nurse Admission Screen     Diagnosis/Problem   Pancreatitis/hepatitis, UTI, ETOH abuse, alcoholic ketoacidosis   Medical/Surgical History History reviewed. No pertinent past medical history.    No past surgical history on file.     Anthropometrics        Current Height  Current Weight  BMI kg/m2 Height: 175.3 cm (69\")  Weight: 55.7 kg (122 lb 14.4 oz) (12/26/23 0500)  Body mass index is 18.15 kg/m².   Adjusted BMI (if applicable)    BMI Category Underweight (18.4 or below)   Ideal Body Weight (IBW) 145lb   Usual Body Weight (UBW) 120-130   Weight Trend Stable   Weight History Wt Readings from Last 30 Encounters:   12/26/23 0500 55.7 kg (122 lb 14.4 oz)   12/25/23 0532 55.2 kg (121 lb 9.6 oz)   12/22/23 2300 53.9 kg (118 lb 13.3 oz)   12/22/23 1814 59 kg (130 lb)   10/27/23 0812 61.2 kg (135 lb)   08/07/23 0615 57.6 kg (126 lb 15.8 oz)   08/07/23 0121 54.4 kg (120 lb)   06/01/23 0746 52.2 kg (115 lb)   05/02/23 0500 52.6 kg (115 lb 15.4 oz)   05/01/23 0530 53.9 kg (118 lb 13.3 oz)   04/30/23 0404 59.4 kg (130 lb 15.3 oz)   04/27/23 2244 59.4 kg (130 lb 15.3 oz)   04/27/23 2001 56.7 kg (125 lb)   09/22/21 0035 66.7 kg (147 lb 1.6 oz)   09/21/21 2047 68 kg (150 lb)   07/26/21 1747 72.6 kg (160 lb)      --  Labs       Pertinent Labs    Results from last 7 days   Lab Units 12/25/23  0146 12/24/23  1008 12/23/23  1556 12/23/23  0339 " 12/23/23 0035 12/22/23  1825   SODIUM mmol/L 141 139 139   < > 141 132*   POTASSIUM mmol/L 3.2* 2.7* 4.3   < > 3.9 4.9   CHLORIDE mmol/L 102 104 103   < > 102 85*   CO2 mmol/L 26.0 22.0 20.6*   < > 10.0* 6.5*   BUN mg/dL <2* <2* 4*   < > 11 15   CREATININE mg/dL 0.44* 0.41* 0.66   < > 0.82 1.15*   CALCIUM mg/dL 8.4* 7.6* 8.6   < > 7.7* 9.1   BILIRUBIN mg/dL  --   --   --   --  1.6* 1.6*   ALK PHOS U/L  --   --   --   --  154* 212*   ALT (SGPT) U/L  --   --   --   --  37* 54*   AST (SGOT) U/L  --   --   --   --  189* 311*   GLUCOSE mg/dL 151* 83 165*   < > 175* 282*    < > = values in this interval not displayed.     Results from last 7 days   Lab Units 12/25/23  0146 12/24/23  1008 12/23/23  1411 12/23/23  1204 12/23/23  0339 12/23/23  0035   MAGNESIUM mg/dL 2.9* 1.5*  --  1.8   < > 1.8   PHOSPHORUS mg/dL 1.7* 1.3*   < >  --    < > 0.9*   HEMOGLOBIN g/dL 9.1*  --   --   --   --  9.5*   HEMATOCRIT % 26.8*  --   --   --   --  30.0*   WBC 10*3/mm3 3.92  --   --   --   --  5.47   ALBUMIN g/dL  --   --   --   --   --  4.1    < > = values in this interval not displayed.     Results from last 7 days   Lab Units 12/25/23  0146 12/23/23  0035 12/22/23 2222 12/22/23  1825   INR   --   --  1.31*  --    PLATELETS 10*3/mm3 98* 106*  --  162     COVID19   Date Value Ref Range Status   04/27/2023 Not Detected Not Detected - Ref. Range Final     Lab Results   Component Value Date    HGBA1C 5.90 (H) 12/23/2023          Medications           Scheduled Medications cefTRIAXone, 2,000 mg, Intravenous, Q24H  folic acid 1 mg in sodium chloride 0.9 % 50 mL IVPB, 1 mg, Intravenous, Daily  insulin glargine, 10 Units, Subcutaneous, Daily  insulin lispro, 3-14 Units, Subcutaneous, 4x Daily AC & at Bedtime  pantoprazole, 40 mg, Intravenous, Q12H  senna-docusate sodium, 2 tablet, Oral, BID  sodium chloride, 10 mL, Intravenous, Q12H  thiamine (B-1) IV, 200 mg, Intravenous, Q8H   Followed by  [START ON 12/30/2023] thiamine, 100 mg, Oral,  Daily       Infusions sodium chloride, 75 mL/hr, Last Rate: 75 mL/hr (12/26/23 0118)       PRN Medications   senna-docusate sodium **AND** polyethylene glycol **AND** bisacodyl **AND** bisacodyl    Calcium Replacement - Follow Nurse / BPA Driven Protocol    dextrose    dextrose    glucagon (human recombinant)    LORazepam **OR** midazolam **OR** LORazepam **OR** midazolam **OR** midazolam **OR** midazolam **OR** LORazepam **OR** midazolam    Magnesium Standard Dose Replacement - Follow Nurse / BPA Driven Protocol    Magnesium Standard Dose Replacement - Follow Nurse / BPA Driven Protocol    nitroglycerin    ondansetron    Phosphorus Replacement - Follow Nurse / BPA Driven Protocol    Potassium Replacement - Follow Nurse / BPA Driven Protocol    sodium chloride    sodium chloride     Physical Findings          General Findings alert, flat affect, oriented   Oral/Mouth Cavity poor dentition   Edema  no edema   Gastrointestinal last bowel movement: 12/25   Skin  skin intact   Tubes/Drains/Lines none   NFPE No clinical signs of muscle wasting or fat loss   --  Current Nutrition Orders & Evaluation of Intake       Oral Nutrition     Food Allergies NKFA   Current PO Diet Diet: Gastrointestinal Diets, Vegetarian; Lacto Vegetarian (Allows dairy); Fiber-Restricted; Texture: Regular Texture (IDDSI 7); Fluid Consistency: Thin (IDDSI 0)   Supplement n/a   PO Evaluation     % PO Intake 75%    Factors Affecting Intake: abdominal pain, nausea, vomiting   --  PES STATEMENT / NUTRITION DIAGNOSIS      Nutrition Dx Problem  Problem: Inadequate Oral Intake  Etiology: Factors Affecting Nutrition - N/V, abd pain    Signs/Symptoms: PO intake and Report/Observation     NUTRITION INTERVENTION / PLAN OF CARE      Intervention Goal(s) Reduce/improve symptoms, Disease management/therapy, Tolerate PO , Maintain weight, and PO intake goal %: 75+         RD Intervention/Action Interview for preferences and Continue to monitor   --       Prescription/Orders:       PO Diet       Supplements       Enteral Nutrition       Parenteral Nutrition    New Prescription Ordered? Continue same per protocol   --      Monitor/Evaluation Per protocol   Discharge Plan/Needs Pending clinical course   --    RD to follow per protocol.      Electronically signed by:  Annette Yates RD  12/26/23 07:53 EST

## 2023-12-26 NOTE — CASE MANAGEMENT/SOCIAL WORK
Discharge Planning Assessment  [facility]     Patient Name: Lisa Gibson  MRN: [id number]  Today's Date: 12/26/2023    Admit Date: 12/22/2023    Plan: Home with sig other and 6 yr old daughter. [facility] treatment options provided by access. Sig other to transport. New PCP appointment setup for Artur 3       Discharge Plan       Row Name 12/26/23 1615       Plan    Plan Home with sig other and 6 yr old daughter. [facility] treatment options provided by access. Sig other to transport. New PCP appointment setup for Artur 3    Patient/Family in Agreement with Plan yes    Plan Comments Met with pt. at bedside. Explained roll of . Face sheet and pharmacy verified. Pt lives in an apartment with her sig other and 6 yr old daughter.  Denies any home DME.  Pt is independent with ADLs. Pt has never been to Rehab or used HH. Pt's does not have a PCP. She is agreeable to CCP setting up PCP. New PCP appointment setup with Gayatri Hopkins MD on Artur 3, 2024. Pt enrolled in meds to bed. Pt normally drives. At discharge, family will transport. Access has seen and provided [facility] treatment options for ETOH. Explained that CCP would follow to assess for discharge needs.  Ean Sherman RN-BC    Final Discharge Disposition Code 01 - home or self-care    Final Note Home with sig other and 6 yr old daughter. [facility] treatment options provided by access. Sig other to transport. New PCP appointment setup for Artur 3                  Continued Care and Services - Admitted Since 12/22/2023    Coordination has not been started for this encounter.       Expected Discharge Date and Time       Expected Discharge Date Expected Discharge Time    Dec 26, 2023                 Ean Sherman RN

## 2023-12-26 NOTE — PAYOR COMM NOTE
"Lisa Talley (33 y.o. Female)        PLEASE SEE ATTACHED FOR INPT CONTINUED STAY AUTH.  [facility] IS DRG WITH [employer].    AUTH#[id number]    PLEASE CALL  OR  [phone]    THANK YOU    AZAM ALLISON LPN CCP   Date of Birth   1990    Social Security Number       Address   [address] [place] [address]   [place] [address]    Home Phone   [phone]    MRN   [id number]       Taoism   None    Marital Status                               Admission Date   12/22/23    Admission Type   Emergency    Admitting Provider   Varun Hartmann Jr., MD    Attending Provider   Varun Hartmann Jr., MD    Department, Room/Bed   [address], E454/1       Discharge Date       Discharge Disposition       Discharge Destination                                 Attending Provider: Varun Hartmann Jr., MD    Allergies: No Known Allergies    Isolation: None   Infection: None   Code Status: CPR    Ht: 175.3 cm (69\")   Wt: 55.7 kg (122 lb 14.4 oz)    Admission Cmt: None   Principal Problem: Alcoholic ketoacidosis [E87.29]                   Active Insurance as of 12/22/2023       Primary Coverage       Payor Plan Insurance Group Employer/Plan Group    [employer] [place] [id number]       Payor Plan Address Payor Plan Phone Number Payor Plan Fax Number Effective Dates    [address] [phone]  1/1/2021 - None Entered    [facility] [id number]         Subscriber Name Subscriber Birth Date Member ID       LISA TALLEY 1990 [id number]                     Emergency Contacts        (Rel.) Home Phone Work Phone Mobile Phone    Diana Shelton (Mother) -- -- [phone]              [place]: NPI [id number]  Tax ID [id number]  Oxygen Therapy (last 2 days)       Date/Time SpO2 Device (Oxygen Therapy) Flow (L/min) Oxygen Concentration (%) ETCO2 (mmHg)    12/25/23 2355 99 room air -- -- --    12/25/23 2015 95 -- -- -- --    12/25/23 2010 100 -- -- -- --    " 12/25/23 2005 98 room air -- -- --    12/25/23 1450 -- room air -- -- --    12/25/23 1331 -- room air -- -- --    12/25/23 1330 -- room air -- -- --    12/25/23 1000 -- room air -- -- --    12/25/23 0719 -- room air -- -- --    12/24/23 2300 100 -- -- -- --    12/24/23 2004 99 -- -- -- --    12/24/23 1351 -- room air -- -- --    12/24/23 1337 99 room air -- -- --    12/24/23 1230 97 -- -- -- --    12/24/23 1200 97 -- -- -- --    12/24/23 1128 98 room air -- -- --    12/24/23 1100 98 -- -- -- --    12/24/23 0900 95 -- -- -- --    12/24/23 0800 96 -- -- -- --    12/24/23 0730 -- room air -- -- --    12/24/23 0700 98 -- -- -- --    12/24/23 0500 98 -- -- -- --    12/24/23 0300 98 -- -- -- --    12/24/23 0200 98 -- -- -- --    12/24/23 0100 97 -- -- -- --    12/24/23 0000 99 -- -- -- --          Intake & Output (last 2 days)         12/24 0701 12/25 0700 12/25 0701 12/26 0700 12/26 0701 12/27 0700    P.O. 720 480     I.V. (mL/kg)       IV Piggyback       Total Intake(mL/kg) 720 (13) 480 (8.6)     Urine (mL/kg/hr) 3700 (2.8) 1800 (1.3) 300 (4)    Stool  0     Total Output 3700 1800 300    Net -2980 -1320 -300           Urine Unmeasured Occurrence  3 x     Stool Unmeasured Occurrence  1 x           Lines, Drains & Airways       Active LDAs       Name Placement date Placement time Site Days    Peripheral IV 12/22/23 1843 Right Antecubital 12/22/23  1843  Antecubital  3    Peripheral IV 12/22/23 2224 Left Antecubital 12/22/23  2224  Antecubital  3    Peripheral IV 12/23/23 0900 Right;Posterior Forearm 12/23/23  0900  Forearm  2                  CIWA (last 2 days)       Date/Time CIWA-Ar Score    12/26/23 0000 0    12/25/23 2000 0    12/25/23 1450 0    12/25/23 1000 0    12/24/23 2000 0    12/24/23 1958 0    12/24/23 1351 4          Medication Administration Report for Lisa Gibson as of 12/26/23 0822     Legend:    Given Hold Not Given Due Canceled Entry Other Actions    Time Time (Time) Time Time-Action          "Discontinued     Completed     Future     MAR Hold     Linked             Medications 12/25/23 12/26/23      sennosides-docusate (PERICOLACE) 8.6-50 MG per tablet 2 tablet  Dose: 2 tablet  Freq: 2 Times Daily Route: PO  Start: 12/22/23 2236   Admin Instructions:   HOLD MEDICATION IF PATIENT HAS HAD BOWEL MOVEMENT. Start bowel management regimen if patient has not had a bowel movement after 12 hours.    (0958)-Not Given     (2059)-Not Given           0900     2100             And  polyethylene glycol (MIRALAX) packet 17 g  Dose: 17 g  Freq: Daily PRN Route: PO  PRN Reason: Constipation  PRN Comment: Use if senna-docusate is ineffective  Start: 12/22/23 2212   Admin Instructions:   Use if no bowel movement after 12 hours. Mix in 6-8 ounces of water.  Use 4-8 ounces of water, tea, or juice for each 17 gram dose.        And  bisacodyl (DULCOLAX) EC tablet 5 mg  Dose: 5 mg  Freq: Daily PRN Route: PO  PRN Reason: Constipation  PRN Comment: Use if polyethylene glycol is ineffective  Start: 12/22/23 2212   Admin Instructions:   Use if no bowel movement after 12 hours.  Swallow whole. Do not crush, split, or chew tablet.        And  bisacodyl (DULCOLAX) suppository 10 mg  Dose: 10 mg  Freq: Daily PRN Route: RE  PRN Reason: Constipation  PRN Comment: Use if bisacodyl oral is ineffective  Start: 12/22/23 2212   Admin Instructions:   Use if no bowel movement after 12 hours.  Hold for diarrhea         Calcium Replacement - Follow Nurse / BPA Driven Protocol  Freq: As Needed Route: XX  PRN Reason: Other  Start: 12/22/23 2217   Admin Instructions:   Open Order & Select \"[facility] Electrolyte Replacement Protocol Algorithm\" to View Details         cefTRIAXone (ROCEPHIN) 2,000 mg in sodium chloride 0.9 % 100 mL IVPB-VTB  Dose: 2,000 mg  Freq: Every 24 Hours Route: IV  Indications of Use: URINARY TRACT INFECTION  Start: 12/23/23 0100   End: 12/28/23 0059   Admin Instructions:   LR should be paused and flushing of the line with NS is " recommended prior to and after completion of ceftriaxone infusion due to incompatibility. Do not co-adminster with calcium-containing solutions.  Caution: Look alike/sound alike drug alert    0026-New Bag            0118-New Bag               dextrose (D50W) (25 g/50 mL) IV injection 25 g  Dose: 25 g  Freq: Every 15 Minutes PRN Route: IV  PRN Reason: Low Blood Sugar  PRN Comment: Blood Sugar Less Than 70  Start: 12/23/23 1538   Admin Instructions:   Blood sugar less than 70; patient has IV access - Unresponsive, NPO or Unable To Safely Swallow         dextrose (GLUTOSE) oral gel 15 g  Dose: 15 g  Freq: Every 15 Minutes PRN Route: PO  PRN Reason: Low Blood Sugar  PRN Comment: Blood sugar less than 70  Start: 12/23/23 1538   Admin Instructions:   BS<70, Patient Alert, Is not NPO, Can safely swallow.         folic acid 1 mg in sodium chloride 0.9 % 50 mL IVPB  Dose: 1 mg  Freq: Daily Route: IV  Start: 12/23/23 0900   Admin Instructions:   Protect from light.    1004-New Bag            0900               glucagon (GLUCAGEN) injection 1 mg  Dose: 1 mg  Freq: Every 15 Minutes PRN Route: IM  PRN Reason: Low Blood Sugar  PRN Comment: Blood Glucose Less Than 70  Start: 12/23/23 1538   Admin Instructions:   Blood Glucose Less Than 70 - Patient Without IV Access - Unresponsive, NPO or Unable To Safely Swallow  Reconstitute powder for injection by adding 1 mL of -supplied sterile diluent or sterile water for injection to a vial containing 1 mg of the drug, to provide solutions containing 1 mg/mL. Shake vial gently to dissolve.         insulin glargine (LANTUS, SEMGLEE) injection 10 Units  Dose: 10 Units  Freq: Daily Route: SC  Start: 12/23/23 1630   Admin Instructions:   Do not hold basal insulin without an order. Consider requesting a dose edit, if needed.      0957-Given            0900               insulin lispro (HUMALOG/ADMELOG) injection 3-14 Units  Dose: 3-14 Units  Freq: 4 Times Daily Before Meals & Nightly  Route: SC  Start: 12/23/23 1730   Admin Instructions:   Correction Insulin - Moderate-High Dose (Total Insulin Dose 60-80 units/day, Patient Taking Insulin at Home)    Blood Glucose 150-199 mg/dL - 3 units  Blood Glucose 200-249 mg/dL - 5 units  Blood Glucose 250-299 mg/dL - 8 units  Blood Glucose 300-349 mg/dL - 10 units  Blood Glucose 350-400 mg/dL - 12 units  Blood Glucose Greater Than 400 mg/dL - 14 units & Call Provider   Caution: Look alike/sound alike drug alert    (0829)-Not Given     (1215)-Not Given     (1714)-Not Given     (2218)-Not Given         (0807)-Not Given     1130 1730     2100            LORazepam (ATIVAN) tablet 1 mg  Dose: 1 mg  Freq: Every 1 Hour PRN Route: PO  PRN Reason: Withdrawal  PRN Comment: For CIWA-Ar 8-10  Start: 12/22/23 2218   End: 12/29/23 2217   Admin Instructions:   Reassess 2 Hours After Administration   Caution: Look alike/sound alike drug alert        Or  midazolam (VERSED) injection 2 mg  Dose: 2 mg  Freq: Every 1 Hour PRN Route: IV  PRN Comment: For CIWA-Ar 8-10  Start: 12/22/23 2218   End: 12/29/23 2217   Admin Instructions:   Reassess 2 Hours After Administration          Or  LORazepam (ATIVAN) tablet 2 mg  Dose: 2 mg  Freq: Every 1 Hour PRN Route: PO  PRN Reason: Withdrawal  PRN Comment: For CIWA-Ar 11-15 or  -160, DBP , or -125  Start: 12/22/23 2218   End: 12/29/23 2217   Admin Instructions:   Reassess 1 Hour After Administration   Caution: Look alike/sound alike drug alert        Or  midazolam (VERSED) injection 4 mg  Dose: 4 mg  Freq: Every 1 Hour PRN Route: IV  PRN Comment: For CIWA-Ar 11-15 or or  -160, DBP , or -125  Start: 12/22/23 2218   End: 12/29/23 2217   Admin Instructions:   Reassess 1 Hour After Administration          Or  midazolam (VERSED) injection 4 mg  Dose: 4 mg  Freq: Every 15 Minutes PRN Route: IV  PRN Comment: For CIWA-Ar Greater Than 15 or SBP >160, DBP >110, or HR >125  Start: 12/22/23 9812   End:  "12/29/23 2217   Admin Instructions:   Reassess 15 Minutes After Each Administration.  If CIWA-Ar Remains Greater Than 15 1 Hour After Administration of 2 IV Doses, Administer LORazepam (ATIVAN) 4 mg oral or midazolam 8 mg IV & Reassess Every Hour          Or  midazolam (VERSED) injection 4 mg  Dose: 4 mg  Freq: Every 15 Minutes PRN Route: IM  PRN Comment: If Unable to Administer IV - For CIWA-Ar Greater Than 15 or SBP >160, DBP >110, or HR >125  Start: 12/22/23 2218   End: 12/29/23 2217   Admin Instructions:   Reassess 15 Minutes After Each Administration.  If CIWA-Ar Remains Greater Than 15 1 Hour After Administration of 2 IV Doses, Administer LORazepam (ATIVAN) 4 mg oral or midazolam 8 mg IV & Reassess Every Hour          Or  LORazepam (ATIVAN) tablet 4 mg  Dose: 4 mg  Freq: Every 1 Hour PRN Route: PO  PRN Reason: Withdrawal  PRN Comment: For CIWA-Ar Greater Than 15 After 2 Doses of midazolam 4 mg IV/IM.  Repeat Dose in 1 Hour if CIWA-Ar Does Not Decrease  Start: 12/22/23 2218   End: 12/29/23 2217   Admin Instructions:   Reassess 1 Hour After Administration   Caution: Look alike/sound alike drug alert        Or  midazolam (VERSED) injection 8 mg  Dose: 8 mg  Freq: Every 1 Hour PRN Route: IV  PRN Comment: For CIWA-Ar Greater Than 15 After 2 Doses of midazolam 4 mg IV/IM.  Repeat Dose in 1 Hour if CIWA-Ar Does Not Decrease  Start: 12/22/23 2218   End: 12/29/23 2217   Admin Instructions:   Reassess 1 Hour After Administration           Magnesium Standard Dose Replacement - Follow Nurse / BPA Driven Protocol  Freq: As Needed Route: XX  PRN Reason: Other  Start: 12/22/23 2220   Admin Instructions:   Open Order & Select \"BHS Electrolyte Replacement Protocol Algorithm\" to View Details         Magnesium Standard Dose Replacement - Follow Nurse / BPA Driven Protocol  Freq: As Needed Route: XX  PRN Reason: Other  Start: 12/22/23 2217   Admin Instructions:   Open Order & Select \"BHS Electrolyte Replacement Protocol " "Algorithm\" to View Details         nitroglycerin (NITROSTAT) SL tablet 0.4 mg  Dose: 0.4 mg  Freq: Every 5 Minutes PRN Route: SL  PRN Reason: Chest Pain  PRN Comment: Only if SBP Greater Than 100  Start: 12/22/23 2212   Admin Instructions:   If Pain Unrelieved After 3 Doses Notify MD  May administer up to 3 doses per episode.         ondansetron (ZOFRAN) injection 4 mg  Dose: 4 mg  Freq: Every 6 Hours PRN Route: IV  PRN Reasons: Nausea,Vomiting  Start: 12/22/23 2218   Admin Instructions:   If BOTH ondansetron (ZOFRAN) & promethazine (PHENERGAN) Ordered, Use ondansetron First & THEN promethazine IF ondansetron Ineffective.         pantoprazole (PROTONIX) injection 40 mg  Dose: 40 mg  Freq: Every 12 Hours Scheduled Route: IV  Indications of Use: GASTROESOPHAGEAL REFLUX DISEASE  Start: 12/22/23 2238   Admin Instructions:   Dilute with 10 mL of 0.9% NaCl and give IV push over 2 minutes.    0957-Given     2059-Given 0900 2100              Phosphorus Replacement - Follow Nurse / BPA Driven Protocol  Freq: As Needed Route: XX  PRN Reason: Other  Start: 12/22/23 2217   Admin Instructions:   Open Order & Select \"BHS Electrolyte Replacement Protocol Algorithm\" to View Details         Potassium Replacement - Follow Nurse / BPA Driven Protocol  Freq: As Needed Route: XX  PRN Reason: Other  Start: 12/22/23 2217   Admin Instructions:   Open Order & Select \"BHS Electrolyte Replacement Protocol Algorithm\" to View Details         sodium chloride 0.9 % flush 10 mL  Dose: 10 mL  Freq: As Needed Route: IV  PRN Reason: Line Care  Start: 12/22/23 2212         sodium chloride 0.9 % flush 10 mL  Dose: 10 mL  Freq: Every 12 Hours Scheduled Route: IV  Start: 12/22/23 2236 0958-Given     2059-Given 0900 2100              sodium chloride 0.9 % infusion  Rate: 75 mL/hr Dose: 75 mL/hr  Freq: Continuous Route: IV  Start: 12/23/23 1730    1005-New Bag            0118-New Bag               sodium chloride 0.9 % " "infusion 40 mL  Dose: 40 mL  Freq: As Needed Route: IV  PRN Reason: Line Care  Start: 12/22/23 2212   Admin Instructions:   Following administration of an IV intermittent medication, flush line with 40mL NS at 100mL/hr.         thiamine (B-1) 500 mg in sodium chloride 0.9 % 100 mL IVPB  Dose: 500 mg  Freq: Every 8 Hours Scheduled Route: IV  Start: 12/22/23 2236   End: 12/25/23 1646   Admin Instructions:   Doses of up to 250mg, give over 1-2 minutes (IV push). Doses 250mg and above, give over 30 minutes.    0514-New Bag     1616-New Bag              Followed by  thiamine (B-1) injection 200 mg  Dose: 200 mg  Freq: Every 8 Hours Scheduled Route: IV  Start: 12/25/23 2200   End: 12/29/23 2159   Admin Instructions:   Doses of up to 250mg, give over 1-2 minutes (IV push). Doses 250mg and above, give over 30 minutes.    2100-Given            0522-Given     1400     2200            Followed by  thiamine (VITAMIN B-1) tablet 100 mg  Dose: 100 mg  Freq: Daily Route: PO  Start: 12/30/23 0900        Completed Medications  Medications 12/25/23 12/26/23       diphenhydrAMINE (BENADRYL) capsule 25 mg  Dose: 25 mg  Freq: Once Route: PO  Start: 12/23/23 2315   End: 12/23/23 2229   Admin Instructions:   Caution: Look alike/sound alike drug alert. This med may be ordered in other forms and routes. Before giving verify the last time the drug was given by any route/form.         droperidol (INAPSINE) injection 2.5 mg  Dose: 2.5 mg  Freq: Once Route: IV  Start: 12/22/23 2039   End: 12/22/23 2028         iopamidol (ISOVUE-300) 61 % injection 100 mL  Dose: 100 mL  Freq: Once in Imaging Route: IV  Start: 12/22/23 2031   End: 12/22/23 2015         magnesium sulfate 2g/50 mL (PREMIX) infusion  Dose: 2 g  Freq: Every 2 Hours Route: IV  Start: 12/24/23 1430   End: 12/24/23 2038         ondansetron (ZOFRAN) injection 4 mg  Dose: 4 mg  Freq: Once Route: IV  Start: 12/22/23 1928   End: 12/22/23 1929   Admin Instructions:   \"If multiple N/V " "medications ordered, use in the following order: Ondansetron, Prochlorperazine, Promethazine. Use PO unless patient refuses or patient unable to swallow.\"         ondansetron (ZOFRAN) injection 4 mg  Dose: 4 mg  Freq: Once Route: IV  Start: 12/22/23 1844   End: 12/22/23 1843   Admin Instructions:   \"If multiple N/V medications ordered, use in the following order: Ondansetron, Prochlorperazine, Promethazine. Use PO unless patient refuses or patient unable to swallow.\"         potassium & sodium phosphates (PHOS-NAK) 280-160-250 MG packet 2 packet  Dose: 2 packet  Freq: Once Route: PO  Start: 12/25/23 0515   End: 12/25/23 0517   Admin Instructions:   Take with full glass of water    0517-Given                potassium chloride (KLOR-CON) packet 40 mEq  Dose: 40 mEq  Freq: Every 4 Hours Route: PO  Start: 12/25/23 0515   End: 12/25/23 0957   Admin Instructions:   For use with a feeding tube. Mix in at least 4 oz. of liquid.    0517-Given     0957-Given               potassium chloride 10 mEq in 100 mL IVPB  Dose: 10 mEq  Freq: Every 1 Hour Route: IV  Start: 12/24/23 1430   End: 12/24/23 2042   Admin Instructions:   OUTPATIENT/NON-MONITORED UNITS: Potassium Chloride standard bolus infusion rate is a maximum of 10 mEq/hr on unmonitored patients    MONITORED UNITS: Potassium Chloride standard bolus infusion rate is a maximum of 20 mEq/hr on ECG monitored patients ONLY         potassium phosphate 15 mmol in 0.9% normal saline 250 mL IVPB  Dose: 15 mmol  Freq: Once Route: IV  Start: 12/24/23 1315   End: 12/24/23 2038         potassium phosphate 15 mmol in 0.9% normal saline 250 mL IVPB  Dose: 15 mmol  Freq: Every 3 Hours Route: IV  Start: 12/23/23 0545   End: 12/23/23 1656         sodium chloride 0.9 % bolus 1,000 mL  Dose: 1,000 mL  Freq: Once Route: IV  Start: 12/22/23 2236   End: 12/23/23 0012         sodium chloride 0.9 % bolus 1,000 mL  Dose: 1,000 mL  Freq: Once Route: IV  Start: 12/22/23 2039   End: 12/22/23 2217      "    sodium chloride 0.9 % bolus 1,000 mL  Dose: 1,000 mL  Freq: Once Route: IV  Start: 12/22/23 1829   End: 12/22/23 2029        Discontinued Medications  Medications 12/25/23 12/26/23       dextrose (D50W) (25 g/50 mL) IV injection 10-50 mL  Dose: 10-50 mL  Freq: Every 15 Minutes PRN Route: IV  PRN Reason: Low Blood Sugar  PRN Comment: per Glucommander  Start: 12/23/23 0002   End: 12/23/23 1542   Admin Instructions:   Blood Glucose Less Than 70, Patient Has IV Access, Is Unresponsive, NPO, or Unable to Safely Swallow   Recheck Blood Glucose Per Glucommander         dextrose (D50W) (25 g/50 mL) IV injection 25 g  Dose: 25 g  Freq: Every 15 Minutes PRN Route: IV  PRN Reason: Low Blood Sugar  PRN Comment: Blood Sugar Less Than 70  Start: 12/22/23 2227   End: 12/23/23 0003   Admin Instructions:   Blood sugar less than 70; patient has IV access - Unresponsive, NPO or Unable To Safely Swallow         dextrose (GLUTOSE) oral gel 15 g  Dose: 15 g  Freq: Every 15 Minutes PRN Route: PO  PRN Reason: Low Blood Sugar  PRN Comment: per Glucommander  Start: 12/23/23 0002   End: 12/23/23 1542   Admin Instructions:   Blood Glucose Less Than 70, Patient Alert, Not NPO & Can Safely Swallow    Recheck Blood Glucose Per Glucommander         dextrose (GLUTOSE) oral gel 15 g  Dose: 15 g  Freq: Every 15 Minutes PRN Route: PO  PRN Reason: Low Blood Sugar  PRN Comment: Blood sugar less than 70  Start: 12/22/23 2227   End: 12/23/23 0003   Admin Instructions:   BS<70, Patient Alert, Is not NPO, Can safely swallow.         dextrose 5 % and sodium chloride 0.45 % infusion  Rate: 150 mL/hr Dose: 150 mL/hr  Freq: Continuous PRN Route: IV  PRN Comment: For Corrected Sodium 135 or Higher, K > 5.3 & Glucose 250 or Lower  Start: 12/23/23 0002   End: 12/23/23 1542         dextrose 5 % and sodium chloride 0.45 % with KCl 20 mEq/L infusion  Rate: 150 mL/hr Dose: 150 mL/hr  Freq: Continuous Route: IV  Start: 12/23/23 1630   End: 12/23/23 1644          dextrose 5 % and sodium chloride 0.45 % with KCl 20 mEq/L infusion  Rate: 150 mL/hr Dose: 150 mL/hr  Freq: Continuous PRN Route: IV  PRN Comment: For Corrected Sodium 135 or Higher, K 3.3 - 5.3 & Glucose 250 or Lower  Start: 12/23/23 0002   End: 12/23/23 1542         dextrose 5 % and sodium chloride 0.45 % with KCl 40 mEq/L infusion  Rate: 150 mL/hr Dose: 150 mL/hr  Freq: Continuous PRN Route: IV  PRN Comment: For Corrected Sodium 135 or Higher, K < 3.3 & Glucose 250 or Lower  Start: 12/23/23 0002   End: 12/23/23 1542         dextrose 5 % and sodium chloride 0.9 % infusion  Rate: 150 mL/hr Dose: 150 mL/hr  Freq: Continuous PRN Route: IV  PRN Comment: For Corrected Sodium < 135, K > 5.3 & Glucose 250 or Lower  Start: 12/23/23 0002   End: 12/23/23 1542         dextrose 5 % and sodium chloride 0.9 % with KCl 20 mEq/L infusion  Rate: 150 mL/hr Dose: 150 mL/hr  Freq: Continuous PRN Route: IV  PRN Comment: For Corrected Sodium < 135, K 3.3 - 5.3 & Glucose 250 or Lower  Start: 12/23/23 0002   End: 12/23/23 1542         dextrose 5 % and sodium chloride 0.9 % with KCl 40 mEq/L infusion  Rate: 150 mL/hr Dose: 150 mL/hr  Freq: Continuous PRN Route: IV  PRN Comment: For Corrected Sodium < 135, K < 3.3 & Glucose 250 or Lower  Start: 12/23/23 0002   End: 12/23/23 1542         glucagon (GLUCAGEN) injection 1 mg  Dose: 1 mg  Freq: Every 15 Minutes PRN Route: IM  PRN Reason: Low Blood Sugar  PRN Comment: per Glucommander  Start: 12/23/23 0002   End: 12/23/23 1542   Admin Instructions:   Blood Glucose Less Than 70, Patient Does Not Have IV Access, Is Unresponsive, NPO, or Unable to Safely Swallow   Recheck Blood Glucose Per Glucommander  Reconstitute powder for injection by adding 1 mL of -supplied sterile diluent or sterile water for injection to a vial containing 1 mg of the drug, to provide solutions containing 1 mg/mL. Shake vial gently to dissolve.         glucagon (GLUCAGEN) injection 1 mg  Dose: 1 mg  Freq: Every  15 Minutes PRN Route: IM  PRN Reason: Low Blood Sugar  PRN Comment: Blood Glucose Less Than 70  Start: 12/22/23 2227   End: 12/23/23 0003   Admin Instructions:   Blood Glucose Less Than 70 - Patient Without IV Access - Unresponsive, NPO or Unable To Safely Swallow  Reconstitute powder for injection by adding 1 mL of -supplied sterile diluent or sterile water for injection to a vial containing 1 mg of the drug, to provide solutions containing 1 mg/mL. Shake vial gently to dissolve.         heparin (porcine) 5000 UNIT/ML injection 5,000 Units  Dose: 5,000 Units  Freq: Every 12 Hours Scheduled Route: SC  Indications of Use: PROPHYLAXIS OF VENOUS THROMBOEMBOLISM  Start: 12/22/23 2236   End: 12/25/23 1340 0956-Given                insulin regular (humuLIN R,novoLIN R) injection 2-7 Units  Dose: 2-7 Units  Freq: Every 4 Hours Route: SC  Start: 12/22/23 2244   End: 12/23/23 0003   Admin Instructions:   Correction Insulin - Low Dose - Total Insulin Dose Less Than 40 units/day (Lean, Elderly or Renal Patients)    Blood Glucose 150-199 mg/dL - 2 units  Blood Glucose 200-249 mg/dL - 3 units  Blood Glucose 250-299 mg/dL - 4 units  Blood Glucose 300-349 mg/dL - 5 units  Blood Glucose 350-400 mg/dL - 6 units  Blood Glucose Greater Than 400 mg/dL - 7 units & Call Provider   Caution: Look alike/sound alike drug alert         insulin regular 1 unit/mL in 0.9% sodium chloride (Glucommander)  Rate: 0-100 mL/hr Dose: 0-100 Units/hr  Freq: Titrated Route: IV  Indications of Use: DIABETIC KETOACIDOSIS  Start: 12/23/23 0100   End: 12/23/23 1542   Admin Instructions:   Initiate Insulin Drip on Infusion Pump at Rate Determined By Glucommander   Order specific questions:   Initial starting multiplier: 0.01  Target Blood Glucose Range: 120 - 160 mg/dL           midazolam (VERSED) injection 4 mg  Dose: 4 mg  Freq: Every 6 Hours Route: IV  Start: 12/22/23 2234   End: 12/23/23 0959   Admin Instructions:           Followed  by  midazolam (VERSED) injection 2 mg  Dose: 2 mg  Freq: Every 6 Hours Route: IV  Start: 12/23/23 2236   End: 12/23/23 0959   Admin Instructions:            sodium chloride 0.45 % 1,000 mL with potassium chloride 40 mEq infusion  Rate: 250 mL/hr Dose: 250 mL/hr  Freq: Continuous PRN Route: IV  PRN Comment: For Corrected Sodium 135 or Higher, K < 3.3 & Glucose > 250  Start: 12/23/23 0002   End: 12/23/23 1542         sodium chloride 0.45 % infusion  Rate: 250 mL/hr Dose: 250 mL/hr  Freq: Continuous PRN Route: IV  PRN Comment: For Corrected Sodium 135 or Higher, K > 5.3 & Glucose > 250  Start: 12/23/23 0002   End: 12/23/23 1542         sodium chloride 0.45 % with KCl 20 mEq/L infusion  Rate: 250 mL/hr Dose: 250 mL/hr  Freq: Continuous PRN Route: IV  PRN Comment: For Corrected Sodium 135 or Higher, K 3.3 - 5.3 & Glucose > 250  Start: 12/23/23 0002   End: 12/23/23 1542         sodium chloride 0.9 % bolus  Rate: 1,000 mL/hr Dose: 1000 mL/hr  Freq: Continuous Route: IV  Start: 12/23/23 0100   End: 12/23/23 0408         sodium chloride 0.9 % flush 10 mL  Dose: 10 mL  Freq: As Needed Route: IV  PRN Reason: Line Care  Start: 12/23/23 0002   End: 12/23/23 1542         sodium chloride 0.9 % flush 10 mL  Dose: 10 mL  Freq: Every 12 Hours Scheduled Route: IV  Start: 12/23/23 0100   End: 12/23/23 1542         sodium chloride 0.9 % infusion  Rate: 250 mL/hr Dose: 250 mL/hr  Freq: Continuous PRN Route: IV  PRN Comment: For Corrected Sodium < 135, K > 5.3 & Glucose > 250  Start: 12/23/23 0002   End: 12/23/23 1542         sodium chloride 0.9 % infusion  Rate: 125 mL/hr Dose: 125 mL/hr  Freq: Continuous Route: IV  Start: 12/22/23 2236   End: 12/23/23 0859         sodium chloride 0.9 % infusion 40 mL  Dose: 40 mL  Freq: As Needed Route: IV  PRN Reason: Line Care  Start: 12/23/23 0002   End: 12/23/23 1542   Admin Instructions:   Following administration of an IV intermittent medication, flush line with 40mL NS at 100mL/hr.         sodium  "chloride 0.9 % with KCl 20 mEq/L infusion  Rate: 250 mL/hr Dose: 250 mL/hr  Freq: Continuous PRN Route: IV  PRN Comment: For Corrected Sodium < 135, K 3.3 - 5.3 & Glucose > 250  Start: 12/23/23 0002   End: 12/23/23 1542         sodium chloride 0.9 % with KCl 40 mEq/L infusion  Rate: 250 mL/hr Dose: 250 mL/hr  Freq: Continuous PRN Route: IV  PRN Comment: For Corrected Sodium < 135, K < 3.3 & Glucose > 250  Start: 12/23/23 0002   End: 12/23/23 1542                    Operative/Procedure Notes (last 48 hours)  Notes from 12/24/23 0822 through 12/26/23 0822   No notes of this type exist for this encounter.          Physician Progress Notes (last 48 hours)        Karlo Hernandez MD at 12/25/23 1341            Intensive Care Unit Daily Progress Note.   [address]  12/25/2023    Patient Name:  Lisa Gibson  MRN:  [id number]  YOB: 1990  Age: 33 y.o.  Sex: female         Reason for Admission / Chief Complaint:  Urinary tract infection, alcoholic ketoacidosis    Hospital Course:   33-year-old female with alcohol abuse history who presents for urinary tract infection and alcoholic ketoacidosis and admitted to the ICU for insulin drip and alcohol withdrawal protocol.    Interval History:  Transferred to floor yesterday  Slowly advancing diet, tolerating well  Blood sugar control  No shortness of breath or cough  No chest pain or palpitations  Platelets  slowly downtrending    Physical Exam:  /76 (BP Location: Right arm, Patient Position: Lying)   Pulse 79   Temp 99 °F (37.2 °C) (Oral)   Resp 18   Ht 175.3 cm (69\")   Wt 55.2 kg (121 lb 9.6 oz)   LMP 11/15/2023 (Approximate)   SpO2 100%   BMI 17.96 kg/m²   Body mass index is 17.96 kg/m².    Intake/Output    Intake/Output Summary (Last 24 hours) at 12/25/2023 1341  Last data filed at 12/25/2023 1330  Gross per 24 hour   Intake 720 ml   Output 4300 ml   Net -3580 ml     General: Alert, weak appearing  HEENT: NC/AT, EOMI, " MMM  Neck: Supple, trachea midline  Cardiac: RRR, no murmur, gallops, rubs  Pulmonary: Clear to auscultation bilaterally, no adventitious breath sounds, normal respiratory effort  GI: Soft, mildly tender, non-distended, normal bowel sounds  Extremities: Warm, well perfused, no LE edema  Skin: no visible rash  Neuro: CN II - XII grossly intact  Psychiatry: Normal mood and affect    Data Review:  Notable Labs:  Results from last 7 days   Lab Units 12/25/23  0146 12/23/23  0035 12/22/23  1825   WBC 10*3/mm3 3.92 5.47 6.84   HEMOGLOBIN g/dL 9.1* 9.5* 12.1   PLATELETS 10*3/mm3 98* 106* 162     Results from last 7 days   Lab Units 12/25/23  0146 12/24/23  1008 12/23/23  1556 12/23/23  1411 12/23/23  1321 12/23/23  1204 12/23/23  0843 12/23/23  0339 12/23/23  0035   SODIUM mmol/L 141 139 139  --  138  --  140 139 141   POTASSIUM mmol/L 3.2* 2.7* 4.3  --  4.4  --  3.4* 3.8 3.9   CHLORIDE mmol/L 102 104 103  --  101  --  107 100 102   CO2 mmol/L 26.0 22.0 20.6*  --  20.0*  --  18.6* 14.0* 10.0*   BUN mg/dL <2* <2* 4*  --  6  --  7 11 11   CREATININE mg/dL 0.44* 0.41* 0.66  --  0.64  --  0.63 0.94 0.82   GLUCOSE mg/dL 151* 83 165*  --  196*  --  113* 193* 175*   CALCIUM mg/dL 8.4* 7.6* 8.6  --  8.6  --  7.7* 8.4* 7.7*   MAGNESIUM mg/dL 2.9* 1.5*  --   --   --  1.8 1.9 2.1 1.8   PHOSPHORUS mg/dL 1.7* 1.3*  --  2.0*  --   --  0.4* 0.7* 0.9*   Estimated Creatinine Clearance: 160 mL/min (A) (by C-G formula based on SCr of 0.44 mg/dL (L)).    Results from last 7 days   Lab Units 12/25/23  0146 12/23/23  0035 12/22/23  2133 12/22/23  1825   AST (SGOT) U/L  --  189*  --  311*   ALT (SGPT) U/L  --  37*  --  54*   LACTATE mmol/L  --  2.0 3.3* 6.0*   PLATELETS 10*3/mm3 98* 106*  --  162       Results from last 7 days   Lab Units 12/23/23  0405   PH, ARTERIAL pH units 7.386   PCO2, ARTERIAL mm Hg 27.6*   PO2 ART mm Hg 102.9*   HCO3 ART mmol/L 16.6*       Imaging:  Reviewed chest images personally from past 3 days    ASSESSMENT  /   PLAN:    Alcoholic ketoacidosis  Alcoholic pancreatitis  Alcoholic hepatitis  Metabolic anion gap acidosis  Acute kidney injury  Bicytopenia  Hyponatremia  Alcohol abuse    -Blood sugars improved  -Platelets decreased, heparin discontinued, HIT panel ordered, hematology consulted.  -Patient also with alcoholic hepatitis with discriminant function less than 32 and not requiring steroids.  -Appreciate GI recommendations  -Soft diet, tolerating well  -Currently not in withdrawal, alcohol level was positive on admission. Continue CIWA protocol.  No history of seizures or DTs.  -Continue antibiotics for urinary tract infection, will complete course.  -ИРИНА resolved  -Continue thiamine and folate    GI prophylaxis: Pantoprazole  DVT prophylaxis: Heparin   Bah catheter: No  Bowel regimen: Ordered  Diet: Clear liquid diet    Discharge: Remain inpatient, anticipate discharge in the next 24 to 48 hours.    Karlo Hernandez MD  [facility]  Pulmonary and Critical Care Medicine, Interventional Pulmonology    Parts of this note may be an electronic transcription/translation of spoken language to printed text using the Dragon dictation system.         Electronically signed by Karlo Hernandez MD at 12/25/23 1528       Adan Hastings MD at 12/25/23 0723          [facility]  Inpatient Progress Note    Reason for Followup: Follow-up pancreatitis, hepatitis    Subjective     Interval History:   Has been moved out of the ICU  Potassium 3.2  Phosphorus 1.7  Calcium 8.4  Glucose 151  Hemoglobin 9.1, stable  Platelets 98    Patient reports feeling better this morning with less nausea.  Wanting to try more diet and improving pain patient denies any withdrawal symptoms    Current Facility-Administered Medications:     sennosides-docusate (PERICOLACE) 8.6-50 MG per tablet 2 tablet, 2 tablet, Oral, BID, 2 tablet at 12/23/23 2028 **AND** polyethylene glycol (MIRALAX) packet 17 g, 17 g, Oral, Daily  PRN **AND** bisacodyl (DULCOLAX) EC tablet 5 mg, 5 mg, Oral, Daily PRN **AND** bisacodyl (DULCOLAX) suppository 10 mg, 10 mg, Rectal, Daily PRN, Varun Hartmann Jr., MD    Calcium Replacement - Follow Nurse / BPA Driven Protocol, , Does not apply, PRN, Varun Hartmann Jr., MD    cefTRIAXone (ROCEPHIN) 2,000 mg in sodium chloride 0.9 % 100 mL IVPB-VTB, 2,000 mg, Intravenous, Q24H, Varun Hartmann Jr., MD, Last Rate: 200 mL/hr at 12/25/23 0026, 2,000 mg at 12/25/23 0026    dextrose (D50W) (25 g/50 mL) IV injection 25 g, 25 g, Intravenous, Q15 Min PRN, Forrest Escobar MD    dextrose (GLUTOSE) oral gel 15 g, 15 g, Oral, Q15 Min PRN, Forrest Escobar MD    folic acid 1 mg in sodium chloride 0.9 % 50 mL IVPB, 1 mg, Intravenous, Daily, Varun Hartmann Jr., MD, Last Rate: 100 mL/hr at 12/24/23 0752, 1 mg at 12/24/23 0752    glucagon (GLUCAGEN) injection 1 mg, 1 mg, Intramuscular, Q15 Min PRN, Forrest Escobar MD    heparin (porcine) 5000 UNIT/ML injection 5,000 Units, 5,000 Units, Subcutaneous, Q12H, Varun Hartmann Jr., MD, 5,000 Units at 12/24/23 2058    insulin glargine (LANTUS, SEMGLEE) injection 10 Units, 10 Units, Subcutaneous, Daily, Forrest Escobar MD, 10 Units at 12/24/23 1043    insulin lispro (HUMALOG/ADMELOG) injection 3-14 Units, 3-14 Units, Subcutaneous, 4x Daily AC & at Bedtime, Forrest Escobar MD, 3 Units at 12/23/23 1656    LORazepam (ATIVAN) tablet 1 mg, 1 mg, Oral, Q1H PRN **OR** midazolam (VERSED) injection 2 mg, 2 mg, Intravenous, Q1H PRN **OR** LORazepam (ATIVAN) tablet 2 mg, 2 mg, Oral, Q1H PRN **OR** midazolam (VERSED) injection 4 mg, 4 mg, Intravenous, Q1H PRN, 4 mg at 12/23/23 0159 **OR** midazolam (VERSED) injection 4 mg, 4 mg, Intravenous, Q15 Min PRN **OR** midazolam (VERSED) injection 4 mg, 4 mg, Intramuscular, Q15 Min PRN **OR** LORazepam (ATIVAN) tablet 4 mg, 4 mg, Oral, Q1H PRN **OR** midazolam (VERSED) injection 8 mg, 8 mg, Intravenous, Q1H PRN, Varun Hartmann Jr., MD    Magnesium  Standard Dose Replacement - Follow Nurse / BPA Driven Protocol, , Does not apply, Shahbaz WOMACK Harold Dale Jr., MD    Magnesium Standard Dose Replacement - Follow Nurse / BPA Driven Protocol, , Does not apply, Shahbaz WOMACK Harold Dale Jr., MD    nitroglycerin (NITROSTAT) SL tablet 0.4 mg, 0.4 mg, Sublingual, Q5 Min PRShahbaz BALDWIN Harold Dale Jr., MD    ondansetron (ZOFRAN) injection 4 mg, 4 mg, Intravenous, Q6H PRNShahbaz Harold Dale Jr., MD, 4 mg at 12/24/23 0751    pantoprazole (PROTONIX) injection 40 mg, 40 mg, Intravenous, Q12H, Varun Hartmann Jr., MD, 40 mg at 12/24/23 2058    Phosphorus Replacement - Follow Nurse / BPA Driven Protocol, , Does not apply, Shahbaz WOMACK Harold Dale Jr., MD    potassium chloride (KLOR-CON) packet 40 mEq, 40 mEq, Oral, Q4H, Annabelle Padilla, APRN, 40 mEq at 12/25/23 0517    Potassium Replacement - Follow Nurse / BPA Driven Protocol, , Does not apply, Shahbaz WOMACK Harold Dale Jr., MD    sodium chloride 0.9 % flush 10 mL, 10 mL, Intravenous, Q12H, Varun Hartmann Jr., MD, 10 mL at 12/24/23 2058    sodium chloride 0.9 % flush 10 mL, 10 mL, Intravenous, PRShahbaz BALDWIN Harold Dale Jr., MD    sodium chloride 0.9 % infusion 40 mL, 40 mL, Intravenous, PRNShahbaz Harold Dale Jr., MD    sodium chloride 0.9 % infusion, 75 mL/hr, Intravenous, Continuous, Forrest Escobar MD, Last Rate: 75 mL/hr at 12/24/23 1911, 75 mL/hr at 12/24/23 1911    thiamine (B-1) 500 mg in sodium chloride 0.9 % 100 mL IVPB, 500 mg, Intravenous, Q8H, Last Rate: 200 mL/hr at 12/25/23 0514, 500 mg at 12/25/23 0514 **FOLLOWED BY** thiamine (B-1) injection 200 mg, 200 mg, Intravenous, Q8H **FOLLOWED BY** [START ON 12/30/2023] thiamine (VITAMIN B-1) tablet 100 mg, 100 mg, Oral, Daily, Vraun Hartmann Jr., MD  Review of Systems:    The following systems were reviewed and negative;  gastrointestinal    Objective     Vital Signs  Temp:  [97.5 °F (36.4 °C)-99.1 °F (37.3 °C)] 99 °F (37.2 °C)  Heart Rate:  [69-98]  79  Resp:  [16-20] 18  BP: (102-119)/(58-85) 119/85  Body mass index is 17.96 kg/m².    Intake/Output Summary (Last 24 hours) at 12/25/2023 0724  Last data filed at 12/25/2023 0532  Gross per 24 hour   Intake 720 ml   Output 3700 ml   Net -2980 ml     No intake/output data recorded.     Physical Exam:   General: patient awake, alert and cooperative   Eyes: Normal lids and lashes, no scleral icterus   Neck: supple, normal ROM   Skin: warm and dry, not jaundiced   Cardiovascular: regular rhythm and rate, no murmurs auscultated   Pulm: clear to auscultation bilaterally, regular and unlabored   Abdomen: soft, nontender, nondistended; normal bowel sounds   Rectal: deferred   Extremities: no rash or edema   Psychiatric: Normal mood and behavior; memory intact     Results Review:     I reviewed the patient's new clinical results.    Results from last 7 days   Lab Units 12/25/23  0146 12/23/23  0035 12/22/23  1825   WBC 10*3/mm3 3.92 5.47 6.84   HEMOGLOBIN g/dL 9.1* 9.5* 12.1   HEMATOCRIT % 26.8* 30.0* 37.2   PLATELETS 10*3/mm3 98* 106* 162     Results from last 7 days   Lab Units 12/25/23  0146 12/24/23  1008 12/23/23  1556 12/23/23  0339 12/23/23  0035 12/22/23  1825   SODIUM mmol/L 141 139 139   < > 141 132*   POTASSIUM mmol/L 3.2* 2.7* 4.3   < > 3.9 4.9   CHLORIDE mmol/L 102 104 103   < > 102 85*   CO2 mmol/L 26.0 22.0 20.6*   < > 10.0* 6.5*   BUN mg/dL <2* <2* 4*   < > 11 15   CREATININE mg/dL 0.44* 0.41* 0.66   < > 0.82 1.15*   CALCIUM mg/dL 8.4* 7.6* 8.6   < > 7.7* 9.1   BILIRUBIN mg/dL  --   --   --   --  1.6* 1.6*   ALK PHOS U/L  --   --   --   --  154* 212*   ALT (SGPT) U/L  --   --   --   --  37* 54*   AST (SGOT) U/L  --   --   --   --  189* 311*   GLUCOSE mg/dL 151* 83 165*   < > 175* 282*    < > = values in this interval not displayed.     Results from last 7 days   Lab Units 12/22/23 2222   INR  1.31*     Lab Results   Lab Value Date/Time    LIPASE 92 (H) 12/23/2023 0035    LIPASE 151 (H) 12/22/2023 1828     LIPASE 64 (H) 10/27/2023 0829    LIPASE 31 08/10/2023 0541    LIPASE 167 (H) 08/07/2023 0129    LIPASE 10 (L) 06/01/2023 0808    LIPASE 12 (L) 04/27/2023 2003    LIPASE 231 (H) 09/25/2021 0646    LIPASE 495 (H) 09/24/2021 0645    LIPASE 889 (H) 09/23/2021 0745    LIPASE 834 (H) 09/21/2021 2049    LIPASE 11 07/05/2021 1311    LIPASE 158 01/06/2021 0830    LIPASE 126 01/05/2021 1623    LIPASE 917 (H) 11/25/2020 1258    LIPASE 795 (H) 11/25/2020 0439    LIPASE 635 (H) 11/24/2020 0505    LIPASE 1,299 (H) 11/23/2020 0545    LIPASE 2,284 (H) 11/22/2020 0658    LIPASE 530 (H) 11/21/2020 1347       Radiology:  [unfilled]      Assessment & Plan   Assessment:   1.  Alcoholic hepatitis  2.  Alcoholic pancreatitis  3.  Nausea  4.  Bone marrow suppression, platelets continuing to decrease  5.  Alcohol use  6.  CT scan with steatosis and hepatomegaly  7.  Anemia, stable      Plan:   1.  Supportive treatment for her pancreatitis and her hepatitis.  2.  Patient highly encouraged to discontinue all alcohol use  3.  Progressive thrombocytopenia.  ? Hematology evalaution  4.  Progress diet as tolerated, patient is vegetarian but advance to soft have discussed with nursing staff    I discussed the patient's findings and my recommendations with patient and nursing staff.         Adan Hastings M.D.  [facility]  [facility] [email or web address]  [phone]             Electronically signed by Adan Hastings MD at 12/25/23 1016       Karlo Hernandez MD at 12/24/23 0858            Intensive Care Unit Daily Progress Note.   [facility]ARE  12/24/2023    Patient Name:  Lisa Gibson  MRN:  [id number]  YOB: 1990  Age: 33 y.o.  Sex: female         Reason for Admission / Chief Complaint:  Urinary tract infection, alcoholic ketoacidosis    Hospital Course:   33-year-old female with alcohol abuse history who presents for urinary tract infection and alcoholic  "ketoacidosis and admitted to the ICU for insulin drip and alcohol withdrawal protocol.    Interval History:  -Transitioned off Glucomander yesterday  -No further episodes of emesis  -Overall feeling slightly better  -No shortness of breath or cough  -No chest pain or rotations    Physical Exam:  /80   Pulse 79   Temp 99.6 °F (37.6 °C)   Resp 18   Ht 175.3 cm (69\")   Wt 53.9 kg (118 lb 13.3 oz)   LMP 11/15/2023 (Approximate)   SpO2 98%   BMI 17.55 kg/m²   Body mass index is 17.55 kg/m².    Intake/Output    Intake/Output Summary (Last 24 hours) at 12/24/2023 0859  Last data filed at 12/24/2023 0536  Gross per 24 hour   Intake 2160.67 ml   Output 2200 ml   Net -39.33 ml     General: Alert, weak appearing  HEENT: NC/AT, EOMI, MMM  Neck: Supple, trachea midline  Cardiac: RRR, no murmur, gallops, rubs  Pulmonary: Clear to auscultation bilaterally, no adventitious breath sounds, normal respiratory effort  GI: Soft, mildly tender, non-distended, normal bowel sounds  Extremities: Warm, well perfused, no LE edema  Skin: no visible rash  Neuro: CN II - XII grossly intact  Psychiatry: Normal mood and affect    Data Review:  Notable Labs:  Results from last 7 days   Lab Units 12/23/23  0035 12/22/23  1825   WBC 10*3/mm3 5.47 6.84   HEMOGLOBIN g/dL 9.5* 12.1   PLATELETS 10*3/mm3 106* 162     Results from last 7 days   Lab Units 12/23/23  1556 12/23/23  1411 12/23/23  1321 12/23/23  1204 12/23/23  0843 12/23/23  0339 12/23/23  0035 12/22/23  1825   SODIUM mmol/L 139  --  138  --  140 139 141 132*   POTASSIUM mmol/L 4.3  --  4.4  --  3.4* 3.8 3.9 4.9   CHLORIDE mmol/L 103  --  101  --  107 100 102 85*   CO2 mmol/L 20.6*  --  20.0*  --  18.6* 14.0* 10.0* 6.5*   BUN mg/dL 4*  --  6  --  7 11 11 15   CREATININE mg/dL 0.66  --  0.64  --  0.63 0.94 0.82 1.15*   GLUCOSE mg/dL 165*  --  196*  --  113* 193* 175* 282*   CALCIUM mg/dL 8.6  --  8.6  --  7.7* 8.4* 7.7* 9.1   MAGNESIUM mg/dL  --   --   --  1.8 1.9 2.1 1.8  --  "   PHOSPHORUS mg/dL  --  2.0*  --   --  0.4* 0.7* 0.9*  --    Estimated Creatinine Clearance: 104.1 mL/min (by C-G formula based on SCr of 0.66 mg/dL).    Results from last 7 days   Lab Units 12/23/23  0035 12/22/23  2133 12/22/23  1825   AST (SGOT) U/L 189*  --  311*   ALT (SGPT) U/L 37*  --  54*   LACTATE mmol/L 2.0 3.3* 6.0*   PLATELETS 10*3/mm3 106*  --  162       Results from last 7 days   Lab Units 12/23/23  0405   PH, ARTERIAL pH units 7.386   PCO2, ARTERIAL mm Hg 27.6*   PO2 ART mm Hg 102.9*   HCO3 ART mmol/L 16.6*       Imaging:  Reviewed chest images personally from past 3 days    ASSESSMENT  /  PLAN:    Alcoholic ketoacidosis  Alcoholic pancreatitis  Alcoholic hepatitis  Metabolic anion gap acidosis  Acute kidney injury  Bicytopenia  Hyponatremia  Alcohol abuse    -Patient with significant alcohol abuse history and presented with alcoholic ketoacidosis on insulin drip now off.   -Patient also with alcoholic hepatitis with discriminant function less than 32 and not requiring steroids.  -Appreciate GI recommendations  -Clear liquid diet for alcoholic pancreatitis, slowly advance as tolerated.  -Currently not in withdrawal, alcohol level was positive on admission. Continue CIWA protocol.  No history of seizures or DTs.  -Continue antibiotics for urinary tract infection, will complete course.  -ИРИНА improving  -Continue thiamine and folate    GI prophylaxis: Pantoprazole  DVT prophylaxis: Heparin   Bah catheter: No  Bowel regimen: Ordered  Diet: Clear liquid diet    Discharge: Transfer to floor.    Karlo Hernandez MD  [facility]  Pulmonary and Critical Care Medicine, Interventional Pulmonology    Parts of this note may be an electronic transcription/translation of spoken language to printed text using the Dragon dictation system.         Electronically signed by Karlo Hernandez MD at 12/24/23 0904       Consult Notes (last 48 hours)  Notes from 12/24/23 0822 through 12/26/23 0822   No notes  of this type exist for this encounter.

## 2023-12-26 NOTE — DISCHARGE INSTRUCTIONS
You were treated for alcoholic ketoacidosis during this admission.  It is critical that you refrain from alcohol as that could worsen your condition again.    You were treated for a urinary tract infection in the hospital.  You received ceftriaxone while in the hospital, you will need to complete a course of antibiotics with Keflex 500 mg twice daily for an additional 3 days.  You will start the Keflex tomorrow, 12/27.    You will need to follow-up with hematology in 2 weeks with a repeat CBC to ensure that your platelet count is improving.  You will need to continue your folic acid.

## 2023-12-26 NOTE — PROGRESS NOTES
Access did follow up with pt. Pt states she is doing fine and is still exploring the resources she was given. LAKESHA is a 0. Access will continue to follow.

## 2023-12-26 NOTE — DISCHARGE SUMMARY
PHYSICIAN DISCHARGE SUMMARY                                                                        [facility]    Patient Identification:  Patient Name:  Lisa Gibson  MRN:  [id number]   YOB: 1990  Age: 33 y.o.  Sex: female  Primary Care Physician: Provider, No Known    Admit date: 12/22/2023  Discharge date and time: No discharge date for patient encounter.   Discharged Condition: good    Discharge Diagnoses:  Alcoholic ketoacidosis  Alcoholic pancreatitis  Alcoholic hepatitis  Acute kidney injury  Bicytopenia  Hyponatremia  Alcohol abuse    Hospital Course: Lisa Gibson presented to [facility] with urinary tract infection and alcoholic ketoacidosis and admitted to the ICU for insulin drip.  Also found to have alcoholic pancreatitis.  Slow improvement in her electrolytes and reintroduced to oral intake.  Strong emphasis placed on patient that she needs to refrain from alcohol to prevent further recurrences.  Treated with  ceftriaxone for urinary tract infection and discharged with Keflex for an additional 3 days to complete course.  She will need to follow-up with hematology as an outpatient for thrombocytopenia.    Consults:   IP CONSULT TO HOSPITALIST  IP CONSULT TO PULMONOLOGY  IP CONSULT TO GASTROENTEROLOGY  IP CONSULT TO CASE MANAGEMENT   IP CONSULT TO NUTRITION SERVICES  IP CONSULT TO ACCESS CENTER  IP CONSULT TO HEMATOLOGY AND ONCOLOGY    Significant Diagnostic Studies:   Heparin-induced platelet antibody -pending    Discharge Exam:  General: Alert, nontoxic, NAD  HEENT: NC/AT, EOMI, MMM  Neck: Supple, trachea midline  Cardiac: RRR, no murmur, gallops, rubs  Pulmonary: Clear to auscultation bilaterally, no adventitious breath sounds, normal respiratory effort  GI: Soft, non-tender, non-distended, normal bowel sounds  Extremities: Warm, well perfused, no LE  edema  Skin: no visible rash  Neuro: CN II - XII grossly intact  Psychiatry: Normal mood and affect     Disposition:  Home    Patient Instructions:      Discharge Medications        New Medications        Instructions Start Date   cephalexin 500 MG capsule  Commonly known as: Keflex   500 mg, Oral, 2 Times Daily   Start Date: December 27, 2023            Continue These Medications        Instructions Start Date   folic acid 1 MG tablet  Commonly known as: FOLVITE   1 mg, Oral, Daily      ondansetron ODT 4 MG disintegrating tablet  Commonly known as: ZOFRAN-ODT   4 mg, Translingual, 4 Times Daily PRN      One-Daily Multi-Vitamin tablet tablet  Generic drug: multivitamin   1 tablet, Oral, Daily      thiamine 100 MG tablet  Commonly known as: VITAMIN B1   100 mg, Oral, Daily              Follow-up Information       Provider, No Known .    Contact information:  [place] [id number] [phone]                              Medication Reconciliation: Please see electronically completed Med Rec.    Total time spent discharging patient including evaluation, medication reconciliation, arranging follow up, and post hospitalization instructions and education total time exceeds 30 minutes.    Signed:  Karlo Hernandez MD  12/26/2023  14:18 EST

## 2023-12-26 NOTE — THERAPY EVALUATION
Patient Name: Lisa Gibson  : 1990    MRN: [id number]                              Today's Date: 2023       Admit Date: 2023    Visit Dx:     ICD-10-CM ICD-9-CM   1. Alcoholic ketoacidosis  E87.29 276.2     Patient Active Problem List   Diagnosis    Alcohol-induced acute pancreatitis without infection or necrosis    Transaminitis    Epigastric pain    Hepatic steatosis    Gallbladder sludge    Severe sepsis    E coli bacteremia    Alcohol withdrawal    Alcohol abuse    Acute pyelonephritis    Essential hypertension    Metabolic encephalopathy    Polysubstance abuse    Hypokalemia    Acute pancreatitis    Alcohol dependence    Pulmonary nodule    Pancreatic pseudocyst    Alcoholic hepatitis    Hypophosphatemia    Nausea with vomiting     History reviewed. No pertinent past medical history.  No past surgical history on file.   General Information       Row Name 23 1408          Physical Therapy Time and Intention    Document Type evaluation  -RD     Mode of Treatment physical therapy  -RD       Row Name 23 1408          General Information    Patient Profile Reviewed yes  -RD     Prior Level of Function independent:;community mobility  -RD       Row Name 23 1408          Cognition    Orientation Status (Cognition) oriented x 3  -RD               User Key  (r) = Recorded By, (t) = Taken By, (c) = Cosigned By      Initials Name Provider Type    RD Diana Cortez, PT Physical Therapist                   Mobility       Row Name 23 1408          Bed Mobility    Bed Mobility bed mobility (all) activities  -RD     All Activities, [place] (Bed Mobility) independent  -RD       Row Name 23 1408          Sit-Stand Transfer    Sit-Stand [place] (Transfers) independent  -RD       Row Name 23 1408          Gait/Stairs (Locomotion)    [place] Level (Gait) independent  -RD     Distance in Feet (Gait) 300  -RD     Deviations/Abnormal Patterns (Gait) base of  support, narrow  -RD     Bilateral Gait Deviations decreased arm swing  -RD               User Key  (r) = Recorded By, (t) = Taken By, (c) = Cosigned By      Initials Name Provider Type    Diana Pierson, PT Physical Therapist                   Obj/Interventions       Row Name 12/26/23 1409          Range of Motion Comprehensive    General Range of Motion no range of motion deficits identified  -RD       Row Name 12/26/23 1409          Strength Comprehensive (MMT)    General Manual Muscle Testing (MMT) Assessment no strength deficits identified  -RD       Row Name 12/26/23 1409          Balance    Balance Assessment standing dynamic balance  -RD     Dynamic Standing Balance supervision  -RD               User Key  (r) = Recorded By, (t) = Taken By, (c) = Cosigned By      Initials Name Provider Type    Diana Pierson, PT Physical Therapist                   Goals/Plan    No documentation.                  Clinical Impression       Row Name 12/26/23 1409          Pain    Pretreatment Pain Rating 0/10 - no pain  -RD     Posttreatment Pain Rating 0/10 - no pain  -RD       Row Name 12/26/23 1409          Plan of Care Review    Plan of Care Reviewed With patient  -RD     Outcome Evaluation Patient requested PT to attempt ambulation with her.  Nsg had documented pt was unsteady with gait.  Pt plans to DC home later today.  Pt was able to demonstrate indep with bed mobility, transfers and gait.  Pt did have some mild balance deficits with high level gait activities.  No further acute skilled therapy indicated but patient was encouraged to increase mobility in order to increase balance/endurance.  -RD       Row Name 12/26/23 1409          Therapy Assessment/Plan (PT)    Patient/Family Therapy Goals Statement (PT) go home.  -RD     Criteria for Skilled Interventions Met (PT) no  -RD     Therapy Frequency (PT) evaluation only  -RD       Row Name 12/26/23 1409          Positioning and Restraints    Pre-Treatment  Position in bed  -RD     Post Treatment Position bed  -RD     In Bed supine;call light within reach;encouraged to call for assist;exit alarm on  -RD               User Key  (r) = Recorded By, (t) = Taken By, (c) = Cosigned By      Initials Name Provider Type    Diana Pierson, PT Physical Therapist                   Outcome Measures       Row Name 12/26/23 1412          How much help from another person do you currently need...    Turning from your back to your side while in flat bed without using bedrails? 4  -RD     Moving from lying on back to sitting on the side of a flat bed without bedrails? 4  -RD     Moving to and from a bed to a chair (including a wheelchair)? 4  -RD     Standing up from a chair using your arms (e.g., wheelchair, bedside chair)? 4  -RD     Climbing 3-5 steps with a railing? 3  -RD     To walk in hospital room? 4  -RD     AM-PAC 6 Clicks Score (PT) 23  -RD     Highest Level of Mobility Goal 7 --> Walk 25 feet or more  -       Row Name 12/26/23 1412          Functional Assessment    Outcome Measure Options AM-PAC 6 Clicks Basic Mobility (PT)  -RD               User Key  (r) = Recorded By, (t) = Taken By, (c) = Cosigned By      Initials Name Provider Type    Diana Pierson, PT Physical Therapist                                 Physical Therapy Education       Title: PT OT SLP Therapies (Done)       Topic: Physical Therapy (Done)       Point: Mobility training (Done)       Learning Progress Summary             Patient Acceptance, E,TB, VU by PARKER at 12/26/2023 1412                                         User Key       Initials Effective Dates Name Provider Type Discipline     06/16/21 -  Diana Cortez, PT Physical Therapist PT                  PT Recommendation and Plan     Plan of Care Reviewed With: patient  Outcome Evaluation: Patient requested PT to attempt ambulation with her.  Nsg had documented pt was unsteady with gait.  Pt plans to DC home later today.  Pt was  able to demonstrate indep with bed mobility, transfers and gait.  Pt did have some mild balance deficits with high level gait activities.  No further acute skilled therapy indicated but patient was encouraged to increase mobility in order to increase balance/endurance.     Time Calculation:         PT Charges       Row Name 12/26/23 1413             Time Calculation    Start Time 1355  -RD      Stop Time 1405  -RD      Time Calculation (min) 10 min  -RD      PT Received On 12/26/23  -RD                User Key  (r) = Recorded By, (t) = Taken By, (c) = Cosigned By      Initials Name Provider Type    Diana Pierson, PT Physical Therapist                  Therapy Charges for Today       Code Description Service Date Service Provider Modifiers Qty    43190147604 HC PT EVAL LOW COMPLEXITY 2 12/26/2023 Diana Cortez, PT GP 1            PT G-Codes  Outcome Measure Options: AM-PAC 6 Clicks Basic Mobility (PT)  AM-PAC 6 Clicks Score (PT): 23       Diana Cortez, THOMAS  12/26/2023

## 2023-12-26 NOTE — PLAN OF CARE
Goal Outcome Evaluation:  Plan of Care Reviewed With: patient           Outcome Evaluation: Patient requested PT to attempt ambulation with her.  Nsg had documented pt was unsteady with gait.  Pt plans to DC home later today.  Pt was able to demonstrate indep with bed mobility, transfers and gait.  Pt did have some mild balance deficits with high level gait activities.  No further acute skilled therapy indicated but patient was encouraged to increase mobility in order to increase balance/endurance.

## 2023-12-26 NOTE — CASE MANAGEMENT/SOCIAL WORK
Case Management Discharge Note      Final Note: Home with sig other and 6 yr old daughter. [facility] treatment options provided by access. Sig other to transport. New PCP appointment setup for [facility]         Selected Continued Care - Admitted Since 12/22/2023       Destination    No services have been selected for the patient.                Durable Medical Equipment    No services have been selected for the patient.                Dialysis/Infusion    No services have been selected for the patient.                Home Medical Care    No services have been selected for the patient.                Therapy    No services have been selected for the patient.                Community Resources    No services have been selected for the patient.                Community & DME    No services have been selected for the patient.                    Transportation Services  Private: Car    Final Discharge Disposition Code: 01 - home or self-care

## 2023-12-26 NOTE — PROGRESS NOTES
[facility]  Inpatient Progress Note    Reason for Follow Up: Alcoholic hepatitis/pancreatitis    Subjective     Interval History:   She reports that she feels fine.  She is tolerating a diet.  No nausea vomiting or abdominal pain.  Small bowel movements has not eaten much.    Current Facility-Administered Medications:     sennosides-docusate (PERICOLACE) 8.6-50 MG per tablet 2 tablet, 2 tablet, Oral, BID, 2 tablet at 12/23/23 2028 **AND** polyethylene glycol (MIRALAX) packet 17 g, 17 g, Oral, Daily PRN **AND** bisacodyl (DULCOLAX) EC tablet 5 mg, 5 mg, Oral, Daily PRN **AND** bisacodyl (DULCOLAX) suppository 10 mg, 10 mg, Rectal, Daily PRN, Varun Hartmann Jr., MD    Calcium Replacement - Follow Nurse / BPA Driven Protocol, , Does not apply, PRN, Varun Hartmann Jr., MD    cefTRIAXone (ROCEPHIN) 2,000 mg in sodium chloride 0.9 % 100 mL IVPB-VTB, 2,000 mg, Intravenous, Q24H, Varun Hartmann Jr., MD, Last Rate: 200 mL/hr at 12/26/23 0118, 2,000 mg at 12/26/23 0118    dextrose (D50W) (25 g/50 mL) IV injection 25 g, 25 g, Intravenous, Q15 Min PRN, Forrest Escobar MD    dextrose (GLUTOSE) oral gel 15 g, 15 g, Oral, Q15 Min PRN, Forrest Escobar MD    folic acid 1 mg in sodium chloride 0.9 % 50 mL IVPB, 1 mg, Intravenous, Daily, Varun Hartmann Jr., MD, Last Rate: 100 mL/hr at 12/25/23 1004, 1 mg at 12/25/23 1004    glucagon (GLUCAGEN) injection 1 mg, 1 mg, Intramuscular, Q15 Min PRN, Forrest Escobar MD    insulin glargine (LANTUS, SEMGLEE) injection 10 Units, 10 Units, Subcutaneous, Daily, Forrest Escobar MD, 10 Units at 12/26/23 0857    insulin lispro (HUMALOG/ADMELOG) injection 3-14 Units, 3-14 Units, Subcutaneous, 4x Daily AC & at Bedtime, Forrest Escobar MD, 3 Units at 12/23/23 0946    LORazepam (ATIVAN) tablet 1 mg, 1 mg, Oral, Q1H PRN **OR** midazolam (VERSED) injection 2 mg, 2 mg, Intravenous, Q1H PRN **OR** LORazepam (ATIVAN) tablet 2 mg, 2 mg, Oral, Q1H PRN **OR** midazolam (VERSED)  injection 4 mg, 4 mg, Intravenous, Q1H PRN, 4 mg at 12/23/23 0159 **OR** midazolam (VERSED) injection 4 mg, 4 mg, Intravenous, Q15 Min PRN **OR** midazolam (VERSED) injection 4 mg, 4 mg, Intramuscular, Q15 Min PRN **OR** LORazepam (ATIVAN) tablet 4 mg, 4 mg, Oral, Q1H PRN **OR** midazolam (VERSED) injection 8 mg, 8 mg, Intravenous, Q1H PRN, Varun Hartmann Jr., MD    Magnesium Standard Dose Replacement - Follow Nurse / BPA Driven Protocol, , Does not apply, Shahbaz WOMACK [place] Jr., MD    Magnesium Standard Dose Replacement - Follow Nurse / BPA Driven Protocol, , Does not apply, Shahbaz WOMACK Harold Dale Jr., MD    nitroglycerin (NITROSTAT) SL tablet 0.4 mg, 0.4 mg, Sublingual, Q5 Min PRN, Varun Hartmann Jr., MD    ondansetron (ZOFRAN) injection 4 mg, 4 mg, Intravenous, Q6H PRN, Varun Hartmann Jr., MD, 4 mg at 12/24/23 0751    pantoprazole (PROTONIX) injection 40 mg, 40 mg, Intravenous, Q12H, Varun Hartmann Jr., MD, 40 mg at 12/26/23 0858    Phosphorus Replacement - Follow Nurse / BPA Driven Protocol, , Does not apply, Shahbaz WOMACK Harold Dale Jr., MD    Potassium Replacement - Follow Nurse / BPA Driven Protocol, , Does not apply, Shahbaz WOMACK Harold Dale Jr., MD    sodium chloride 0.9 % flush 10 mL, 10 mL, Intravenous, Q12H, Varun Hartmann Jr., MD, 10 mL at 12/26/23 0858    sodium chloride 0.9 % flush 10 mL, 10 mL, Intravenous, Shahbaz WOMACK Harold Dale Jr., MD    sodium chloride 0.9 % infusion 40 mL, 40 mL, Intravenous, PRShahbaz BALDWIN Harold Dale Jr., MD    sodium chloride 0.9 % infusion, 75 mL/hr, Intravenous, Continuous, Forrest Escobar MD, Last Rate: 75 mL/hr at 12/26/23 0118, 75 mL/hr at 12/26/23 0118    [COMPLETED] thiamine (B-1) 500 mg in sodium chloride 0.9 % 100 mL IVPB, 500 mg, Intravenous, Q8H, Last Rate: 200 mL/hr at 12/25/23 1616, 500 mg at 12/25/23 1616 **FOLLOWED BY** thiamine (B-1) injection 200 mg, 200 mg, Intravenous, Q8H, 200 mg at 12/26/23 0522 **FOLLOWED BY** [START ON  12/30/2023] thiamine (VITAMIN B-1) tablet 100 mg, 100 mg, Oral, Daily, Varun Hartmann Jr., MD  Review of Systems:    The following systems were reviewed and negative;  constitution and gastrointestinal    Objective     Vital Signs  Temp:  [98.4 °F (36.9 °C)-99.3 °F (37.4 °C)] 98.4 °F (36.9 °C)  Heart Rate:  [83-88] 88  Resp:  [18] 18  BP: (105-118)/(69-82) 105/69  Body mass index is 18.15 kg/m².    Intake/Output Summary (Last 24 hours) at 12/26/2023 1044  Last data filed at 12/26/2023 1030  Gross per 24 hour   Intake 480 ml   Output 2000 ml   Net -1520 ml     I/O this shift:  In: -   Out: 700 [Urine:700]     Physical Exam:   General: patient awake, alert and cooperative   Eyes: Normal lids and lashes, no scleral icterus   Neck: supple, normal ROM   Skin: warm and dry, not jaundiced   Pulm:  regular and unlabored   Abdomen: soft, nontender, nondistended    Extremities: no rash or edema   Psychiatric: Normal mood and behavior; memory intact     Results Review:     I reviewed the patient's new clinical results.    Results from last 7 days   Lab Units 12/26/23  0614 12/25/23  0146 12/23/23  0035   WBC 10*3/mm3 3.50 3.92 5.47   HEMOGLOBIN g/dL 8.9* 9.1* 9.5*   HEMATOCRIT % 26.2* 26.8* 30.0*   PLATELETS 10*3/mm3 96*  96* 98* 106*     Results from last 7 days   Lab Units 12/26/23  0614 12/25/23  0146 12/24/23  1008 12/23/23  0339 12/23/23  0035 12/22/23  1825   SODIUM mmol/L 138 141 139   < > 141 132*   POTASSIUM mmol/L 4.8 3.2* 2.7*   < > 3.9 4.9   CHLORIDE mmol/L 103 102 104   < > 102 85*   CO2 mmol/L 23.5 26.0 22.0   < > 10.0* 6.5*   BUN mg/dL <2* <2* <2*   < > 11 15   CREATININE mg/dL 0.28* 0.44* 0.41*   < > 0.82 1.15*   CALCIUM mg/dL 9.0 8.4* 7.6*   < > 7.7* 9.1   BILIRUBIN mg/dL  --   --   --   --  1.6* 1.6*   ALK PHOS U/L  --   --   --   --  154* 212*   ALT (SGPT) U/L  --   --   --   --  37* 54*   AST (SGOT) U/L  --   --   --   --  189* 311*   GLUCOSE mg/dL 79 151* 83   < > 175* 282*    < > = values in this  interval not displayed.     Results from last 7 days   Lab Units 12/26/23  0614 12/22/23  2222   INR  1.04 1.31*     Lab Results   Lab Value Date/Time    LIPASE 92 (H) 12/23/2023 0035    LIPASE 151 (H) 12/22/2023 1825    LIPASE 64 (H) 10/27/2023 0829    LIPASE 31 08/10/2023 0541    LIPASE 167 (H) 08/07/2023 0129    LIPASE 10 (L) 06/01/2023 0808    LIPASE 12 (L) 04/27/2023 2003    LIPASE 231 (H) 09/25/2021 0646    LIPASE 495 (H) 09/24/2021 0645    LIPASE 889 (H) 09/23/2021 0745    LIPASE 834 (H) 09/21/2021 2049    LIPASE 11 07/05/2021 1311    LIPASE 158 01/06/2021 0830    LIPASE 126 01/05/2021 1623    LIPASE 917 (H) 11/25/2020 1258    LIPASE 795 (H) 11/25/2020 0439    LIPASE 635 (H) 11/24/2020 0505    LIPASE 1,299 (H) 11/23/2020 0545    LIPASE 2,284 (H) 11/22/2020 0658    LIPASE 530 (H) 11/21/2020 1347       Radiology:  CT Abdomen Pelvis With Contrast   Final Result       1.  Marked hepatomegaly with marked diffuse hepatic steatosis.   2.  Pancreatic ductal prominence within the tail is similar to prior.   The previously noted hypodense focus in the pancreatic head is not   clearly visualized on the current examination. Follow-up   contrast-enhanced MRCP is again recommended.   3.  No bowel obstruction or CT evidence of acute appendicitis.       This report was finalized on 12/22/2023 8:54 PM by Dr. Keyona Root M.D   on Workstation: [id number]              Assessment & Plan     Active Hospital Problems    Diagnosis     **Alcoholic ketoacidosis      All problems are new to me today  Assessment:  Alcoholic hepatitis  2.  Alcoholic pancreatitis  3.  Nausea  4.  Thrombocytopenia  5.  Alcohol use  6.  CT scan with steatosis and hepatomegaly  7.  Anemia, stable      Plan:  Discussed alcohol abstinence and fat restriction given inflammation of the liver and pancreas.  Symptomatically she is much improved and tolerating regular diet without any symptoms.  Stable for discharge from GI standpoint-we will schedule outpatient  follow-up.  We will sign off but are available as needed    I discussed the patients findings and my recommendations with patient.    Loren Lorenz MD

## 2023-12-26 NOTE — CONSULTS
Subjective     REASON FOR CONSULTATION:  Provide an opinion on any further workup or treatment on:    Thrombocytopenia                       REQUESTING PHYSICIAN: Varun Hartmann Jr.*    HISTORY OF PRESENT ILLNESS:      Lisa Gibson is a 33 y.o. patient who was admitted on 12/22/2023.  She has significant history of alcohol abuse.  She was admitted on 12/22/2023 to ICU after presenting with vomiting and being unable to eat.  She reported throwing up black emesis.  She was also reporting abdominal pain.    On admission on 12/22/2023, platelet count was 162,000.  Hemoglobin was 12.1.  WBC was 6840.  Platelet count decreased to 106,000 on 12/23/2023.  It decreased to 98,000 on 12/25/2023.  Hematology consultation was requested to evaluate the thrombocytopenia.     Patient reports that she remembers being told she had low platelets and labs from earlier this year.  She reports that the drop in the count was mild and platelets eventually improved.    PMH:  Patient is not a carrier of sickle cell disease or thalassemia.    SCHEDULED MEDS:  cefTRIAXone, 2,000 mg, Intravenous, Q24H  folic acid 1 mg in sodium chloride 0.9 % 50 mL IVPB, 1 mg, Intravenous, Daily  insulin glargine, 10 Units, Subcutaneous, Daily  insulin lispro, 3-14 Units, Subcutaneous, 4x Daily AC & at Bedtime  pantoprazole, 40 mg, Intravenous, Q12H  senna-docusate sodium, 2 tablet, Oral, BID  sodium chloride, 10 mL, Intravenous, Q12H  thiamine (B-1) IV, 200 mg, Intravenous, Q8H   Followed by  [START ON 12/30/2023] thiamine, 100 mg, Oral, Daily      INFUSIONS:  sodium chloride, 75 mL/hr, Last Rate: 75 mL/hr (12/26/23 0118)      ALLERGIES:  No Known Allergies     Social History     Socioeconomic History    Marital status:    Tobacco Use    Smoking status: Never    Smokeless tobacco: Never   Vaping Use    Vaping Use: Never used   Substance and Sexual Activity    Alcohol use: Yes     Alcohol/week: 20.0 standard drinks of alcohol     Types: 20  Shots of liquor per week    Drug use: Never    Sexual activity: Defer     History reviewed. No pertinent family history.     REVIEW OF SYSTEMS:   GENERAL:  Negative.   SKIN: Negative.  HEME/LYMPH: Negative.  RESPIRATORY:  Negative.   CVS:  Negative.   GI: Nausea and vomiting.   :  Negative.   MUSCULOSKELETAL:  Negative.  NEUROLOGICAL:  Negative.    Objective   VITAL SIGNS:  Temp:  [99 °F (37.2 °C)-99.3 °F (37.4 °C)] 99.3 °F (37.4 °C)  Heart Rate:  [83-86] 83  Resp:  [18] 18  BP: (106-118)/(74-82) 118/82     Wt Readings from Last 3 Encounters:   12/26/23 55.7 kg (122 lb 14.4 oz)   10/27/23 61.2 kg (135 lb)   08/07/23 57.6 kg (126 lb 15.8 oz)     PHYSICAL EXAMINATION:   GENERAL:  The patient appears in good general condition, not in acute distress.  SKIN: No skin rash. No ecchymosis.  HEAD:  Normocephalic.  EYES:  No Jaundice. No Pallor.   NECK:  Supple. No Masses.  LYMPHATICS:  No cervical or supraclavicular lymphadenopathy.  CHEST: Normal respiratory effort. Lungs clear to auscultation.   CARDIAC:  Normal S1 & S2. No murmur.   ABDOMEN:  Soft. No tenderness. No Hepatomegaly. No Splenomegaly. No masses.  EXTREMITIES:  No noted deformities.   NEUROLOGICAL:  No Focal neurological deficits.     RESULT REVIEW:   Results from last 7 days   Lab Units 12/26/23  0614 12/25/23  0146 12/23/23  0035 12/22/23  1825   WBC 10*3/mm3 3.50 3.92 5.47 6.84   NEUTROS ABS 10*3/mm3 1.35* 1.77 3.96 5.38   HEMOGLOBIN g/dL 8.9* 9.1* 9.5* 12.1   HEMATOCRIT % 26.2* 26.8* 30.0* 37.2   PLATELETS 10*3/mm3 96*  96* 98* 106* 162     Results from last 7 days   Lab Units 12/26/23  0614 12/25/23  0146 12/24/23  1008 12/23/23  1556 12/23/23  1321 12/23/23  1204 12/23/23  0843 12/23/23  0339 12/23/23  0035 12/22/23  1825   SODIUM mmol/L 138 141 139 139 138  --  140   < > 141 132*   POTASSIUM mmol/L 4.8 3.2* 2.7* 4.3 4.4  --  3.4*   < > 3.9 4.9   CHLORIDE mmol/L 103 102 104 103 101  --  107   < > 102 85*   CO2 mmol/L 23.5 26.0 22.0 20.6* 20.0*  --   18.6*   < > 10.0* 6.5*   BUN mg/dL <2* <2* <2* 4* 6  --  7   < > 11 15   CREATININE mg/dL 0.28* 0.44* 0.41* 0.66 0.64  --  0.63   < > 0.82 1.15*   CALCIUM mg/dL 9.0 8.4* 7.6* 8.6 8.6  --  7.7*   < > 7.7* 9.1   ALBUMIN g/dL  --   --   --   --   --   --   --   --  4.1 4.8   BILIRUBIN mg/dL  --   --   --   --   --   --   --   --  1.6* 1.6*   ALK PHOS U/L  --   --   --   --   --   --   --   --  154* 212*   ALT (SGPT) U/L  --   --   --   --   --   --   --   --  37* 54*   AST (SGOT) U/L  --   --   --   --   --   --   --   --  189* 311*   MAGNESIUM mg/dL 2.5 2.9* 1.5*  --   --  1.8 1.9   < > 1.8  --     < > = values in this interval not displayed.     Results from last 7 days   Lab Units 12/26/23  0614 12/22/23  2222   INR  1.04 1.31*   APTT seconds 26.9  --          Lab 12/26/23  0614   IRON 73   IRON SATURATION (TSAT) 27   TIBC 267*   TRANSFERRIN 179*   FERRITIN 646.00*   FOLATE 7.78   VITAMIN B 12 737      Assessment & Plan   *Thrombocytopenia  On review of previous labs, platelets were borderline in October 2023 at 166,000.   On admission on 12/22/2023, platelet count was 162,000.  Platelet count decreased to 106,000 on 12/23/2023.  Platelets decreased to 98,000 on 12/25/2023.  Platelets decreased to 96,000 on 12/26/2023.  Immature platelet fraction elevated at 14% today.  PT and PTT are normal.  DIC not suspected.  No evidence of vitamin B12 or folate deficiency.  This may represent platelets dropped due to consumption very suppressive effect of alcohol on the bone marrow.  HIT antibody test was requested but HIT is not clinically suspected.    *Normochromic normocytic anemia.  Admission CBC on 12/22/2023 revealed a hemoglobin of 12.1.  ?  Hemoconcentration.  Hemoglobin decreased to 9.5 on 12/23/2023.  Hemoglobin decreased to 8.9 on 12/26/2023.  Patient reported emesis of black material-?  Upper GI bleeding.  12/26/2023: Ferritin 646.  Transferrin saturation 27%-no evidence of iron deficiency.     PLAN:    1.   Recommended abstinence from alcohol.  2.  Okay for discharge from hematology standpoint.  We will see her in follow-up in 2 weeks to recheck CBC.  3.  Given the low normal folate level, I recommend continuing folic acid 1 mg daily after discharge.        Ana M Flores MD  12/26/23

## 2023-12-27 ENCOUNTER — TRANSITIONAL CARE MANAGEMENT TELEPHONE ENCOUNTER (OUTPATIENT)
Dept: CALL CENTER | Facility: HOSPITAL | Age: 33
End: 2023-12-27
Payer: COMMERCIAL

## 2023-12-27 LAB
BACTERIA SPEC AEROBE CULT: NORMAL
BACTERIA SPEC AEROBE CULT: NORMAL
PF4 HEPARIN CMPLX IGG SERPL IA: 0.11 OD (ref 0–0.4)

## 2023-12-27 NOTE — OUTREACH NOTE
Call Center TCM Note      Flowsheet Row Responses   [facility] patient discharged from? [place]   Does the patient have one of the following disease processes/diagnoses(primary or secondary)? Other   TCM attempt successful? No   Unsuccessful attempts Attempt 1            Ariana Andersen RN    12/27/2023, 10:53 EST

## 2023-12-27 NOTE — OUTREACH NOTE
Call Center TCM Note      Flowsheet Row Responses   [facility] patient discharged from? [place]   Does the patient have one of the following disease processes/diagnoses(primary or secondary)? Other   TCM attempt successful? No   Unsuccessful attempts Attempt 2            Ariana Andersen RN    12/27/2023, 15:45 EST

## 2023-12-27 NOTE — OUTREACH NOTE
Prep Survey      Flowsheet Row Responses   [facility] facility patient discharged from? [place]   Is LACE score < 7 ? Yes   Eligibility [facility]   Date of Admission 12/22/23   Date of Discharge 12/26/23   Discharge Disposition Home or Self Care   Discharge diagnosis Alcoholic ketoacidosis   Does the patient have one of the following disease processes/diagnoses(primary or secondary)? Other   Does the patient have Home health ordered? No   Is there a DME ordered? No   Comments regarding appointments New PCP appt   Prep survey completed? Yes            ARMEN HELLER - Registered Nurse

## 2023-12-27 NOTE — PAYOR COMM NOTE
"Lisa Talley (33 y.o. Female)        PLEASE SEE ATTACHED DC SUMMARY    REF#[id number]     THANK YOU    AZAM ALLISON LPN [facility]       Date of Birth   1990    Social Security Number       Address   [address] [place] [address]    Home Phone   [phone]    MRN   [id number]       Nondenominational   None    Marital Status                               Admission Date   12/22/23    Admission Type   Emergency    Admitting Provider   Varun Hartmann Jr., MD    Attending Provider       Department, Room/Bed   [address], E454/1       Discharge Date   12/26/2023    Discharge Disposition   Home or Self Care    Discharge Destination                                 Attending Provider: (none)   Allergies: No Known Allergies    Isolation: None   Infection: None   Code Status: Prior    Ht: 175.3 cm (69\")   Wt: 55.7 kg (122 lb 14.4 oz)    Admission Cmt: None   Principal Problem: Alcoholic ketoacidosis [E87.29]                   Active Insurance as of 12/22/2023       Primary Coverage       Payor Plan Insurance Group Employer/Plan Group    [employer] C[employer]NA [id number]       Payor Plan Address Payor Plan Phone Number Payor Plan Fax Number Effective Dates    [address] [phone]  1/1/2021 - None Entered    [facility] [id number]         Subscriber Name Subscriber Birth Date Member ID       LISA TALLEY 1990 [id number]                     Emergency Contacts        (Rel.) Home Phone Work Phone Mobile Phone    Diana Shelton (Mother) -- -- [phone]                 Discharge Summary        Karlo Hernandez MD at 12/26/23 1418                                                                             PHYSICIAN DISCHARGE SUMMARY                                                                        [facility]    Patient Identification:  Patient Name:  Lisa Talley  MRN:  [id number]   YOB: 1990  Age: 33 " y.o.  Sex: female  Primary Care Physician: Provider, No Known    Admit date: 12/22/2023  Discharge date and time: No discharge date for patient encounter.   Discharged Condition: good    Discharge Diagnoses:  Alcoholic ketoacidosis  Alcoholic pancreatitis  Alcoholic hepatitis  Acute kidney injury  Bicytopenia  Hyponatremia  Alcohol abuse    Hospital Course: Lisa Gibson presented to [facility] with urinary tract infection and alcoholic ketoacidosis and admitted to the ICU for insulin drip.  Also found to have alcoholic pancreatitis.  Slow improvement in her electrolytes and reintroduced to oral intake.  Strong emphasis placed on patient that she needs to refrain from alcohol to prevent further recurrences.  Treated with  ceftriaxone for urinary tract infection and discharged with Keflex for an additional 3 days to complete course.  She will need to follow-up with hematology as an outpatient for thrombocytopenia.    Consults:   IP CONSULT TO HOSPITALIST  IP CONSULT TO PULMONOLOGY  IP CONSULT TO GASTROENTEROLOGY  IP CONSULT TO CASE MANAGEMENT   IP CONSULT TO NUTRITION SERVICES  IP CONSULT TO ACCESS CENTER  IP CONSULT TO HEMATOLOGY AND ONCOLOGY    Significant Diagnostic Studies:   Heparin-induced platelet antibody -pending    Discharge Exam:  General: Alert, nontoxic, NAD  HEENT: NC/AT, EOMI, MMM  Neck: Supple, trachea midline  Cardiac: RRR, no murmur, gallops, rubs  Pulmonary: Clear to auscultation bilaterally, no adventitious breath sounds, normal respiratory effort  GI: Soft, non-tender, non-distended, normal bowel sounds  Extremities: Warm, well perfused, no LE edema  Skin: no visible rash  Neuro: CN II - XII grossly intact  Psychiatry: Normal mood and affect     Disposition:  Home    Patient Instructions:      Discharge Medications        New Medications        Instructions Start Date   cephalexin 500 MG capsule  Commonly known as: Keflex   500 mg, Oral, 2 Times Daily    Start Date: December 27, 2023            Continue These Medications        Instructions Start Date   folic acid 1 MG tablet  Commonly known as: FOLVITE   1 mg, Oral, Daily      ondansetron ODT 4 MG disintegrating tablet  Commonly known as: ZOFRAN-ODT   4 mg, Translingual, 4 Times Daily PRN      One-Daily Multi-Vitamin tablet tablet  Generic drug: multivitamin   1 tablet, Oral, Daily      thiamine 100 MG tablet  Commonly known as: VITAMIN B1   100 mg, Oral, Daily              Follow-up Information       Provider, No Known .    Contact information:  [place] [id number] [phone]                              Medication Reconciliation: Please see electronically completed Med Rec.    Total time spent discharging patient including evaluation, medication reconciliation, arranging follow up, and post hospitalization instructions and education total time exceeds 30 minutes.    Signed:  Etelvina Hager MD  12/26/2023  14:18 EST      Electronically signed by Etelvina Hager MD at 12/26/23 1421       Discharge Order (From admission, onward)       Start     Ordered    12/26/23 1409  Discharge patient  Once        Expected Discharge Date: 12/26/23   Expected Discharge Time: Afternoon   Discharge Disposition: Home or Self Care   Physician of Record for Attribution - Please select from Treatment Team: ETELVINA HAGER [[id number]]   Review needed by CMO to determine Physician of Record: No      Question Answer Comment   Physician of Record for Attribution - Please select from Treatment Team ETELVINA HAGER    Review needed by CMO to determine Physician of Record No        12/26/23 1416

## 2023-12-28 ENCOUNTER — TRANSITIONAL CARE MANAGEMENT TELEPHONE ENCOUNTER (OUTPATIENT)
Dept: CALL CENTER | Facility: HOSPITAL | Age: 33
End: 2023-12-28
Payer: COMMERCIAL

## 2023-12-28 NOTE — OUTREACH NOTE
Call Center TCM Note      Flowsheet Row Responses   [facility] patient discharged from? [facility]   Does the patient have one of the following disease processes/diagnoses(primary or secondary)? Other   TCM attempt successful? No   Unsuccessful attempts Attempt 3            Tiffanie Perez LPN    12/28/2023, 16:24 EST

## 2024-08-25 ENCOUNTER — P2P PATIENT RECORD (OUTPATIENT)
Age: 34
End: 2024-08-25

## 2024-11-12 NOTE — PAYOR COMM NOTE
"Lisa Talley (32 y.o. Female)        ATTENTION; INITIAL CLINICALS AUTH PENDING [id number] - MEDICAL INPATIENT POD C      REPLY TO [facility] DEPT  [phone] OR CALL   HOLLY HESS LPN       Date of Birth   1990    Social Security Number       Address   [address]  [place] [address]    Home Phone   [phone]    MRN   [id number]       Congregation   Unknown    Marital Status                               Admission Date   4/27/23    Admission Type   Emergency    Admitting Provider   Ryne Resendiz MD    Attending Provider   Ryne Resendiz MD    Department, Room/Bed   [facility] CARDIAC INTENSIVE CARE, 3002/1       Discharge Date       Discharge Disposition       Discharge Destination                               Attending Provider: Ryne Resendiz MD    Allergies: No Known Allergies    Isolation: None   Infection: None   Code Status: CPR    Ht: 175.3 cm (69\")   Wt: 59.4 kg (130 lb 15.3 oz)    Admission Cmt: None   Principal Problem: Severe sepsis [A41.9,R65.20]                 Active Insurance as of 4/27/2023     Primary Coverage     Payor Plan Insurance Group Employer/Plan Group    CIG[employer] CIGNA [id number]     Payor Plan Address Payor Plan Phone Number Payor Plan Fax Number Effective Dates    [address] [phone]  1/1/2021 - None Entered    [facility] [id number]       Subscriber Name Subscriber Birth Date Member ID       LISA TALLEY 1990 [id number]                 Emergency Contacts      (Rel.) Home Phone Work Phone Mobile Phone    Diana Shelton (Mother) -- -- [phone]               History & Physical      Ryne Resendiz MD at 04/27/23 1152          [facility]    CC: UTO    HPI:  Mrs. Talley is a 33yo female with history of alcohol abuse.  She was brought to ER today by EMS.  Her mother is in from out of town visiting.  She says Lisa wasn't complaining of anything the past few days, " "seemed to be acting normally.  Patient was in the bathroom and mother was in another room, mother her patient hit the ground. EMS was called. Patient was brought to ER altered and having abnormal body movements, febrile. Patient given antibiotics, fluids and benzos in the er and she has improved significantly at this point. She is now awake and able to tell me her name that she is in a hospital and that the year is 2023.  She recognizes her mother.  She doesn't remember feeling  Unwell in the past few days. She denies any prescription or street drugs. States she drinks \"2 shots\" a day.  She denies any pain.  She denies any urinary symptoms. She says her back hurts all over.     PMH:  Alcohol abuse  Acute pancreatitis  Fatty liver  HTN    SH:  +etoh, daily    FH: non contributory  ALL: NKDA  ROS: 12 point negative except as in HPI    Vital Sign Min/Max for last 24 hours  Temp  Min: 103.3 °F (39.6 °C)  Max: 106.5 °F (41.4 °C)   BP  Min: 112/56  Max: 119/84   Pulse  Min: 107  Max: 150   Resp  Min: 16  Max: 28   SpO2  Min: 98 %  Max: 99 %   No data recorded   Weight  Min: 56.7 kg (125 lb)  Max: 56.7 kg (125 lb)     GEN:   appears ill,  A&O x3 fetor hepaticus?  HEENT: PERRL, EOMI, normal sclera, mm dry, no JVD, trachea midline, neck supple  CHEST: CTA bilat, no wheezes, no crackles, no use of accessory muscles  CV: tachy, regular, no m/g/r  ABD: soft, nt, nd +bs, no hepatosplenomegaly  EXT: no c/c/e  SKIN: no rashes, no xanthomas, nl turgor, warm, dry  LYMPH: no palpable cervical or supraclavicular lymphadenopathy  NEURO: CN 2-12 intact and symmetric bilaterally  PSYCH: flat affect, nl orientation, nl judgment, nl mood  MSK: no kyphoscoliosis, 5/5 strength ue and le bilaterally    Labs: 4/27: reviewed:  7.47/30/34 (vbg)  UDS: +THC  lactate 6.7  U/a: george est 2+; nitrate +; wbc TNC; Rogelio 3+  Glucose 150  Bun 5  Cr 0.57  Na 137  Bicarb 19.3  Alt 41  aSt 298  Alk phos 133  etoh 233  Mg 1.3  Wbc 8  hgb 9.8  plts 171  procal " "0.49  Ck 248  CXR: 4/27: nad to my read    A/P:  1. Encephalopathy -- likely multifactorial.  Suspect patient may not be being totally honest with the amount/types of substances consumed -- sz remain in differential dx -- will ask neurology to see in morning. Monitor in ICU. benzos if needed. Check ammonia, ct head  2. Hepatitis - suspect alcoholic hepatitis -- supportive care, monitor. ruq ultrasound  3. Abnormal u/a -- treat with antibiotics for now, but patient denies urinary symptoms -- will check renal ultrasound  4. Lactic acidosis - -- suspect will be much improved here, bicarb should be lower given the etoh, lactic and ketones in urine. Continue iv fluids  5. Alcohol intoxication - thiamine, ciwa  6. hypomagnesium -- replace  7. Anemia  8. Mild rhabdo  9. Fever -- unclear    Discussed with mother at bedside, discussed with Dr. Cline    CC 40 mins excluding any future billable procedures.     Electronically signed by Ryne Resendiz MD at 04/27/23 2212          Emergency Department Notes      Eyad Kahn RN at 04/27/23 1956        Pt arrived via [facility]  From home. EMS was called for seizure. Family states they heard a \"thud\" and found the pt on the floor but did not report seizure activity to EMS. EMS states pt posturing enroute. Pt non verbal at this time.     Electronically signed by Eyad Kahn RN at 04/27/23 1959     Rocky Cline MD at 04/27/23 1959      Procedure Orders    1. Critical Care [[id number]] ordered by Rocky Cline MD                EMERGENCY DEPARTMENT ENCOUNTER    Room Number:  3002/1  Date seen:  4/27/2023  PCP: Provider, No Known  Historian: EMS  Relevant information provided by sources other than the patient, review of external records, and social determinants of health may be included in the HPI section.      HPI:  Chief Complaint: Altered mental status  Additional historical features obtained directly from: EMS reported that seen some seizure-like activity along " "with agitation and \"uncontrollable body movements\" nothing else is known about the patient at this time no past medical history is no  Context: Lisa Gibson is a 32 y.o. female who presents to the ED c/o EMS was called to the scene today for the symptoms mentioned above.  No one else here with patient at this time to give any additional information and she was delirious and unable to provide any.  She is agitated and restless used and not having any tonic-clonic movements but movement is consistent with akathisia        Initial impression including social determinants which will impact the assessment very difficult social situation given the events she presents to the present status.  Very aggressive resuscitation, and was also noted that her temperature was 106.5.  Certainly would suggest something like serotonin syndrome or severe DTs along with the possibility of severe sepsis with delirium.  We will try to calm her down and resuscitate her as best we can as we try and get samples for testing          PAST MEDICAL HISTORY  Active Ambulatory Problems     Diagnosis Date Noted   • Alcohol-induced acute pancreatitis without infection or necrosis 09/21/2021   • Transaminitis 09/22/2021   • Epigastric pain 09/22/2021   • Fatty (change of) liver, not elsewhere classified 09/24/2021   • Gallbladder sludge 09/24/2021     Resolved Ambulatory Problems     Diagnosis Date Noted   • No Resolved Ambulatory Problems     No Additional Past Medical History         PAST SURGICAL HISTORY  No past surgical history on file.      FAMILY HISTORY  No family history on file.      SOCIAL HISTORY  Social History     Socioeconomic History   • Marital status:    Tobacco Use   • Smoking status: Never   • Smokeless tobacco: Never         ALLERGIES  Patient has no known allergies.          PHYSICAL EXAM  ED Triage Vitals   Temp Pulse Resp BP SpO2   -- -- -- -- --      Temp src Heart Rate Source Patient Position BP Location FiO2 (%) "   -- -- -- -- --         Physical Exam      GENERAL: Agitated and delirious, thin and emaciated  HENT: nares patent  EYES: no scleral icterus, pupils are dilated  CV: Extremely tachycardic in the 170s and appears to be narrow complex, distal pulses symmetric and palpable  RESPIRATORY: Hyperventilating and tachypneic but breath sounds are clear  ABDOMEN: soft, nondistended nontender with normal bowel sound  MUSCULOSKELETAL: No obvious deformity  NEURO: Agitated and delirious with akathisia body wide.  No seizure activity is seen  SKIN: Very warm        LAB RESULTS  Recent Results (from the past 24 hour(s))   Comprehensive Metabolic Panel    Collection Time: 04/27/23  8:03 PM    Specimen: Blood   Result Value Ref Range    Glucose 150 (H) 65 - 99 mg/dL    BUN 5 (L) 6 - 20 mg/dL    Creatinine 0.57 0.57 - 1.00 mg/dL    Sodium 137 136 - 145 mmol/L    Potassium 3.7 3.5 - 5.2 mmol/L    Chloride 95 (L) 98 - 107 mmol/L    CO2 19.3 (L) 22.0 - 29.0 mmol/L    Calcium 8.9 8.6 - 10.5 mg/dL    Total Protein 7.6 6.0 - 8.5 g/dL    Albumin 3.8 3.5 - 5.2 g/dL    ALT (SGPT) 41 (H) 1 - 33 U/L    AST (SGOT) 298 (H) 1 - 32 U/L    Alkaline Phosphatase 133 (H) 39 - 117 U/L    Total Bilirubin 0.8 0.0 - 1.2 mg/dL    Globulin 3.8 gm/dL    A/G Ratio 1.0 g/dL    BUN/Creatinine Ratio 8.8 7.0 - 25.0    Anion Gap 22.7 (H) 5.0 - 15.0 mmol/L    eGFR 124.8 >60.0 mL/min/1.73   hCG, Serum, Qualitative    Collection Time: 04/27/23  8:03 PM    Specimen: Blood   Result Value Ref Range    HCG Qualitative Negative Negative   Ethanol    Collection Time: 04/27/23  8:03 PM    Specimen: Blood   Result Value Ref Range    Ethanol 233 (H) 0 - 10 mg/dL    Ethanol % 0.233 %   Magnesium    Collection Time: 04/27/23  8:03 PM    Specimen: Blood   Result Value Ref Range    Magnesium 1.3 (L) 1.6 - 2.6 mg/dL   CBC Auto Differential    Collection Time: 04/27/23  8:03 PM    Specimen: Blood   Result Value Ref Range    WBC 8.01 3.40 - 10.80 10*3/mm3    RBC 3.26 (L) 3.77 - 5.28  10*6/mm3    Hemoglobin 9.8 (L) 12.0 - 15.9 g/dL    Hematocrit 28.6 (L) 34.0 - 46.6 %    MCV 87.7 79.0 - 97.0 fL    MCH 30.1 26.6 - 33.0 pg    MCHC 34.3 31.5 - 35.7 g/dL    RDW 13.6 12.3 - 15.4 %    RDW-SD 42.4 37.0 - 54.0 fl    MPV 10.1 6.0 - 12.0 fL    Platelets 171 140 - 450 10*3/mm3   Procalcitonin    Collection Time: 04/27/23  8:03 PM    Specimen: Blood   Result Value Ref Range    Procalcitonin 0.49 (H) 0.00 - 0.25 ng/mL   CK    Collection Time: 04/27/23  8:03 PM    Specimen: Blood   Result Value Ref Range    Creatine Kinase 248 (H) 20 - 180 U/L   Manual Differential    Collection Time: 04/27/23  8:03 PM    Specimen: Blood   Result Value Ref Range    Neutrophil % 81.6 (H) 42.7 - 76.0 %    Lymphocyte % 16.3 (L) 19.6 - 45.3 %    Monocyte % 1.0 (L) 5.0 - 12.0 %    Basophil % 1.0 0.0 - 1.5 %    Neutrophils Absolute 6.54 1.70 - 7.00 10*3/mm3    Lymphocytes Absolute 1.31 0.70 - 3.10 10*3/mm3    Monocytes Absolute 0.08 (L) 0.10 - 0.90 10*3/mm3    Basophils Absolute 0.08 0.00 - 0.20 10*3/mm3    nRBC 4.1 (H) 0.0 - 0.2 /100 WBC    Anisocytosis Slight/1+ None Seen    Hypochromia Mod/2+ None Seen    Polychromasia Slight/1+ None Seen    Schistocytes Slight/1+ None Seen    WBC Morphology Normal Normal    Platelet Morphology Normal Normal   Lipase    Collection Time: 04/27/23  8:03 PM    Specimen: Blood   Result Value Ref Range    Lipase 12 (L) 13 - 60 U/L   Salicylate Level    Collection Time: 04/27/23  8:03 PM    Specimen: Blood   Result Value Ref Range    Salicylate 0.4 <=30.0 mg/dL   Urine Drug Screen - Indwelling Urethral Catheter    Collection Time: 04/27/23  8:31 PM    Specimen: Indwelling Urethral Catheter; Urine   Result Value Ref Range    Amphet/Methamphet, Screen Negative Negative    Barbiturates Screen, Urine Negative Negative    Benzodiazepine Screen, Urine Negative Negative    Cocaine Screen, Urine Negative Negative    Opiate Screen Negative Negative    THC, Screen, Urine Positive (A) Negative    Methadone  Screen, Urine Negative Negative    Oxycodone Screen, Urine Negative Negative    Fentanyl, Urine Negative Negative   Lactic Acid, Plasma    Collection Time: 04/27/23  8:31 PM    Specimen: Blood   Result Value Ref Range    Lactate 6.7 (C) 0.5 - 2.0 mmol/L   Urinalysis With Microscopic If Indicated (No Culture) - Indwelling Urethral Catheter    Collection Time: 04/27/23  8:31 PM    Specimen: Indwelling Urethral Catheter; Urine   Result Value Ref Range    Color, UA Dark Yellow (A) Yellow, Straw    Appearance, UA Turbid (A) Clear    pH, UA 6.0 5.0 - 8.0    Specific Gravity, UA 1.022 1.005 - 1.030    Glucose, UA Negative Negative    Ketones, UA 15 mg/dL (1+) (A) Negative    Bilirubin, UA Negative Negative    Blood, UA Large (3+) (A) Negative    Protein, UA >=300 mg/dL (3+) (A) Negative    Leuk Esterase, UA Moderate (2+) (A) Negative    Nitrite, UA Positive (A) Negative    Urobilinogen, UA 1.0 E.U./dL 0.2 - 1.0 E.U./dL   Urinalysis, Microscopic Only - Indwelling Urethral Catheter    Collection Time: 04/27/23  8:31 PM    Specimen: Indwelling Urethral Catheter; Urine   Result Value Ref Range    RBC, UA Too Numerous to Count (A) None Seen, 0-2 /HPF    WBC, UA Too Numerous to Count (A) None Seen, 0-2 /HPF    Bacteria, UA 3+ (A) None Seen /HPF    Squamous Epithelial Cells, UA 0-2 None Seen, 0-2 /HPF    Hyaline Casts, UA 7-12 None Seen /LPF    Methodology Automated Microscopy    Blood Gas, Arterial -    Collection Time: 04/27/23  8:58 PM    Specimen: Arterial Blood   Result Value Ref Range    Site Arterial: right radial     Pj's Test Positive     pH, Arterial 7.472 (H) 7.350 - 7.450 pH units    pCO2, Arterial 30.9 (L) 35.0 - 45.0 mm Hg    pO2, Arterial 34.0 (C) 80.0 - 100.0 mm Hg    HCO3, Arterial 22.6 22.0 - 28.0 mmol/L    Base Excess, Arterial -0.5 (L) 0.0 - 2.0 mmol/L    O2 Saturation Calculated 70.7 (L) 92.0 - 99.0 %    Barometric Pressure for Blood Gas 743.5 mmHg    Modality Room Air     Rate 20 Breaths/minute    Respiratory Panel PCR w/COVID-19(SARS-CoV-2) ЕКАТЕРИНА/SHARLENE/LAURA/PAD/COR/MAD/GILMA In-House, NP Swab in UTM/VTM, 3-4 HR TAT - Swab, Nasopharynx    Collection Time: 04/27/23  9:25 PM    Specimen: Nasopharynx; Swab   Result Value Ref Range    ADENOVIRUS, PCR Not Detected Not Detected    Coronavirus 229E Not Detected Not Detected    Coronavirus HKU1 Not Detected Not Detected    Coronavirus NL63 Not Detected Not Detected    Coronavirus OC43 Not Detected Not Detected    COVID19 Not Detected Not Detected - Ref. Range    Human Metapneumovirus Not Detected Not Detected    Human Rhinovirus/Enterovirus Not Detected Not Detected    Influenza A PCR Not Detected Not Detected    Influenza B PCR Not Detected Not Detected    Parainfluenza Virus 1 Not Detected Not Detected    Parainfluenza Virus 2 Not Detected Not Detected    Parainfluenza Virus 3 Not Detected Not Detected    Parainfluenza Virus 4 Not Detected Not Detected    RSV, PCR Not Detected Not Detected    Bordetella pertussis pcr Not Detected Not Detected    Bordetella parapertussis PCR Not Detected Not Detected    Chlamydophila pneumoniae PCR Not Detected Not Detected    Mycoplasma pneumo by PCR Not Detected Not Detected   POC Glucose Once    Collection Time: 04/27/23 10:33 PM    Specimen: Blood   Result Value Ref Range    Glucose 100 70 - 130 mg/dL   STAT Lactic Acid, Reflex    Collection Time: 04/27/23 11:04 PM    Specimen: Blood   Result Value Ref Range    Lactate 4.3 (C) 0.5 - 2.0 mmol/L   Ammonia    Collection Time: 04/27/23 11:04 PM    Specimen: Blood   Result Value Ref Range    Ammonia 27 11 - 51 umol/L   Hepatitis Panel, Acute    Collection Time: 04/27/23 11:04 PM    Specimen: Blood   Result Value Ref Range    Hepatitis B Surface Ag Non-Reactive Non-Reactive    Hep A IgM Non-Reactive Non-Reactive    Hep B C IgM Non-Reactive Non-Reactive    Hepatitis C Ab Non-Reactive Non-Reactive       Ordered the above labs and my independent interpretation of these results are discussed  "in the section labeled \"ED course\" below        RADIOLOGY  CT Head Without Contrast    Result Date: 4/27/2023  Patient: MYRIAM TALLEY  Time Out: 22:33 Exam(s): CT HEAD Without Contrast EXAM:   CT Head Without Intravenous Contrast CLINICAL HISTORY:    Reason for exam: Encephalopathy (Ped 0-17y). TECHNIQUE:   Axial computed tomography images of the head brain without intravenous contrast.  CTDI is 54.93 mGy and DLP is 949.1 mGy-cm.  This CT exam was performed according to the principle of ALARA (As Low As Reasonably Achievable) by using one or more of the following dose reduction techniques: automated exposure control, adjustment of the mA and or kV according to patient size, and or use of iterative reconstruction technique. COMPARISON:   None. FINDINGS:   Brain:  Unremarkable.  No hemorrhage.  No significant white matter disease.  No edema.   Ventricles:  Unremarkable.  No ventriculomegaly.   Bones joints:  Unremarkable.  No acute fracture.   Soft tissues:  Unremarkable.   Sinuses:  Completely opacified right sphenoid sinus.   Mastoid air cells:  Unremarkable as visualized.  No mastoid effusion. IMPRESSION:     1.  No intracranial hemorrhage or other acute intracranial abnormality. 2.  Completely opacified right sphenoid sinus.  Recommend correlation for symptoms.     Electronically signed by Nakul Ruiz MD on 04-27-23 at 2233    XR Chest 1 View    Result Date: 4/27/2023  XR CHEST 1 VW-  Clinical: Febrile  FINDINGS: The right lung is clear. Heart size within normal limits. No mediastinal or hilar abnormality. Vague area of infiltrate demonstrated at the left lung base. No pleural effusion or vascular congestion seen.  CONCLUSION: Vague infiltrate at the left base, otherwise within normal limits.  This report was finalized on 4/27/2023 10:14 PM by Dr. Christian Latif M.D.        Ordered the above noted radiological studies.  Independently interpreted by me in PACS.  My independent interpretation of these " "results are discussed in the section below labeled \"ED course\"        PROCEDURES  Critical Care  Performed by: Rocky Cline MD  Authorized by: Rocky Cline MD     Critical care provider statement:     Critical care time (minutes):  45    Critical care time was exclusive of:  Separately billable procedures and treating other patients    Critical care was necessary to treat or prevent imminent or life-threatening deterioration of the following conditions: Suspicion for malignant hyperthermia or serotonin syndrome.    Critical care was time spent personally by me on the following activities:  Discussions with consultants, evaluation of patient's response to treatment, examination of patient, ordering and performing treatments and interventions, ordering and review of laboratory studies, ordering and review of radiographic studies, pulse oximetry, re-evaluation of patient's condition and review of old charts    I assumed direction of critical care for this patient from another provider in my specialty: no      Care discussed with: admitting provider                MEDICATIONS GIVEN IN ER  Medications   sodium chloride 0.9 % flush 10 mL (has no administration in time range)   LORazepam (ATIVAN) injection 1 mg (1 mg Intravenous Given 4/27/23 2042)   diazePAM (VALIUM) injection 10 mg (10 mg Intravenous Not Given 4/27/23 2206)   thiamine (B-1) 100 mg in sodium chloride 0.9 % 100 mL IVPB (has no administration in time range)   sodium chloride 0.9 % infusion (125 mL/hr Intravenous New Bag 4/27/23 2307)   cefTRIAXone (ROCEPHIN) 2 g in sodium chloride 0.9 % 100 mL IVPB-VTB (has no administration in time range)   sodium chloride 0.9 % flush 10 mL (10 mL Intravenous Given 4/27/23 2307)   sodium chloride 0.9 % flush 10 mL (has no administration in time range)   sodium chloride 0.9 % infusion 40 mL (has no administration in time range)   Enoxaparin Sodium (LOVENOX) syringe 40 mg (40 mg Subcutaneous Given 4/27/23 2310) "   acetaminophen (TYLENOL) tablet 650 mg (has no administration in time range)     Or   acetaminophen (TYLENOL) suppository 650 mg (has no administration in time range)   potassium chloride (K-DUR,KLOR-CON) ER tablet 40 mEq (has no administration in time range)     Or   potassium chloride (KLOR-CON) packet 40 mEq (has no administration in time range)     Or   potassium chloride 10 mEq in 100 mL IVPB (has no administration in time range)   Magnesium Sulfate - Total Dose 10 grams - Magnesium 1 or Less (has no administration in time range)     Or   Magnesium Sulfate - Total Dose 6 grams - Magnesium 1.1 - 1.5 (has no administration in time range)     Or   Magnesium Sulfate - Total Dose 4 grams - Magnesium 1.6 - 1.9 (has no administration in time range)   potassium & sodium phosphates (PHOS-NAK) 280-160-250 MG packet - for Phosphorus less than 1.25 mg/dL (has no administration in time range)     Or   potassium & sodium phosphates (PHOS-NAK) 280-160-250 MG packet - for Phosphorus 1.25 - 2.5 mg/dL (has no administration in time range)   calcium gluconate 1g/50ml 0.675% NaCl IV SOLN (has no administration in time range)     And   calcium gluconate 2 g in sodium chloride 0.9 % 500 mL IVPB (has no administration in time range)   ondansetron (ZOFRAN) tablet 4 mg (has no administration in time range)     Or   ondansetron (ZOFRAN) injection 4 mg (has no administration in time range)   calcium carbonate (TUMS) chewable tablet 500 mg (200 mg elemental) (has no administration in time range)   LORazepam (ATIVAN) tablet 1 mg (1 mg Oral Given 4/27/23 2308)     Followed by   LORazepam (ATIVAN) tablet 1 mg (has no administration in time range)   folic acid (FOLVITE) tablet 1 mg (has no administration in time range)   LORazepam (ATIVAN) injection 2 mg (has no administration in time range)   LORazepam (ATIVAN) injection 1 mg (1 mg Intravenous Given 4/27/23 2007)   thiamine (B-1) 100 mg, folic acid 1 mg in sodium chloride 0.9 % 1,000 mL  "infusion (1,000 mL/hr Intravenous New Bag 4/27/23 2039)   magnesium sulfate 2g/50 mL (PREMIX) infusion (2 g Intravenous New Bag 4/27/23 2007)   sodium chloride 0.9 % bolus 1,000 mL (0 mL Intravenous Stopped 4/27/23 2107)   acetaminophen (TYLENOL) suppository 650 mg (650 mg Rectal Given 4/27/23 2019)   sodium chloride 0.9 % bolus 1,701 mL (1,701 mL Intravenous New Bag 4/27/23 2119)   cefTRIAXone (ROCEPHIN) 1 g in sodium chloride 0.9 % 100 mL IVPB-VTB (1 g Intravenous New Bag 4/27/23 2132)   cefTRIAXone (ROCEPHIN) 1 g in sodium chloride 0.9 % 100 mL IVPB-VTB (1 g Intravenous New Bag 4/27/23 2312)                   MEDICAL DECISION MAKING and INDEPENDENT INTERPRETATIONS      Everything documented below this statement is the \"ED course\" section which I have referred to above.  Everything discussed in the following section constitutes MY INDEPENDENT INTERPRETATIONS of the lab work, EKGs, and radiology studies, and all of my personal conversations with other providers, and all of my independent decision making regarding this patient are discussed below.      ED Course as of 04/27/23 2351   u Apr 27, 2023 2111 The clinical data that I have at this point would point more towards sepsis with UTI as in origin, however it really does not make complete sense based on the way the patient looks.    I also think there is a component of alcohol withdrawal and potentially even coingestion.  I cannot exclude serotonin syndrome or neuroleptic malignant syndrome, but I have no history to go off of regarding what she might of taken.    She has been covered for sepsis with antibiotics and fluids.  Her temperature is down to 103 after all the active measures.  She is hemodynamically stable and much more calm at this time.  She still confused and a little restless but no longer having the akathisia and certainly no seizure-like activity. [DP]   2112 I will admit the patient to the ICU for further [DP]   2350 Patient does have a " "significantly elevated lactate and white blood cell count which could be partially reactive but also could represent sepsis as discussed above. [DP]   2350 Urinalysis does have 3+ bacteria and too numerous to count white blood cells so antibiotics were started [DP]   2350 She was not acidotic on her ABG [DP]   2350 Patient received 2 doses of IV Ativan and her mental status and vital signs did improve quite a bit although she was still confused and somewhat anxious [DP]   2351 Her mother showed up at the bedside and I ask if there were any illicit substances or prescription substance that she knew of that her daughter might of taken and she said no [DP]   2351 Patient was reluctant to give any information [DP]   2351 Appreciate Dr. Cardozo who came down to the ER to evaluate the patient and agrees with course of care to this point.  She will be admitted to the ICU [DP]      ED Course User Index  [DP] Rocky Cline MD         Everything documented above with this statement, in the ED course section constitutes my independent interpretation of lab work EKG and radiology studies along with my personal conversations with other providers and my independent medical decision making.  This statement will not be repeated before each data point, but they are all my independent interpretation needs contained within the \"ED course\" section.             All appropriate hygiene and PPE requirements were satisfied with this patient encounter      FINAL DIAGNOSES  Final diagnoses:   Severe sepsis   Alcohol withdrawal syndrome, with delirium           DISPOSITION  Admit to ICU          Latest Documented Vital Signs:  As of 23:51 EDT  BP- 121/71 HR- 111 Temp- (!) 100.8 °F (38.2 °C) (Rectal) O2 sat- 100%        --    Please note that portions of this were completed with a voice recognition program.       Note Disclaimer: At [facility], we believe that sharing information builds trust and better relationships. You are receiving " "this note because you are receiving care at [facility] or recently visited. It is possible you will see health information before a provider has talked with you about it. This kind of information can be easy to misunderstand. To help you fully understand what it      Rocky Cline MD  04/27/23 2351      Electronically signed by Rocky Cline MD at 04/27/23 2351     Ida Jeffrey, RN at 04/27/23 2132          Nursing report ED to floor  Lisa Gibson  32 y.o.  female    HPI :   Chief Complaint   Patient presents with   • Altered Mental Status       Admitting doctor:   Ryne Resendiz MD    Admitting diagnosis:   The primary encounter diagnosis was Severe sepsis. A diagnosis of Alcohol withdrawal syndrome, with delirium was also pertinent to this visit.    Code status:   Current Code Status       Date Active Code Status Order ID Comments User Context       Prior            Allergies:   Patient has no known allergies.    Isolation:   Enhanced Droplet/Contact     Intake and Output  No intake or output data in the 24 hours ending 04/27/23 2132    Weight:       04/27/23 2001   Weight: 56.7 kg (125 lb)       Most recent vitals:   Vitals:    04/27/23 2001 04/27/23 2011 04/27/23 2031 04/27/23 2101   BP: 112/52  112/56    Pulse: (!) 150  (!) 122    Resp: 16   28   Temp:  (!) 106.5 °F (41.4 °C)  (!) 103.3 °F (39.6 °C)   TempSrc:  Rectal  Rectal   SpO2: 99%  98%    Weight: 56.7 kg (125 lb)      Height: 175.3 cm (69\")          Active LDAs/IV Access:   Lines, Drains & Airways       Active LDAs       Name Placement date Placement time Site Days    Peripheral IV 04/27/23 1955 Left Antecubital 04/27/23 1955  Antecubital  less than 1    Peripheral IV 04/27/23 2000 Right Antecubital 04/27/23 2000  Antecubital  less than 1    Peripheral IV 04/27/23 2105 Anterior;Right Forearm 04/27/23 2105  Forearm  less than 1    Urethral Catheter Non-latex 16 Fr. 04/27/23 2031  -- less than 1                    Labs (abnormal " labs have a star):   Labs Reviewed   COMPREHENSIVE METABOLIC PANEL - Abnormal; Notable for the following components:       Result Value    Glucose 150 (*)     BUN 5 (*)     Chloride 95 (*)     CO2 19.3 (*)     ALT (SGPT) 41 (*)     AST (SGOT) 298 (*)     Alkaline Phosphatase 133 (*)     Anion Gap 22.7 (*)     All other components within normal limits    Narrative:     GFR Normal >60  Chronic Kidney Disease <60  Kidney Failure <15     ETHANOL - Abnormal; Notable for the following components:    Ethanol 233 (*)     All other components within normal limits   MAGNESIUM - Abnormal; Notable for the following components:    Magnesium 1.3 (*)     All other components within normal limits   CBC WITH AUTO DIFFERENTIAL - Abnormal; Notable for the following components:    RBC 3.26 (*)     Hemoglobin 9.8 (*)     Hematocrit 28.6 (*)     All other components within normal limits   LACTIC ACID, PLASMA - Abnormal; Notable for the following components:    Lactate 6.7 (*)     All other components within normal limits   CK - Abnormal; Notable for the following components:    Creatine Kinase 248 (*)     All other components within normal limits   URINALYSIS W/ MICROSCOPIC IF INDICATED (NO CULTURE) - Abnormal; Notable for the following components:    Color, UA Dark Yellow (*)     Appearance, UA Turbid (*)     Ketones, UA 15 mg/dL (1+) (*)     Blood, UA Large (3+) (*)     Protein, UA >=300 mg/dL (3+) (*)     Leuk Esterase, UA Moderate (2+) (*)     Nitrite, UA Positive (*)     All other components within normal limits   MANUAL DIFFERENTIAL - Abnormal; Notable for the following components:    Neutrophil % 81.6 (*)     Lymphocyte % 16.3 (*)     Monocyte % 1.0 (*)     Monocytes Absolute 0.08 (*)     nRBC 4.1 (*)     All other components within normal limits   URINALYSIS, MICROSCOPIC ONLY - Abnormal; Notable for the following components:    RBC, UA Too Numerous to Count (*)     WBC, UA Too Numerous to Count (*)     Bacteria, UA 3+ (*)     All  other components within normal limits   BLOOD GAS, ARTERIAL - Abnormal; Notable for the following components:    pH, Arterial 7.472 (*)     pCO2, Arterial 30.9 (*)     pO2, Arterial 34.0 (*)     Base Excess, Arterial -0.5 (*)     O2 Saturation Calculated 70.7 (*)     All other components within normal limits   HCG, SERUM, QUALITATIVE - Normal   BLOOD CULTURE   BLOOD CULTURE   RESPIRATORY PANEL PCR W/ COVID-19 (SARS-COV-2) ЕКАТЕРИНА/SHARLENE/LAURA/PAD/COR/MAD/GILMA IN-HOUSE, NP SWAB IN [facility], 3-4 HR TAT   MRSA SCREEN, PCR   BLOOD GAS, ARTERIAL   URINE DRUG SCREEN    Narrative:     The following orders were created for panel order Urine Drug Screen - Indwelling Urethral Catheter.  Procedure                               Abnormality         Status                     ---------                               -----------         ------                     Urine Drug Screen - Indw...[[id number]]                      In process                   Please view results for these tests on the individual orders.   URINE DRUG SCREEN   PROCALCITONIN   LACTIC ACID, REFLEX   LIPASE   AMMONIA   SALICYLATE LEVEL   CBC AND DIFFERENTIAL    Narrative:     The following orders were created for panel order CBC & Differential.  Procedure                               Abnormality         Status                     ---------                               -----------         ------                     CBC Auto Differential[[id number]]        Abnormal            Final result                 Please view results for these tests on the individual orders.       EKG:   No orders to display       Meds given in ED:   Medications   sodium chloride 0.9 % flush 10 mL (has no administration in time range)   LORazepam (ATIVAN) injection 1 mg (1 mg Intravenous Given 4/27/23 2042)   sodium chloride 0.9 % bolus 1,701 mL (1,701 mL Intravenous New Bag 4/27/23 2119)   cefTRIAXone (ROCEPHIN) 1 g in sodium chloride 0.9 % 100 mL IVPB-VTB (has no administration in time range)    diazePAM (VALIUM) injection 10 mg (has no administration in time range)   thiamine (B-1) 100 mg in sodium chloride 0.9 % 100 mL IVPB (has no administration in time range)   cefTRIAXone (ROCEPHIN) 1 g in sodium chloride 0.9 % 100 mL IVPB-VTB (has no administration in time range)   sodium chloride 0.9 % infusion (has no administration in time range)   LORazepam (ATIVAN) injection 1 mg (1 mg Intravenous Given 4/27/23 2007)   thiamine (B-1) 100 mg, folic acid 1 mg in sodium chloride 0.9 % 1,000 mL infusion (1,000 mL/hr Intravenous New Bag 4/27/23 2039)   magnesium sulfate 2g/50 mL (PREMIX) infusion (2 g Intravenous New Bag 4/27/23 2007)   sodium chloride 0.9 % bolus 1,000 mL (0 mL Intravenous Stopped 4/27/23 2107)   acetaminophen (TYLENOL) suppository 650 mg (650 mg Rectal Given 4/27/23 2019)       Imaging results:  No radiology results for the last day    Ambulatory status:   - bedrest    Social issues:   Social History     Socioeconomic History   • Marital status:    Tobacco Use   • Smoking status: Never   • Smokeless tobacco: Never       NIH Stroke Scale:         Ida Lawson RN  04/27/23 21:32 EDT         Electronically signed by Ida Jeffrey RN at 04/27/23 2132       Vital Signs (last day)     Date/Time Temp Temp src Pulse Resp BP Patient Position SpO2    04/28/23 1100 -- -- 121 -- 122/81 -- 100    04/28/23 1000 -- -- 111 -- 116/83 -- 100    04/28/23 0900 -- -- 112 -- 114/93 -- 100    04/28/23 0800 102 (38.9) Rectal 127 -- 125/91 -- 100    04/28/23 0700 -- -- 112 -- 119/80 -- 100    04/28/23 0600 -- -- 113 -- 119/87 -- 100    04/28/23 0500 -- -- 108 -- 128/87 -- 100    04/28/23 0400 99.7 (37.6) -- 108 -- 118/79 -- 100    04/28/23 0300 -- -- 112 -- 115/79 -- 100    04/28/23 0200 -- -- 125 -- 107/75 -- 99    04/28/23 0130 -- -- 130 -- 125/83 -- 100    04/28/23 0100 -- -- 121 -- 123/84 -- 100    04/28/23 0030 -- -- 119 -- 122/75 -- 100    04/28/23 0000 102.7 (39.3) Rectal 125 -- 112/90 -- 100     04/27/23 2330 -- -- 121 -- 128/94 -- 99    04/27/23 2244 100.8 (38.2) Rectal -- -- -- -- --    04/27/23 2230 100.2 (37.9) Oral 111 25 121/71 Lying 100    04/27/23 2131 -- -- 107 -- 119/84 -- 99    04/27/23 21:01:22 103.3 (39.6) Rectal -- 28 -- -- --    04/27/23 2031 -- -- 122 -- 112/56 -- 98    04/27/23 2011 106.5 (41.4) Rectal -- -- -- -- --    04/27/23 2001 -- -- 150 16 112/52 -- 99          Oxygen Therapy (last day)     Date/Time SpO2 Device (Oxygen Therapy) Flow (L/min) Oxygen Concentration (%) ETCO2 (mmHg)    04/28/23 1100 100 -- -- -- --    04/28/23 1000 100 -- -- -- --    04/28/23 0900 100 -- -- -- --    04/28/23 0800 100 -- -- -- --    04/28/23 0756 -- room air -- -- --    04/28/23 0700 100 -- -- -- --    04/28/23 0600 100 -- -- -- --    04/28/23 0500 100 -- -- -- --    04/28/23 0400 100 -- -- -- --    04/28/23 0300 100 -- -- -- --    04/28/23 0200 99 -- -- -- --    04/28/23 0130 100 -- -- -- --    04/28/23 0100 100 -- -- -- --    04/28/23 0030 100 -- -- -- --    04/28/23 0000 100 -- -- -- --    04/27/23 2330 99 -- -- -- --    04/27/23 2230 100 room air -- -- --    04/27/23 2131 99 -- -- -- --    04/27/23 2031 98 -- -- -- --    04/27/23 2001 99 room air -- -- --          Lines, Drains & Airways     Active LDAs     Name Placement date Placement time Site Days    Peripheral IV 04/27/23 2000 Right Antecubital 04/27/23 2000  Antecubital  less than 1    Peripheral IV 04/27/23 2105 Anterior;Right Forearm 04/27/23 2105  Forearm  less than 1    Peripheral IV 04/27/23 2158 Left;Upper Arm 04/27/23 2158  Arm  less than 1    Urethral Catheter Non-latex 16 Fr. 04/27/23 2031  -- less than 1          Inactive LDAs     Name Placement date Placement time Removal date Removal time Site Days    [REMOVED] Peripheral IV 04/27/23 1955 Left Antecubital 04/27/23 1955 04/27/23 2157  Antecubital  less than 1                CIWA (last day)     Date/Time CIWA-Ar Score    04/28/23 0756 1    04/28/23 0500 --    04/28/23 0400 1     04/27/23 2316 4          Facility-Administered Medications as of 4/28/2023   Medication Dose Route Frequency Provider Last Rate Last Admin   • [COMPLETED] acetaminophen (TYLENOL) suppository 650 mg  650 mg Rectal Once Rocky Cline MD   650 mg at 04/27/23 2019   • acetaminophen (TYLENOL) tablet 650 mg  650 mg Oral Q4H PRN Ryne Resendiz MD   650 mg at 04/28/23 0758    Or   • acetaminophen (TYLENOL) suppository 650 mg  650 mg Rectal Q4H PRN Ryne Resendiz MD       • calcium carbonate (TUMS) chewable tablet 500 mg (200 mg elemental)  2 tablet Oral BID PRN Ryne Resendiz MD       • calcium gluconate 1g/50ml 0.675% NaCl IV SOLN  1 g Intravenous PRN Ryne Resendiz MD        And   • calcium gluconate 2 g in sodium chloride 0.9 % 500 mL IVPB  2 g Intravenous PRN Ryne Resendiz MD       • [COMPLETED] cefTRIAXone (ROCEPHIN) 1 g in sodium chloride 0.9 % 100 mL IVPB-VTB  1 g Intravenous Once Rocky Cline  mL/hr at 04/27/23 2132 1 g at 04/27/23 2132   • [COMPLETED] cefTRIAXone (ROCEPHIN) 1 g in sodium chloride 0.9 % 100 mL IVPB-VTB  1 g Intravenous Once Ryne Resendiz  mL/hr at 04/27/23 2312 1 g at 04/27/23 2312   • cefTRIAXone (ROCEPHIN) 2 g in sodium chloride 0.9 % 100 mL IVPB-VTB  2 g Intravenous Q24H Ryne Resendiz MD       • diazePAM (VALIUM) injection 10 mg  10 mg Intravenous Once Rocky Cline MD       • Enoxaparin Sodium (LOVENOX) syringe 40 mg  40 mg Subcutaneous Nightly Ryne Resendiz MD   40 mg at 04/27/23 2310   • folic acid (FOLVITE) tablet 1 mg  1 mg Oral Daily Ryne Resendiz MD   1 mg at 04/28/23 0800   • [COMPLETED] LORazepam (ATIVAN) injection 1 mg  1 mg Intravenous Once Rocky Cline MD   1 mg at 04/27/23 2007   • LORazepam (ATIVAN) injection 1 mg  1 mg Intravenous Q10 Min PRN Rocky Cline MD   1 mg at 04/27/23 2042   • LORazepam (ATIVAN) injection 2 mg  2 mg Intravenous Q4H PRN Ryne Resendiz MD       • LORazepam (ATIVAN) tablet 1 mg  1 mg Oral Q6H Astrid  Ryne SUE MD   1 mg at 04/28/23 1057    Followed by   • LORazepam (ATIVAN) tablet 1 mg  1 mg Oral Q8H Ryne Resendiz MD       • Magnesium Sulfate - Total Dose 10 grams - Magnesium 1 or Less  2 g Intravenous PRN Ryne Resendiz MD        Or   • Magnesium Sulfate - Total Dose 6 grams - Magnesium 1.1 - 1.5  2 g Intravenous PRN Ryne Resendiz MD        Or   • Magnesium Sulfate - Total Dose 4 grams - Magnesium 1.6 - 1.9  4 g Intravenous PRN Ryne Resendiz MD       • [COMPLETED] magnesium sulfate 2g/50 mL (PREMIX) infusion  2 g Intravenous Once Rocky Cline MD   2 g at 04/27/23 2007   • ondansetron (ZOFRAN) tablet 4 mg  4 mg Oral Q6H PRN Ryne Resendiz MD        Or   • ondansetron (ZOFRAN) injection 4 mg  4 mg Intravenous Q6H PRN Ryne Resendiz MD       • potassium & sodium phosphates (PHOS-NAK) 280-160-250 MG packet - for Phosphorus less than 1.25 mg/dL  2 packet Oral Q6H PRRyne Joseph MD        Or   • potassium & sodium phosphates (PHOS-NAK) 280-160-250 MG packet - for Phosphorus 1.25 - 2.5 mg/dL  2 packet Oral Q6H PRRyne Joseph MD       • potassium chloride (K-DUR,KLOR-CON) ER tablet 40 mEq  40 mEq Oral PRN Ryne Resendiz MD   40 mEq at 04/28/23 0838    Or   • potassium chloride (KLOR-CON) packet 40 mEq  40 mEq Oral PRN Ryne Resendiz MD        Or   • potassium chloride 10 mEq in 100 mL IVPB  10 mEq Intravenous Q1H PRN Ryne Resendiz MD       • [COMPLETED] sodium chloride 0.9 % bolus 1,000 mL  1,000 mL Intravenous Once Rocky Cline MD   Stopped at 04/27/23 2107   • [COMPLETED] sodium chloride 0.9 % bolus 1,701 mL  30 mL/kg Intravenous Once Rocky Cline MD 1,701 mL/hr at 04/27/23 2119 1,701 mL at 04/27/23 2119   • sodium chloride 0.9 % flush 10 mL  10 mL Intravenous PRN Rocky Cline MD       • sodium chloride 0.9 % flush 10 mL  10 mL Intravenous Q12H Ryne Resendiz MD   10 mL at 04/28/23 0804   • sodium chloride 0.9 % flush 10 mL  10 mL Intravenous PRN Ryne Resendiz MD        • sodium chloride 0.9 % infusion 40 mL  40 mL Intravenous PRN Ryne Resendiz MD       • sodium chloride 0.9 % infusion  125 mL/hr Intravenous Continuous Ryne Resendiz  mL/hr at 04/28/23 1101 125 mL/hr at 04/28/23 1101   • thiamine (B-1) 100 mg in sodium chloride 0.9 % 100 mL IVPB  100 mg Intravenous Daily Ryne Resendiz  mL/hr at 04/28/23 0800 100 mg at 04/28/23 0800   • [COMPLETED] thiamine (B-1) 100 mg, folic acid 1 mg in sodium chloride 0.9 % 1,000 mL infusion  1,000 mL/hr Intravenous Once Rocky Cline MD 1,000 mL/hr at 04/27/23 2039 1,000 mL/hr at 04/27/23 2039     Orders (last 24 hrs)      Start     Ordered    04/28/23 2217  LORazepam (ATIVAN) tablet 1 mg  Every 8 Hours        See Hyperspace for full Linked Orders Report.    04/27/23 2215 04/28/23 1800  cefTRIAXone (ROCEPHIN) 1 g in sodium chloride 0.9 % 100 mL IVPB-VTB  Every 24 Hours,   Status:  Discontinued         04/27/23 2123 04/28/23 1800  cefTRIAXone (ROCEPHIN) 2 g in sodium chloride 0.9 % 100 mL IVPB-VTB  Every 24 Hours         04/27/23 2208    04/28/23 1125  STAT Lactic Acid, Reflex  PROCEDURE ONCE         04/28/23 0611    04/28/23 1118  Blood Culture ID, PCR - Blood, Arm, Right  Once         04/28/23 1117    04/28/23 1056  I attest that I reassessed tissue perfusion after fluid resuscitation was completed  Once        Comments: I attest that I reassessed tissue perfusion after fluid resuscitation was completed    04/28/23 1055    04/28/23 0900  thiamine (B-1) 100 mg in sodium chloride 0.9 % 100 mL IVPB  Daily         04/27/23 2120 04/28/23 0900  folic acid (FOLVITE) tablet 1 mg  Daily         04/27/23 2215 04/28/23 0702  Inpatient Neurology Consult General  IN         Specialty:  Neurology  Provider:  Alexander Roe MD    04/27/23 2159 04/28/23 0600  CBC & Differential  Morning Draw         04/27/23 2214 04/28/23 0600  Comprehensive Metabolic Panel  Morning Draw         04/27/23 2214 04/28/23 0600   CBC Auto Differential  PROCEDURE ONCE         04/27/23 2214    04/28/23 0545  US Renal Bilateral  1 Time Imaging        Comments: Pt has both kidneys    04/27/23 2209    04/28/23 0525  STAT Lactic Acid, Reflex  PROCEDURE ONCE         04/28/23 0257    04/28/23 0243  Manual Differential  Once         04/28/23 0242    04/28/23 0204  STAT Lactic Acid, Reflex  PROCEDURE ONCE         04/27/23 2343    04/27/23 2331  STAT Lactic Acid, Reflex  PROCEDURE ONCE         04/27/23 2104    04/27/23 2235  POC Glucose Once  PROCEDURE ONCE         04/27/23 2233    04/27/23 2221  Enoxaparin Sodium (LOVENOX) syringe 40 mg  Nightly         04/27/23 2214 04/27/23 2216  sodium chloride 0.9 % flush 10 mL  Every 12 Hours Scheduled         04/27/23 2214 04/27/23 2216  LORazepam (ATIVAN) tablet 1 mg  Every 6 Hours        See Hyperspace for full Linked Orders Report.    04/27/23 2215 04/27/23 2215  LORazepam (ATIVAN) injection 2 mg  Every 4 Hours PRN         04/27/23 2215 04/27/23 2215  Daily Weights  Daily       04/27/23 2214 04/27/23 2215  Patient Currently On Electrolyte Replacement Protocol - Please Refer to MAR for Protocol Details  Misc Nursing Order (Specify)  Daily      Comments: Patient Currently On Electrolyte Replacement Protocol - Please Refer to MAR for Protocol Details    04/27/23 2214 04/27/23 2215  Initiate Alcohol Withdrawal Protocol  Once         04/27/23 2215 04/27/23 2215  Vital Signs  Per Hospital Policy         04/27/23 2215 04/27/23 2215  Continuous Pulse Oximetry  Continuous         04/27/23 2215 04/27/23 2215  Obtain Baseline Clinical [place] Withdrawal Assessment - Ar (CIWA-Ar), Sedation Scale & Vital Signs  Once        Comments: Follow CIWA Scoring Reference Guide    04/27/23 2215 04/27/23 2215  Clinical [place] Withdrawal Assessment (CIWA-Ar)  Per Hospital Policy         04/27/23 2215 04/27/23 2215  If CIWA-Ar Score Less Than 8 For 3 Consecutive Assessments, Monitor Every 4 Hours  & Discontinue Assessment When CIWA-Ar Less Than 8 for 24 Hours  Per Hospital Policy        Comments: Contact Provider to Restart Protocol if Any Symptoms Reappear    04/27/23 2215 04/27/23 2215  Seizure Precautions  Continuous         04/27/23 2215 04/27/23 2215  Notify Provider - Withdrawal  Until Discontinued         04/27/23 2215 04/27/23 2215  Notify Provider of Abnormal Lab Results  Until Discontinued         04/27/23 2215 04/27/23 2215  Notify Provider - Vitals  Until Discontinued         04/27/23 2215 04/27/23 2215  Safety Precautions  Continuous         04/27/23 2215 04/27/23 2214  calcium carbonate (TUMS) chewable tablet 500 mg (200 mg elemental)  2 Times Daily PRN         04/27/23 2214 04/27/23 2213  ondansetron (ZOFRAN) tablet 4 mg  Every 6 Hours PRN        See Hyperspace for full Linked Orders Report.    04/27/23 2214 04/27/23 2213  ondansetron (ZOFRAN) injection 4 mg  Every 6 Hours PRN        See Hyperspace for full Linked Orders Report.    04/27/23 2214 04/27/23 2213  calcium gluconate 1g/50ml 0.675% NaCl IV SOLN  As Needed        See Hyperspace for full Linked Orders Report.    04/27/23 2214 04/27/23 2213  calcium gluconate 2 g in sodium chloride 0.9 % 500 mL IVPB  As Needed        Note to Pharmacy: Final concentration of bolus dose between 10 to 50 mg/mL.   See Hyperspace for full Linked Orders Report.    04/27/23 2214 04/27/23 2213  potassium & sodium phosphates (PHOS-NAK) 280-160-250 MG packet - for Phosphorus less than 1.25 mg/dL  Every 6 Hours PRN        See Hyperspace for full Linked Orders Report.    04/27/23 2214 04/27/23 2213  potassium & sodium phosphates (PHOS-NAK) 280-160-250 MG packet - for Phosphorus 1.25 - 2.5 mg/dL  Every 6 Hours PRN        See Hyperspace for full Linked Orders Report.    04/27/23 2214 04/27/23 2213  Magnesium Sulfate - Total Dose 10 grams - Magnesium 1 or Less  As Needed        See Hyperspace for full Linked Orders Report.     04/27/23 2214 04/27/23 2213  Magnesium Sulfate - Total Dose 6 grams - Magnesium 1.1 - 1.5  As Needed        See Hyperspace for full Linked Orders Report.    04/27/23 2214 04/27/23 2213  Magnesium Sulfate - Total Dose 4 grams - Magnesium 1.6 - 1.9  As Needed        See Hyperspace for full Linked Orders Report.    04/27/23 2214 04/27/23 2213  potassium chloride (K-DUR,KLOR-CON) ER tablet 40 mEq  As Needed        See Hyperspace for full Linked Orders Report.    04/27/23 2214 04/27/23 2213  potassium chloride (KLOR-CON) packet 40 mEq  As Needed        See Hyperspace for full Linked Orders Report.    04/27/23 2214 04/27/23 2213  potassium chloride 10 mEq in 100 mL IVPB  Every 1 Hour PRN        See Hyperspace for full Linked Orders Report.    04/27/23 2214 04/27/23 2213  acetaminophen (TYLENOL) tablet 650 mg  Every 4 Hours PRN        See Hyperspace for full Linked Orders Report.    04/27/23 2214 04/27/23 2213  acetaminophen (TYLENOL) suppository 650 mg  Every 4 Hours PRN        See Hyperspace for full Linked Orders Report.    04/27/23 2214 04/27/23 2213  cefTRIAXone (ROCEPHIN) 1 g in sodium chloride 0.9 % 100 mL IVPB-VTB  Once         04/27/23 2211 04/27/23 2213  Vital Signs Per hospital policy  Per Hospital Policy         04/27/23 2214 04/27/23 2213  Cardiac Monitoring  Continuous         04/27/23 2214 04/27/23 2213  Continuous Pulse Oximetry  Continuous,   Status:  Canceled         04/27/23 2214 04/27/23 2213  Height and weight  Once         04/27/23 2214 04/27/23 2213  Intake and Output  Every Shift       04/27/23 2214 04/27/23 2213  Oral Care - Patient Not on NPPV & Not Intubated  Every Shift       04/27/23 2214 04/27/23 2213  If Patient has BG of < 80 and is symptomatic but not on an IV insulin protocol then use the Adult Hypoglycemia Treatment Orders.  Once         04/27/23 2214    04/27/23 2213  POC Glucose Once  Once        Comments: On arrival to unit. If >140, call  admitting MD for orders.      04/27/23 2214 04/27/23 2213  Tobacco cessation education  Once         04/27/23 2214 04/27/23 2213  Follow Continuous Cardiac Monitoring Standing Orders  Continuous        Comments: Follow Standing Orders As Outlined in Process Instructions (Open Order Report to View Full Instructions)    04/27/23 2214 04/27/23 2213  ICU / CCU - Maintain IV Access  Continuous,   Status:  Canceled         04/27/23 2214 04/27/23 2213  ICU / CCU - Place Order Consult Intensivist For Critical Care Management (If Patient Not Admitted to Cardiology for Primary Cardiology Condition)  Continuous         04/27/23 2214 04/27/23 2213  ICU / CCU - Notify All Physicians When Patient is Transferred  Once         04/27/23 2214 04/27/23 2213  Use Mobility Guidelines for Advancement of Activity  Until Discontinued         04/27/23 2214 04/27/23 2213  Insert Peripheral IV  Once         04/27/23 2214 04/27/23 2213  Saline Lock & Maintain IV Access  Continuous         04/27/23 2214 04/27/23 2213  Code Status and Medical Interventions:  Continuous         04/27/23 2214 04/27/23 2213  Diet: Liquid Diets; Clear Liquid; Texture: Regular Texture (IDDSI 7); Fluid Consistency: Thin (IDDSI 0)  Diet Effective Now         04/27/23 2214 04/27/23 2212  sodium chloride 0.9 % flush 10 mL  As Needed         04/27/23 2214 04/27/23 2212  sodium chloride 0.9 % infusion 40 mL  As Needed         04/27/23 2214 04/27/23 2211  Hepatitis Panel, Acute  Once         04/27/23 2210 04/27/23 2210  US Renal Limited  1 Time Imaging,   Status:  Canceled         04/27/23 2209 04/27/23 2209  US Gallbladder  1 Time Imaging         04/27/23 2208 04/27/23 2126  sodium chloride 0.9 % infusion  Continuous         04/27/23 2124 04/27/23 2126  Salicylate Level  Once         04/27/23 2125 04/27/23 2124  Inpatient Admission  Once         04/27/23 2123 04/27/23 2123  Lipase  Once         04/27/23 2122     04/27/23 2123  Ammonia  Once         04/27/23 2122 04/27/23 2122  CT Head Without Contrast  1 Time Imaging         04/27/23 2121 04/27/23 2122  XR Chest 1 View  1 Time Imaging         04/27/23 2121 04/27/23 2121  Respiratory Panel PCR w/COVID-19(SARS-CoV-2) ЕКАТЕРИНА/SHARLENE/LAURA/PAD/COR/MAD/GILMA In-House, NP Swab in UTM/VTM, 3-4 HR TAT - Swab, Nasopharynx  Once         04/27/23 2120 04/27/23 2121  MRSA Screen, PCR (Inpatient) - Swab, Nares  Once         04/27/23 2120 04/27/23 2111  Pulmonology (on-call MD unless specified)  Once        Specialty:  Pulmonary Disease  Provider:  Ryne Resendiz MD    04/27/23 2110 04/27/23 2106  Blood Gas, Arterial -  PROCEDURE ONCE         04/27/23 2058 04/27/23 2054  diazePAM (VALIUM) injection 10 mg  Once         04/27/23 2052 04/27/23 2053  LORazepam (ATIVAN) injection 1 mg  Once,   Status:  Discontinued         04/27/23 2051 04/27/23 2047  Urinalysis, Microscopic Only - Indwelling Urethral Catheter  Once         04/27/23 2046 04/27/23 2022  cefTRIAXone (ROCEPHIN) 1 g in sodium chloride 0.9 % 100 mL IVPB-VTB  Once         04/27/23 2020 04/27/23 2022  Critical Care  Once        Comments: This order was created via procedure documentation    04/27/23 2021 04/27/23 2019  sodium chloride 0.9 % bolus 1,701 mL  Once         04/27/23 2017 04/27/23 2018  Insert 3-way Bah Catheter  Once        Comments: Follow Protocol As Outlined in Process Instructions (Open Order Report to View Full Instructions)   See Hyperspace for full Linked Orders Report.    04/27/23 2017 04/27/23 2018  Assess Need for Indwelling Urinary Catheter - Follow Removal Protocol  Continuous        Comments: Follow Protocol As Outlined in Process Instructions (Open Order Report to View Full Instructions)   See Hyperspace for full Linked Orders Report.    04/27/23 2017 04/27/23 2018  Urinary Catheter Care  Every Shift      See Hyperspace for full Linked Orders Report.    04/27/23 2017     04/27/23 2016  Manual Differential  Once         04/27/23 2015 04/27/23 2015  acetaminophen (TYLENOL) suppository 650 mg  Once         04/27/23 2013 04/27/23 2015  Urinalysis With Microscopic If Indicated (No Culture) - Indwelling Urethral Catheter  Once         04/27/23 2014 04/27/23 2013  Blood Culture - Blood, Arm, Left  Once         04/27/23 2013 04/27/23 2013  Blood Culture - Blood, Arm, Right  Once         04/27/23 2013 04/27/23 2013  Lactic Acid, Plasma  Once         04/27/23 2013 04/27/23 2013  Procalcitonin  Once         04/27/23 2013 04/27/23 2013  CK  STAT         04/27/23 2013 04/27/23 2013  Blood Gas, Arterial -  Once         04/27/23 2013 04/27/23 2013  Please recheck temperature in 30 minutes after the Tylenol.  Please get ice packs to place under the armpits in the back of the neck as well as in the groin area.  Misc Nursing Order (Specify)  Once        Comments: Please recheck temperature in 30 minutes after the Tylenol.  Please get ice packs to place under the armpits in the back of the neck as well as in the groin area.    04/27/23 2013 04/27/23 2004  thiamine (B-1) 100 mg, folic acid 1 mg in sodium chloride 0.9 % 1,000 mL infusion  Once         04/27/23 2002 04/27/23 2004  magnesium sulfate 2g/50 mL (PREMIX) infusion  Once         04/27/23 2002 04/27/23 2004  sodium chloride 0.9 % bolus 1,000 mL  Once         04/27/23 2002 04/27/23 2001  LORazepam (ATIVAN) injection 1 mg  Once         04/27/23 1959    04/27/23 2001  Ethanol  STAT         04/27/23 2000 04/27/23 2001  Magnesium  STAT         04/27/23 2000 04/27/23 2000  LORazepam (ATIVAN) injection 1 mg  Every 10 Minutes PRN         04/27/23 2000 04/27/23 2000  CBC & Differential  Once         04/27/23 2000 04/27/23 2000  Comprehensive Metabolic Panel  Once         04/27/23 2000 04/27/23 2000  hCG, Serum, Qualitative  STAT         04/27/23 2000 04/27/23 2000  Insert Peripheral IV  Once         See Hyperspace for full Linked Orders Report.    04/27/23 2000 04/27/23 2000  Urine Drug Screen - Indwelling Urethral Catheter  STAT         04/27/23 2000 04/27/23 2000  CBC Auto Differential  PROCEDURE ONCE         04/27/23 2000 04/27/23 2000  Urine Drug Screen - Indwelling Urethral Catheter  PROCEDURE ONCE         04/27/23 2000 04/27/23 1959  sodium chloride 0.9 % flush 10 mL  As Needed        See Hyperspace for full Linked Orders Report.    04/27/23 2000    Unscheduled  Initiate & Follow Electrolyte Replacement Protocol  As Needed       04/27/23 2214    Unscheduled  Magnesium  As Needed       04/27/23 2214    Unscheduled  Potassium  As Needed       04/27/23 2214    Unscheduled  Magnesium  As Needed      Comments: In AM After Magnesium Supplementation      04/27/23 2214    Unscheduled  Calcium  As Needed      Comments: 6 hours after infusion complete     See Hyperspace for full Linked Orders Report.    04/27/23 2214    Unscheduled  Obtain Pre & Post Sedation Scores With Every Lorazepam Dose - Hold For POSS Greater Than 2 or RASS of -3 or Less  As Needed       04/27/23 2215                   Physician Progress Notes (last 24 hours)      González Tilley MD at 04/28/23 0736                                                        LOS: 1 day   Patient Care Team:  Provider, No Known as PCP - General    Chief Complaint:  F/up sepsis, alcoholism and critical care management    Subjective   Interval History  I reviewed the admission note, progress notes, PMH, PSH, Family hx, social history, imagings and prior records on this admission, summarized the finding in my note and formulated a transition of care plan.    Fever.  Shivering.  Abdominal discomfort only on palpation of the lower abdomen    REVIEW OF SYSTEMS:   CARDIOVASCULAR: No chest pain, chest pressure or chest discomfort. No palpitations or edema.   RESPIRATORY: Mild cough attributed to allergy.  No shortness of breath.  GASTROINTESTINAL: No anorexia,  "nausea, vomiting or diarrhea. No abdominal pain.  CONSTITUTIONAL: Fever and chills.  Neuro: Denies headache, neck stiffness.    Ventilator/Non-Invasive Ventilation Settings (From admission, onward)    None                Physical Exam:     Vital Signs  Temp:  [99.7 °F (37.6 °C)-106.5 °F (41.4 °C)] 99.7 °F (37.6 °C)  Heart Rate:  [107-150] 112  Resp:  [16-28] 25  BP: (107-128)/(52-94) 119/80    Intake/Output Summary (Last 24 hours) at 4/28/2023 0736  Last data filed at 4/28/2023 0649  Gross per 24 hour   Intake 1012.5 ml   Output 2400 ml   Net -1387.5 ml     Flowsheet Rows    Flowsheet Row First Filed Value   Admission Height 175.3 cm (69\") Documented at 04/27/2023 2001   Admission Weight 56.7 kg (125 lb) Documented at 04/27/2023 2001          PPE used per hospital policy    General Appearance:   Alert, cooperative, in no acute distress   ENMT:  Mallampati score 2, dry mucous membrane   Eyes:  Pupils equal and reactive to light. EOMI   Neck:    Trachea midline. No thyromegaly.   Lungs:    Clear to auscultation,respirations regular, even and nonlabored    Heart:   Tachycardia but regular rhythm.  No audible murmur   Skin:   No rash or ecchymosis   Abdomen:    Soft.  Mild tenderness on palpation of the lower abdomen.  No guarding or rebound.   Neuro/psych:  Conscious, alert, oriented x3. Strength 5/5 in upper and lower  ext.  Appropriate mood and affect   Extremities:  No cyanosis, clubbing or edema.  Warm extremities and well-perfused          Results Review:        Results from last 7 days   Lab Units 04/28/23 0225 04/27/23 2003   SODIUM mmol/L 142 137   POTASSIUM mmol/L 2.1* 3.7   CHLORIDE mmol/L 104 95*   CO2 mmol/L 18.8* 19.3*   BUN mg/dL 3* 5*   CREATININE mg/dL 0.40* 0.57   GLUCOSE mg/dL 85 150*   CALCIUM mg/dL 7.3* 8.9     Results from last 7 days   Lab Units 04/27/23 2003   CK TOTAL U/L 248*     Results from last 7 days   Lab Units 04/28/23 0225 04/27/23 2003   WBC 10*3/mm3 5.99 8.01   HEMOGLOBIN g/dL " 8.9* 9.8*   HEMATOCRIT % 25.8* 28.6*   PLATELETS 10*3/mm3 143 171                           Results from last 7 days   Lab Units 04/27/23 2058   PH, ARTERIAL pH units 7.472*   PCO2, ARTERIAL mm Hg 30.9*   PO2 ART mm Hg 34.0*   MODALITY  Room Air   O2 SATURATION CALC % 70.7*         I reviewed the patient's new clinical results.        Medication Review:   cefTRIAXone, 2 g, Intravenous, Q24H  diazePAM, 10 mg, Intravenous, Once  enoxaparin, 40 mg, Subcutaneous, Nightly  folic acid, 1 mg, Oral, Daily  LORazepam, 1 mg, Oral, Q6H   Followed by  LORazepam, 1 mg, Oral, Q8H  sodium chloride, 10 mL, Intravenous, Q12H  thiamine (VITAMIN B1) IVPB, 100 mg, Intravenous, Daily        sodium chloride, 125 mL/hr, Last Rate: 125 mL/hr (04/28/23 0227)        Diagnostic imaging:  I personally and independently reviewed the following images:  CXR 4/27/23: Vague left pulmonary infiltrates    Assessment     1. Severe sepsis, POA, 2ary to UTI  2. UTI  3. Metabolic/toxic encephalopathy, resolved  4. Alcohol intoxication  5. Lactic acidosis, secondary to alcoholism and sepsis  6. Transaminitis, likely mild acute alcoholic hepatitis  7. THC use  8. Microscopic hematuria, secondary to UTI VS other  9. Elevated CK, mild  10. Acute on chronic anemia but no evidence of blood loss.  Hb stable  11. Hepatic steatosis on ultrasound, probably secondary to alcoholism  12. Electrolytes disturbance: Severe hypokalemia      Plan         · Rocephin for UTI.  Add urine culture to the specimen collected on presentation  · IV hydration with normal saline  · Replace potassium  · Thiamine and folate supplement  · Ativan per CIWA protocol  · Pending ultrasound of the kidneys due to hematuria  · Continue trending lactate  · DVT prophylaxis with Lovenox  · Counseled against alcoholism    Continue ICU care for today due to severe sepsis.    González Tilley MD  04/28/23  07:36 EDT      Time: Critical care 35 min      This note was dictated utilizing Ankit  dictation    Electronically signed by González Tilley MD at 04/28/23 2005      Detail Level: Zone